# Patient Record
Sex: MALE | Race: WHITE | NOT HISPANIC OR LATINO | Employment: OTHER | URBAN - METROPOLITAN AREA
[De-identification: names, ages, dates, MRNs, and addresses within clinical notes are randomized per-mention and may not be internally consistent; named-entity substitution may affect disease eponyms.]

---

## 2018-08-20 PROBLEM — C18.4 MALIGNANT NEOPLASM OF TRANSVERSE COLON: Status: ACTIVE | Noted: 2018-08-20

## 2018-08-20 NOTE — H&P (VIEW-ONLY)
History & Physical    SUBJECTIVE:     History of Present Illness:  Patient is a 68 y.o. male presents with newly diagnosed colon cancer on recent colonoscopy.     Chief Complaint   Patient presents with    Colon Cancer       Review of patient's allergies indicates:  No Known Allergies    Current Outpatient Medications   Medication Sig Dispense Refill    atorvastatin (LIPITOR) 10 MG tablet TAKE 1 TABLET BY MOUTH EVERY BEDTIME  4    cholecalciferol, vitamin D3, (VITAMIN D3) 400 unit Cap Take 400 Units by mouth once daily.      docusate (COLACE) 60 mg/15 mL syrup Take 100 mg by mouth 2 (two) times daily.      escitalopram oxalate (LEXAPRO) 10 MG tablet Take 10 mg by mouth once daily.      ferrous sulfate 325 mg (65 mg iron) Tab tablet Take 1 tablet by mouth once daily.  0    hydroCHLOROthiazide (HYDRODIURIL) 25 MG tablet Take 25 mg by mouth every morning.  4    levetiracetam (KEPPRA) 500 MG Tab Take 500 mg by mouth 2 (two) times daily.      lisinopril (PRINIVIL,ZESTRIL) 5 MG tablet Take 5 mg by mouth once daily.      omeprazole (PRILOSEC) 20 MG capsule Take by mouth daily as needed.  4    potassium chloride (MICRO-K) 10 MEQ CpSR Take 10 mEq by mouth once.      SYMBICORT 160-4.5 mcg/actuation HFAA 2 puffs 2 (two) times daily.  2    VENTOLIN HFA 90 mcg/actuation inhaler Inhale 2 puffs into the lungs every 4 (four) hours as needed.  4     No current facility-administered medications for this visit.        Past Medical History:   Diagnosis Date    Aphagia     Cancer     prostate & colon    Cardiomegaly     Dysphagia     Hypertension     Seizures     Subdural hemorrhage      Past Surgical History:   Procedure Laterality Date    brain coiling      PROSTATE SURGERY      TRACHEOSTOMY TUBE PLACEMENT       Family History   Problem Relation Age of Onset    Aneurysm Sister      Social History     Tobacco Use    Smoking status: Current Every Day Smoker    Smokeless tobacco: Never Used   Substance Use  "Topics    Alcohol use: Yes    Drug use: No        Review of Systems:  Review of Systems   Constitutional: Negative.    HENT: Negative.    Eyes: Negative.    Respiratory: Negative.    Cardiovascular: Negative.    Gastrointestinal: Negative.    Endocrine: Negative.    Musculoskeletal: Negative.    Skin: Negative.    Allergic/Immunologic: Negative.    Neurological: Negative.    Hematological: Negative.    Psychiatric/Behavioral: Negative.    All other systems reviewed and are negative.      OBJECTIVE:     Vital Signs (Most Recent)  Pulse: 79 (08/20/18 1411)  BP: 131/69 (08/20/18 1411)  5' 6" (1.676 m)  105.7 kg (233 lb)     Physical Exam:  Physical Exam   Constitutional: He is oriented to person, place, and time. He appears well-developed and well-nourished.   HENT:   Head: Normocephalic and atraumatic.   Right Ear: External ear normal.   Left Ear: External ear normal.   Nose: Nose normal.   Mouth/Throat: Oropharynx is clear and moist.   Eyes: Conjunctivae and EOM are normal. Pupils are equal, round, and reactive to light.   Neck: Normal range of motion. Neck supple.   Cardiovascular: Normal rate, regular rhythm, normal heart sounds and intact distal pulses.   Pulmonary/Chest: Effort normal and breath sounds normal.   Abdominal: Soft. Bowel sounds are normal.       Musculoskeletal: Normal range of motion.   Neurological: He is alert and oriented to person, place, and time. He has normal reflexes.   Skin: Skin is warm and dry.   Psychiatric: He has a normal mood and affect. His behavior is normal. Thought content normal.   Vitals reviewed.      Laboratory  none    Diagnostic Results:  none    ASSESSMENT/PLAN:     Colon cancer    PLAN:Plan     To the OR for lap colectomy with risks and possible complications were explained and he agrees to proceed        "

## 2018-09-05 ENCOUNTER — HOSPITAL ENCOUNTER (OUTPATIENT)
Dept: PREADMISSION TESTING | Facility: HOSPITAL | Age: 68
Discharge: HOME | End: 2018-09-05
Attending: SURGERY
Payer: MEDICARE

## 2018-09-05 VITALS
HEART RATE: 68 BPM | DIASTOLIC BLOOD PRESSURE: 74 MMHG | OXYGEN SATURATION: 95 % | SYSTOLIC BLOOD PRESSURE: 119 MMHG | WEIGHT: 230.19 LBS | BODY MASS INDEX: 36.99 KG/M2 | TEMPERATURE: 97 F | RESPIRATION RATE: 18 BRPM | HEIGHT: 66 IN

## 2018-09-05 DIAGNOSIS — C18.4 MALIGNANT NEOPLASM OF TRANSVERSE COLON: ICD-10-CM

## 2018-09-05 DIAGNOSIS — Z01.818 PRE-OP TESTING: Primary | ICD-10-CM

## 2018-09-05 LAB
ALBUMIN SERPL BCP-MCNC: 3.8 G/DL
ALP SERPL-CCNC: 84 U/L
ALT SERPL W/O P-5'-P-CCNC: 14 U/L
ANION GAP SERPL CALC-SCNC: 11 MMOL/L
AST SERPL-CCNC: 14 U/L
BASOPHILS # BLD AUTO: 0.03 K/UL
BASOPHILS NFR BLD: 0.2 %
BILIRUB SERPL-MCNC: 0.5 MG/DL
BUN SERPL-MCNC: 18 MG/DL
CALCIUM SERPL-MCNC: 10 MG/DL
CHLORIDE SERPL-SCNC: 104 MMOL/L
CO2 SERPL-SCNC: 28 MMOL/L
CREAT SERPL-MCNC: 1.1 MG/DL
DIFFERENTIAL METHOD: ABNORMAL
EOSINOPHIL # BLD AUTO: 0.5 K/UL
EOSINOPHIL NFR BLD: 3.7 %
ERYTHROCYTE [DISTWIDTH] IN BLOOD BY AUTOMATED COUNT: 15.4 %
EST. GFR  (AFRICAN AMERICAN): >60 ML/MIN/1.73 M^2
EST. GFR  (NON AFRICAN AMERICAN): >60 ML/MIN/1.73 M^2
GLUCOSE SERPL-MCNC: 98 MG/DL
HCT VFR BLD AUTO: 42.7 %
HGB BLD-MCNC: 14.7 G/DL
LYMPHOCYTES # BLD AUTO: 3 K/UL
LYMPHOCYTES NFR BLD: 22.4 %
MCH RBC QN AUTO: 29.6 PG
MCHC RBC AUTO-ENTMCNC: 34.4 G/DL
MCV RBC AUTO: 86 FL
MONOCYTES # BLD AUTO: 1.1 K/UL
MONOCYTES NFR BLD: 7.9 %
NEUTROPHILS # BLD AUTO: 8.7 K/UL
NEUTROPHILS NFR BLD: 65.4 %
PLATELET # BLD AUTO: 373 K/UL
PMV BLD AUTO: 9.6 FL
POTASSIUM SERPL-SCNC: 4.7 MMOL/L
PROT SERPL-MCNC: 7.4 G/DL
RBC # BLD AUTO: 4.97 M/UL
SODIUM SERPL-SCNC: 143 MMOL/L
WBC # BLD AUTO: 13.36 K/UL

## 2018-09-05 PROCEDURE — 93005 ELECTROCARDIOGRAM TRACING: CPT

## 2018-09-05 PROCEDURE — 80053 COMPREHEN METABOLIC PANEL: CPT

## 2018-09-05 PROCEDURE — 36415 COLL VENOUS BLD VENIPUNCTURE: CPT

## 2018-09-05 PROCEDURE — 93010 ELECTROCARDIOGRAM REPORT: CPT | Mod: ,,, | Performed by: INTERNAL MEDICINE

## 2018-09-05 PROCEDURE — 85025 COMPLETE CBC W/AUTO DIFF WBC: CPT

## 2018-09-05 RX ORDER — ASPIRIN 81 MG/1
81 TABLET ORAL DAILY
COMMUNITY

## 2018-09-05 NOTE — DISCHARGE INSTRUCTIONS
"  Your surgery is scheduled for __Monday Sept 10, 2018__________________.    Call [phone] between 2 p.m. and 5 p.m. on   _Friday___ to find out your arrival time for the day of your surgery.      Please report to SAME DAY SURGERY UNIT on the 2nd FLOOR at _______ a.m.  Use front door entrance. The doors open at 0530 am.      If you need WHEELCHAIR assistance please call  [phone] from your cell phone or "0"  from the  hospital courtesy phone located to the right after you enter the hospital lobby.      INSTRUCTIONS IMPORTANT!!!  ¨ Do not eat or drink after 12 midnight-including water. OK to brush teeth, no   gum, candy or mints!    ¨ Take only these medicines with a small swallow of water-morning of surgery.  Take Levetiracetam (Keppra) and Omeprazole with swallow of water.    Take Flagyl and Neomycin 2 tablets ( each) at 7 PM and 11 PM  Drink entire gallon of prescribed PREP as directed evening before surgery.          _x___  Prep instructions:   SHOWER     _x___  Please shower using Hibiclens soap the night before AND  the morning of  your surgery/procedure. Do not use Hibiclens on your face or genitals       x        If your surgery is around your belly button (Navel) be sure to wash inside your belly button also. Rinse hibiclens off completely.  _x___  No shaving of procedural area at least 4-5 days before surgery due to  increased risk of skin irritation and/or possible infection.  ____  Do not wear makeup, including mascara. WEARING EYE MAKEUP MAY LEAD TO SERIOUS EYE INJURY during surgery.  _x___  No powder, lotions or creams to your body.  _x___  You may wear only deodorant on the day of surgery.  _x___  Please remove all jewelry, including piercings and leave at home.  ____  No money or valuables needed. Please leave at home.  You may bring your cell phone.  ____  Please bring any documents given by your doctor.  ____  If going home the same day, arrange for a ride home. You will not be able to drive if " Anesthesia was used.  ____  Children under 18 years require a parent / guardian present the entire time they are in surgery / recovery.  _x___  Wear loose fitting clothing. Allow for dressings, bandages.  _x___  Stop Aspirin, Ibuprofen, Motrin and Aleve at least 3-5 days before surgery, unless otherwise instructed by your doctor, or the nurse.              You MAY use Tylenol/acetaminophen until day of surgery.  ____  If you take diabetic medication, do not take am of surgery unless instructed by   Doctor.  _x___  Call MD for temperature above 101 degrees.        _x___ Stop taking any Fish Oil supplement or any Vitamins that contain Vitamin  E at least 5 days prior to surgery.          I have read or had read and explained to me, and understand the above information.  Additional comments or instructions:Please call   [phone] if you have any questions regarding the instructions above.

## 2018-09-09 ENCOUNTER — ANESTHESIA EVENT (OUTPATIENT)
Dept: SURGERY | Facility: HOSPITAL | Age: 68
DRG: 331 | End: 2018-09-09
Payer: MEDICARE

## 2018-09-10 ENCOUNTER — ANESTHESIA (OUTPATIENT)
Dept: SURGERY | Facility: HOSPITAL | Age: 68
DRG: 331 | End: 2018-09-10
Payer: MEDICARE

## 2018-09-10 ENCOUNTER — HOSPITAL ENCOUNTER (INPATIENT)
Facility: HOSPITAL | Age: 68
LOS: 3 days | Discharge: HOME | DRG: 331 | End: 2018-09-13
Attending: SURGERY | Admitting: SURGERY
Payer: MEDICARE

## 2018-09-10 DIAGNOSIS — C18.4 MALIGNANT NEOPLASM OF TRANSVERSE COLON: Primary | ICD-10-CM

## 2018-09-10 PROCEDURE — C9290 INJ, BUPIVACAINE LIPOSOME: HCPCS | Performed by: SURGERY

## 2018-09-10 PROCEDURE — 25000003 PHARM REV CODE 250: Performed by: NURSE ANESTHETIST, CERTIFIED REGISTERED

## 2018-09-10 PROCEDURE — 25000003 PHARM REV CODE 250: Performed by: SURGERY

## 2018-09-10 PROCEDURE — 63600175 PHARM REV CODE 636 W HCPCS: Performed by: SURGERY

## 2018-09-10 PROCEDURE — V2790 AMNIOTIC MEMBRANE: HCPCS | Performed by: SURGERY

## 2018-09-10 PROCEDURE — 27201423 OPTIME MED/SURG SUP & DEVICES STERILE SUPPLY: Performed by: SURGERY

## 2018-09-10 PROCEDURE — 25000003 PHARM REV CODE 250: Performed by: ANESTHESIOLOGY

## 2018-09-10 PROCEDURE — D9220A PRA ANESTHESIA: Mod: CRNA,,, | Performed by: NURSE ANESTHETIST, CERTIFIED REGISTERED

## 2018-09-10 PROCEDURE — 36000711: Performed by: SURGERY

## 2018-09-10 PROCEDURE — 37000008 HC ANESTHESIA 1ST 15 MINUTES: Performed by: SURGERY

## 2018-09-10 PROCEDURE — 36000710: Performed by: SURGERY

## 2018-09-10 PROCEDURE — 37000009 HC ANESTHESIA EA ADD 15 MINS: Performed by: SURGERY

## 2018-09-10 PROCEDURE — 63600175 PHARM REV CODE 636 W HCPCS: Performed by: NURSE ANESTHETIST, CERTIFIED REGISTERED

## 2018-09-10 PROCEDURE — D9220A PRA ANESTHESIA: Mod: ANES,,, | Performed by: ANESTHESIOLOGY

## 2018-09-10 PROCEDURE — 0DTF4ZZ RESECTION OF RIGHT LARGE INTESTINE, PERCUTANEOUS ENDOSCOPIC APPROACH: ICD-10-PCS | Performed by: SURGERY

## 2018-09-10 PROCEDURE — 88307 TISSUE EXAM BY PATHOLOGIST: CPT | Performed by: PATHOLOGY

## 2018-09-10 PROCEDURE — 63600175 PHARM REV CODE 636 W HCPCS: Performed by: ANESTHESIOLOGY

## 2018-09-10 PROCEDURE — 88307 TISSUE EXAM BY PATHOLOGIST: CPT | Mod: 26,,, | Performed by: PATHOLOGY

## 2018-09-10 PROCEDURE — 94640 AIRWAY INHALATION TREATMENT: CPT

## 2018-09-10 PROCEDURE — 25000242 PHARM REV CODE 250 ALT 637 W/ HCPCS: Performed by: NURSE ANESTHETIST, CERTIFIED REGISTERED

## 2018-09-10 PROCEDURE — 71000033 HC RECOVERY, INTIAL HOUR: Performed by: SURGERY

## 2018-09-10 PROCEDURE — 71000039 HC RECOVERY, EACH ADD'L HOUR: Performed by: SURGERY

## 2018-09-10 PROCEDURE — 11000001 HC ACUTE MED/SURG PRIVATE ROOM

## 2018-09-10 PROCEDURE — 25000242 PHARM REV CODE 250 ALT 637 W/ HCPCS: Performed by: ANESTHESIOLOGY

## 2018-09-10 DEVICE — MEMBRANE AMNIOFIX 2X12CM: Type: IMPLANTABLE DEVICE | Site: ABDOMEN | Status: FUNCTIONAL

## 2018-09-10 RX ORDER — ACETAMINOPHEN 10 MG/ML
1000 INJECTION, SOLUTION INTRAVENOUS ONCE
Status: COMPLETED | OUTPATIENT
Start: 2018-09-10 | End: 2018-09-10

## 2018-09-10 RX ORDER — LIDOCAINE HYDROCHLORIDE 10 MG/ML
1 INJECTION, SOLUTION EPIDURAL; INFILTRATION; INTRACAUDAL; PERINEURAL ONCE
Status: DISCONTINUED | OUTPATIENT
Start: 2018-09-10 | End: 2018-09-10 | Stop reason: HOSPADM

## 2018-09-10 RX ORDER — ALVIMOPAN 12 MG/1
12 CAPSULE ORAL 2 TIMES DAILY
Status: DISCONTINUED | OUTPATIENT
Start: 2018-09-10 | End: 2018-09-13 | Stop reason: HOSPADM

## 2018-09-10 RX ORDER — METOPROLOL TARTRATE 1 MG/ML
INJECTION, SOLUTION INTRAVENOUS
Status: DISCONTINUED | OUTPATIENT
Start: 2018-09-10 | End: 2018-09-10

## 2018-09-10 RX ORDER — ONDANSETRON 2 MG/ML
4 INJECTION INTRAMUSCULAR; INTRAVENOUS EVERY 6 HOURS PRN
Status: DISCONTINUED | OUTPATIENT
Start: 2018-09-10 | End: 2018-09-10 | Stop reason: HOSPADM

## 2018-09-10 RX ORDER — BUPIVACAINE HYDROCHLORIDE 2.5 MG/ML
INJECTION, SOLUTION EPIDURAL; INFILTRATION; INTRACAUDAL
Status: DISCONTINUED | OUTPATIENT
Start: 2018-09-10 | End: 2018-09-10 | Stop reason: HOSPADM

## 2018-09-10 RX ORDER — PROPOFOL 10 MG/ML
VIAL (ML) INTRAVENOUS
Status: DISCONTINUED | OUTPATIENT
Start: 2018-09-10 | End: 2018-09-10

## 2018-09-10 RX ORDER — SODIUM CHLORIDE 9 MG/ML
INJECTION, SOLUTION INTRAVENOUS CONTINUOUS
Status: DISCONTINUED | OUTPATIENT
Start: 2018-09-10 | End: 2018-09-10

## 2018-09-10 RX ORDER — LEVETIRACETAM 500 MG/1
500 TABLET ORAL 2 TIMES DAILY
Status: DISCONTINUED | OUTPATIENT
Start: 2018-09-10 | End: 2018-09-13 | Stop reason: HOSPADM

## 2018-09-10 RX ORDER — HYDRALAZINE HYDROCHLORIDE 20 MG/ML
INJECTION INTRAMUSCULAR; INTRAVENOUS
Status: DISCONTINUED | OUTPATIENT
Start: 2018-09-10 | End: 2018-09-10

## 2018-09-10 RX ORDER — LIDOCAINE HCL/PF 100 MG/5ML
SYRINGE (ML) INTRAVENOUS
Status: DISCONTINUED | OUTPATIENT
Start: 2018-09-10 | End: 2018-09-10

## 2018-09-10 RX ORDER — HYDROMORPHONE HYDROCHLORIDE 2 MG/ML
0.2 INJECTION, SOLUTION INTRAMUSCULAR; INTRAVENOUS; SUBCUTANEOUS EVERY 5 MIN PRN
Status: DISCONTINUED | OUTPATIENT
Start: 2018-09-10 | End: 2018-09-10 | Stop reason: HOSPADM

## 2018-09-10 RX ORDER — MIDAZOLAM HYDROCHLORIDE 1 MG/ML
INJECTION, SOLUTION INTRAMUSCULAR; INTRAVENOUS
Status: DISCONTINUED | OUTPATIENT
Start: 2018-09-10 | End: 2018-09-10

## 2018-09-10 RX ORDER — FENTANYL CITRATE 50 UG/ML
25 INJECTION, SOLUTION INTRAMUSCULAR; INTRAVENOUS EVERY 5 MIN PRN
Status: DISCONTINUED | OUTPATIENT
Start: 2018-09-10 | End: 2018-09-10 | Stop reason: HOSPADM

## 2018-09-10 RX ORDER — SODIUM CHLORIDE, SODIUM LACTATE, POTASSIUM CHLORIDE, CALCIUM CHLORIDE 600; 310; 30; 20 MG/100ML; MG/100ML; MG/100ML; MG/100ML
1000 INJECTION, SOLUTION INTRAVENOUS ONCE
Status: DISCONTINUED | OUTPATIENT
Start: 2018-09-10 | End: 2018-09-10 | Stop reason: HOSPADM

## 2018-09-10 RX ORDER — CEFAZOLIN SODIUM 2 G/50ML
2 SOLUTION INTRAVENOUS
Status: COMPLETED | OUTPATIENT
Start: 2018-09-10 | End: 2018-09-10

## 2018-09-10 RX ORDER — CEFOXITIN SODIUM 2 G/50ML
2 INJECTION, SOLUTION INTRAVENOUS
Status: COMPLETED | OUTPATIENT
Start: 2018-09-10 | End: 2018-09-11

## 2018-09-10 RX ORDER — PHENYLEPHRINE HYDROCHLORIDE 10 MG/ML
INJECTION INTRAVENOUS
Status: DISCONTINUED | OUTPATIENT
Start: 2018-09-10 | End: 2018-09-10

## 2018-09-10 RX ORDER — SODIUM CHLORIDE, SODIUM LACTATE, POTASSIUM CHLORIDE, CALCIUM CHLORIDE 600; 310; 30; 20 MG/100ML; MG/100ML; MG/100ML; MG/100ML
INJECTION, SOLUTION INTRAVENOUS CONTINUOUS
Status: DISCONTINUED | OUTPATIENT
Start: 2018-09-10 | End: 2018-09-10

## 2018-09-10 RX ORDER — ESCITALOPRAM OXALATE 10 MG/1
10 TABLET ORAL DAILY
Status: DISCONTINUED | OUTPATIENT
Start: 2018-09-10 | End: 2018-09-13 | Stop reason: HOSPADM

## 2018-09-10 RX ORDER — HYDROCHLOROTHIAZIDE 25 MG/1
25 TABLET ORAL EVERY MORNING
Status: DISCONTINUED | OUTPATIENT
Start: 2018-09-10 | End: 2018-09-13 | Stop reason: HOSPADM

## 2018-09-10 RX ORDER — SODIUM CHLORIDE 9 MG/ML
INJECTION, SOLUTION INTRAVENOUS CONTINUOUS
Status: DISCONTINUED | OUTPATIENT
Start: 2018-09-10 | End: 2018-09-12

## 2018-09-10 RX ORDER — ALBUTEROL SULFATE 90 UG/1
AEROSOL, METERED RESPIRATORY (INHALATION)
Status: DISCONTINUED | OUTPATIENT
Start: 2018-09-10 | End: 2018-09-10

## 2018-09-10 RX ORDER — GLYCOPYRROLATE 0.2 MG/ML
INJECTION INTRAMUSCULAR; INTRAVENOUS
Status: DISCONTINUED | OUTPATIENT
Start: 2018-09-10 | End: 2018-09-10

## 2018-09-10 RX ORDER — MORPHINE SULFATE 10 MG/ML
5 INJECTION, SOLUTION INTRAMUSCULAR; INTRAVENOUS EVERY 4 HOURS PRN
Status: DISCONTINUED | OUTPATIENT
Start: 2018-09-10 | End: 2018-09-10

## 2018-09-10 RX ORDER — ALBUTEROL SULFATE 2.5 MG/.5ML
2.5 SOLUTION RESPIRATORY (INHALATION) ONCE
Status: COMPLETED | OUTPATIENT
Start: 2018-09-10 | End: 2018-09-10

## 2018-09-10 RX ORDER — SUCCINYLCHOLINE CHLORIDE 20 MG/ML
INJECTION INTRAMUSCULAR; INTRAVENOUS
Status: DISCONTINUED | OUTPATIENT
Start: 2018-09-10 | End: 2018-09-10

## 2018-09-10 RX ORDER — ENOXAPARIN SODIUM 100 MG/ML
40 INJECTION SUBCUTANEOUS EVERY 24 HOURS
Status: DISCONTINUED | OUTPATIENT
Start: 2018-09-11 | End: 2018-09-13 | Stop reason: HOSPADM

## 2018-09-10 RX ORDER — MORPHINE SULFATE 4 MG/ML
5 INJECTION, SOLUTION INTRAMUSCULAR; INTRAVENOUS EVERY 4 HOURS PRN
Status: DISCONTINUED | OUTPATIENT
Start: 2018-09-10 | End: 2018-09-13

## 2018-09-10 RX ORDER — SODIUM CHLORIDE 0.9 % (FLUSH) 0.9 %
3 SYRINGE (ML) INJECTION
Status: DISCONTINUED | OUTPATIENT
Start: 2018-09-10 | End: 2018-09-10 | Stop reason: HOSPADM

## 2018-09-10 RX ORDER — ROCURONIUM BROMIDE 10 MG/ML
INJECTION, SOLUTION INTRAVENOUS
Status: DISCONTINUED | OUTPATIENT
Start: 2018-09-10 | End: 2018-09-10

## 2018-09-10 RX ORDER — LISINOPRIL 5 MG/1
5 TABLET ORAL DAILY
Status: DISCONTINUED | OUTPATIENT
Start: 2018-09-10 | End: 2018-09-13 | Stop reason: HOSPADM

## 2018-09-10 RX ORDER — NEOSTIGMINE METHYLSULFATE 1 MG/ML
INJECTION, SOLUTION INTRAVENOUS
Status: DISCONTINUED | OUTPATIENT
Start: 2018-09-10 | End: 2018-09-10

## 2018-09-10 RX ORDER — EPHEDRINE SULFATE 50 MG/ML
INJECTION, SOLUTION INTRAVENOUS
Status: DISCONTINUED | OUTPATIENT
Start: 2018-09-10 | End: 2018-09-10

## 2018-09-10 RX ORDER — FENTANYL CITRATE 50 UG/ML
INJECTION, SOLUTION INTRAMUSCULAR; INTRAVENOUS
Status: DISCONTINUED | OUTPATIENT
Start: 2018-09-10 | End: 2018-09-10

## 2018-09-10 RX ADMIN — ROCURONIUM BROMIDE 10 MG: 10 INJECTION, SOLUTION INTRAVENOUS at 08:09

## 2018-09-10 RX ADMIN — FENTANYL CITRATE 100 MCG: 50 INJECTION INTRAMUSCULAR; INTRAVENOUS at 07:09

## 2018-09-10 RX ADMIN — ACETAMINOPHEN 1000 MG: 10 INJECTION, SOLUTION INTRAVENOUS at 09:09

## 2018-09-10 RX ADMIN — PHENYLEPHRINE HYDROCHLORIDE 100 MCG: 10 INJECTION INTRAVENOUS at 07:09

## 2018-09-10 RX ADMIN — HYDROMORPHONE HYDROCHLORIDE 0.2 MG: 2 INJECTION, SOLUTION INTRAMUSCULAR; INTRAVENOUS; SUBCUTANEOUS at 09:09

## 2018-09-10 RX ADMIN — SODIUM CHLORIDE, SODIUM LACTATE, POTASSIUM CHLORIDE, AND CALCIUM CHLORIDE: .6; .31; .03; .02 INJECTION, SOLUTION INTRAVENOUS at 08:09

## 2018-09-10 RX ADMIN — LISINOPRIL 5 MG: 5 TABLET ORAL at 11:09

## 2018-09-10 RX ADMIN — ESCITALOPRAM OXALATE 10 MG: 10 TABLET ORAL at 11:09

## 2018-09-10 RX ADMIN — ONDANSETRON 4 MG: 2 INJECTION, SOLUTION INTRAMUSCULAR; INTRAVENOUS at 09:09

## 2018-09-10 RX ADMIN — SODIUM CHLORIDE: 0.9 INJECTION, SOLUTION INTRAVENOUS at 11:09

## 2018-09-10 RX ADMIN — GLYCOPYRROLATE 0.2 MG: 0.2 INJECTION, SOLUTION INTRAMUSCULAR; INTRAVENOUS at 08:09

## 2018-09-10 RX ADMIN — ALVIMOPAN 12 MG: 12 CAPSULE ORAL at 11:09

## 2018-09-10 RX ADMIN — ALBUTEROL SULFATE 4 PUFF: 90 AEROSOL, METERED RESPIRATORY (INHALATION) at 09:09

## 2018-09-10 RX ADMIN — HYDROMORPHONE HYDROCHLORIDE 0.2 MG: 2 INJECTION, SOLUTION INTRAMUSCULAR; INTRAVENOUS; SUBCUTANEOUS at 10:09

## 2018-09-10 RX ADMIN — LEVETIRACETAM 500 MG: 500 TABLET, FILM COATED ORAL at 11:09

## 2018-09-10 RX ADMIN — METOPROLOL TARTRATE 2 MG: 1 INJECTION, SOLUTION INTRAVENOUS at 09:09

## 2018-09-10 RX ADMIN — HYDRALAZINE HYDROCHLORIDE 5 MG: 20 INJECTION INTRAMUSCULAR; INTRAVENOUS at 09:09

## 2018-09-10 RX ADMIN — CEFOXITIN SODIUM 2 G: 2 INJECTION, SOLUTION INTRAVENOUS at 02:09

## 2018-09-10 RX ADMIN — SUCCINYLCHOLINE CHLORIDE 120 MG: 20 INJECTION, SOLUTION INTRAMUSCULAR; INTRAVENOUS at 07:09

## 2018-09-10 RX ADMIN — ALVIMOPAN 12 MG: 12 CAPSULE ORAL at 09:09

## 2018-09-10 RX ADMIN — ROCURONIUM BROMIDE 45 MG: 10 INJECTION, SOLUTION INTRAVENOUS at 07:09

## 2018-09-10 RX ADMIN — CEFAZOLIN SODIUM 2 G: 2 SOLUTION INTRAVENOUS at 07:09

## 2018-09-10 RX ADMIN — GLYCOPYRROLATE 0.6 MG: 0.2 INJECTION, SOLUTION INTRAMUSCULAR; INTRAVENOUS at 09:09

## 2018-09-10 RX ADMIN — EPHEDRINE SULFATE 10 MG: 50 INJECTION, SOLUTION INTRAMUSCULAR; INTRAVENOUS; SUBCUTANEOUS at 08:09

## 2018-09-10 RX ADMIN — LEVETIRACETAM 500 MG: 500 TABLET, FILM COATED ORAL at 09:09

## 2018-09-10 RX ADMIN — PROPOFOL 150 MG: 10 INJECTION, EMULSION INTRAVENOUS at 07:09

## 2018-09-10 RX ADMIN — ALBUTEROL SULFATE 2.5 MG: 2.5 SOLUTION RESPIRATORY (INHALATION) at 06:09

## 2018-09-10 RX ADMIN — ALBUTEROL SULFATE 4 PUFF: 90 AEROSOL, METERED RESPIRATORY (INHALATION) at 07:09

## 2018-09-10 RX ADMIN — METOPROLOL TARTRATE 1 MG: 1 INJECTION, SOLUTION INTRAVENOUS at 08:09

## 2018-09-10 RX ADMIN — ROCURONIUM BROMIDE 5 MG: 10 INJECTION, SOLUTION INTRAVENOUS at 07:09

## 2018-09-10 RX ADMIN — MORPHINE SULFATE 5 MG: 4 INJECTION, SOLUTION INTRAMUSCULAR; INTRAVENOUS at 02:09

## 2018-09-10 RX ADMIN — NEOSTIGMINE METHYLSULFATE 5 MG: 1 INJECTION INTRAVENOUS at 09:09

## 2018-09-10 RX ADMIN — LIDOCAINE HYDROCHLORIDE 100 MG: 20 INJECTION, SOLUTION INTRAVENOUS at 07:09

## 2018-09-10 RX ADMIN — CEFOXITIN SODIUM 2 G: 2 INJECTION, SOLUTION INTRAVENOUS at 11:09

## 2018-09-10 RX ADMIN — SODIUM CHLORIDE, SODIUM LACTATE, POTASSIUM CHLORIDE, AND CALCIUM CHLORIDE: .6; .31; .03; .02 INJECTION, SOLUTION INTRAVENOUS at 06:09

## 2018-09-10 RX ADMIN — HYDROCHLOROTHIAZIDE 25 MG: 25 TABLET ORAL at 11:09

## 2018-09-10 RX ADMIN — PROPOFOL 50 MG: 10 INJECTION, EMULSION INTRAVENOUS at 07:09

## 2018-09-10 RX ADMIN — MIDAZOLAM HYDROCHLORIDE 2 MG: 1 INJECTION, SOLUTION INTRAMUSCULAR; INTRAVENOUS at 07:09

## 2018-09-10 RX ADMIN — Medication 10 MG: at 08:09

## 2018-09-10 RX ADMIN — MORPHINE SULFATE 5 MG: 4 INJECTION, SOLUTION INTRAMUSCULAR; INTRAVENOUS at 07:09

## 2018-09-10 RX ADMIN — ROCURONIUM BROMIDE 15 MG: 10 INJECTION, SOLUTION INTRAVENOUS at 08:09

## 2018-09-10 NOTE — BRIEF OP NOTE
[facility]  Brief Operative Note    SUMMARY     Surgery Date: 9/10/2018     Surgeon(s) and Role:     * Mat Glass MD - Primary     * Hector Phan MD - Resident - Assisting        Pre-op Diagnosis:  Malignant neoplasm of transverse colon [C18.4]    Post-op Diagnosis:  Post-Op Diagnosis Codes:     * Malignant neoplasm of transverse colon [C18.4]    Procedure(s) (LRB):  COLECTOMY,LAPAROSCOPIC (N/A)  Right and transverse with SPY    Anesthesia: General    Description of Procedure: laparoscopic extended right colectomy with SPY application    Description of the findings of the procedure: mass in the proximal transverse colon    Estimated Blood Loss: 50 mL         Specimens:   Specimen (12h ago, onward)    Start     Ordered    09/10/18 0848  Specimen to Pathology - Surgery  Once     Comments:  Right colon/Transverse colon     Start Status   09/10/18 0848 Needs to be Collected       09/10/18 0848

## 2018-09-10 NOTE — TRANSFER OF CARE
"Anesthesia Transfer of Care Note    Patient: Justin Adams    Procedure(s) Performed: Procedure(s) (LRB):  COLECTOMY,LAPAROSCOPIC (N/A)    Patient location: PACU    Anesthesia Type: general    Transport from OR: Transported from OR on 6-10 L/min O2 by face mask with adequate spontaneous ventilation    Post pain: adequate analgesia    Post assessment: no apparent anesthetic complications    Post vital signs: stable    Level of consciousness: awake, alert and oriented    Nausea/Vomiting: no nausea/vomiting    Complications: none    Transfer of care protocol was followed      Last vitals:   Visit Vitals  BP (!) 140/70 (BP Location: Left arm, Patient Position: Lying)   Pulse 90   Temp 36.3 °C (97.3 °F) (Oral)   Resp (!) 22   Ht 5' 6" (1.676 m)   Wt 104.4 kg (230 lb 2 oz)   SpO2 97%   BMI 37.14 kg/m²     "

## 2018-09-10 NOTE — PLAN OF CARE
Problem: Patient Care Overview  Goal: Plan of Care Review   09/10/18 1115   Coping/Psychosocial   Plan Of Care Reviewed With patient     Goal: Individualization & Mutuality   09/10/18 0658   Mutuality/Individual Preferences   What Anxieties, Fears, Concerns, or Questions Do You Have About Your Care? All questions answered.    What Information Would Help Us Give You More Personalized Care? none   Individualization   Patient Specific Preferences none       Problem: Fall Risk (Adult)  Goal: Identify Related Risk Factors and Signs and Symptoms  Related risk factors and signs and symptoms are identified upon initiation of Human Response Clinical Practice Guideline (CPG)   09/10/18 1412   Fall Risk   Related Risk Factors (Fall Risk) gait/mobility problems;environment unfamiliar   Signs and Symptoms (Fall Risk) presence of risk factors       Problem: Infection, Risk/Actual (Adult)  Goal: Identify Related Risk Factors and Signs and Symptoms  Related risk factors and signs and symptoms are identified upon initiation of Human Response Clinical Practice Guideline (CPG)   09/10/18 1412   Infection, Risk/Actual   Related Risk Factors (Infection, Risk/Actual) other (see comments)  (surgery)

## 2018-09-10 NOTE — ANESTHESIA PREPROCEDURE EVALUATION
09/10/2018  Justin Adams is a 68 y.o., male.    Anesthesia Evaluation    I have reviewed the Patient Summary Reports.     I have reviewed the Medications.     Review of Systems  Anesthesia Hx:  No problems with previous Anesthesia Denies Hx of Anesthetic complications  History of prior surgery of interest to airway management or planning: Previous anesthesia: General Denies Family Hx of Anesthesia complications.   Denies Personal Hx of Anesthesia complications.   Social:  Smoker 1 PPD  Wheezing on exam.    EENT/Dental:   Dysphagia   Cardiovascular:   Exercise tolerance: good Hypertension, well controlled ECG has been reviewed. cardiomegaly   Pulmonary:   Hx/o tracheostomy   Musculoskeletal:  Musculoskeletal Normal    Neurological:   Seizures, well controlled S/p coiling   Endocrine:  Endocrine Normal    Dermatological:  Skin Normal    Psych:  Psychiatric Normal           Physical Exam  General:  Well nourished, Obesity    Airway/Jaw/Neck:  Airway Findings: Mallampati: III TM Distance: 4 - 6 cm  Jaw/Neck Findings:  Neck ROM: Normal ROM  Neck Findings:  Girth Increased     Eyes/Ears/Nose:  EYES/EARS/NOSE FINDINGS: Normal   Dental:  Dental Findings: In tact   Chest/Lungs:  Chest/Lungs Findings: Expiratory Wheezes, Mild, Expiratory Wheezes, Mod., Normal Respiratory Rate         Mental Status:  Mental Status Findings:  Cooperative, Alert and Oriented         Anesthesia Plan  Type of Anesthesia, risks & benefits discussed:  Anesthesia Type:  general  Patient's Preference:   Intra-op Monitoring Plan:   Intra-op Monitoring Plan Comments:   Post Op Pain Control Plan: multimodal analgesia, IV/PO Opioids PRN and per primary service following discharge from PACU  Post Op Pain Control Plan Comments:   Induction:   IV  Beta Blocker:  Patient is not currently on a Beta-Blocker (No further documentation required).        Informed Consent: Patient understands risks and agrees with Anesthesia plan.  Questions answered. Anesthesia consent signed with patient.  ASA Score: 2     Day of Surgery Review of History & Physical: I have interviewed and examined the patient. I have reviewed the patient's H&P dated:  There are no significant changes.          Ready For Surgery From Anesthesia Perspective.

## 2018-09-10 NOTE — OP NOTE
DATE OF PROCEDURE:  09/10/2018.    PREOPERATIVE DIAGNOSIS:  Transverse or hepatic flexure malignant neoplasm.    POSTOPERATIVE DIAGNOSIS:  Transverse or hepatic flexure malignant neoplasm.    PROCEDURE PERFORMED:  Laparoscopic extended right colectomy.    SURGEON:  Mat Glass M.D.    ASSISTANT:  Sylvester.    ANESTHESIA:  General.    ESTIMATED BLOOD LOSS:  50 mL    DESCRIPTION OF OPERATION:  The patient was brought to the Operating Room, placed   in lithotomy after anesthesia was induced.  He was then prepped and draped   around his abdomen in the usual sterile fashion.  Incision was made in his   umbilicus through which a 5-mm port was inserted under direct vision.  The   abdomen was insufflated with 3.5 liters of CO2.  Another port was placed in his   left upper quadrant and a hand port was placed in his right upper quadrant and   lateral right side of his abdomen.  His colon was mobilized from his appendix   all the way to the mid transverse colon.  The mesentery was then divided between   the terminal ileum and the midtransverse colon.  The tattoo kenrick could be seen   in the hepatic flexure.  Once this was done, the specimen was externalized and   the transverse colon was divided and the distal ileum was divided.  The   side-to-side functional end-to-end anastomosis was then created with staplers   and then the patient had amniotic membrane placed on both sides of the   anastomosis.  Following this, ICG was injected into the patient and using the   laparoscopic SPY camera, there was more than adequate perfusion to the   anastomosis and this was without tension.  This was dropped back into the   patient's abdominal cavity without any evidence of any ongoing bleeding or   problems.  The ports were removed.  The abdomen was desufflated and the port   sites were closed in layers with absorbable suture.  Steri-Strips applied as   well as bandage.  The patient was awakened and transported to Recovery Room in    satisfactory condition.      KOLTON  dd: 09/10/2018 09:06:58 (CDT)  td: 09/10/2018 11:57:47 (CDT)  Doc ID   #[id number]  Job ID #[id number]    CC:

## 2018-09-10 NOTE — OR NURSING
Dr flanagan at bedside - assessing pt breathing   IS given and instructed on how to use - pt verbalized understanding

## 2018-09-10 NOTE — NURSING
[facility] AA[facility], answers questions, follows commands, MAEx4 with general weakness,

## 2018-09-10 NOTE — ANESTHESIA POSTPROCEDURE EVALUATION
"Anesthesia Post Evaluation    Patient: Justin Adams    Procedure(s) Performed: Procedure(s) (LRB):  COLECTOMY,LAPAROSCOPIC (N/A)    Final Anesthesia Type: general  Patient location during evaluation: PACU  Patient participation: Yes- Able to Participate  Level of consciousness: awake and alert and oriented  Post-procedure vital signs: reviewed and stable  Pain management: adequate  Airway patency: patent  PONV status at discharge: No PONV  Anesthetic complications: no      Cardiovascular status: blood pressure returned to baseline and hemodynamically stable  Respiratory status: spontaneous ventilation, room air and nasal cannula  Hydration status: euvolemic  Follow-up not needed.        Visit Vitals  /60 (BP Location: Left arm, Patient Position: Lying)   Pulse 81   Temp 36.4 °C (97.5 °F) (Oral)   Resp 18   Ht 5' 6" (1.676 m)   Wt 104.4 kg (230 lb 2 oz)   SpO2 (!) 93%   BMI 37.14 kg/m²       Pain/Joy Score: Pain Assessment Performed: Yes (9/10/2018 11:00 AM)  Presence of Pain: complains of pain/discomfort (9/10/2018 11:00 AM)  Pain Rating Prior to Med Admin: 3 (9/10/2018 11:00 AM)  Pain Rating Post Med Admin: 3 (9/10/2018 11:00 AM)  Joy Score: 9 (9/10/2018 11:00 AM)        "

## 2018-09-11 LAB
ALBUMIN SERPL BCP-MCNC: 2.8 G/DL
ALP SERPL-CCNC: 66 U/L
ALT SERPL W/O P-5'-P-CCNC: 15 U/L
ANION GAP SERPL CALC-SCNC: 10 MMOL/L
ANION GAP SERPL CALC-SCNC: 10 MMOL/L
AST SERPL-CCNC: 18 U/L
BASOPHILS # BLD AUTO: 0.01 K/UL
BASOPHILS NFR BLD: 0.1 %
BILIRUB SERPL-MCNC: 0.5 MG/DL
BUN SERPL-MCNC: 10 MG/DL
BUN SERPL-MCNC: 10 MG/DL
CALCIUM SERPL-MCNC: 8.4 MG/DL
CALCIUM SERPL-MCNC: 8.4 MG/DL
CHLORIDE SERPL-SCNC: 104 MMOL/L
CHLORIDE SERPL-SCNC: 104 MMOL/L
CO2 SERPL-SCNC: 23 MMOL/L
CO2 SERPL-SCNC: 23 MMOL/L
CREAT SERPL-MCNC: 0.8 MG/DL
CREAT SERPL-MCNC: 0.8 MG/DL
DIFFERENTIAL METHOD: ABNORMAL
EOSINOPHIL # BLD AUTO: 0 K/UL
EOSINOPHIL NFR BLD: 0.2 %
ERYTHROCYTE [DISTWIDTH] IN BLOOD BY AUTOMATED COUNT: 15.3 %
EST. GFR  (AFRICAN AMERICAN): >60 ML/MIN/1.73 M^2
EST. GFR  (AFRICAN AMERICAN): >60 ML/MIN/1.73 M^2
EST. GFR  (NON AFRICAN AMERICAN): >60 ML/MIN/1.73 M^2
EST. GFR  (NON AFRICAN AMERICAN): >60 ML/MIN/1.73 M^2
GLUCOSE SERPL-MCNC: 102 MG/DL
GLUCOSE SERPL-MCNC: 102 MG/DL
HCT VFR BLD AUTO: 38.9 %
HGB BLD-MCNC: 13.1 G/DL
LYMPHOCYTES # BLD AUTO: 1.6 K/UL
LYMPHOCYTES NFR BLD: 9.8 %
MAGNESIUM SERPL-MCNC: 1.4 MG/DL
MCH RBC QN AUTO: 29.6 PG
MCHC RBC AUTO-ENTMCNC: 33.7 G/DL
MCV RBC AUTO: 88 FL
MONOCYTES # BLD AUTO: 1.7 K/UL
MONOCYTES NFR BLD: 10.3 %
NEUTROPHILS # BLD AUTO: 12.7 K/UL
NEUTROPHILS NFR BLD: 79.3 %
PLATELET # BLD AUTO: 336 K/UL
PMV BLD AUTO: 10.5 FL
POTASSIUM SERPL-SCNC: 3.7 MMOL/L
POTASSIUM SERPL-SCNC: 3.7 MMOL/L
PROT SERPL-MCNC: 6.3 G/DL
RBC # BLD AUTO: 4.42 M/UL
SODIUM SERPL-SCNC: 137 MMOL/L
SODIUM SERPL-SCNC: 137 MMOL/L
WBC # BLD AUTO: 15.99 K/UL

## 2018-09-11 PROCEDURE — 36415 COLL VENOUS BLD VENIPUNCTURE: CPT

## 2018-09-11 PROCEDURE — 94761 N-INVAS EAR/PLS OXIMETRY MLT: CPT

## 2018-09-11 PROCEDURE — 25000242 PHARM REV CODE 250 ALT 637 W/ HCPCS: Performed by: SURGERY

## 2018-09-11 PROCEDURE — 25000003 PHARM REV CODE 250: Performed by: SURGERY

## 2018-09-11 PROCEDURE — 83735 ASSAY OF MAGNESIUM: CPT

## 2018-09-11 PROCEDURE — 94640 AIRWAY INHALATION TREATMENT: CPT

## 2018-09-11 PROCEDURE — 85025 COMPLETE CBC W/AUTO DIFF WBC: CPT

## 2018-09-11 PROCEDURE — 94799 UNLISTED PULMONARY SVC/PX: CPT

## 2018-09-11 PROCEDURE — 27000221 HC OXYGEN, UP TO 24 HOURS

## 2018-09-11 PROCEDURE — 11000001 HC ACUTE MED/SURG PRIVATE ROOM

## 2018-09-11 PROCEDURE — 63600175 PHARM REV CODE 636 W HCPCS: Performed by: SURGERY

## 2018-09-11 PROCEDURE — 80053 COMPREHEN METABOLIC PANEL: CPT

## 2018-09-11 RX ORDER — IPRATROPIUM BROMIDE AND ALBUTEROL SULFATE 2.5; .5 MG/3ML; MG/3ML
3 SOLUTION RESPIRATORY (INHALATION) EVERY 4 HOURS PRN
Status: DISCONTINUED | OUTPATIENT
Start: 2018-09-11 | End: 2018-09-12

## 2018-09-11 RX ADMIN — LEVETIRACETAM 500 MG: 500 TABLET, FILM COATED ORAL at 08:09

## 2018-09-11 RX ADMIN — MORPHINE SULFATE 5 MG: 4 INJECTION, SOLUTION INTRAMUSCULAR; INTRAVENOUS at 04:09

## 2018-09-11 RX ADMIN — ESCITALOPRAM OXALATE 10 MG: 10 TABLET ORAL at 08:09

## 2018-09-11 RX ADMIN — ALVIMOPAN 12 MG: 12 CAPSULE ORAL at 08:09

## 2018-09-11 RX ADMIN — ENOXAPARIN SODIUM 40 MG: 40 INJECTION, SOLUTION INTRAVENOUS; SUBCUTANEOUS at 04:09

## 2018-09-11 RX ADMIN — LISINOPRIL 5 MG: 5 TABLET ORAL at 08:09

## 2018-09-11 RX ADMIN — SODIUM CHLORIDE: 0.9 INJECTION, SOLUTION INTRAVENOUS at 06:09

## 2018-09-11 RX ADMIN — IPRATROPIUM BROMIDE AND ALBUTEROL SULFATE 3 ML: .5; 2.5 SOLUTION RESPIRATORY (INHALATION) at 07:09

## 2018-09-11 RX ADMIN — HYDROCHLOROTHIAZIDE 25 MG: 25 TABLET ORAL at 06:09

## 2018-09-11 NOTE — NURSING
Problem: Patient Care Overview  Goal: Plan of Care Review  Outcome: Ongoing (interventions implemented as appropriate)    09/11/18 9053   Coping/Psychosocial   Plan Of Care Reviewed With patient   Pt AOx3. No s/s of distress or discomfort noted. Dressing on abd intact. O2 via nasal cannula. Iv intact. Fluids infusing and antibiotics. Tolerating well. Will cont to monitor.

## 2018-09-11 NOTE — PROGRESS NOTES
[facility]      General Surgery Progress Note    09/11/2018  8:43 AM      Patient: Justin Adams  MRN: [id number]  Admit Date: 9/10/2018  LOS: 1        Subjective                                                                                                      POD1 extended right hemicolectomy for colon adenocarcinoma.     Doing well. Tolerated small amt PO. No gas. Pain controlled.     Patient Active Problem List   Diagnosis    Malignant neoplasm of transverse colon       No current facility-administered medications on file prior to encounter.      Current Outpatient Medications on File Prior to Encounter   Medication Sig Dispense Refill    atorvastatin (LIPITOR) 10 MG tablet TAKE 1 TABLET BY MOUTH EVERY BEDTIME  4    docusate (COLACE) 60 mg/15 mL syrup Take 100 mg by mouth 2 (two) times daily.      escitalopram oxalate (LEXAPRO) 10 MG tablet Take 10 mg by mouth once daily.      ferrous sulfate 325 mg (65 mg iron) Tab tablet Take 1 tablet by mouth once daily.  0    hydroCHLOROthiazide (HYDRODIURIL) 25 MG tablet Take 25 mg by mouth every morning.  4    levetiracetam (KEPPRA) 500 MG Tab Take 500 mg by mouth 2 (two) times daily.      lisinopril (PRINIVIL,ZESTRIL) 5 MG tablet Take 5 mg by mouth once daily.      metroNIDAZOLE (FLAGYL) 500 MG tablet Take 2 tabs at 7pm & 11pm night before surgery 4 tablet 0    neomycin (MYCIFRADIN) 500 mg Tab Take 2 tabs at 7pm & 11pm night before surgery 4 tablet 0    potassium chloride (MICRO-K) 10 MEQ CpSR Take 10 mEq by mouth once.      SYMBICORT 160-4.5 mcg/actuation HFAA 2 puffs 2 (two) times daily.  2    omeprazole (PRILOSEC) 20 MG capsule Take by mouth daily as needed.  4    VENTOLIN HFA 90 mcg/actuation inhaler Inhale 2 puffs into the lungs every 4 (four) hours as needed.  4       Objective                                                                                                          Vitals:    09/10/18 1945 09/10/18 1957  09/10/18 2306 09/11/18 0732   BP:  132/65 132/70 130/63   BP Location:  Left arm Left arm Right arm   Patient Position:  Lying Lying Lying   Pulse:  88 88 90   Resp:  20 20 18   Temp:  98.7 °F (37.1 °C) 98.1 °F (36.7 °C) 98.2 °F (36.8 °C)   TempSrc:  Oral Oral Oral   SpO2: (!) 92% (!) 93% (!) 92% (!) 90%   Weight:       Height:           CBC   Recent Labs   Lab  09/05/18   1109  09/11/18   0509   WBC  13.36*  15.99*   HGB  14.7  13.1*   HCT  42.7  38.9*   PLT  373*  336       CHEM   Recent Labs   Lab  09/05/18   1108  09/11/18   0509   NA  143  137  137   K  4.7  3.7  3.7   CL  104  104  104   CO2  28  23  23   BUN  18  10  10   CREATININE  1.1  0.8  0.8   GLU  98  102  102       PHYSICAL EXAM:    GEN: NAD, sitting up awake  HEENT: Atraumatic  RESP: Nonlabored on NC  CV: RRR  ABD: Soft, appropriate tenderness, incisions clean and dry. Obese with mild distention  EXT:  No peripheral edema    Assessment / Plan:                                                                                           A/P:  68 y.o. male POD1 lap hand assist extended right hemicolectomy    - Regular diet as tolerated  - IVF until taking adequate PO@125  - Home medications: lisinopril, levetiracetam, escitalopram, HCTZ  - PRN morphine  - up OOB  - IS  - laurent Phan MD  PGY5 General Surgery

## 2018-09-11 NOTE — PLAN OF CARE
Problem: Patient Care Overview  Goal: Plan of Care Review  Outcome: Ongoing (interventions implemented as appropriate)   09/11/18 4118   Coping/Psychosocial   Plan Of Care Reviewed With patient   Pt AOx3. No s/s of distress or discomfort noted. Pain managed with prn meds. Dressing on abd intact. O2 tirated to 1L nassal cannula. Iv intact. Fluids infusing and antibiotics. Tolerating well. Will cont to monitor....

## 2018-09-11 NOTE — NURSING
Offered to assist patient in ambulation. He did not want to walk at this time but was ok with sitting in the chair at the bedside. Patient used his walker and required very minimal assistance to the chair. All needs met.

## 2018-09-11 NOTE — PLAN OF CARE
09/11/18 1020   Discharge Assessment   Assessment Type Discharge Planning Assessment   Confirmed/corrected address and phone number on facesheet? Yes   Assessment information obtained from? Patient   Prior to hospitilization cognitive status: Alert/Oriented   Prior to hospitalization functional status: Independent;Assistive Equipment   Current cognitive status: Alert/Oriented   Current Functional Status: Independent;Assistive Equipment   Lives With alone   Able to Return to Prior Arrangements yes   Is patient able to care for self after discharge? Yes   Who are your caregiver(s) and their phone number(s)? [facility] [phone]   Patient's perception of discharge disposition home or selfcare;home health   Readmission Within The Last 30 Days no previous admission in last 30 days   Patient currently being followed by outpatient case management? No   Patient currently receives any other outside agency services? No   Equipment Currently Used at Home walker, rolling   Do you have any problems affording any of your prescribed medications? No   Is the patient taking medications as prescribed? yes   Does the patient have transportation home? Yes   Transportation Available family or friend will provide   Does the patient receive services at the [facility]? No   Discharge Plan A Home with family;Home Health   Discharge Plan B Home with family;Home Health  (with follow up appointment)   Patient/Family In Agreement With Plan yes     [facility] [address] [place] [address] AT [facility] OF [address] [address]  Phone: [phone] Fax: [phone]    [id number] - [place] - [phone]  [place] [address]  Phone: [phone] Fax: [phone]

## 2018-09-11 NOTE — NURSING
Patient urinated in bed, pad and draw sheet were full of urine patient did not tell rn PCT's cleaned him up and placed diaper on him prior to that patient voided  200 ml in urinal

## 2018-09-12 PROCEDURE — 11000001 HC ACUTE MED/SURG PRIVATE ROOM

## 2018-09-12 PROCEDURE — 63600175 PHARM REV CODE 636 W HCPCS: Performed by: SURGERY

## 2018-09-12 PROCEDURE — 27000221 HC OXYGEN, UP TO 24 HOURS

## 2018-09-12 PROCEDURE — 94761 N-INVAS EAR/PLS OXIMETRY MLT: CPT

## 2018-09-12 PROCEDURE — 25000003 PHARM REV CODE 250: Performed by: SURGERY

## 2018-09-12 PROCEDURE — 25000242 PHARM REV CODE 250 ALT 637 W/ HCPCS: Performed by: PEDIATRICS

## 2018-09-12 PROCEDURE — 63600175 PHARM REV CODE 636 W HCPCS: Performed by: PEDIATRICS

## 2018-09-12 PROCEDURE — 25000242 PHARM REV CODE 250 ALT 637 W/ HCPCS: Performed by: SURGERY

## 2018-09-12 PROCEDURE — 94799 UNLISTED PULMONARY SVC/PX: CPT

## 2018-09-12 PROCEDURE — 94640 AIRWAY INHALATION TREATMENT: CPT

## 2018-09-12 RX ORDER — IPRATROPIUM BROMIDE AND ALBUTEROL SULFATE 2.5; .5 MG/3ML; MG/3ML
3 SOLUTION RESPIRATORY (INHALATION)
Status: DISCONTINUED | OUTPATIENT
Start: 2018-09-12 | End: 2018-09-13 | Stop reason: HOSPADM

## 2018-09-12 RX ORDER — FUROSEMIDE 10 MG/ML
40 INJECTION INTRAMUSCULAR; INTRAVENOUS ONCE
Status: COMPLETED | OUTPATIENT
Start: 2018-09-12 | End: 2018-09-12

## 2018-09-12 RX ADMIN — ALVIMOPAN 12 MG: 12 CAPSULE ORAL at 09:09

## 2018-09-12 RX ADMIN — LEVETIRACETAM 500 MG: 500 TABLET, FILM COATED ORAL at 09:09

## 2018-09-12 RX ADMIN — ESCITALOPRAM OXALATE 10 MG: 10 TABLET ORAL at 08:09

## 2018-09-12 RX ADMIN — IPRATROPIUM BROMIDE AND ALBUTEROL SULFATE 3 ML: .5; 2.5 SOLUTION RESPIRATORY (INHALATION) at 08:09

## 2018-09-12 RX ADMIN — IPRATROPIUM BROMIDE AND ALBUTEROL SULFATE 3 ML: .5; 2.5 SOLUTION RESPIRATORY (INHALATION) at 09:09

## 2018-09-12 RX ADMIN — IPRATROPIUM BROMIDE AND ALBUTEROL SULFATE 3 ML: .5; 2.5 SOLUTION RESPIRATORY (INHALATION) at 03:09

## 2018-09-12 RX ADMIN — ALVIMOPAN 12 MG: 12 CAPSULE ORAL at 08:09

## 2018-09-12 RX ADMIN — LISINOPRIL 5 MG: 5 TABLET ORAL at 08:09

## 2018-09-12 RX ADMIN — LEVETIRACETAM 500 MG: 500 TABLET, FILM COATED ORAL at 08:09

## 2018-09-12 RX ADMIN — HYDROCHLOROTHIAZIDE 25 MG: 25 TABLET ORAL at 06:09

## 2018-09-12 RX ADMIN — MORPHINE SULFATE 5 MG: 4 INJECTION, SOLUTION INTRAMUSCULAR; INTRAVENOUS at 03:09

## 2018-09-12 RX ADMIN — FUROSEMIDE 40 MG: 10 INJECTION, SOLUTION INTRAMUSCULAR; INTRAVENOUS at 11:09

## 2018-09-12 NOTE — PLAN OF CARE
Problem: Patient Care Overview  Goal: Plan of Care Review  Outcome: Ongoing (interventions implemented as appropriate)   09/12/18 3610   Coping/Psychosocial   Plan Of Care Reviewed With patient   Pt AOx4. Hard of hearing. Able to make needs known. c/o pain during shift. Managed with prn meds. IV intact. Fluids infusing. Bowel sounds audible . Abd tender to touch. No s/s of distress or discomfort noted.  Safety maintained. Will cont to monitor ...

## 2018-09-12 NOTE — PROGRESS NOTES
[facility]      General Surgery Progress Note    09/12/2018  8:43 AM      Patient: Justin Adams  MRN: [id number]  Admit Date: 9/10/2018  LOS: 2        Subjective                                                                                                      POD2 extended right hemicolectomy for colon adenocarcinoma.     Doing well. Tolerated small amt PO. No gas. Pain controlled.   Having some minor resp issue with congestion, on NC.     Patient Active Problem List   Diagnosis    Malignant neoplasm of transverse colon       No current facility-administered medications on file prior to encounter.      Current Outpatient Medications on File Prior to Encounter   Medication Sig Dispense Refill    atorvastatin (LIPITOR) 10 MG tablet TAKE 1 TABLET BY MOUTH EVERY BEDTIME  4    docusate (COLACE) 60 mg/15 mL syrup Take 100 mg by mouth 2 (two) times daily.      escitalopram oxalate (LEXAPRO) 10 MG tablet Take 10 mg by mouth once daily.      ferrous sulfate 325 mg (65 mg iron) Tab tablet Take 1 tablet by mouth once daily.  0    hydroCHLOROthiazide (HYDRODIURIL) 25 MG tablet Take 25 mg by mouth every morning.  4    levetiracetam (KEPPRA) 500 MG Tab Take 500 mg by mouth 2 (two) times daily.      lisinopril (PRINIVIL,ZESTRIL) 5 MG tablet Take 5 mg by mouth once daily.      metroNIDAZOLE (FLAGYL) 500 MG tablet Take 2 tabs at 7pm & 11pm night before surgery 4 tablet 0    neomycin (MYCIFRADIN) 500 mg Tab Take 2 tabs at 7pm & 11pm night before surgery 4 tablet 0    potassium chloride (MICRO-K) 10 MEQ CpSR Take 10 mEq by mouth once.      SYMBICORT 160-4.5 mcg/actuation HFAA 2 puffs 2 (two) times daily.  2    omeprazole (PRILOSEC) 20 MG capsule Take by mouth daily as needed.  4    VENTOLIN HFA 90 mcg/actuation inhaler Inhale 2 puffs into the lungs every 4 (four) hours as needed.  4       Objective                                                                                                           Vitals:    09/11/18 2013 09/11/18 2319 09/12/18 0716 09/12/18 0919   BP: (!) 142/71 (!) 145/69 126/68    BP Location: Left arm Left arm Right arm    Patient Position: Lying Lying Lying    Pulse: 99 104 87 93   Resp: 20 20 18 20   Temp: 98.4 °F (36.9 °C) 98.2 °F (36.8 °C) 98.3 °F (36.8 °C)    TempSrc: Oral Oral Oral    SpO2: (!) 94% (!) 94% (!) 91% (!) 93%   Weight:       Height:           CBC   Recent Labs   Lab  09/05/18   1109  09/11/18   0509   WBC  13.36*  15.99*   HGB  14.7  13.1*   HCT  42.7  38.9*   PLT  373*  336       CHEM   Recent Labs   Lab  09/05/18   1108  09/11/18   0509   NA  143  137  137   K  4.7  3.7  3.7   CL  104  104  104   CO2  28  23  23   BUN  18  10  10   CREATININE  1.1  0.8  0.8   GLU  98  102  102       PHYSICAL EXAM:    GEN: NAD, sitting up awake  HEENT: Atraumatic  RESP: Nonlabored on NC  CV: RRR  ABD: Soft, appropriate tenderness, incisions clean and dry. Obese with mild distention  EXT:  No peripheral edema    Assessment / Plan:                                                                                           A/P:  68 y.o. male POD2 lap hand assist extended right hemicolectomy    - Regular diet as tolerated  - dc IVF, seems to be mildly volume overloaded with oxygen requirement.   - 40mg 1x lasix today   - labs tomorrow   - scheduled resp treatments.   - Home medications: lisinopril, levetiracetam, escitalopram, HCTZ  - PRN morphine  - up OOB  - IS  - lovealpax      Hector Phan MD  PGY5 General Surgery

## 2018-09-12 NOTE — PLAN OF CARE
09/11/18 1936   Patient Assessment/Suction   Level of Consciousness (AVPU) alert   Respiratory Effort Unlabored;Short of breath   Expansion/Accessory Muscles/Retractions no use of accessory muscles   All Lung Fields Breath Sounds diminished   PRE-TX-O2-ETCO2   O2 Device (Oxygen Therapy) nasal cannula   Flow (L/min) 4   SpO2 96 %   Pulse 97   Resp 18   Aerosol Therapy   $ Aerosol Therapy Charges Aerosol Treatment   Respiratory Treatment Status given   SVN/Inhaler Treatment Route mouthpiece;with oxygen   Position During Treatment HOB at 30 degrees   Patient Tolerance good   Post-Treatment   Post-treatment Heart Rate (beats/min) 98   Post-treatment Resp Rate (breaths/min) 18   All Fields Breath Sounds unchanged   Incentive Spirometer   $ Incentive Spirometer Charges done with encouragement   Administration (Incentive Spirometer) done with encouragement   Number of Repetitions (Incentive Spirometer) 8   Level (mL) (Incentive Spirometer) 750   Patient Tolerance good   Pt tolerates NC well. PRN TX requested due to SOB.  Pt has good effort with IS.

## 2018-09-13 VITALS
HEIGHT: 66 IN | BODY MASS INDEX: 36.99 KG/M2 | OXYGEN SATURATION: 92 % | TEMPERATURE: 99 F | HEART RATE: 113 BPM | RESPIRATION RATE: 18 BRPM | DIASTOLIC BLOOD PRESSURE: 67 MMHG | WEIGHT: 230.13 LBS | SYSTOLIC BLOOD PRESSURE: 98 MMHG

## 2018-09-13 LAB
ANION GAP SERPL CALC-SCNC: 11 MMOL/L
BASOPHILS # BLD AUTO: 0.02 K/UL
BASOPHILS NFR BLD: 0.2 %
BUN SERPL-MCNC: 15 MG/DL
CALCIUM SERPL-MCNC: 9.6 MG/DL
CHLORIDE SERPL-SCNC: 99 MMOL/L
CO2 SERPL-SCNC: 31 MMOL/L
CREAT SERPL-MCNC: 0.8 MG/DL
DIFFERENTIAL METHOD: ABNORMAL
EOSINOPHIL # BLD AUTO: 0.6 K/UL
EOSINOPHIL NFR BLD: 5 %
ERYTHROCYTE [DISTWIDTH] IN BLOOD BY AUTOMATED COUNT: 15.1 %
EST. GFR  (AFRICAN AMERICAN): >60 ML/MIN/1.73 M^2
EST. GFR  (NON AFRICAN AMERICAN): >60 ML/MIN/1.73 M^2
GLUCOSE SERPL-MCNC: 95 MG/DL
HCT VFR BLD AUTO: 38.3 %
HGB BLD-MCNC: 13.4 G/DL
LYMPHOCYTES # BLD AUTO: 2.7 K/UL
LYMPHOCYTES NFR BLD: 21.4 %
MCH RBC QN AUTO: 30.5 PG
MCHC RBC AUTO-ENTMCNC: 35 G/DL
MCV RBC AUTO: 87 FL
MONOCYTES # BLD AUTO: 1.1 K/UL
MONOCYTES NFR BLD: 8.5 %
NEUTROPHILS # BLD AUTO: 8.3 K/UL
NEUTROPHILS NFR BLD: 64.9 %
PLATELET # BLD AUTO: 317 K/UL
PMV BLD AUTO: 10.7 FL
POTASSIUM SERPL-SCNC: 3.8 MMOL/L
RBC # BLD AUTO: 4.39 M/UL
SODIUM SERPL-SCNC: 141 MMOL/L
WBC # BLD AUTO: 12.8 K/UL

## 2018-09-13 PROCEDURE — 94761 N-INVAS EAR/PLS OXIMETRY MLT: CPT

## 2018-09-13 PROCEDURE — 25000003 PHARM REV CODE 250: Performed by: SURGERY

## 2018-09-13 PROCEDURE — 25000242 PHARM REV CODE 250 ALT 637 W/ HCPCS: Performed by: PEDIATRICS

## 2018-09-13 PROCEDURE — 36415 COLL VENOUS BLD VENIPUNCTURE: CPT

## 2018-09-13 PROCEDURE — 27000221 HC OXYGEN, UP TO 24 HOURS

## 2018-09-13 PROCEDURE — 85025 COMPLETE CBC W/AUTO DIFF WBC: CPT

## 2018-09-13 PROCEDURE — 94640 AIRWAY INHALATION TREATMENT: CPT

## 2018-09-13 PROCEDURE — 80048 BASIC METABOLIC PNL TOTAL CA: CPT

## 2018-09-13 PROCEDURE — 94799 UNLISTED PULMONARY SVC/PX: CPT

## 2018-09-13 RX ORDER — OXYCODONE AND ACETAMINOPHEN 5; 325 MG/1; MG/1
1 TABLET ORAL EVERY 4 HOURS PRN
Qty: 30 TABLET | Refills: 0 | Status: SHIPPED | OUTPATIENT
Start: 2018-09-13 | End: 2018-09-13

## 2018-09-13 RX ORDER — OXYCODONE AND ACETAMINOPHEN 5; 325 MG/1; MG/1
1 TABLET ORAL EVERY 4 HOURS PRN
Status: DISCONTINUED | OUTPATIENT
Start: 2018-09-13 | End: 2018-09-13 | Stop reason: HOSPADM

## 2018-09-13 RX ORDER — OXYCODONE AND ACETAMINOPHEN 5; 325 MG/1; MG/1
1 TABLET ORAL EVERY 4 HOURS PRN
Qty: 30 TABLET | Refills: 0 | Status: SHIPPED | OUTPATIENT
Start: 2018-09-13 | End: 2018-10-17

## 2018-09-13 RX ADMIN — ALVIMOPAN 12 MG: 12 CAPSULE ORAL at 08:09

## 2018-09-13 RX ADMIN — LEVETIRACETAM 500 MG: 500 TABLET, FILM COATED ORAL at 08:09

## 2018-09-13 RX ADMIN — IPRATROPIUM BROMIDE AND ALBUTEROL SULFATE 3 ML: .5; 2.5 SOLUTION RESPIRATORY (INHALATION) at 07:09

## 2018-09-13 RX ADMIN — HYDROCHLOROTHIAZIDE 25 MG: 25 TABLET ORAL at 08:09

## 2018-09-13 RX ADMIN — ESCITALOPRAM OXALATE 10 MG: 10 TABLET ORAL at 08:09

## 2018-09-13 RX ADMIN — LISINOPRIL 5 MG: 5 TABLET ORAL at 08:09

## 2018-09-13 NOTE — PLAN OF CARE
09/13/18 1020   Medicare Message   Important Message from Medicare regarding Discharge Appeal Rights Given to patient/caregiver;Explained to patient/caregiver;Signed/date by patient/caregiver   Date IMM was signed 09/13/18   Time IMM was signed 1020

## 2018-09-13 NOTE — DISCHARGE SUMMARY
[facility]  Discharge Summary      Admit Date: 9/10/2018    Discharge Date and Time:  09/13/2018 1:59 PM    Attending Physician: Mat Glass MD     Reason for Admission: Elective extended right hemicolectomy for colon adenocarcinoma of transverse colon.     Procedures Performed: Procedure(s) (LRB):  COLECTOMY,LAPAROSCOPIC (N/A)    Hospital Course (synopsis of major diagnoses, care, treatment, and services provided during the course of the hospital stay): Patient presented for elective lap hand assisted extended right hemicolectomy for colon adenocarcinoma of the transverse colon. He did very well and by POD3 was having multiple BMs and tolerating diet. Also walking halls with adequate pain control. He was then discharged to home in stable condition.      Consults: none    Significant Diagnostic Studies: Labs:   CBC   Recent Labs   Lab  09/13/18   0505   WBC  12.80*   HGB  13.4*   HCT  38.3*   PLT  317       Final Diagnoses:    Principal Problem: Malignant neoplasm of transverse colon   Secondary Diagnoses:   Active Hospital Problems    Diagnosis  POA    *Malignant neoplasm of transverse colon [C18.4]  Yes      Resolved Hospital Problems   No resolved problems to display.       Discharged Condition: good    Disposition: Home or Self Care    Follow Up/Patient Instructions:     Medications:  Reconciled Home Medications:      Medication List      START taking these medications    oxyCODONE-acetaminophen 5-325 mg per tablet  Commonly known as:  PERCOCET  Take 1 tablet by mouth every 4 (four) hours as needed.        CONTINUE taking these medications    aspirin 81 MG EC tablet  Commonly known as:  ECOTRIN  Take 81 mg by mouth once daily.     atorvastatin 10 MG tablet  Commonly known as:  LIPITOR  TAKE 1 TABLET BY MOUTH EVERY BEDTIME     CENTRUM SILVER ULTRA MEN'S ORAL  Take 1 tablet by mouth once daily.     docusate 60 mg/15 mL syrup  Commonly known as:  COLACE  Take 100 mg by mouth 2 (two) times  daily.     escitalopram oxalate 10 MG tablet  Commonly known as:  LEXAPRO  Take 10 mg by mouth once daily.     ferrous sulfate 325 mg (65 mg iron) Tab tablet  Commonly known as:  FEOSOL  Take 1 tablet by mouth once daily.     hydroCHLOROthiazide 25 MG tablet  Commonly known as:  HYDRODIURIL  Take 25 mg by mouth every morning.     levETIRAcetam 500 MG Tab  Commonly known as:  KEPPRA  Take 500 mg by mouth 2 (two) times daily.     lisinopril 5 MG tablet  Commonly known as:  PRINIVIL,ZESTRIL  Take 5 mg by mouth once daily.     metroNIDAZOLE 500 MG tablet  Commonly known as:  FLAGYL  Take 2 tabs at 7pm & 11pm night before surgery     neomycin 500 mg Tab  Commonly known as:  MYCIFRADIN  Take 2 tabs at 7pm & 11pm night before surgery     omeprazole 20 MG capsule  Commonly known as:  PRILOSEC  Take by mouth daily as needed.     potassium chloride 10 MEQ Cpsr  Commonly known as:  MICRO-K  Take 10 mEq by mouth once.     SYMBICORT 160-4.5 mcg/actuation Hfaa  Generic drug:  budesonide-formoterol 160-4.5 mcg  2 puffs 2 (two) times daily.     VENTOLIN HFA 90 mcg/actuation inhaler  Generic drug:  albuterol  Inhale 2 puffs into the lungs every 4 (four) hours as needed.          Discharge Procedure Orders   No dressing needed     Activity as tolerated     Follow-up Information     Dandre Massey MD.    Specialty:  Internal Medicine  Contact information:  [address]  [place] [address]  [phone]             Mat Glass MD In 2 weeks.    Specialties:  General Surgery, Oncology  Contact information:  [address] [place] [address]  [address]  [place] [address]  [phone]

## 2018-09-13 NOTE — PLAN OF CARE
"   09/13/18 1139   Final Note   Assessment Type Final Discharge Note   Discharge Disposition Home   Hospital Follow Up  Appt(s) scheduled? Yes   Discharge plans and expectations educations in teach back method with documentation complete? Yes   Right Care Referral Info   Post Acute Recommendation No Care   EDUCATION:   provided with educational information on post operative written instructions.  Information reviewed and placed in :My Healthcare Packet" to be brought home to use as resource after discharge.  Information included:  signs and symptoms to look for and call the doctor if experiencing, and symptoms that may indicate a medical emergency: CALL 911.      All questions answered.  Teach back method used.        Pts nurse Lennie notified that all CM needs have been addressed and that she may proceed with nursing d/c and teaching to complete d/c process.        "

## 2018-09-13 NOTE — PROGRESS NOTES
WRITTEN HEALTHCARE DISCHARGE INFORMATION      Things that YOU are responsible for to Manage Your Care At Home:  1. Getting your prescriptions filled.  2. Taking you medications as directed. DO NOT MISS ANY DOSES!  3. Going to your follow-up doctor appointments. This is important because it allows the doctor to monitor your progress and to determine if any changes need to be made to your treatment plan.     If you are unable to make your follow up appointments, please call the number listed and reschedule this appointment.      After discharge, if you need assistance, you can call [facility] for 24/7 assistance at [phone]     If you are experience any signs or symptoms, Call your doctor or Call 911 and come to your nearest Emergency Room.     Thank you for choosing [facility] and allowing us to care for you.        You should receive a call from [facility] within 48-72 hours to help manage your care after discharge. Please try to make sure that you answer your phone for this important phone call.     Follow-up Information     Dandre Massey MD.    Specialty:  Internal Medicine  Why:  call to schedule follow up appointment as needed  Contact information:  [address]  [facility] [address] [phone]             Mat Glass MD On 9/26/2018.    Specialties:  General Surgery, Oncology  Why:  Appointment scheduled for Wednesday September 26th at 11am  Contact information:  [address] [place] [address]  [address]  [facility] [address] [phone]

## 2018-09-13 NOTE — NURSING
Patient discharged to home at this time with family. He had all belongings and verbalized understanding of discharge instruction. Iv removed, catheter intact. Patient declined wheelchair and walked out with walker. This writer escorted patient to sisters car.

## 2018-09-13 NOTE — PLAN OF CARE
Problem: Patient Care Overview  Goal: Plan of Care Review  Outcome: Ongoing (interventions implemented as appropriate)  Plan of care discussed w/ pt.A/OX4 Pt ambulates to restroom w/ assist.No s/s of distress.No complaints of pain.Pt able to make needs known.Surgical dressing intact.Safety maintained.

## 2018-09-26 ENCOUNTER — OFFICE VISIT (OUTPATIENT)
Dept: SURGERY | Facility: CLINIC | Age: 68
End: 2018-09-26
Payer: MEDICARE

## 2018-09-26 DIAGNOSIS — C18.4 MALIGNANT NEOPLASM OF TRANSVERSE COLON: Primary | ICD-10-CM

## 2018-09-26 PROCEDURE — 99024 POSTOP FOLLOW-UP VISIT: CPT | Mod: S$GLB,,, | Performed by: SURGERY

## 2018-09-26 NOTE — PROGRESS NOTES
Subjective:       Patient ID: Justin Adams is a 68 y.o. male.    Chief Complaint: Post-op Evaluation    HPI 67 yo male with colon cancer s/p lap resection without complaints   Review of Systems   Constitutional: Negative.    HENT: Negative.    Eyes: Negative.    Respiratory: Negative.    Cardiovascular: Negative.    Gastrointestinal: Negative.    Endocrine: Negative.    Musculoskeletal: Negative.    Skin: Negative.    Allergic/Immunologic: Negative.    Neurological: Negative.    Hematological: Negative.    Psychiatric/Behavioral: Negative.    All other systems reviewed and are negative.      Objective:      Physical Exam   Constitutional: He is oriented to person, place, and time. He appears well-developed and well-nourished.   HENT:   Head: Normocephalic and atraumatic.   Right Ear: External ear normal.   Left Ear: External ear normal.   Nose: Nose normal.   Mouth/Throat: Oropharynx is clear and moist.   Eyes: Conjunctivae and EOM are normal. Pupils are equal, round, and reactive to light.   Neck: Normal range of motion. Neck supple.   Cardiovascular: Normal rate, regular rhythm, normal heart sounds and intact distal pulses.   Pulmonary/Chest: Effort normal and breath sounds normal.   Abdominal: Soft. Bowel sounds are normal.       Musculoskeletal: Normal range of motion.   Neurological: He is alert and oriented to person, place, and time. He has normal reflexes.   Skin: Skin is warm and dry.   Psychiatric: He has a normal mood and affect. His behavior is normal. Thought content normal.   Vitals reviewed.      Assessment:       1. Malignant neoplasm of transverse colon      discussed the pathology   Plan:       I will see him back in 3 weeks with labs

## 2018-09-27 ENCOUNTER — TELEPHONE (OUTPATIENT)
Dept: SURGERY | Facility: CLINIC | Age: 68
End: 2018-09-27

## 2018-10-17 ENCOUNTER — OFFICE VISIT (OUTPATIENT)
Dept: SURGERY | Facility: CLINIC | Age: 68
End: 2018-10-17
Payer: MEDICARE

## 2018-10-17 DIAGNOSIS — C18.4 MALIGNANT NEOPLASM OF TRANSVERSE COLON: Primary | ICD-10-CM

## 2018-10-17 PROCEDURE — 99024 POSTOP FOLLOW-UP VISIT: CPT | Mod: S$GLB,,, | Performed by: SURGERY

## 2018-10-17 RX ORDER — LIDOCAINE HYDROCHLORIDE 10 MG/ML
1 INJECTION, SOLUTION EPIDURAL; INFILTRATION; INTRACAUDAL; PERINEURAL ONCE
Status: DISCONTINUED | OUTPATIENT
Start: 2018-10-17 | End: 2018-10-30 | Stop reason: CLARIF

## 2018-10-17 RX ORDER — SODIUM CHLORIDE 9 MG/ML
INJECTION, SOLUTION INTRAVENOUS CONTINUOUS
Status: CANCELLED | OUTPATIENT
Start: 2018-10-17

## 2018-10-17 NOTE — PROGRESS NOTES
Subjective:       Patient ID: Justin Adams is a 68 y.o. male.    Chief Complaint: Post-op Evaluation    HPI 69 yo male with colon cancer and with some rectal bleeding and an elevated CEA  Review of Systems   Constitutional: Negative.    HENT: Negative.    Eyes: Negative.    Respiratory: Negative.    Cardiovascular: Negative.    Gastrointestinal: Positive for anal bleeding and blood in stool.   Endocrine: Negative.    Musculoskeletal: Negative.    Skin: Negative.    Allergic/Immunologic: Negative.    Neurological: Negative.    Hematological: Negative.    Psychiatric/Behavioral: Negative.    All other systems reviewed and are negative.      Objective:      Physical Exam   Constitutional: He is oriented to person, place, and time. He appears well-developed and well-nourished.   HENT:   Head: Normocephalic and atraumatic.   Right Ear: External ear normal.   Left Ear: External ear normal.   Nose: Nose normal.   Mouth/Throat: Oropharynx is clear and moist.   Eyes: Conjunctivae and EOM are normal. Pupils are equal, round, and reactive to light.   Neck: Normal range of motion. Neck supple.   Cardiovascular: Normal rate, regular rhythm, normal heart sounds and intact distal pulses.   Pulmonary/Chest: Effort normal and breath sounds normal.   Abdominal: Soft. Bowel sounds are normal.   Musculoskeletal: Normal range of motion.   Neurological: He is alert and oriented to person, place, and time. He has normal reflexes.   Skin: Skin is warm and dry.   Psychiatric: He has a normal mood and affect. His behavior is normal. Thought content normal.   Vitals reviewed.      Assessment:       1. Malignant neoplasm of transverse colon      rectal bleeding and h/o rectal polyp  Plan:       I will take him the OR for exam under anesthesia and procto sigmoid.  The risks were explained and he agrees to proceed

## 2018-10-18 RX ORDER — POLYETHYLENE GLYCOL 3350, SODIUM SULFATE ANHYDROUS, SODIUM BICARBONATE, SODIUM CHLORIDE, POTASSIUM CHLORIDE 236; 22.74; 6.74; 5.86; 2.97 G/4L; G/4L; G/4L; G/4L; G/4L
4 POWDER, FOR SOLUTION ORAL ONCE
Qty: 4000 ML | Refills: 0 | Status: SHIPPED | OUTPATIENT
Start: 2018-10-18 | End: 2018-10-18

## 2018-10-23 ENCOUNTER — HOSPITAL ENCOUNTER (OUTPATIENT)
Dept: PREADMISSION TESTING | Facility: HOSPITAL | Age: 68
Discharge: HOME | End: 2018-10-23
Attending: SURGERY
Payer: MEDICARE

## 2018-10-23 VITALS
HEART RATE: 77 BPM | RESPIRATION RATE: 18 BRPM | OXYGEN SATURATION: 95 % | TEMPERATURE: 98 F | WEIGHT: 220 LBS | BODY MASS INDEX: 35.36 KG/M2 | HEIGHT: 66 IN | DIASTOLIC BLOOD PRESSURE: 72 MMHG | SYSTOLIC BLOOD PRESSURE: 117 MMHG

## 2018-10-23 DIAGNOSIS — Z01.818 PREOP TESTING: Primary | ICD-10-CM

## 2018-10-23 NOTE — DISCHARGE INSTRUCTIONS
Your surgery is scheduled for____10/30/2018_____________.    Call [phone] between 2 pm and 5 pm ____10/29/2018________ to find out your arrival time for the day of surgery.    Report to SAME DAY SURGERY UNIT at _______am on the 2nd floor of the hospital.  Use the front entrance of the hospital.  The front doors of the hospital open promptly at 5:30 am.    If you need wheelchair assistance, call [phone] from your cell phone,  or call 0 from the courtesy phone in the hospital lobby.    Important instructions:   Do not eat or drink after 12 midnight, including water.  It is okay to brush your teeth.  Do not have gum, candy or mints.     Take only these medications with a small swallow of water on the morning of your surgery.___escitalopram, levetiracetam(keppra) , inhalers, omeprazole__________      Stop taking Aspirin, Ibuprofen, Motrin and Aleve , Fish oil, and Vitamin E for at least 7 days before your surgery. You may use Tylenol unless otherwise instructed by your doctor.           Please shower the night before and the morning of your surgery.     You may wear deodorant only.      Do not wear powder, body lotion or cologne.     Do not wear any jewelry or have any metal on your body.     Please bring any documents given to you by your doctor.     If you are going home on the same day of surgery, you must arrange for a family member or a friend to drive you home.  Public transportation is prohibited.    You will not be able to drive home if you were given anesthesia or sedation.     Wear loose fitting clothes allowing for bandages.     Please leave money and valuables home.       You may bring your cell phone.     Call the doctor if fever or illness should occur before your surgery.    Call [phone] to contact us here at [facility] if needed.

## 2018-10-30 ENCOUNTER — ANESTHESIA (OUTPATIENT)
Dept: SURGERY | Facility: HOSPITAL | Age: 68
End: 2018-10-30
Payer: MEDICARE

## 2018-10-30 ENCOUNTER — ANESTHESIA EVENT (OUTPATIENT)
Dept: SURGERY | Facility: HOSPITAL | Age: 68
End: 2018-10-30
Payer: MEDICARE

## 2018-10-30 ENCOUNTER — HOSPITAL ENCOUNTER (OUTPATIENT)
Facility: HOSPITAL | Age: 68
Discharge: HOME | End: 2018-10-30
Attending: SURGERY | Admitting: SURGERY
Payer: MEDICARE

## 2018-10-30 VITALS
OXYGEN SATURATION: 98 % | TEMPERATURE: 98 F | HEART RATE: 80 BPM | DIASTOLIC BLOOD PRESSURE: 66 MMHG | SYSTOLIC BLOOD PRESSURE: 105 MMHG | RESPIRATION RATE: 16 BRPM | HEIGHT: 66 IN | BODY MASS INDEX: 35.36 KG/M2 | WEIGHT: 220 LBS

## 2018-10-30 DIAGNOSIS — C18.4 MALIGNANT NEOPLASM OF TRANSVERSE COLON: Primary | ICD-10-CM

## 2018-10-30 LAB
ALLENS TEST: ABNORMAL
DELSYS: ABNORMAL
HCO3 UR-SCNC: 27.3 MMOL/L (ref 24–28)
HCT VFR BLD CALC: 33 %PCV (ref 36–54)
MODE: ABNORMAL
PCO2 BLDA: 39.8 MMHG (ref 35–45)
PH SMN: 7.44 [PH] (ref 7.35–7.45)
PO2 BLDA: 50 MMHG (ref 80–100)
POC BE: 3 MMOL/L
POC IONIZED CALCIUM: 1.18 MMOL/L (ref 1.06–1.42)
POC SATURATED O2: 86 % (ref 95–100)
POC TCO2: 29 MMOL/L (ref 23–27)
POCT GLUCOSE: 106 MG/DL (ref 70–110)
POTASSIUM BLD-SCNC: 3.7 MMOL/L (ref 3.5–5.1)
SAMPLE: ABNORMAL
SITE: ABNORMAL
SODIUM BLD-SCNC: 141 MMOL/L (ref 136–145)

## 2018-10-30 PROCEDURE — 71000016 HC POSTOP RECOV ADDL HR: Performed by: SURGERY

## 2018-10-30 PROCEDURE — D9220A PRA ANESTHESIA: Mod: ANES,,, | Performed by: ANESTHESIOLOGY

## 2018-10-30 PROCEDURE — 36000704 HC OR TIME LEV I 1ST 15 MIN: Performed by: SURGERY

## 2018-10-30 PROCEDURE — 25000003 PHARM REV CODE 250: Performed by: NURSE ANESTHETIST, CERTIFIED REGISTERED

## 2018-10-30 PROCEDURE — 37000009 HC ANESTHESIA EA ADD 15 MINS: Performed by: SURGERY

## 2018-10-30 PROCEDURE — 63600175 PHARM REV CODE 636 W HCPCS: Performed by: SURGERY

## 2018-10-30 PROCEDURE — C9290 INJ, BUPIVACAINE LIPOSOME: HCPCS | Performed by: SURGERY

## 2018-10-30 PROCEDURE — 82803 BLOOD GASES ANY COMBINATION: CPT

## 2018-10-30 PROCEDURE — 63600175 PHARM REV CODE 636 W HCPCS: Performed by: ANESTHESIOLOGY

## 2018-10-30 PROCEDURE — 45100 BIOPSY OF RECTUM: CPT | Mod: ,,, | Performed by: SURGERY

## 2018-10-30 PROCEDURE — 99900035 HC TECH TIME PER 15 MIN (STAT)

## 2018-10-30 PROCEDURE — 94640 AIRWAY INHALATION TREATMENT: CPT

## 2018-10-30 PROCEDURE — 36600 WITHDRAWAL OF ARTERIAL BLOOD: CPT

## 2018-10-30 PROCEDURE — 25000003 PHARM REV CODE 250: Performed by: SURGERY

## 2018-10-30 PROCEDURE — 63600175 PHARM REV CODE 636 W HCPCS: Performed by: NURSE ANESTHETIST, CERTIFIED REGISTERED

## 2018-10-30 PROCEDURE — 36000705 HC OR TIME LEV I EA ADD 15 MIN: Performed by: SURGERY

## 2018-10-30 PROCEDURE — S0020 INJECTION, BUPIVICAINE HYDRO: HCPCS | Performed by: SURGERY

## 2018-10-30 PROCEDURE — 94761 N-INVAS EAR/PLS OXIMETRY MLT: CPT

## 2018-10-30 PROCEDURE — D9220A PRA ANESTHESIA: Mod: CRNA,,, | Performed by: NURSE ANESTHETIST, CERTIFIED REGISTERED

## 2018-10-30 PROCEDURE — 37000008 HC ANESTHESIA 1ST 15 MINUTES: Performed by: SURGERY

## 2018-10-30 PROCEDURE — 71000033 HC RECOVERY, INTIAL HOUR: Performed by: SURGERY

## 2018-10-30 PROCEDURE — 71000015 HC POSTOP RECOV 1ST HR: Performed by: SURGERY

## 2018-10-30 PROCEDURE — 25000242 PHARM REV CODE 250 ALT 637 W/ HCPCS: Performed by: ANESTHESIOLOGY

## 2018-10-30 PROCEDURE — 88309 TISSUE EXAM BY PATHOLOGIST: CPT | Performed by: PATHOLOGY

## 2018-10-30 PROCEDURE — 88309 TISSUE EXAM BY PATHOLOGIST: CPT | Mod: 26,,, | Performed by: PATHOLOGY

## 2018-10-30 RX ORDER — SODIUM CHLORIDE, SODIUM LACTATE, POTASSIUM CHLORIDE, CALCIUM CHLORIDE 600; 310; 30; 20 MG/100ML; MG/100ML; MG/100ML; MG/100ML
INJECTION, SOLUTION INTRAVENOUS CONTINUOUS PRN
Status: DISCONTINUED | OUTPATIENT
Start: 2018-10-30 | End: 2018-10-30

## 2018-10-30 RX ORDER — SODIUM CHLORIDE 9 MG/ML
INJECTION, SOLUTION INTRAVENOUS CONTINUOUS
Status: DISCONTINUED | OUTPATIENT
Start: 2018-10-30 | End: 2018-10-30 | Stop reason: HOSPADM

## 2018-10-30 RX ORDER — DIBUCAINE 1 %
OINTMENT (GRAM) TOPICAL
Status: DISCONTINUED | OUTPATIENT
Start: 2018-10-30 | End: 2018-10-30 | Stop reason: HOSPADM

## 2018-10-30 RX ORDER — PHENYLEPHRINE HYDROCHLORIDE 10 MG/ML
INJECTION INTRAVENOUS
Status: DISCONTINUED | OUTPATIENT
Start: 2018-10-30 | End: 2018-10-30

## 2018-10-30 RX ORDER — METOCLOPRAMIDE HYDROCHLORIDE 5 MG/ML
INJECTION INTRAMUSCULAR; INTRAVENOUS
Status: DISCONTINUED | OUTPATIENT
Start: 2018-10-30 | End: 2018-10-30

## 2018-10-30 RX ORDER — SUCCINYLCHOLINE CHLORIDE 20 MG/ML
INJECTION INTRAMUSCULAR; INTRAVENOUS
Status: DISCONTINUED | OUTPATIENT
Start: 2018-10-30 | End: 2018-10-30

## 2018-10-30 RX ORDER — BUPIVACAINE HYDROCHLORIDE 2.5 MG/ML
INJECTION, SOLUTION INFILTRATION; PERINEURAL
Status: DISCONTINUED | OUTPATIENT
Start: 2018-10-30 | End: 2018-10-30 | Stop reason: HOSPADM

## 2018-10-30 RX ORDER — HYDROMORPHONE HYDROCHLORIDE 2 MG/ML
0.2 INJECTION, SOLUTION INTRAMUSCULAR; INTRAVENOUS; SUBCUTANEOUS EVERY 5 MIN PRN
Status: DISCONTINUED | OUTPATIENT
Start: 2018-10-30 | End: 2018-10-30 | Stop reason: HOSPADM

## 2018-10-30 RX ORDER — ACETAMINOPHEN 10 MG/ML
1000 INJECTION, SOLUTION INTRAVENOUS ONCE
Status: COMPLETED | OUTPATIENT
Start: 2018-10-30 | End: 2018-10-30

## 2018-10-30 RX ORDER — ONDANSETRON 2 MG/ML
INJECTION INTRAMUSCULAR; INTRAVENOUS
Status: DISCONTINUED | OUTPATIENT
Start: 2018-10-30 | End: 2018-10-30

## 2018-10-30 RX ORDER — PROPOFOL 10 MG/ML
VIAL (ML) INTRAVENOUS
Status: DISCONTINUED | OUTPATIENT
Start: 2018-10-30 | End: 2018-10-30

## 2018-10-30 RX ORDER — OXYCODONE AND ACETAMINOPHEN 5; 325 MG/1; MG/1
1 TABLET ORAL EVERY 4 HOURS PRN
Qty: 30 TABLET | Refills: 0 | Status: SHIPPED | OUTPATIENT
Start: 2018-10-30 | End: 2018-11-07

## 2018-10-30 RX ORDER — GLYCOPYRROLATE 0.2 MG/ML
INJECTION INTRAMUSCULAR; INTRAVENOUS
Status: DISCONTINUED | OUTPATIENT
Start: 2018-10-30 | End: 2018-10-30

## 2018-10-30 RX ORDER — MORPHINE SULFATE 4 MG/ML
3 INJECTION, SOLUTION INTRAMUSCULAR; INTRAVENOUS
Status: DISCONTINUED | OUTPATIENT
Start: 2018-10-30 | End: 2018-10-30 | Stop reason: HOSPADM

## 2018-10-30 RX ORDER — MIDAZOLAM HYDROCHLORIDE 1 MG/ML
INJECTION, SOLUTION INTRAMUSCULAR; INTRAVENOUS
Status: DISCONTINUED | OUTPATIENT
Start: 2018-10-30 | End: 2018-10-30

## 2018-10-30 RX ORDER — ROCURONIUM BROMIDE 10 MG/ML
INJECTION, SOLUTION INTRAVENOUS
Status: DISCONTINUED | OUTPATIENT
Start: 2018-10-30 | End: 2018-10-30

## 2018-10-30 RX ORDER — IPRATROPIUM BROMIDE AND ALBUTEROL SULFATE 2.5; .5 MG/3ML; MG/3ML
3 SOLUTION RESPIRATORY (INHALATION)
Status: COMPLETED | OUTPATIENT
Start: 2018-10-30 | End: 2018-10-30

## 2018-10-30 RX ORDER — SODIUM CHLORIDE 0.9 % (FLUSH) 0.9 %
3 SYRINGE (ML) INJECTION
Status: DISCONTINUED | OUTPATIENT
Start: 2018-10-30 | End: 2018-10-30 | Stop reason: HOSPADM

## 2018-10-30 RX ORDER — FENTANYL CITRATE 50 UG/ML
25 INJECTION, SOLUTION INTRAMUSCULAR; INTRAVENOUS EVERY 5 MIN PRN
Status: DISCONTINUED | OUTPATIENT
Start: 2018-10-30 | End: 2018-10-30 | Stop reason: HOSPADM

## 2018-10-30 RX ORDER — FENTANYL CITRATE 50 UG/ML
INJECTION, SOLUTION INTRAMUSCULAR; INTRAVENOUS
Status: DISCONTINUED | OUTPATIENT
Start: 2018-10-30 | End: 2018-10-30

## 2018-10-30 RX ADMIN — IPRATROPIUM BROMIDE AND ALBUTEROL SULFATE 3 ML: .5; 2.5 SOLUTION RESPIRATORY (INHALATION) at 07:10

## 2018-10-30 RX ADMIN — ACETAMINOPHEN 1000 MG: 10 INJECTION, SOLUTION INTRAVENOUS at 08:10

## 2018-10-30 RX ADMIN — METOCLOPRAMIDE 10 MG: 5 INJECTION, SOLUTION INTRAMUSCULAR; INTRAVENOUS at 07:10

## 2018-10-30 RX ADMIN — HYDROMORPHONE HYDROCHLORIDE 0.2 MG: 2 INJECTION, SOLUTION INTRAMUSCULAR; INTRAVENOUS; SUBCUTANEOUS at 08:10

## 2018-10-30 RX ADMIN — ROCURONIUM BROMIDE 5 MG: 10 INJECTION, SOLUTION INTRAVENOUS at 07:10

## 2018-10-30 RX ADMIN — PROPOFOL 150 MG: 10 INJECTION, EMULSION INTRAVENOUS at 07:10

## 2018-10-30 RX ADMIN — ONDANSETRON 4 MG: 2 INJECTION, SOLUTION INTRAMUSCULAR; INTRAVENOUS at 07:10

## 2018-10-30 RX ADMIN — SUCCINYLCHOLINE CHLORIDE 140 MG: 20 INJECTION, SOLUTION INTRAMUSCULAR; INTRAVENOUS at 07:10

## 2018-10-30 RX ADMIN — SODIUM CHLORIDE, SODIUM LACTATE, POTASSIUM CHLORIDE, AND CALCIUM CHLORIDE: .6; .31; .03; .02 INJECTION, SOLUTION INTRAVENOUS at 07:10

## 2018-10-30 RX ADMIN — PROPOFOL 50 MG: 10 INJECTION, EMULSION INTRAVENOUS at 07:10

## 2018-10-30 RX ADMIN — PHENYLEPHRINE HYDROCHLORIDE 100 MCG: 10 INJECTION INTRAVENOUS at 07:10

## 2018-10-30 RX ADMIN — GLYCOPYRROLATE 0.2 MG: 0.2 INJECTION, SOLUTION INTRAMUSCULAR; INTRAVENOUS at 07:10

## 2018-10-30 RX ADMIN — CEFAZOLIN SODIUM 2 G: 1 POWDER, FOR SOLUTION INTRAMUSCULAR; INTRAVENOUS at 07:10

## 2018-10-30 RX ADMIN — MIDAZOLAM HYDROCHLORIDE 2 MG: 1 INJECTION, SOLUTION INTRAMUSCULAR; INTRAVENOUS at 07:10

## 2018-10-30 RX ADMIN — FENTANYL CITRATE 100 MCG: 50 INJECTION INTRAMUSCULAR; INTRAVENOUS at 07:10

## 2018-10-30 NOTE — BRIEF OP NOTE
[facility]  Brief Operative Note     SUMMARY     Surgery Date: 10/30/2018     Surgeon(s) and Role:     * Mat Glass MD - Primary    Assisting Surgeon: Afia Schwartz MD    Pre-op Diagnosis:  Malignant neoplasm of transverse colon [C18.4]    Post-op Diagnosis:  Post-Op Diagnosis Codes:     * Malignant neoplasm of transverse colon [C18.4]    Procedure(s) (LRB):  Exam under anesthesia (N/A)    Anesthesia: General    Description of the findings of the procedure: mass extending from anal verge proximally into colon    Findings/Key Components: friable anorectal mass that was biopsied    Estimated Blood Loss: minimal         Specimens:   Specimen (12h ago, onward)    None          Discharge Note    SUMMARY     Admit Date: 10/30/2018    Discharge Date and Time:  10/30/2018 7:58 AM    Hospital Course (synopsis of major diagnoses, care, treatment, and services provided during the course of the hospital stay): uneventful post op course     Final Diagnosis: Post-Op Diagnosis Codes:     * Malignant neoplasm of transverse colon [C18.4]    Disposition: Home or Self Care    Follow Up/Patient Instructions:     Medications:  Reconciled Home Medications:      Medication List      START taking these medications    oxyCODONE-acetaminophen 5-325 mg per tablet  Commonly known as:  PERCOCET  Take 1 tablet by mouth every 4 (four) hours as needed for Pain.        CONTINUE taking these medications    aspirin 81 MG EC tablet  Commonly known as:  ECOTRIN  Take 81 mg by mouth once daily.     atorvastatin 10 MG tablet  Commonly known as:  LIPITOR  TAKE 1 TABLET BY MOUTH EVERY BEDTIME     CENTRUM SILVER ULTRA MEN'S ORAL  Take 1 tablet by mouth once daily.     docusate sodium 100 MG capsule  Commonly known as:  COLACE  Take 100 mg by mouth 2 (two) times daily.     escitalopram oxalate 10 MG tablet  Commonly known as:  LEXAPRO  Take 10 mg by mouth once daily.     ferrous sulfate 325 mg (65 mg iron) Tab tablet  Commonly known  as:  FEOSOL  Take 1 tablet by mouth once daily.     hydroCHLOROthiazide 25 MG tablet  Commonly known as:  HYDRODIURIL  Take 25 mg by mouth every morning.     levETIRAcetam 500 MG Tab  Commonly known as:  KEPPRA  Take 500 mg by mouth 2 (two) times daily.     lisinopril 5 MG tablet  Commonly known as:  PRINIVIL,ZESTRIL  Take 5 mg by mouth once daily.     omeprazole 20 MG capsule  Commonly known as:  PRILOSEC  Take by mouth daily as needed.     potassium chloride 10 MEQ Cpsr  Commonly known as:  MICRO-K  Take 10 mEq by mouth once.     SYMBICORT 160-4.5 mcg/actuation Hfaa  Generic drug:  budesonide-formoterol 160-4.5 mcg  2 puffs 2 (two) times daily.     VENTOLIN HFA 90 mcg/actuation inhaler  Generic drug:  albuterol  Inhale 2 puffs into the lungs every 4 (four) hours as needed.          Discharge Procedure Orders   Diet general     Call MD for:  temperature >100.4     Call MD for:  persistent nausea and vomiting     Call MD for:  severe uncontrolled pain     Call MD for:  difficulty breathing, headache or visual disturbances     Call MD for:  redness, tenderness, or signs of infection (pain, swelling, redness, odor or green/yellow discharge around incision site)     Call MD for:  hives     Remove dressing in 24 hours     Shower on day dressing removed (No bath)     Follow-up Information     Mat Glass MD In 1 week.    Specialties:  General Surgery, Oncology  Contact information:  [address]  [address] [place] [id number]  [phone]

## 2018-10-30 NOTE — DISCHARGE INSTRUCTIONS
Ginny Fernández and Maria Luisa   Office # [phone]     Discharge Instructions for Same Day Surgery     Call the office for and appointment if one has not already been made.     Diet: Drink plenty of fluids the first 48 hours and you may resume your   usual diet.     Activity: No heavy lifting (over 10 pounds), pushing or pulling until your   post op visit. Your doctor's office may have told you to limit your lifting to less weight, or even no weight.  Be sure to follow those instructions.    Note: You may ride in a car and you may drive when comfortable.     Do not drive, drink alcohol, or sign legal documents for 24 hours, or if taking narcotic pain medication.    Dressings: You have a rectal packing present that may fall out at any point or will come out with your next BM    Medical: Call the doctor for any of the following problems: fever above 101,   severe pain, bleeding, or abdominal distention (swelling).   If constipated you may take any stool softener you choose.     Occasionally small areas of skin numbness or an unpleasant skin sensation can result. Also, you may find that your incision is swollen and tender for a few days.  Some redness around sutures and staples is a normal reaction, but if the discomfort persists or worsens, call you doctor.                                                Exparel information      To help control your pain after surgery, your surgeon injected Exparel (bupicacaine liposome injectable suspension) into your surgical incision just before the end of the procedure.      Exparel is a local analgesic that contains the local anesthetic bupivacaine..  Local anesthetics provide pain relief by numbing the tissue around the surgical site.    Exparel is specifically designed to release pain medication over time an can control pain for up to 72 hours.    In addition to Exparel, your surgeon may provide other pain medications to control your pain.    Each patient is different and responds  differently to pain medication.  Depending on how you respond to Exparel, you may require less additional pain medication during your recovery.    Side effects can occur with any medication and it is important not to ignore anything you may be experiencing.  Some patients who received Exparel experienced nausea, vomiting, or constipation.  Rarely, patients who receive bupivacaine (the active ingredient in Exparel) have experienced numbness and tingling in their mouth or lips, lightheadedness, or anxiety.  Speak with your doctor right away if you think you may be experiencing any of these sensations, or if you have other questions regarding possible side effects.    Products that contain bupivacaine, like Exparel, may cause a temporary loss of sensation or the ability to move in the area where bupivacaine was injected.    Other formulations of bupivacaine should not be administered within 96 hours following administrations of Exparel.  Do not remove the teal colored bracelet that you have on for 96 hours.  This bracelet will let other health care workers know that you have received Exparel, and not to give you bupivacaine during this 96 hours.      Fall Prevention  Millions of people fall every year and injure themselves. You may have had anesthesia or sedation which may increase your risk of falling. You may have health issues that put you at an increased risk of falling.     Here are ways to reduce your risk of falling.  ·   · Make your home safe by keeping walkways clear of objects you may trip over.  · Use non-slip pads under rugs. Do not use area rugs or small throw rugs.  · Use non-slip mats in bathtubs and showers.  · Install handrails and lights on staircases.  · Do not walk in poorly lit areas.  · Do not stand on chairs or wobbly ladders.  · Use caution when reaching overhead or looking upward. This position can cause a loss of balance.  · Be sure your shoes fit properly, have non-slip bottoms and are in  good condition.   · Wear shoes both inside and out. Avoid going barefoot or wearing slippers.  · Be cautious when going up and down stairs, curbs, and when walking on uneven sidewalks.  · If your balance is poor, consider using a cane or walker.  · If your fall was related to alcohol use, stop or limit alcohol intake.   · If your fall was related to use of sleeping medicines, talk to your doctor about this. You may need to reduce your dosage at bedtime if you awaken during the night to go to the bathroom.    · To reduce the need for nighttime bathroom trips:  ¨ Avoid drinking fluids for several hours before going to bed  ¨ Empty your bladder before going to bed  ¨ Men can keep a urinal at the bedside  · Stay as active as you can. Balance, flexibility, strength, and endurance all come from exercise. They all play a role in preventing falls. Ask your healthcare provider which types of activity are right for you.  · Get your vision checked on a regular basis.  · If you have pets, know where they are before you stand up or walk so you don't trip over them.  Use night lights.

## 2018-10-30 NOTE — OP NOTE
DATE OF PROCEDURE:  10/30/2018.    PREOPERATIVE DIAGNOSIS:  Rectal mass, rectal bleeding, history of colon cancer.    POSTOPERATIVE DIAGNOSIS:  Rectal mass, rectal bleeding, history of colon cancer.    PROCEDURE PERFORMED:  Exam under anesthesia, biopsy of anorectal mass/polyp.    SURGEON:  Mat Glass M.D.    ASSISTANT:  Afia Schwartz.    ANESTHESIA:  General.    ESTIMATED BLOOD LOSS:  Minimal.    DESCRIPTION OF OPERATION:  The patient was brought to the Operating Room.  Under   adequate anesthesia, placed in prone jackknife position.  At the 12 o'clock   position, there was a mass that extended from the anal verge, approximately 10   to 12 cm into the rectum was quite friable, but had areas that were very hard as   well.  This was biopsied at the anorectal area, was removed and sent to   Pathology for analysis.  A small pack was placed in his bottom as well as   bandage or pad.  The patient was awakened and transported to the Recovery Room   in satisfactory condition.      PHILLIP/ANAT  dd: 10/30/2018 08:22:54 (CDT)  td: 10/30/2018 09:08:04 (CDT)  Doc ID   #[id number]  Job ID #[id number]    CC:

## 2018-10-30 NOTE — TRANSFER OF CARE
"Anesthesia Transfer of Care Note    Patient: Justin Adams    Procedure(s) Performed: Procedure(s) (LRB):  Exam under anesthesia (N/A)    Patient location: PACU    Anesthesia Type: general    Transport from OR: Transported from OR on room air with adequate spontaneous ventilation    Post pain: adequate analgesia    Post assessment: no apparent anesthetic complications and tolerated procedure well    Post vital signs: stable    Level of consciousness: awake, alert and oriented    Nausea/Vomiting: no nausea/vomiting    Complications: none    Transfer of care protocol was followed      Last vitals:   Visit Vitals  /62 (BP Location: Right arm, Patient Position: Lying)   Pulse 104   Temp 36.6 °C (97.9 °F) (Oral)   Resp 16   Ht 5' 6" (1.676 m)   Wt 99.8 kg (220 lb)   SpO2 99%   BMI 35.51 kg/m²     "

## 2018-11-02 NOTE — ANESTHESIA POSTPROCEDURE EVALUATION
"Anesthesia Post Evaluation    Patient: Justin Adams    Procedure(s) Performed: Procedure(s) (LRB):  Exam under anesthesia (N/A)    Final Anesthesia Type: general  Patient location during evaluation: PACU  Patient participation: Yes- Able to Participate  Level of consciousness: awake and alert  Post-procedure vital signs: reviewed and stable  Pain management: adequate  Airway patency: patent  PONV status at discharge: No PONV  Anesthetic complications: no      Cardiovascular status: blood pressure returned to baseline and hemodynamically stable  Respiratory status: unassisted and spontaneous ventilation  Hydration status: euvolemic  Follow-up not needed.        Visit Vitals  /66   Pulse 80   Temp 36.4 °C (97.5 °F) (Oral)   Resp 16   Ht 5' 6" (1.676 m)   Wt 99.8 kg (220 lb)   SpO2 98%   BMI 35.51 kg/m²       Pain/Joy Score: No Data Recorded      "

## 2018-11-02 NOTE — ANESTHESIA PREPROCEDURE EVALUATION
11/02/2018  Justin Adams is a 68 y.o., male.    Anesthesia Evaluation     I have reviewed the Nursing Notes.      Review of Systems  Anesthesia Hx:  No problems with previous Anesthesia   Social:  Smoker    Hematology/Oncology:        Current/Recent Cancer.   Cardiovascular:   Denies Pacemaker. Hypertension  Denies Valvular problems/Murmurs.  Denies MI.  Denies CAD.    Denies CABG/stent.  Denies Dysrhythmias.   Denies Angina.             denies PVD hyperlipidemia    Pulmonary:   COPD Denies Shortness of breath.  Denies Recent URI. Trach hx   Renal/:  Renal/ Normal     Hepatic/GI:   Denies PUD. Denies Hiatal Hernia.  Denies GERD. Denies Liver Disease.  Denies Hepatitis. Colon ca   Neurological:   Denies CVA. Seizures    Endocrine:  Endocrine Normal        Physical Exam  General:  Obesity    Airway/Jaw/Neck:  AIRWAY FINDINGS: Normal      Chest/Lungs:  Chest/Lungs Clear    Heart/Vascular:  Heart Findings: Normal       Mental Status:  Mental Status Findings: Normal        Anesthesia Plan  Type of Anesthesia, risks & benefits discussed:  Anesthesia Type:  general  Patient's Preference:   Intra-op Monitoring Plan: standard ASA monitors  Intra-op Monitoring Plan Comments:   Post Op Pain Control Plan:   Post Op Pain Control Plan Comments:   Induction:   IV  Beta Blocker:  Patient is not currently on a Beta-Blocker (No further documentation required).       Informed Consent: Patient understands risks and agrees with Anesthesia plan.  Questions answered. Anesthesia consent signed with patient.  ASA Score: 3     Day of Surgery Review of History & Physical:  There are no significant changes.  H&P update referred to the provider.         Ready For Surgery From Anesthesia Perspective.

## 2018-11-07 ENCOUNTER — DOCUMENTATION ONLY (OUTPATIENT)
Dept: SURGERY | Facility: CLINIC | Age: 68
End: 2018-11-07

## 2018-11-07 ENCOUNTER — OFFICE VISIT (OUTPATIENT)
Dept: SURGERY | Facility: CLINIC | Age: 68
End: 2018-11-07
Payer: MEDICARE

## 2018-11-07 VITALS
HEIGHT: 66 IN | WEIGHT: 220 LBS | BODY MASS INDEX: 35.36 KG/M2 | DIASTOLIC BLOOD PRESSURE: 69 MMHG | SYSTOLIC BLOOD PRESSURE: 116 MMHG | HEART RATE: 80 BPM

## 2018-11-07 DIAGNOSIS — C18.4 MALIGNANT NEOPLASM OF TRANSVERSE COLON: Primary | ICD-10-CM

## 2018-11-07 DIAGNOSIS — C20 MALIGNANT NEOPLASM OF RECTUM: ICD-10-CM

## 2018-11-07 DIAGNOSIS — C20 MALIGNANT NEOPLASM OF RECTUM: Primary | ICD-10-CM

## 2018-11-07 PROCEDURE — 99024 POSTOP FOLLOW-UP VISIT: CPT | Mod: S$GLB,,, | Performed by: SURGERY

## 2018-11-07 NOTE — H&P (VIEW-ONLY)
Subjective:       Patient ID: Justin Adams is a 68 y.o. male.    Chief Complaint: Post-op Evaluation    HPI 67 yo male with known transverse colon cancer and now a rectal cancer in a villous adenoma needing neoadjuvant treatment without symptoms  Review of Systems   Constitutional: Negative.    HENT: Negative.    Eyes: Negative.    Respiratory: Negative.    Cardiovascular: Negative.    Gastrointestinal: Negative.    Endocrine: Negative.    Musculoskeletal: Negative.    Skin: Negative.    Allergic/Immunologic: Negative.    Neurological: Negative.    Hematological: Negative.    Psychiatric/Behavioral: Negative.    All other systems reviewed and are negative.      Objective:      Physical Exam   Constitutional: He is oriented to person, place, and time. He appears well-developed and well-nourished.   HENT:   Head: Normocephalic and atraumatic.   Right Ear: External ear normal.   Left Ear: External ear normal.   Nose: Nose normal.   Mouth/Throat: Oropharynx is clear and moist.   Eyes: Conjunctivae and EOM are normal. Pupils are equal, round, and reactive to light.   Neck: Normal range of motion. Neck supple.   Cardiovascular: Normal rate, regular rhythm, normal heart sounds and intact distal pulses.   Pulmonary/Chest: Effort normal and breath sounds normal.   Abdominal: Soft. Bowel sounds are normal.   Musculoskeletal: Normal range of motion.   Neurological: He is alert and oriented to person, place, and time. He has normal reflexes.   Skin: Skin is warm and dry.   Psychiatric: He has a normal mood and affect. His behavior is normal. Thought content normal.   Vitals reviewed.      Assessment:       1. Malignant neoplasm of transverse colon    2. Malignant neoplasm of rectum        Plan:       I will send him to see Radiation oncology and medical oncology then back to see me

## 2018-11-07 NOTE — PROGRESS NOTES
Pt notified of appt with  11/12/18 @ 2:40pm-[address] & appt with Dr. Salcido 11/13/18 @ 2:45pm-wj radiation therapy

## 2018-11-07 NOTE — PROGRESS NOTES
Subjective:       Patient ID: Justin Adams is a 68 y.o. male.    Chief Complaint: Post-op Evaluation    HPI 69 yo male with known transverse colon cancer and now a rectal cancer in a villous adenoma needing neoadjuvant treatment without symptoms  Review of Systems   Constitutional: Negative.    HENT: Negative.    Eyes: Negative.    Respiratory: Negative.    Cardiovascular: Negative.    Gastrointestinal: Negative.    Endocrine: Negative.    Musculoskeletal: Negative.    Skin: Negative.    Allergic/Immunologic: Negative.    Neurological: Negative.    Hematological: Negative.    Psychiatric/Behavioral: Negative.    All other systems reviewed and are negative.      Objective:      Physical Exam   Constitutional: He is oriented to person, place, and time. He appears well-developed and well-nourished.   HENT:   Head: Normocephalic and atraumatic.   Right Ear: External ear normal.   Left Ear: External ear normal.   Nose: Nose normal.   Mouth/Throat: Oropharynx is clear and moist.   Eyes: Conjunctivae and EOM are normal. Pupils are equal, round, and reactive to light.   Neck: Normal range of motion. Neck supple.   Cardiovascular: Normal rate, regular rhythm, normal heart sounds and intact distal pulses.   Pulmonary/Chest: Effort normal and breath sounds normal.   Abdominal: Soft. Bowel sounds are normal.   Musculoskeletal: Normal range of motion.   Neurological: He is alert and oriented to person, place, and time. He has normal reflexes.   Skin: Skin is warm and dry.   Psychiatric: He has a normal mood and affect. His behavior is normal. Thought content normal.   Vitals reviewed.      Assessment:       1. Malignant neoplasm of transverse colon    2. Malignant neoplasm of rectum        Plan:       I will send him to see Radiation oncology and medical oncology then back to see me

## 2018-11-12 ENCOUNTER — OFFICE VISIT (OUTPATIENT)
Dept: HEMATOLOGY/ONCOLOGY | Facility: CLINIC | Age: 68
End: 2018-11-12
Payer: MEDICARE

## 2018-11-12 ENCOUNTER — TELEPHONE (OUTPATIENT)
Dept: SURGERY | Facility: CLINIC | Age: 68
End: 2018-11-12

## 2018-11-12 VITALS
WEIGHT: 216.69 LBS | HEIGHT: 65 IN | TEMPERATURE: 98 F | BODY MASS INDEX: 36.1 KG/M2 | SYSTOLIC BLOOD PRESSURE: 126 MMHG | HEART RATE: 76 BPM | OXYGEN SATURATION: 95 % | DIASTOLIC BLOOD PRESSURE: 66 MMHG

## 2018-11-12 DIAGNOSIS — C20 CARCINOMA OF RECTUM: Primary | ICD-10-CM

## 2018-11-12 DIAGNOSIS — C18.4 CARCINOMA OF TRANSVERSE COLON: ICD-10-CM

## 2018-11-12 DIAGNOSIS — Z85.46 PERSONAL HISTORY OF PROSTATE CANCER: ICD-10-CM

## 2018-11-12 PROCEDURE — 1101F PT FALLS ASSESS-DOCD LE1/YR: CPT | Mod: CPTII,S$GLB,, | Performed by: INTERNAL MEDICINE

## 2018-11-12 PROCEDURE — 99999 PR PBB SHADOW E&M-EST. PATIENT-LVL III: CPT | Mod: PBBFAC,,, | Performed by: INTERNAL MEDICINE

## 2018-11-12 PROCEDURE — 99205 OFFICE O/P NEW HI 60 MIN: CPT | Mod: S$GLB,,, | Performed by: INTERNAL MEDICINE

## 2018-11-12 NOTE — LETTER
November 13, 2018      Mat Glass MD  [address] [facility] [address]  [address]  [facility] [address]           [facility]  [address] [facility] [address]  [place] [address]  Phone: [phone]          Patient: Justin Adams   MR Number: [id number]   YOB: 1950   Date of Visit: 11/12/2018       Dear Dr. Mat Glass:    Thank you for referring Justin Adams to me for evaluation. Attached you will find relevant portions of my assessment and plan of care.    If you have questions, please do not hesitate to call me. I look forward to following Justin Adams along with you.    Sincerely,    Vu Adames MD    Enclosure  CC:  No Recipients    If you would like to receive this communication electronically, please contact [email or web address] or [phone] to request more information on [facility] access.    For providers and/or their staff who would like to refer a patient to [facility], please contact us through our one-stop-shop provider referral line, [facility], at [phone].    If you feel you have received this communication in error or would no longer like to receive these types of communications, please e-mail [email or web address]

## 2018-11-13 ENCOUNTER — TELEPHONE (OUTPATIENT)
Dept: SURGERY | Facility: CLINIC | Age: 68
End: 2018-11-13

## 2018-11-13 DIAGNOSIS — C20 MALIGNANT NEOPLASM OF RECTUM: Primary | ICD-10-CM

## 2018-11-13 DIAGNOSIS — C20 RECTAL CANCER: ICD-10-CM

## 2018-11-13 RX ORDER — SODIUM CHLORIDE 9 MG/ML
INJECTION, SOLUTION INTRAVENOUS CONTINUOUS
Status: CANCELLED | OUTPATIENT
Start: 2018-11-13

## 2018-11-13 RX ORDER — LIDOCAINE HYDROCHLORIDE 10 MG/ML
1 INJECTION, SOLUTION EPIDURAL; INFILTRATION; INTRACAUDAL; PERINEURAL ONCE
Status: CANCELLED | OUTPATIENT
Start: 2018-11-13 | End: 2018-11-13

## 2018-11-13 NOTE — TELEPHONE ENCOUNTER
----- Message from Darshan Mckay sent at 11/13/2018 11:16 AM CST -----  Contact: Self/[phone]  The patient returned the staff call.        Thank you

## 2018-11-13 NOTE — TELEPHONE ENCOUNTER
Pt notified surgery scheduled for 11/26/18 for port insertion.  Will call pt back with preop appt.  Pt instructed to d/c asa 5 days before surgery

## 2018-11-20 ENCOUNTER — HOSPITAL ENCOUNTER (OUTPATIENT)
Dept: PREADMISSION TESTING | Facility: HOSPITAL | Age: 68
Discharge: HOME | End: 2018-11-20
Attending: SURGERY
Payer: MEDICARE

## 2018-11-20 ENCOUNTER — HOSPITAL ENCOUNTER (OUTPATIENT)
Dept: RADIOLOGY | Facility: HOSPITAL | Age: 68
Discharge: HOME | End: 2018-11-20
Attending: SURGERY
Payer: MEDICARE

## 2018-11-20 ENCOUNTER — CLINICAL SUPPORT (OUTPATIENT)
Dept: HEMATOLOGY/ONCOLOGY | Facility: CLINIC | Age: 68
End: 2018-11-20
Payer: MEDICARE

## 2018-11-20 VITALS
SYSTOLIC BLOOD PRESSURE: 123 MMHG | TEMPERATURE: 98 F | WEIGHT: 216 LBS | DIASTOLIC BLOOD PRESSURE: 65 MMHG | HEIGHT: 64 IN | BODY MASS INDEX: 36.88 KG/M2 | RESPIRATION RATE: 18 BRPM | OXYGEN SATURATION: 95 %

## 2018-11-20 DIAGNOSIS — C20 MALIGNANT NEOPLASM OF RECTUM: ICD-10-CM

## 2018-11-20 DIAGNOSIS — Z01.818 PREOP TESTING: Primary | ICD-10-CM

## 2018-11-20 DIAGNOSIS — C20 CARCINOMA OF RECTUM: ICD-10-CM

## 2018-11-20 DIAGNOSIS — R11.0 CHEMOTHERAPY-INDUCED NAUSEA: Primary | ICD-10-CM

## 2018-11-20 DIAGNOSIS — C18.4 CARCINOMA OF TRANSVERSE COLON: ICD-10-CM

## 2018-11-20 DIAGNOSIS — Z85.46 PERSONAL HISTORY OF PROSTATE CANCER: ICD-10-CM

## 2018-11-20 DIAGNOSIS — T45.1X5A CHEMOTHERAPY-INDUCED NAUSEA: Primary | ICD-10-CM

## 2018-11-20 LAB
ALBUMIN SERPL BCP-MCNC: 3.7 G/DL
ALP SERPL-CCNC: 71 U/L
ALT SERPL W/O P-5'-P-CCNC: 11 U/L
ANION GAP SERPL CALC-SCNC: 9 MMOL/L
AST SERPL-CCNC: 12 U/L
BASOPHILS # BLD AUTO: 0.02 K/UL
BASOPHILS NFR BLD: 0.1 %
BILIRUB SERPL-MCNC: 0.4 MG/DL
BUN SERPL-MCNC: 20 MG/DL
CALCIUM SERPL-MCNC: 9.6 MG/DL
CHLORIDE SERPL-SCNC: 104 MMOL/L
CO2 SERPL-SCNC: 30 MMOL/L
CREAT SERPL-MCNC: 0.9 MG/DL
DIFFERENTIAL METHOD: ABNORMAL
EOSINOPHIL # BLD AUTO: 0.4 K/UL
EOSINOPHIL NFR BLD: 2.3 %
ERYTHROCYTE [DISTWIDTH] IN BLOOD BY AUTOMATED COUNT: 15 %
EST. GFR  (AFRICAN AMERICAN): >60 ML/MIN/1.73 M^2
EST. GFR  (NON AFRICAN AMERICAN): >60 ML/MIN/1.73 M^2
GLUCOSE SERPL-MCNC: 96 MG/DL
HCT VFR BLD AUTO: 37 %
HGB BLD-MCNC: 12.6 G/DL
LYMPHOCYTES # BLD AUTO: 4 K/UL
LYMPHOCYTES NFR BLD: 25.3 %
MCH RBC QN AUTO: 30.4 PG
MCHC RBC AUTO-ENTMCNC: 34.1 G/DL
MCV RBC AUTO: 89 FL
MONOCYTES # BLD AUTO: 1.4 K/UL
MONOCYTES NFR BLD: 8.6 %
NEUTROPHILS # BLD AUTO: 10 K/UL
NEUTROPHILS NFR BLD: 63.7 %
PLATELET # BLD AUTO: 376 K/UL
PMV BLD AUTO: 9.5 FL
POTASSIUM SERPL-SCNC: 3.8 MMOL/L
PROT SERPL-MCNC: 7.1 G/DL
RBC # BLD AUTO: 4.14 M/UL
SODIUM SERPL-SCNC: 143 MMOL/L
WBC # BLD AUTO: 15.78 K/UL

## 2018-11-20 PROCEDURE — 82378 CARCINOEMBRYONIC ANTIGEN: CPT

## 2018-11-20 PROCEDURE — 71046 X-RAY EXAM CHEST 2 VIEWS: CPT | Mod: TC,FY

## 2018-11-20 PROCEDURE — 85025 COMPLETE CBC W/AUTO DIFF WBC: CPT

## 2018-11-20 PROCEDURE — 80053 COMPREHEN METABOLIC PANEL: CPT

## 2018-11-20 PROCEDURE — 99999 PR PBB SHADOW E&M-EST. PATIENT-LVL II: CPT | Mod: PBBFAC,,,

## 2018-11-20 PROCEDURE — 71046 X-RAY EXAM CHEST 2 VIEWS: CPT | Mod: 26,,, | Performed by: RADIOLOGY

## 2018-11-20 PROCEDURE — 36415 COLL VENOUS BLD VENIPUNCTURE: CPT

## 2018-11-20 RX ORDER — ONDANSETRON HYDROCHLORIDE 8 MG/1
8 TABLET, FILM COATED ORAL EVERY 8 HOURS PRN
Qty: 30 TABLET | Refills: 1 | Status: ON HOLD | OUTPATIENT
Start: 2018-11-20 | End: 2019-03-21 | Stop reason: HOSPADM

## 2018-11-20 NOTE — PROGRESS NOTES
Chemotherapy Education    Justin Adams attended chemotherapy class by himself.  [facility] cancer resource binder provided to patient as well as handouts on Fluorouracil.  Most common side effects and regimen reviewed.  Information provided for all support groups and additional relevant resources.  Also provided my contact information for any future questions or concerns.  Maximum support offered.  Schedule provided and reviewed.

## 2018-11-20 NOTE — DISCHARGE INSTRUCTIONS
"Your procedure  is scheduled for __11/26/2018________.    Call [phone] between 2pm and 5pm on _11/23/2018______to find out your arrival time for the day of surgery.    Report to Same Day Surgery Unit at ____ AM on the 2nd floor of the hospital.  Use the front entrance of the hospital.  The front doors of the hospital open promptly at 5:30am.  If you need wheelchair assistance, call [phone] from your cell phone, or call "0" from the courtesy phone in the lobby.    Important instructions:   Do not eat or drink after 12 midnight, including water.  It is okay to brush your teeth.  Do not have gum, candy or mints.     Take only these medications with a small swallow of water on the morning of your surgery __inhalers, lexapro, keppra____________      Stop taking Aspirin, Ibuprofen, Motrin and Aleve , Fish oil, and Vitamin E for at least 7 days before your surgery. You may use Tylenol unless otherwise instructed by your doctor.         Please shower the night before and the morning of your surgery.        Use Hibiclens soap to your surgery site if instructed by your pre op nurse.   If your surgery is on your abdomen, be sure to wash your naval.  Be sure to rinse off Hibiclens after it is on your skin for several minutes.  Do not use Hibiclens on your face or genitals. Please place clean linens on your bed the night before surgery. Please wear fresh clean clothing after each shower.     No shaving of procedural area at least 4-5 days before surgery due to increased risk of skin irritation and/or possible infection.      You may wear deodorant only.      Do not wear powder, body lotion or perfume/cologne.     Do not wear any jewelry or have any metal on your body.     You will be asked to remove any dentures or partials for the procedure.     Please bring any documents given to you by your doctor.     If you are going home on the same day of surgery, you must arrange for a family member or a friend to drive you " home.  Public transportation is prohibited.  You will not be able to drive home if you were given anesthesia or sedation.     Wear loose fitting clothes allowing for bandages.     Please leave money and valuables home.       You may bring your cell phone.     Call the doctor if fever or illness should occur before your surgery.    Call [phone] to contact us here if needed.

## 2018-11-20 NOTE — PLAN OF CARE
Pre-operative instructions, medication directives and pain scales reviewed with patient. All questions the patient had were answered. Re-assurance about surgical procedure and day of surgery routine given as needed, patient verbalized understanding of the pre-op instructions.

## 2018-11-21 LAB — CEA SERPL-MCNC: 40.8 NG/ML

## 2018-11-26 ENCOUNTER — ANESTHESIA (OUTPATIENT)
Dept: SURGERY | Facility: HOSPITAL | Age: 68
End: 2018-11-26
Payer: MEDICARE

## 2018-11-26 ENCOUNTER — ANESTHESIA EVENT (OUTPATIENT)
Dept: SURGERY | Facility: HOSPITAL | Age: 68
End: 2018-11-26
Payer: MEDICARE

## 2018-11-26 ENCOUNTER — HOSPITAL ENCOUNTER (OUTPATIENT)
Facility: HOSPITAL | Age: 68
Discharge: HOME | End: 2018-11-26
Attending: SURGERY | Admitting: SURGERY
Payer: MEDICARE

## 2018-11-26 VITALS
WEIGHT: 216 LBS | RESPIRATION RATE: 19 BRPM | HEART RATE: 71 BPM | DIASTOLIC BLOOD PRESSURE: 71 MMHG | TEMPERATURE: 98 F | SYSTOLIC BLOOD PRESSURE: 121 MMHG | BODY MASS INDEX: 36.88 KG/M2 | OXYGEN SATURATION: 95 % | HEIGHT: 64 IN

## 2018-11-26 DIAGNOSIS — C20 MALIGNANT NEOPLASM OF RECTUM: Primary | ICD-10-CM

## 2018-11-26 DIAGNOSIS — C20 RECTAL CANCER: ICD-10-CM

## 2018-11-26 LAB — POCT GLUCOSE: 103 MG/DL (ref 70–110)

## 2018-11-26 PROCEDURE — 25000003 PHARM REV CODE 250: Performed by: ANESTHESIOLOGY

## 2018-11-26 PROCEDURE — 37000008 HC ANESTHESIA 1ST 15 MINUTES: Performed by: SURGERY

## 2018-11-26 PROCEDURE — 36000707: Performed by: SURGERY

## 2018-11-26 PROCEDURE — 25000003 PHARM REV CODE 250: Performed by: SURGERY

## 2018-11-26 PROCEDURE — 71000015 HC POSTOP RECOV 1ST HR: Performed by: SURGERY

## 2018-11-26 PROCEDURE — 63600175 PHARM REV CODE 636 W HCPCS: Performed by: NURSE ANESTHETIST, CERTIFIED REGISTERED

## 2018-11-26 PROCEDURE — 82962 GLUCOSE BLOOD TEST: CPT | Performed by: SURGERY

## 2018-11-26 PROCEDURE — 36561 INSERT TUNNELED CV CATH: CPT | Mod: 58,LT,, | Performed by: SURGERY

## 2018-11-26 PROCEDURE — C1788 PORT, INDWELLING, IMP: HCPCS | Performed by: SURGERY

## 2018-11-26 PROCEDURE — 63600175 PHARM REV CODE 636 W HCPCS: Performed by: SURGERY

## 2018-11-26 PROCEDURE — 36000706: Performed by: SURGERY

## 2018-11-26 PROCEDURE — 77001 FLUOROGUIDE FOR VEIN DEVICE: CPT | Mod: 26,,, | Performed by: SURGERY

## 2018-11-26 PROCEDURE — 37000009 HC ANESTHESIA EA ADD 15 MINS: Performed by: SURGERY

## 2018-11-26 DEVICE — PORT POWER 8FR NO SUT PLUG: Type: IMPLANTABLE DEVICE | Site: CHEST  WALL | Status: FUNCTIONAL

## 2018-11-26 RX ORDER — ACETAMINOPHEN 10 MG/ML
1000 INJECTION, SOLUTION INTRAVENOUS ONCE
Status: DISCONTINUED | OUTPATIENT
Start: 2018-11-26 | End: 2018-11-26

## 2018-11-26 RX ORDER — MIDAZOLAM HYDROCHLORIDE 1 MG/ML
INJECTION, SOLUTION INTRAMUSCULAR; INTRAVENOUS
Status: DISCONTINUED | OUTPATIENT
Start: 2018-11-26 | End: 2018-11-26

## 2018-11-26 RX ORDER — SODIUM CHLORIDE 9 MG/ML
INJECTION, SOLUTION INTRAVENOUS CONTINUOUS
Status: DISCONTINUED | OUTPATIENT
Start: 2018-11-26 | End: 2018-11-26 | Stop reason: HOSPADM

## 2018-11-26 RX ORDER — CEFAZOLIN SODIUM 2 G/50ML
2 SOLUTION INTRAVENOUS
Status: COMPLETED | OUTPATIENT
Start: 2018-11-26 | End: 2018-11-26

## 2018-11-26 RX ORDER — LIDOCAINE HYDROCHLORIDE 10 MG/ML
1 INJECTION, SOLUTION EPIDURAL; INFILTRATION; INTRACAUDAL; PERINEURAL ONCE
Status: DISCONTINUED | OUTPATIENT
Start: 2018-11-26 | End: 2018-11-26 | Stop reason: HOSPADM

## 2018-11-26 RX ORDER — FENTANYL CITRATE 50 UG/ML
INJECTION, SOLUTION INTRAMUSCULAR; INTRAVENOUS
Status: DISCONTINUED | OUTPATIENT
Start: 2018-11-26 | End: 2018-11-26

## 2018-11-26 RX ORDER — BUPIVACAINE HYDROCHLORIDE 2.5 MG/ML
INJECTION, SOLUTION EPIDURAL; INFILTRATION; INTRACAUDAL
Status: DISCONTINUED | OUTPATIENT
Start: 2018-11-26 | End: 2018-11-26 | Stop reason: HOSPADM

## 2018-11-26 RX ORDER — OXYCODONE AND ACETAMINOPHEN 5; 325 MG/1; MG/1
1 TABLET ORAL EVERY 4 HOURS PRN
Qty: 30 TABLET | Refills: 0 | Status: SHIPPED | OUTPATIENT
Start: 2018-11-26 | End: 2019-02-14 | Stop reason: CLARIF

## 2018-11-26 RX ORDER — HYDROMORPHONE HYDROCHLORIDE 2 MG/ML
0.5 INJECTION, SOLUTION INTRAMUSCULAR; INTRAVENOUS; SUBCUTANEOUS EVERY 5 MIN PRN
Status: DISCONTINUED | OUTPATIENT
Start: 2018-11-26 | End: 2018-11-26 | Stop reason: HOSPADM

## 2018-11-26 RX ORDER — LIDOCAINE HYDROCHLORIDE 10 MG/ML
INJECTION, SOLUTION EPIDURAL; INFILTRATION; INTRACAUDAL; PERINEURAL
Status: DISCONTINUED | OUTPATIENT
Start: 2018-11-26 | End: 2018-11-26 | Stop reason: HOSPADM

## 2018-11-26 RX ORDER — ACETAMINOPHEN 10 MG/ML
INJECTION, SOLUTION INTRAVENOUS
Status: DISCONTINUED | OUTPATIENT
Start: 2018-11-26 | End: 2018-11-26

## 2018-11-26 RX ORDER — SODIUM CHLORIDE 0.9 % (FLUSH) 0.9 %
3 SYRINGE (ML) INJECTION
Status: DISCONTINUED | OUTPATIENT
Start: 2018-11-26 | End: 2018-11-26 | Stop reason: HOSPADM

## 2018-11-26 RX ORDER — HEPARIN SODIUM 1000 [USP'U]/ML
INJECTION, SOLUTION INTRAVENOUS; SUBCUTANEOUS
Status: DISCONTINUED | OUTPATIENT
Start: 2018-11-26 | End: 2018-11-26 | Stop reason: HOSPADM

## 2018-11-26 RX ORDER — HYDROMORPHONE HYDROCHLORIDE 2 MG/ML
0.2 INJECTION, SOLUTION INTRAMUSCULAR; INTRAVENOUS; SUBCUTANEOUS EVERY 5 MIN PRN
Status: DISCONTINUED | OUTPATIENT
Start: 2018-11-26 | End: 2018-11-26 | Stop reason: HOSPADM

## 2018-11-26 RX ORDER — MORPHINE SULFATE 4 MG/ML
3 INJECTION, SOLUTION INTRAMUSCULAR; INTRAVENOUS
Status: DISCONTINUED | OUTPATIENT
Start: 2018-11-26 | End: 2018-11-26 | Stop reason: HOSPADM

## 2018-11-26 RX ORDER — SODIUM CHLORIDE, SODIUM LACTATE, POTASSIUM CHLORIDE, CALCIUM CHLORIDE 600; 310; 30; 20 MG/100ML; MG/100ML; MG/100ML; MG/100ML
INJECTION, SOLUTION INTRAVENOUS CONTINUOUS
Status: DISCONTINUED | OUTPATIENT
Start: 2018-11-26 | End: 2018-11-26 | Stop reason: HOSPADM

## 2018-11-26 RX ADMIN — FENTANYL CITRATE 25 MCG: 50 INJECTION INTRAMUSCULAR; INTRAVENOUS at 12:11

## 2018-11-26 RX ADMIN — ACETAMINOPHEN 1000 MG: 10 INJECTION, SOLUTION INTRAVENOUS at 12:11

## 2018-11-26 RX ADMIN — MIDAZOLAM HYDROCHLORIDE 1 MG: 1 INJECTION, SOLUTION INTRAMUSCULAR; INTRAVENOUS at 12:11

## 2018-11-26 RX ADMIN — CEFAZOLIN SODIUM 2 G: 2 SOLUTION INTRAVENOUS at 12:11

## 2018-11-26 RX ADMIN — FENTANYL CITRATE 50 MCG: 50 INJECTION INTRAMUSCULAR; INTRAVENOUS at 12:11

## 2018-11-26 RX ADMIN — FENTANYL CITRATE 50 MCG: 50 INJECTION INTRAMUSCULAR; INTRAVENOUS at 01:11

## 2018-11-26 RX ADMIN — SODIUM CHLORIDE, SODIUM LACTATE, POTASSIUM CHLORIDE, AND CALCIUM CHLORIDE: .6; .31; .03; .02 INJECTION, SOLUTION INTRAVENOUS at 08:11

## 2018-11-26 RX ADMIN — MIDAZOLAM HYDROCHLORIDE 2 MG: 1 INJECTION, SOLUTION INTRAMUSCULAR; INTRAVENOUS at 12:11

## 2018-11-26 NOTE — DISCHARGE INSTRUCTIONS
Ginny Fernández and Maria Luisa   Office # [phone]     Discharge Instructions for Same Day Surgery     Call the office for and appointment if one has not already been made.     Diet: Drink plenty of fluids the first 48 hours and you may resume your   usual diet.     Activity: No heavy lifting (over 10 pounds), pushing or pulling until your   post op visit. Your doctor's office may have told you to limit your lifting to less weight, or even no weight.  Be sure to follow those instructions.    Note: You may ride in a car and you may drive when comfortable.     Do not drive, drink alcohol, or sign legal documents for 24 hours, or if taking narcotic pain medication.      Medical: Call the doctor for any of the following problems: fever above 101,   severe pain, bleeding, or abdominal distention (swelling).   If constipated you may take any stool softener you choose.     Occasionally small areas of skin numbness or an unpleasant skin sensation can result. Also, you may find that your incision is swollen and tender for a few days.  Some redness around sutures and staples is a normal reaction, but if the discomfort persists or worsens, call you doctor.       Dermabond or a material like it was used on your incision.   It is like a liquid glue.   Do not peel or try to remove it it will start to fall off in 7-10 days on its' own.   It is OK to shower, pat dry, do not apply any creams or lotions.    No tub baths, swimming pools, hot tubs or submersion of the incision until your surgeon says it's ok.         Comfort Measures  1.  For the first few days, it is common for the area around the incision to be swollen, discolored (black & blue), and sore.  To help reduce swelling, apply an ice pack or a  bag of frozen peas may be applied to the swollen area for 15 to 20 minutes every hour for 3 days or more as needed.  Wear loose, comfortable clothing.        Fall Prevention  Millions of people fall every year and injure themselves.  You may have had anesthesia or sedation which may increase your risk of falling. You may have health issues that put you at an increased risk of falling.     Here are ways to reduce your risk of falling.  ·   · Make your home safe by keeping walkways clear of objects you may trip over.  · Use non-slip pads under rugs. Do not use area rugs or small throw rugs.  · Use non-slip mats in bathtubs and showers.  · Install handrails and lights on staircases.  · Do not walk in poorly lit areas.  · Do not stand on chairs or wobbly ladders.  · Use caution when reaching overhead or looking upward. This position can cause a loss of balance.  · Be sure your shoes fit properly, have non-slip bottoms and are in good condition.   · Wear shoes both inside and out. Avoid going barefoot or wearing slippers.  · Be cautious when going up and down stairs, curbs, and when walking on uneven sidewalks.  · If your balance is poor, consider using a cane or walker.  · If your fall was related to alcohol use, stop or limit alcohol intake.   · If your fall was related to use of sleeping medicines, talk to your doctor about this. You may need to reduce your dosage at bedtime if you awaken during the night to go to the bathroom.    · To reduce the need for nighttime bathroom trips:  ¨ Avoid drinking fluids for several hours before going to bed  ¨ Empty your bladder before going to bed  ¨ Men can keep a urinal at the bedside  · Stay as active as you can. Balance, flexibility, strength, and endurance all come from exercise. They all play a role in preventing falls. Ask your healthcare provider which types of activity are right for you.  · Get your vision checked on a regular basis.  · If you have pets, know where they are before you stand up or walk so you don't trip over them.  Use night lights.

## 2018-11-26 NOTE — ANESTHESIA POSTPROCEDURE EVALUATION
"Anesthesia Post Evaluation    Patient: Justin Adams    Procedure(s) Performed: Procedure(s) (LRB):  [id number]-T-CATH (N/A)    Final Anesthesia Type: general  Patient location during evaluation: PACU  Patient participation: Yes- Able to Participate  Level of consciousness: awake  Post-procedure vital signs: reviewed and stable  Pain management: adequate  Airway patency: patent  PONV status at discharge: No PONV  Anesthetic complications: no      Cardiovascular status: blood pressure returned to baseline  Respiratory status: unassisted  Hydration status: euvolemic  Follow-up not needed.        Visit Vitals  /69 (BP Location: Left arm, Patient Position: Lying)   Pulse 62   Temp 36.4 °C (97.5 °F) (Oral)   Resp 17   Ht 5' 4" (1.626 m)   Wt 98 kg (216 lb)   SpO2 (!) 94%   BMI 37.08 kg/m²       Pain/Joy Score: Pain Assessment Performed: Yes (11/26/2018  8:46 AM)  Presence of Pain: complains of pain/discomfort (11/26/2018  8:46 AM)        "

## 2018-11-26 NOTE — OP NOTE
Port-A-Cath Insertion        DATE OF PROCEDURE: 11/26/2018     PREOPERATIVE DIAGNOSIS: Rectal cancer     POSTOPERATIVE DIAGNOSIS: same     PROCEDURE PERFORMED: Port-A-Cath insertion via cephalic vein cut down, left.     SURGEON: Mat Glass M.D.    ASST: Sparkle Barfield MD     ANESTHESIA: General.     DESCRIPTION OF OPERATION: The patient was brought to the Operating Room, placed  on operating room table in supine position. Under adequate general anesthesia,  prepped and draped around her left shoulder in the usual sterile fashion.   Incision was made in deltopectoral groove, deepened to expose her cephalic vein.  Proximal and distal control was obtained. Small venotomy was performed.   Catheter was inserted to the cavoatrial junction and verified with fluoroscopy.   This was attached to a port, which was fixed to the patient's chest wall and   flushed. The wound was then closed in layers with absorbable suture.   Steri-Strips were applied as well as bandage.     ESTIMATED BLOOD LOSS: Minimal.

## 2018-11-26 NOTE — TRANSFER OF CARE
"Anesthesia Transfer of Care Note    Patient: Justin Adams    Procedure(s) Performed: Procedure(s) (LRB):  [id number]-CATH (N/A)    Patient location: [facility]    Anesthesia Type: MAC    Transport from OR: Transported from OR on room air with adequate spontaneous ventilation    Post pain: adequate analgesia    Post assessment: no apparent anesthetic complications and tolerated procedure well    Post vital signs: stable    Level of consciousness: awake and alert    Nausea/Vomiting: no nausea/vomiting    Complications: none    Transfer of care protocol was followed      Last vitals:   Visit Vitals  /69 (BP Location: Left arm, Patient Position: Lying)   Pulse 62   Temp 36.4 °C (97.5 °F) (Oral)   Resp 17   Ht 5' 4" (1.626 m)   Wt 98 kg (216 lb)   SpO2 (!) 94%   BMI 37.08 kg/m²     "

## 2018-11-26 NOTE — INTERVAL H&P NOTE
The patient has been examined and the H&P has been reviewed:    I concur with the findings and no changes have occurred since H&P was written.    Anesthesia/Surgery risks, benefits and alternative options discussed and understood by patient/family.          Active Hospital Problems    Diagnosis  POA    Rectal cancer [C20]  Yes      Resolved Hospital Problems   No resolved problems to display.      Size Of Lesion In Cm: 0.5

## 2018-11-26 NOTE — BRIEF OP NOTE
[facility]  Brief Operative Note     SUMMARY     Surgery Date: 11/26/2018     Surgeon(s) and Role:     * Mat Glass MD - Primary    Assisting Surgeon: Sparkle Barfield MD    Pre-op Diagnosis:  Malignant neoplasm of rectum [C20]    Post-op Diagnosis:  Post-Op Diagnosis Codes:     * Malignant neoplasm of rectum [C20]    Procedure(s) (LRB):  [id number]-CATH (N/A)    Anesthesia: Local MAC    Description of the findings of the procedure: normal anatomy    Findings/Key Components: same    Estimated Blood Loss: minimal         Specimens:   Specimen (12h ago, onward)    None          Discharge Note    SUMMARY     Admit Date: 11/26/2018    Discharge Date and Time:  11/26/2018 1:15 PM    Hospital Course (synopsis of major diagnoses, care, treatment, and services provided during the course of the hospital stay): uneventful post op course     Final Diagnosis: Post-Op Diagnosis Codes:     * Malignant neoplasm of rectum [C20]    Disposition: Home or Self Care    Follow Up/Patient Instructions:     Medications:  Reconciled Home Medications:      Medication List      START taking these medications    oxyCODONE-acetaminophen 5-325 mg per tablet  Commonly known as:  PERCOCET  Take 1 tablet by mouth every 4 (four) hours as needed for Pain.        CONTINUE taking these medications    aspirin 81 MG EC tablet  Commonly known as:  ECOTRIN  Take 81 mg by mouth once daily.     atorvastatin 10 MG tablet  Commonly known as:  LIPITOR  TAKE 1 TABLET BY MOUTH EVERY BEDTIME     CENTRUM SILVER ULTRA MEN'S ORAL  Take 1 tablet by mouth once daily.     docusate sodium 100 MG capsule  Commonly known as:  COLACE  Take 100 mg by mouth 2 (two) times daily.     escitalopram oxalate 10 MG tablet  Commonly known as:  LEXAPRO  Take 10 mg by mouth once daily.     ferrous sulfate 325 mg (65 mg iron) Tab tablet  Commonly known as:  FEOSOL  Take 1 tablet by mouth once daily.     hydroCHLOROthiazide 25 MG tablet  Commonly known as:   HYDRODIURIL  Take 25 mg by mouth every morning.     levETIRAcetam 500 MG Tab  Commonly known as:  KEPPRA  Take 500 mg by mouth 2 (two) times daily.     lisinopril 5 MG tablet  Commonly known as:  PRINIVIL,ZESTRIL  Take 5 mg by mouth once daily.     omeprazole 20 MG capsule  Commonly known as:  PRILOSEC  Take by mouth daily as needed.     ondansetron 8 MG tablet  Commonly known as:  ZOFRAN  Take 1 tablet (8 mg total) by mouth every 8 (eight) hours as needed for Nausea.     potassium chloride 10 MEQ Cpsr  Commonly known as:  MICRO-K  Take 10 mEq by mouth once.     SYMBICORT 160-4.5 mcg/actuation Hfaa  Generic drug:  budesonide-formoterol 160-4.5 mcg  2 puffs 2 (two) times daily.     VENTOLIN HFA 90 mcg/actuation inhaler  Generic drug:  albuterol  Inhale 2 puffs into the lungs every 4 (four) hours as needed.          Discharge Procedure Orders   Diet general     Call MD for:  temperature >100.4     Call MD for:  persistent nausea and vomiting     Call MD for:  severe uncontrolled pain     Call MD for:  difficulty breathing, headache or visual disturbances     Call MD for:  redness, tenderness, or signs of infection (pain, swelling, redness, odor or green/yellow discharge around incision site)     Call MD for:  hives     Remove dressing in 24 hours     Shower on day dressing removed (No bath)     Follow-up Information     Mat Glass MD In 1 week.    Specialties:  General Surgery, Oncology  Contact information:  [address]  [address] [place] [id number] [phone]

## 2018-11-26 NOTE — ANESTHESIA PREPROCEDURE EVALUATION
11/26/2018  Justin Adams is a 68 y.o., male.    Anesthesia Evaluation    I have reviewed the Patient Summary Reports.      I have reviewed the Medications.     Review of Systems  Anesthesia Hx:  No problems with previous Anesthesia    Cardiovascular:   Hypertension  Hypertension    Neurological:   Seizures        Physical Exam      Chest/Lungs:  Chest/Lungs Findings: Clear to auscultation, Normal Respiratory Rate     Heart/Vascular:  Heart Findings: Rate: Normal  Rhythm: Regular Rhythm             Labs and EKG reviewed.     Anesthesia Plan  Type of Anesthesia, risks & benefits discussed:  Anesthesia Type:  MAC  Patient's Preference:   Intra-op Monitoring Plan: standard ASA monitors  Intra-op Monitoring Plan Comments:   Post Op Pain Control Plan:   Post Op Pain Control Plan Comments:   Induction:   IV  Beta Blocker:         Informed Consent: Patient understands risks and agrees with Anesthesia plan.  Questions answered.   ASA Score: 3     Day of Surgery Review of History & Physical:            Ready For Surgery From Anesthesia Perspective.

## 2018-11-27 ENCOUNTER — INFUSION (OUTPATIENT)
Dept: INFUSION THERAPY | Facility: HOSPITAL | Age: 68
End: 2018-11-27
Attending: INTERNAL MEDICINE
Payer: MEDICARE

## 2018-11-27 ENCOUNTER — TELEPHONE (OUTPATIENT)
Dept: SURGERY | Facility: CLINIC | Age: 68
End: 2018-11-27

## 2018-11-27 VITALS
SYSTOLIC BLOOD PRESSURE: 120 MMHG | RESPIRATION RATE: 18 BRPM | OXYGEN SATURATION: 96 % | HEART RATE: 99 BPM | TEMPERATURE: 98 F | DIASTOLIC BLOOD PRESSURE: 61 MMHG

## 2018-11-27 DIAGNOSIS — C20 CARCINOMA OF RECTUM: Primary | ICD-10-CM

## 2018-11-27 PROCEDURE — 63600175 PHARM REV CODE 636 W HCPCS: Performed by: INTERNAL MEDICINE

## 2018-11-27 PROCEDURE — 96416 CHEMO PROLONG INFUSE W/PUMP: CPT

## 2018-11-27 PROCEDURE — 25000003 PHARM REV CODE 250: Performed by: INTERNAL MEDICINE

## 2018-11-27 RX ADMIN — FLUOROURACIL 900 MG: 50 INJECTION, SOLUTION INTRAVENOUS at 12:11

## 2018-11-27 NOTE — PLAN OF CARE
Problem: Patient Care Overview  Goal: Plan of Care Review  Outcome: Ongoing (interventions implemented as appropriate)  Patient oriented to unit and Infusion Center procedures. Reviewed possible side effects of 5FU, symptom management and when to contact MD or head to ER. Verbalized understanding. Patient viewed Infusystem video and signed chemo pump consent. Questions and concerns addressed. Patient placed on 5FU chemo pump. Patient aware of spill kit placed in pump bag and 1-800 number. Received discharge instructions and verbalized understanding. Will return Friday for pump dc and pre-chemo labs.

## 2018-11-27 NOTE — TELEPHONE ENCOUNTER
Pt is getting first chemo treatment today. He is feeling fine from port insertion.  Instructed pt to call if he has any problems with port and we will see him in 3 mths with labs. notified

## 2018-11-30 ENCOUNTER — INFUSION (OUTPATIENT)
Dept: INFUSION THERAPY | Facility: HOSPITAL | Age: 68
End: 2018-11-30
Attending: INTERNAL MEDICINE
Payer: MEDICARE

## 2018-11-30 DIAGNOSIS — C20 RECTAL CANCER: Primary | ICD-10-CM

## 2018-11-30 LAB
ALBUMIN SERPL BCP-MCNC: 3.4 G/DL
ALP SERPL-CCNC: 70 U/L
ALT SERPL W/O P-5'-P-CCNC: 10 U/L
ANION GAP SERPL CALC-SCNC: 8 MMOL/L
AST SERPL-CCNC: 13 U/L
BILIRUB SERPL-MCNC: 0.5 MG/DL
BUN SERPL-MCNC: 13 MG/DL
CALCIUM SERPL-MCNC: 9.4 MG/DL
CHLORIDE SERPL-SCNC: 103 MMOL/L
CO2 SERPL-SCNC: 29 MMOL/L
CREAT SERPL-MCNC: 0.9 MG/DL
ERYTHROCYTE [DISTWIDTH] IN BLOOD BY AUTOMATED COUNT: 14.3 %
EST. GFR  (AFRICAN AMERICAN): >60 ML/MIN/1.73 M^2
EST. GFR  (NON AFRICAN AMERICAN): >60 ML/MIN/1.73 M^2
GLUCOSE SERPL-MCNC: 106 MG/DL
HCT VFR BLD AUTO: 34.9 %
HGB BLD-MCNC: 11.7 G/DL
MCH RBC QN AUTO: 29.7 PG
MCHC RBC AUTO-ENTMCNC: 33.5 G/DL
MCV RBC AUTO: 89 FL
NEUTROPHILS # BLD AUTO: 5.7 K/UL
PLATELET # BLD AUTO: 350 K/UL
PMV BLD AUTO: 9.7 FL
POTASSIUM SERPL-SCNC: 3.9 MMOL/L
PROT SERPL-MCNC: 6.6 G/DL
RBC # BLD AUTO: 3.94 M/UL
SODIUM SERPL-SCNC: 140 MMOL/L
WBC # BLD AUTO: 8.72 K/UL

## 2018-11-30 PROCEDURE — 63600175 PHARM REV CODE 636 W HCPCS: Performed by: INTERNAL MEDICINE

## 2018-11-30 PROCEDURE — 25000003 PHARM REV CODE 250: Performed by: INTERNAL MEDICINE

## 2018-11-30 PROCEDURE — 36591 DRAW BLOOD OFF VENOUS DEVICE: CPT

## 2018-11-30 PROCEDURE — 80053 COMPREHEN METABOLIC PANEL: CPT

## 2018-11-30 PROCEDURE — A4216 STERILE WATER/SALINE, 10 ML: HCPCS | Performed by: INTERNAL MEDICINE

## 2018-11-30 PROCEDURE — 85027 COMPLETE CBC AUTOMATED: CPT

## 2018-11-30 RX ORDER — SODIUM CHLORIDE 0.9 % (FLUSH) 0.9 %
10 SYRINGE (ML) INJECTION
Status: DISCONTINUED | OUTPATIENT
Start: 2018-11-30 | End: 2018-11-30 | Stop reason: HOSPADM

## 2018-11-30 RX ORDER — HEPARIN 100 UNIT/ML
500 SYRINGE INTRAVENOUS
Status: COMPLETED | OUTPATIENT
Start: 2018-11-30 | End: 2018-11-30

## 2018-11-30 RX ADMIN — HEPARIN 500 UNITS: 100 SYRINGE at 08:11

## 2018-11-30 RX ADMIN — Medication 10 ML: at 08:11

## 2018-11-30 NOTE — PLAN OF CARE
Problem: Patient Care Overview  Goal: Plan of Care Review  Outcome: Ongoing (interventions implemented as appropriate)  Arrived to unit. 5FU per pump completed. Labs drawn from port. Pt states he did well this week, feels like the pain has decreased. AVS given to pt. Pt discharged in NAD.

## 2018-12-03 ENCOUNTER — INFUSION (OUTPATIENT)
Dept: INFUSION THERAPY | Facility: HOSPITAL | Age: 68
End: 2018-12-03
Attending: INTERNAL MEDICINE
Payer: MEDICARE

## 2018-12-03 VITALS
DIASTOLIC BLOOD PRESSURE: 60 MMHG | SYSTOLIC BLOOD PRESSURE: 127 MMHG | TEMPERATURE: 98 F | OXYGEN SATURATION: 96 % | HEART RATE: 78 BPM | RESPIRATION RATE: 18 BRPM

## 2018-12-03 DIAGNOSIS — C20 CARCINOMA OF RECTUM: Primary | ICD-10-CM

## 2018-12-03 DIAGNOSIS — C18.4 CARCINOMA OF TRANSVERSE COLON: ICD-10-CM

## 2018-12-03 PROCEDURE — 96416 CHEMO PROLONG INFUSE W/PUMP: CPT

## 2018-12-03 PROCEDURE — 63600175 PHARM REV CODE 636 W HCPCS: Performed by: INTERNAL MEDICINE

## 2018-12-03 PROCEDURE — 25000003 PHARM REV CODE 250: Performed by: INTERNAL MEDICINE

## 2018-12-03 RX ADMIN — FLUOROURACIL 900 MG: 50 INJECTION, SOLUTION INTRAVENOUS at 12:12

## 2018-12-03 NOTE — PLAN OF CARE
Problem: Patient Care Overview  Goal: Plan of Care Review  Outcome: Ongoing (interventions implemented as appropriate)  Arrived to unit. Pt denies diarrhea, mouth sores, nausea or decreased appetite. Port accessed. 5FU per pump initiated. Pt using walker, discharged in NAD. Wife accompanied pt.

## 2018-12-07 ENCOUNTER — INFUSION (OUTPATIENT)
Dept: INFUSION THERAPY | Facility: HOSPITAL | Age: 68
End: 2018-12-07
Attending: INTERNAL MEDICINE
Payer: MEDICARE

## 2018-12-07 DIAGNOSIS — C20 RECTAL CANCER: Primary | ICD-10-CM

## 2018-12-07 LAB
ALBUMIN SERPL BCP-MCNC: 3.5 G/DL
ALP SERPL-CCNC: 63 U/L
ALT SERPL W/O P-5'-P-CCNC: 12 U/L
ANION GAP SERPL CALC-SCNC: 9 MMOL/L
AST SERPL-CCNC: 12 U/L
BILIRUB SERPL-MCNC: 0.3 MG/DL
BUN SERPL-MCNC: 14 MG/DL
CALCIUM SERPL-MCNC: 9.5 MG/DL
CHLORIDE SERPL-SCNC: 105 MMOL/L
CO2 SERPL-SCNC: 28 MMOL/L
CREAT SERPL-MCNC: 0.9 MG/DL
ERYTHROCYTE [DISTWIDTH] IN BLOOD BY AUTOMATED COUNT: 14.7 %
EST. GFR  (AFRICAN AMERICAN): >60 ML/MIN/1.73 M^2
EST. GFR  (NON AFRICAN AMERICAN): >60 ML/MIN/1.73 M^2
GLUCOSE SERPL-MCNC: 109 MG/DL
HCT VFR BLD AUTO: 35.2 %
HGB BLD-MCNC: 12.1 G/DL
MCH RBC QN AUTO: 30.1 PG
MCHC RBC AUTO-ENTMCNC: 34.4 G/DL
MCV RBC AUTO: 88 FL
NEUTROPHILS # BLD AUTO: 5.4 K/UL
PLATELET # BLD AUTO: 321 K/UL
PMV BLD AUTO: 9.4 FL
POTASSIUM SERPL-SCNC: 3.8 MMOL/L
PROT SERPL-MCNC: 6.7 G/DL
RBC # BLD AUTO: 4.02 M/UL
SODIUM SERPL-SCNC: 142 MMOL/L
WBC # BLD AUTO: 7.59 K/UL

## 2018-12-07 PROCEDURE — A4216 STERILE WATER/SALINE, 10 ML: HCPCS | Performed by: INTERNAL MEDICINE

## 2018-12-07 PROCEDURE — 80053 COMPREHEN METABOLIC PANEL: CPT

## 2018-12-07 PROCEDURE — 63600175 PHARM REV CODE 636 W HCPCS: Performed by: INTERNAL MEDICINE

## 2018-12-07 PROCEDURE — 85027 COMPLETE CBC AUTOMATED: CPT

## 2018-12-07 PROCEDURE — 36591 DRAW BLOOD OFF VENOUS DEVICE: CPT

## 2018-12-07 PROCEDURE — 25000003 PHARM REV CODE 250: Performed by: INTERNAL MEDICINE

## 2018-12-07 RX ORDER — HEPARIN 100 UNIT/ML
500 SYRINGE INTRAVENOUS
Status: COMPLETED | OUTPATIENT
Start: 2018-12-07 | End: 2018-12-07

## 2018-12-07 RX ORDER — SODIUM CHLORIDE 0.9 % (FLUSH) 0.9 %
10 SYRINGE (ML) INJECTION
Status: DISCONTINUED | OUTPATIENT
Start: 2018-12-07 | End: 2018-12-07 | Stop reason: HOSPADM

## 2018-12-07 RX ADMIN — HEPARIN 500 UNITS: 100 SYRINGE at 11:12

## 2018-12-07 RX ADMIN — Medication 10 ML: at 11:12

## 2018-12-07 NOTE — PLAN OF CARE
Problem: Patient Care Overview  Goal: Plan of Care Review  Outcome: Ongoing (interventions implemented as appropriate)  Tolerated lab draw from portacath and pump dc. Denies mouth sores, N/V/D, or any other possible chemo side effects. Received discharge instructions and follow up appointments. Verbalized understanding and ambulated off unit accompanied by wife.

## 2018-12-10 ENCOUNTER — INFUSION (OUTPATIENT)
Dept: INFUSION THERAPY | Facility: HOSPITAL | Age: 68
End: 2018-12-10
Attending: INTERNAL MEDICINE
Payer: MEDICARE

## 2018-12-10 VITALS
HEART RATE: 66 BPM | DIASTOLIC BLOOD PRESSURE: 58 MMHG | SYSTOLIC BLOOD PRESSURE: 127 MMHG | TEMPERATURE: 98 F | OXYGEN SATURATION: 96 % | RESPIRATION RATE: 19 BRPM

## 2018-12-10 DIAGNOSIS — C20 CARCINOMA OF RECTUM: Primary | ICD-10-CM

## 2018-12-10 DIAGNOSIS — C18.4 CARCINOMA OF TRANSVERSE COLON: ICD-10-CM

## 2018-12-10 PROCEDURE — 96416 CHEMO PROLONG INFUSE W/PUMP: CPT

## 2018-12-10 PROCEDURE — 63600175 PHARM REV CODE 636 W HCPCS: Performed by: INTERNAL MEDICINE

## 2018-12-10 PROCEDURE — 25000003 PHARM REV CODE 250: Performed by: INTERNAL MEDICINE

## 2018-12-10 RX ADMIN — FLUOROURACIL 900 MG: 50 INJECTION, SOLUTION INTRAVENOUS at 12:12

## 2018-12-10 NOTE — PLAN OF CARE
Problem: Patient Care Overview  Goal: Plan of Care Review  Outcome: Ongoing (interventions implemented as appropriate)  Patient arrived to unit. Denies N/V/D, mouth sores. Port accessed. 5FU chemo pump initiated. Received discharge instructions and verbalized understanding. Ambulated off unit using walker accompanied by wife.

## 2018-12-14 ENCOUNTER — INFUSION (OUTPATIENT)
Dept: INFUSION THERAPY | Facility: HOSPITAL | Age: 68
End: 2018-12-14
Attending: INTERNAL MEDICINE
Payer: MEDICARE

## 2018-12-14 DIAGNOSIS — C20 RECTAL CANCER: Primary | ICD-10-CM

## 2018-12-14 LAB
ALBUMIN SERPL BCP-MCNC: 3.5 G/DL
ALP SERPL-CCNC: 86 U/L
ALT SERPL W/O P-5'-P-CCNC: 47 U/L
ANION GAP SERPL CALC-SCNC: 11 MMOL/L
AST SERPL-CCNC: 52 U/L
BILIRUB SERPL-MCNC: 0.3 MG/DL
BUN SERPL-MCNC: 18 MG/DL
CALCIUM SERPL-MCNC: 9.3 MG/DL
CHLORIDE SERPL-SCNC: 101 MMOL/L
CO2 SERPL-SCNC: 29 MMOL/L
CREAT SERPL-MCNC: 1.1 MG/DL
ERYTHROCYTE [DISTWIDTH] IN BLOOD BY AUTOMATED COUNT: 15.6 %
EST. GFR  (AFRICAN AMERICAN): >60 ML/MIN/1.73 M^2
EST. GFR  (NON AFRICAN AMERICAN): >60 ML/MIN/1.73 M^2
GLUCOSE SERPL-MCNC: 89 MG/DL
HCT VFR BLD AUTO: 39.4 %
HGB BLD-MCNC: 13.3 G/DL
MCH RBC QN AUTO: 29.3 PG
MCHC RBC AUTO-ENTMCNC: 33.8 G/DL
MCV RBC AUTO: 87 FL
NEUTROPHILS # BLD AUTO: 3 K/UL
PLATELET # BLD AUTO: 218 K/UL
PMV BLD AUTO: 10.2 FL
POTASSIUM SERPL-SCNC: 3.8 MMOL/L
PROT SERPL-MCNC: 7 G/DL
RBC # BLD AUTO: 4.54 M/UL
SODIUM SERPL-SCNC: 141 MMOL/L
WBC # BLD AUTO: 3.82 K/UL

## 2018-12-14 PROCEDURE — 25000003 PHARM REV CODE 250: Performed by: INTERNAL MEDICINE

## 2018-12-14 PROCEDURE — A4216 STERILE WATER/SALINE, 10 ML: HCPCS | Performed by: INTERNAL MEDICINE

## 2018-12-14 PROCEDURE — 85027 COMPLETE CBC AUTOMATED: CPT

## 2018-12-14 PROCEDURE — 80053 COMPREHEN METABOLIC PANEL: CPT

## 2018-12-14 PROCEDURE — 36591 DRAW BLOOD OFF VENOUS DEVICE: CPT

## 2018-12-14 PROCEDURE — 63600175 PHARM REV CODE 636 W HCPCS: Performed by: INTERNAL MEDICINE

## 2018-12-14 RX ORDER — SODIUM CHLORIDE 0.9 % (FLUSH) 0.9 %
10 SYRINGE (ML) INJECTION
Status: DISCONTINUED | OUTPATIENT
Start: 2018-12-14 | End: 2018-12-14 | Stop reason: HOSPADM

## 2018-12-14 RX ORDER — HEPARIN 100 UNIT/ML
500 SYRINGE INTRAVENOUS
Status: COMPLETED | OUTPATIENT
Start: 2018-12-14 | End: 2018-12-14

## 2018-12-14 RX ADMIN — Medication 10 ML: at 11:12

## 2018-12-14 RX ADMIN — HEPARIN 500 UNITS: 100 SYRINGE at 11:12

## 2018-12-14 NOTE — NURSING
"Labs drawn. Patient tolerated portacath flush and pump dc. Pt c/o "sharp, stabbing" back pain. Will notify MD.   "

## 2018-12-17 ENCOUNTER — INFUSION (OUTPATIENT)
Dept: INFUSION THERAPY | Facility: HOSPITAL | Age: 68
End: 2018-12-17
Attending: INTERNAL MEDICINE
Payer: MEDICARE

## 2018-12-17 VITALS
TEMPERATURE: 98 F | OXYGEN SATURATION: 95 % | DIASTOLIC BLOOD PRESSURE: 59 MMHG | RESPIRATION RATE: 18 BRPM | HEART RATE: 79 BPM | SYSTOLIC BLOOD PRESSURE: 114 MMHG

## 2018-12-17 DIAGNOSIS — C18.4 CARCINOMA OF TRANSVERSE COLON: ICD-10-CM

## 2018-12-17 DIAGNOSIS — C20 CARCINOMA OF RECTUM: Primary | ICD-10-CM

## 2018-12-17 PROCEDURE — 96416 CHEMO PROLONG INFUSE W/PUMP: CPT

## 2018-12-17 PROCEDURE — 25000003 PHARM REV CODE 250: Performed by: INTERNAL MEDICINE

## 2018-12-17 PROCEDURE — 63600175 PHARM REV CODE 636 W HCPCS: Performed by: INTERNAL MEDICINE

## 2018-12-17 RX ADMIN — FLUOROURACIL 900 MG: 50 INJECTION, SOLUTION INTRAVENOUS at 12:12

## 2018-12-17 NOTE — NURSING
"Lower back pain described Friday as "stabbing" has improved over the weekend. Denies N/V/D, mouth sores or any other chemo-related symptoms. VSS. NAD. Portacath accessed. Discharged home on portable 5FU pump. Pt will return Friday for pump dc and port flush. Ambulated with walker off unit accompanied by wife.   "

## 2018-12-21 ENCOUNTER — INFUSION (OUTPATIENT)
Dept: INFUSION THERAPY | Facility: HOSPITAL | Age: 68
End: 2018-12-21
Attending: INTERNAL MEDICINE
Payer: MEDICARE

## 2018-12-21 DIAGNOSIS — C20 RECTAL CANCER: Primary | ICD-10-CM

## 2018-12-21 LAB
ALBUMIN SERPL BCP-MCNC: 3.5 G/DL
ALP SERPL-CCNC: 126 U/L
ALT SERPL W/O P-5'-P-CCNC: 77 U/L
ANION GAP SERPL CALC-SCNC: 9 MMOL/L
AST SERPL-CCNC: 33 U/L
BILIRUB SERPL-MCNC: 0.3 MG/DL
BUN SERPL-MCNC: 17 MG/DL
CALCIUM SERPL-MCNC: 9.4 MG/DL
CHLORIDE SERPL-SCNC: 104 MMOL/L
CO2 SERPL-SCNC: 28 MMOL/L
CREAT SERPL-MCNC: 0.9 MG/DL
ERYTHROCYTE [DISTWIDTH] IN BLOOD BY AUTOMATED COUNT: 15.1 %
EST. GFR  (AFRICAN AMERICAN): >60 ML/MIN/1.73 M^2
EST. GFR  (NON AFRICAN AMERICAN): >60 ML/MIN/1.73 M^2
GLUCOSE SERPL-MCNC: 102 MG/DL
HCT VFR BLD AUTO: 36.5 %
HGB BLD-MCNC: 12.6 G/DL
MCH RBC QN AUTO: 29.8 PG
MCHC RBC AUTO-ENTMCNC: 34.5 G/DL
MCV RBC AUTO: 86 FL
NEUTROPHILS # BLD AUTO: 6.3 K/UL
PLATELET # BLD AUTO: 332 K/UL
PMV BLD AUTO: 9.1 FL
POTASSIUM SERPL-SCNC: 4 MMOL/L
PROT SERPL-MCNC: 6.9 G/DL
RBC # BLD AUTO: 4.23 M/UL
SODIUM SERPL-SCNC: 141 MMOL/L
WBC # BLD AUTO: 8.97 K/UL

## 2018-12-21 PROCEDURE — 25000003 PHARM REV CODE 250: Performed by: INTERNAL MEDICINE

## 2018-12-21 PROCEDURE — 63600175 PHARM REV CODE 636 W HCPCS: Performed by: INTERNAL MEDICINE

## 2018-12-21 PROCEDURE — A4216 STERILE WATER/SALINE, 10 ML: HCPCS | Performed by: INTERNAL MEDICINE

## 2018-12-21 PROCEDURE — 80053 COMPREHEN METABOLIC PANEL: CPT

## 2018-12-21 PROCEDURE — 36591 DRAW BLOOD OFF VENOUS DEVICE: CPT

## 2018-12-21 PROCEDURE — 85027 COMPLETE CBC AUTOMATED: CPT

## 2018-12-21 RX ORDER — SODIUM CHLORIDE 0.9 % (FLUSH) 0.9 %
10 SYRINGE (ML) INJECTION
Status: DISCONTINUED | OUTPATIENT
Start: 2018-12-21 | End: 2018-12-21 | Stop reason: HOSPADM

## 2018-12-21 RX ORDER — HEPARIN 100 UNIT/ML
500 SYRINGE INTRAVENOUS
Status: COMPLETED | OUTPATIENT
Start: 2018-12-21 | End: 2018-12-21

## 2018-12-21 RX ADMIN — Medication 10 ML: at 09:12

## 2018-12-21 RX ADMIN — HEPARIN 500 UNITS: 100 SYRINGE at 09:12

## 2018-12-21 NOTE — PLAN OF CARE
Problem: Adult Inpatient Plan of Care  Goal: Plan of Care Review  Outcome: Ongoing (interventions implemented as appropriate)  5Fu per pump completed. Blood collected for cbc, cmp. No complaints voiced. No mouth sores. Pt has next appt.

## 2018-12-24 ENCOUNTER — INFUSION (OUTPATIENT)
Dept: INFUSION THERAPY | Facility: HOSPITAL | Age: 68
End: 2018-12-24
Attending: INTERNAL MEDICINE
Payer: MEDICARE

## 2018-12-24 DIAGNOSIS — C20 CARCINOMA OF RECTUM: Primary | ICD-10-CM

## 2018-12-24 DIAGNOSIS — C18.4 CARCINOMA OF TRANSVERSE COLON: ICD-10-CM

## 2018-12-24 PROCEDURE — 96416 CHEMO PROLONG INFUSE W/PUMP: CPT

## 2018-12-24 PROCEDURE — 25000003 PHARM REV CODE 250: Performed by: INTERNAL MEDICINE

## 2018-12-24 PROCEDURE — 63600175 PHARM REV CODE 636 W HCPCS: Performed by: INTERNAL MEDICINE

## 2018-12-24 RX ADMIN — FLUOROURACIL 900 MG: 50 INJECTION, SOLUTION INTRAVENOUS at 09:12

## 2018-12-24 NOTE — PLAN OF CARE
Problem: Adult Inpatient Plan of Care  Goal: Plan of Care Review  Outcome: Ongoing (interventions implemented as appropriate)  Arrived to unit. Reports rectal pain and Percocet is not helping. Also reports constipation. Taking colace with no results. Dr. Urbina notified. Pt instructed to try dulcolax or miralax for constipation and may take Percocet every 3 hours. Pt verbalized understanding. Port accessed.  5FU per pump initiated. Pt discharged, with wife, NAD.

## 2018-12-28 ENCOUNTER — INFUSION (OUTPATIENT)
Dept: INFUSION THERAPY | Facility: HOSPITAL | Age: 68
End: 2018-12-28
Attending: INTERNAL MEDICINE
Payer: MEDICARE

## 2018-12-28 DIAGNOSIS — C20 RECTAL CANCER: Primary | ICD-10-CM

## 2018-12-28 LAB
ALBUMIN SERPL BCP-MCNC: 3.5 G/DL
ALP SERPL-CCNC: 92 U/L
ALT SERPL W/O P-5'-P-CCNC: 16 U/L
ANION GAP SERPL CALC-SCNC: 10 MMOL/L
AST SERPL-CCNC: 12 U/L
BILIRUB SERPL-MCNC: 0.4 MG/DL
BUN SERPL-MCNC: 15 MG/DL
CALCIUM SERPL-MCNC: 9.5 MG/DL
CHLORIDE SERPL-SCNC: 104 MMOL/L
CO2 SERPL-SCNC: 26 MMOL/L
CREAT SERPL-MCNC: 1 MG/DL
ERYTHROCYTE [DISTWIDTH] IN BLOOD BY AUTOMATED COUNT: 15.5 %
EST. GFR  (AFRICAN AMERICAN): >60 ML/MIN/1.73 M^2
EST. GFR  (NON AFRICAN AMERICAN): >60 ML/MIN/1.73 M^2
GLUCOSE SERPL-MCNC: 77 MG/DL
HCT VFR BLD AUTO: 37 %
HGB BLD-MCNC: 12.7 G/DL
MCH RBC QN AUTO: 29.5 PG
MCHC RBC AUTO-ENTMCNC: 34.3 G/DL
MCV RBC AUTO: 86 FL
NEUTROPHILS # BLD AUTO: 5.6 K/UL
PLATELET # BLD AUTO: 501 K/UL
PMV BLD AUTO: 9.5 FL
POTASSIUM SERPL-SCNC: 4 MMOL/L
PROT SERPL-MCNC: 6.7 G/DL
RBC # BLD AUTO: 4.3 M/UL
SODIUM SERPL-SCNC: 140 MMOL/L
WBC # BLD AUTO: 8.54 K/UL

## 2018-12-28 PROCEDURE — 63600175 PHARM REV CODE 636 W HCPCS: Performed by: INTERNAL MEDICINE

## 2018-12-28 PROCEDURE — 36591 DRAW BLOOD OFF VENOUS DEVICE: CPT

## 2018-12-28 PROCEDURE — A4216 STERILE WATER/SALINE, 10 ML: HCPCS | Performed by: INTERNAL MEDICINE

## 2018-12-28 PROCEDURE — 25000003 PHARM REV CODE 250: Performed by: INTERNAL MEDICINE

## 2018-12-28 PROCEDURE — 80053 COMPREHEN METABOLIC PANEL: CPT

## 2018-12-28 PROCEDURE — 85027 COMPLETE CBC AUTOMATED: CPT

## 2018-12-28 RX ORDER — HEPARIN 100 UNIT/ML
500 SYRINGE INTRAVENOUS
Status: COMPLETED | OUTPATIENT
Start: 2018-12-28 | End: 2018-12-28

## 2018-12-28 RX ORDER — SODIUM CHLORIDE 0.9 % (FLUSH) 0.9 %
10 SYRINGE (ML) INJECTION
Status: DISCONTINUED | OUTPATIENT
Start: 2018-12-28 | End: 2018-12-28 | Stop reason: HOSPADM

## 2018-12-28 RX ADMIN — Medication 10 ML: at 11:12

## 2018-12-28 RX ADMIN — HEPARIN 500 UNITS: 100 SYRINGE at 11:12

## 2018-12-28 NOTE — PLAN OF CARE
Problem: Adult Inpatient Plan of Care  Goal: Plan of Care Review  Outcome: Ongoing (interventions implemented as appropriate)  5FU per portable pump completed. Labs drawn. Patient tolerated well. VSS. No current concerns/complaints. Denies mouth sores or any other chemo related symptoms. Received discharge instructions and follow up appointments. Verbalized understanding and ambulated off unit with walker assistance accompanied by wife.

## 2018-12-31 ENCOUNTER — INFUSION (OUTPATIENT)
Dept: INFUSION THERAPY | Facility: HOSPITAL | Age: 68
End: 2018-12-31
Attending: INTERNAL MEDICINE
Payer: MEDICARE

## 2018-12-31 VITALS
SYSTOLIC BLOOD PRESSURE: 98 MMHG | OXYGEN SATURATION: 95 % | TEMPERATURE: 98 F | DIASTOLIC BLOOD PRESSURE: 57 MMHG | HEART RATE: 71 BPM | RESPIRATION RATE: 17 BRPM

## 2018-12-31 DIAGNOSIS — C18.4 CARCINOMA OF TRANSVERSE COLON: ICD-10-CM

## 2018-12-31 DIAGNOSIS — C20 CARCINOMA OF RECTUM: Primary | ICD-10-CM

## 2018-12-31 PROCEDURE — 96416 CHEMO PROLONG INFUSE W/PUMP: CPT

## 2018-12-31 PROCEDURE — 63600175 PHARM REV CODE 636 W HCPCS: Performed by: INTERNAL MEDICINE

## 2018-12-31 PROCEDURE — 25000003 PHARM REV CODE 250: Performed by: INTERNAL MEDICINE

## 2018-12-31 RX ADMIN — FLUOROURACIL 900 MG: 50 INJECTION, SOLUTION INTRAVENOUS at 09:12

## 2018-12-31 NOTE — PLAN OF CARE
Problem: Adult Inpatient Plan of Care  Goal: Plan of Care Review  Outcome: Ongoing (interventions implemented as appropriate)  Arrived to unit after radiation. Reports some rectal pain. Reinforced to pt to take meds as directed ATC. Port accessed. 5Fu infusing per pump. Pt discharged, accompanied by wife, NAD.

## 2019-01-04 ENCOUNTER — INFUSION (OUTPATIENT)
Dept: INFUSION THERAPY | Facility: HOSPITAL | Age: 69
End: 2019-01-04
Attending: INTERNAL MEDICINE
Payer: MEDICARE

## 2019-01-04 DIAGNOSIS — C20 RECTAL CANCER: Primary | ICD-10-CM

## 2019-01-04 PROCEDURE — 25000003 PHARM REV CODE 250: Performed by: INTERNAL MEDICINE

## 2019-01-04 PROCEDURE — 96523 IRRIG DRUG DELIVERY DEVICE: CPT

## 2019-01-04 PROCEDURE — A4216 STERILE WATER/SALINE, 10 ML: HCPCS | Performed by: INTERNAL MEDICINE

## 2019-01-04 PROCEDURE — 63600175 PHARM REV CODE 636 W HCPCS: Performed by: INTERNAL MEDICINE

## 2019-01-04 RX ORDER — SODIUM CHLORIDE 0.9 % (FLUSH) 0.9 %
10 SYRINGE (ML) INJECTION
Status: DISCONTINUED | OUTPATIENT
Start: 2019-01-04 | End: 2019-01-04 | Stop reason: HOSPADM

## 2019-01-04 RX ORDER — HEPARIN 100 UNIT/ML
500 SYRINGE INTRAVENOUS
Status: COMPLETED | OUTPATIENT
Start: 2019-01-04 | End: 2019-01-04

## 2019-01-04 RX ADMIN — Medication 10 ML: at 12:01

## 2019-01-04 RX ADMIN — HEPARIN 500 UNITS: 100 SYRINGE at 12:01

## 2019-01-04 NOTE — PLAN OF CARE
Problem: Adult Inpatient Plan of Care  Goal: Plan of Care Review  Outcome: Ongoing (interventions implemented as appropriate)  5FU per portable pump completed. Tolerated pump dc and portacath flush. Received discharge instructions and verbalized understanding. Ambulated with walker off unit accompanied by significant other.

## 2019-01-24 ENCOUNTER — TELEPHONE (OUTPATIENT)
Dept: HEMATOLOGY/ONCOLOGY | Facility: CLINIC | Age: 69
End: 2019-01-24

## 2019-01-24 ENCOUNTER — TELEPHONE (OUTPATIENT)
Dept: SURGERY | Facility: CLINIC | Age: 69
End: 2019-01-24

## 2019-01-24 DIAGNOSIS — C20 RECTAL CANCER: Primary | ICD-10-CM

## 2019-01-24 NOTE — TELEPHONE ENCOUNTER
Pt notified of ct scan appt 1/31/19 @ 9:30am-och-prep instructions given & appt with  2/6/19 @ 11:00am          ----- Message from Марина Grady sent at 1/24/2019 10:07 AM CST -----  Contact: [phone]  Pt is finished with all chemo and radiation , wants to know when should he schedule a ppt to see you . Pls call pt [phone]. Thanks........Tena

## 2019-01-30 ENCOUNTER — LAB VISIT (OUTPATIENT)
Dept: LAB | Facility: HOSPITAL | Age: 69
End: 2019-01-30
Attending: INTERNAL MEDICINE
Payer: MEDICARE

## 2019-01-30 DIAGNOSIS — C20 RECTAL CANCER: ICD-10-CM

## 2019-01-30 LAB
ALBUMIN SERPL BCP-MCNC: 3.3 G/DL
ALP SERPL-CCNC: 72 U/L
ALT SERPL W/O P-5'-P-CCNC: 13 U/L
ANION GAP SERPL CALC-SCNC: 11 MMOL/L
AST SERPL-CCNC: 14 U/L
BASOPHILS # BLD AUTO: 0.03 K/UL
BASOPHILS NFR BLD: 0.3 %
BILIRUB SERPL-MCNC: 0.3 MG/DL
BUN SERPL-MCNC: 17 MG/DL
CALCIUM SERPL-MCNC: 9.7 MG/DL
CEA SERPL-MCNC: 2.9 NG/ML
CHLORIDE SERPL-SCNC: 104 MMOL/L
CO2 SERPL-SCNC: 27 MMOL/L
CREAT SERPL-MCNC: 1 MG/DL
DIFFERENTIAL METHOD: ABNORMAL
EOSINOPHIL # BLD AUTO: 0.4 K/UL
EOSINOPHIL NFR BLD: 3.7 %
ERYTHROCYTE [DISTWIDTH] IN BLOOD BY AUTOMATED COUNT: 16.6 %
EST. GFR  (AFRICAN AMERICAN): >60 ML/MIN/1.73 M^2
EST. GFR  (NON AFRICAN AMERICAN): >60 ML/MIN/1.73 M^2
GLUCOSE SERPL-MCNC: 108 MG/DL
HCT VFR BLD AUTO: 37.3 %
HGB BLD-MCNC: 13 G/DL
LYMPHOCYTES # BLD AUTO: 1.3 K/UL
LYMPHOCYTES NFR BLD: 11.2 %
MCH RBC QN AUTO: 29.9 PG
MCHC RBC AUTO-ENTMCNC: 34.9 G/DL
MCV RBC AUTO: 86 FL
MONOCYTES # BLD AUTO: 1.1 K/UL
MONOCYTES NFR BLD: 9.6 %
NEUTROPHILS # BLD AUTO: 8.4 K/UL
NEUTROPHILS NFR BLD: 75.2 %
PLATELET # BLD AUTO: 358 K/UL
PMV BLD AUTO: 9.3 FL
POTASSIUM SERPL-SCNC: 4.3 MMOL/L
PROT SERPL-MCNC: 7 G/DL
RBC # BLD AUTO: 4.35 M/UL
SODIUM SERPL-SCNC: 142 MMOL/L
WBC # BLD AUTO: 11.2 K/UL

## 2019-01-30 PROCEDURE — 80053 COMPREHEN METABOLIC PANEL: CPT

## 2019-01-30 PROCEDURE — 85025 COMPLETE CBC W/AUTO DIFF WBC: CPT | Mod: PO

## 2019-01-30 PROCEDURE — 36415 COLL VENOUS BLD VENIPUNCTURE: CPT | Mod: PO

## 2019-01-30 PROCEDURE — 82378 CARCINOEMBRYONIC ANTIGEN: CPT

## 2019-01-31 ENCOUNTER — HOSPITAL ENCOUNTER (OUTPATIENT)
Dept: RADIOLOGY | Facility: HOSPITAL | Age: 69
Discharge: HOME | End: 2019-01-31
Attending: SURGERY
Payer: MEDICARE

## 2019-01-31 DIAGNOSIS — C20 RECTAL CANCER: ICD-10-CM

## 2019-01-31 PROCEDURE — 74177 CT ABD & PELVIS W/CONTRAST: CPT | Mod: TC

## 2019-01-31 PROCEDURE — 74177 CT ABD & PELVIS W/CONTRAST: CPT | Mod: 26,,, | Performed by: RADIOLOGY

## 2019-01-31 PROCEDURE — 74177 CT ABDOMEN PELVIS WITH CONTRAST: ICD-10-PCS | Mod: 26,,, | Performed by: RADIOLOGY

## 2019-01-31 PROCEDURE — 25500020 PHARM REV CODE 255: Performed by: SURGERY

## 2019-01-31 RX ADMIN — IOHEXOL 100 ML: 350 INJECTION, SOLUTION INTRAVENOUS at 10:01

## 2019-01-31 RX ADMIN — IOHEXOL 15 ML: 300 INJECTION, SOLUTION INTRAVENOUS at 10:01

## 2019-02-06 ENCOUNTER — OFFICE VISIT (OUTPATIENT)
Dept: SURGERY | Facility: CLINIC | Age: 69
End: 2019-02-06
Payer: MEDICARE

## 2019-02-06 VITALS
SYSTOLIC BLOOD PRESSURE: 107 MMHG | HEART RATE: 80 BPM | HEIGHT: 64 IN | WEIGHT: 216 LBS | DIASTOLIC BLOOD PRESSURE: 66 MMHG | BODY MASS INDEX: 36.88 KG/M2

## 2019-02-06 DIAGNOSIS — C20 RECTAL CANCER: Primary | ICD-10-CM

## 2019-02-06 PROCEDURE — 99214 OFFICE O/P EST MOD 30 MIN: CPT | Mod: S$GLB,,, | Performed by: SURGERY

## 2019-02-06 PROCEDURE — 99214 PR OFFICE/OUTPT VISIT, EST, LEVL IV, 30-39 MIN: ICD-10-PCS | Mod: S$GLB,,, | Performed by: SURGERY

## 2019-02-06 PROCEDURE — 1101F PT FALLS ASSESS-DOCD LE1/YR: CPT | Mod: CPTII,S$GLB,, | Performed by: SURGERY

## 2019-02-06 PROCEDURE — 1101F PR PT FALLS ASSESS DOC 0-1 FALLS W/OUT INJ PAST YR: ICD-10-PCS | Mod: CPTII,S$GLB,, | Performed by: SURGERY

## 2019-02-06 RX ORDER — SODIUM CHLORIDE 9 MG/ML
INJECTION, SOLUTION INTRAVENOUS CONTINUOUS
Status: CANCELLED | OUTPATIENT
Start: 2019-02-06

## 2019-02-06 RX ORDER — LIDOCAINE HYDROCHLORIDE 10 MG/ML
1 INJECTION, SOLUTION EPIDURAL; INFILTRATION; INTRACAUDAL; PERINEURAL ONCE
Status: DISCONTINUED | OUTPATIENT
Start: 2019-02-06 | End: 2019-02-22 | Stop reason: CLARIF

## 2019-02-06 NOTE — H&P (VIEW-ONLY)
History & Physical    SUBJECTIVE:     History of Present Illness:  Patient is a 68 y.o. male presents with rectal cancer s/p neoadjuvant chemo and XRT and here now for lap LAR.     Chief Complaint   Patient presents with    Follow-up       Review of patient's allergies indicates:  No Known Allergies    Current Outpatient Medications   Medication Sig Dispense Refill    aspirin (ECOTRIN) 81 MG EC tablet Take 81 mg by mouth once daily.      atorvastatin (LIPITOR) 10 MG tablet TAKE 1 TABLET BY MOUTH EVERY BEDTIME  4    docusate sodium (COLACE) 100 MG capsule Take 100 mg by mouth 2 (two) times daily.      escitalopram oxalate (LEXAPRO) 10 MG tablet Take 10 mg by mouth once daily.      ferrous sulfate 325 mg (65 mg iron) Tab tablet Take 1 tablet by mouth once daily.  0    hydroCHLOROthiazide (HYDRODIURIL) 25 MG tablet Take 25 mg by mouth every morning.  4    levetiracetam (KEPPRA) 500 MG Tab Take 500 mg by mouth 2 (two) times daily.      lisinopril (PRINIVIL,ZESTRIL) 5 MG tablet Take 5 mg by mouth once daily.      multivit-min/FA/lycopen/lutein (CENTRUM SILVER ULTRA MEN'S ORAL) Take 1 tablet by mouth once daily.      omeprazole (PRILOSEC) 20 MG capsule Take by mouth daily as needed.  4    oxyCODONE-acetaminophen (PERCOCET) 5-325 mg per tablet Take 1 tablet by mouth every 4 (four) hours as needed for Pain. 30 tablet 0    potassium chloride (MICRO-K) 10 MEQ CpSR Take 10 mEq by mouth once.      SYMBICORT 160-4.5 mcg/actuation HFAA 2 puffs 2 (two) times daily.  2    VENTOLIN HFA 90 mcg/actuation inhaler Inhale 2 puffs into the lungs every 4 (four) hours as needed.  4    ondansetron (ZOFRAN) 8 MG tablet Take 1 tablet (8 mg total) by mouth every 8 (eight) hours as needed for Nausea. 30 tablet 1     No current facility-administered medications for this visit.        Past Medical History:   Diagnosis Date    Aphagia     Cancer     prostate & colon    Cardiomegaly     Colon cancer     rectal cancer     "Dysphagia     Hypertension     Prostate cancer     Seizures     Subdural hemorrhage      Past Surgical History:   Procedure Laterality Date    brain coiling      COLECTOMY,LAPAROSCOPIC N/A 9/10/2018    [facility]    COLON SURGERY      Exam under anesthesia N/A 10/30/2018    [facility]    EKBUYRZOT-OYRY-O-CATH N/A 11/26/2018    [facility] OR    pilonadal cyst      PROSTATE SURGERY      TONSILLECTOMY      TRACHEOSTOMY TUBE PLACEMENT       Family History   Problem Relation Age of Onset    Aneurysm Sister      Social History     Tobacco Use    Smoking status: Current Every Day Smoker     Types: Cigars    Smokeless tobacco: Never Used    Tobacco comment: one daily   Substance Use Topics    Alcohol use: Yes     Comment: occassional    Drug use: No        Review of Systems:  Review of Systems   Constitutional: Negative.    HENT: Negative.    Eyes: Negative.    Respiratory: Negative.    Cardiovascular: Negative.    Gastrointestinal: Negative.    Endocrine: Negative.    Musculoskeletal: Negative.    Skin: Negative.    Allergic/Immunologic: Negative.    Neurological: Negative.    Hematological: Negative.    Psychiatric/Behavioral: Negative.    All other systems reviewed and are negative.      OBJECTIVE:     Vital Signs (Most Recent)  Pulse: 80 (02/06/19 1048)  BP: 107/66 (02/06/19 1048)  5' 4" (1.626 m)  98 kg (216 lb)     Physical Exam:  Physical Exam   Constitutional: He is oriented to person, place, and time. He appears well-developed and well-nourished.   HENT:   Head: Normocephalic and atraumatic.   Right Ear: External ear normal.   Left Ear: External ear normal.   Nose: Nose normal.   Mouth/Throat: Oropharynx is clear and moist.   Eyes: Conjunctivae and EOM are normal. Pupils are equal, round, and reactive to light.   Neck: Normal range of motion. Neck supple.   Cardiovascular: Normal rate, regular " rhythm, normal heart sounds and intact distal pulses.   Pulmonary/Chest: Effort normal and breath sounds normal.   Abdominal: Soft. Bowel sounds are normal.   Musculoskeletal: Normal range of motion.   Neurological: He is alert and oriented to person, place, and time. He has normal reflexes.   Skin: Skin is warm and dry.   Psychiatric: He has a normal mood and affect. His behavior is normal. Thought content normal.   Vitals reviewed.      Laboratory  none    Diagnostic Results:  CT: Reviewed  no mets and a low rectal cancer    ASSESSMENT/PLAN:     Rectal cancer    PLAN:Plan     To the OR for lap LAR possible APR with the risks and possible complications explained and he agrees to proceed as planned

## 2019-02-07 DIAGNOSIS — C20 MALIGNANT NEOPLASM OF RECTUM: Primary | ICD-10-CM

## 2019-02-07 RX ORDER — POLYETHYLENE GLYCOL 3350, SODIUM SULFATE ANHYDROUS, SODIUM BICARBONATE, SODIUM CHLORIDE, POTASSIUM CHLORIDE 236; 22.74; 6.74; 5.86; 2.97 G/4L; G/4L; G/4L; G/4L; G/4L
4 POWDER, FOR SOLUTION ORAL ONCE
Qty: 4000 ML | Refills: 0 | Status: SHIPPED | OUTPATIENT
Start: 2019-02-07 | End: 2019-02-07

## 2019-02-07 RX ORDER — METRONIDAZOLE 500 MG/1
TABLET ORAL
Qty: 4 TABLET | Refills: 0 | Status: ON HOLD | OUTPATIENT
Start: 2019-02-07 | End: 2019-03-21 | Stop reason: HOSPADM

## 2019-02-07 RX ORDER — NEOMYCIN SULFATE 500 MG/1
TABLET ORAL
Qty: 4 TABLET | Refills: 0 | Status: ON HOLD | OUTPATIENT
Start: 2019-02-07 | End: 2019-03-21 | Stop reason: HOSPADM

## 2019-02-14 ENCOUNTER — OFFICE VISIT (OUTPATIENT)
Dept: HEMATOLOGY/ONCOLOGY | Facility: CLINIC | Age: 69
End: 2019-02-14
Payer: MEDICARE

## 2019-02-14 ENCOUNTER — HOSPITAL ENCOUNTER (OUTPATIENT)
Dept: PREADMISSION TESTING | Facility: HOSPITAL | Age: 69
Discharge: HOME | End: 2019-02-14
Attending: SURGERY
Payer: MEDICARE

## 2019-02-14 VITALS
OXYGEN SATURATION: 95 % | TEMPERATURE: 98 F | SYSTOLIC BLOOD PRESSURE: 105 MMHG | HEIGHT: 65 IN | WEIGHT: 189.81 LBS | DIASTOLIC BLOOD PRESSURE: 62 MMHG | HEART RATE: 69 BPM | BODY MASS INDEX: 31.63 KG/M2

## 2019-02-14 DIAGNOSIS — Z85.46 PERSONAL HISTORY OF PROSTATE CANCER: ICD-10-CM

## 2019-02-14 DIAGNOSIS — C18.4 CARCINOMA OF TRANSVERSE COLON: ICD-10-CM

## 2019-02-14 DIAGNOSIS — C20 RECTAL CANCER: Primary | ICD-10-CM

## 2019-02-14 DIAGNOSIS — C20 RECTAL CANCER: ICD-10-CM

## 2019-02-14 PROCEDURE — 99999 PR PBB SHADOW E&M-EST. PATIENT-LVL III: CPT | Mod: PBBFAC,,, | Performed by: INTERNAL MEDICINE

## 2019-02-14 PROCEDURE — 99999 PR PBB SHADOW E&M-EST. PATIENT-LVL III: ICD-10-PCS | Mod: PBBFAC,,, | Performed by: INTERNAL MEDICINE

## 2019-02-14 PROCEDURE — 99213 OFFICE O/P EST LOW 20 MIN: CPT | Mod: S$GLB,,, | Performed by: INTERNAL MEDICINE

## 2019-02-14 PROCEDURE — 99213 PR OFFICE/OUTPT VISIT, EST, LEVL III, 20-29 MIN: ICD-10-PCS | Mod: S$GLB,,, | Performed by: INTERNAL MEDICINE

## 2019-02-14 PROCEDURE — 1101F PR PT FALLS ASSESS DOC 0-1 FALLS W/OUT INJ PAST YR: ICD-10-PCS | Mod: CPTII,S$GLB,, | Performed by: INTERNAL MEDICINE

## 2019-02-14 PROCEDURE — 1101F PT FALLS ASSESS-DOCD LE1/YR: CPT | Mod: CPTII,S$GLB,, | Performed by: INTERNAL MEDICINE

## 2019-02-14 RX ORDER — HYDROCORTISONE 25 MG/G
CREAM TOPICAL
Status: ON HOLD | COMMUNITY
Start: 2019-01-23 | End: 2019-03-21 | Stop reason: HOSPADM

## 2019-02-14 RX ORDER — POLYETHYLENE GLYCOL-3350 AND ELECTROLYTES 236; 6.74; 5.86; 2.97; 22.74 G/274.31G; G/274.31G; G/274.31G; G/274.31G; G/274.31G
POWDER, FOR SOLUTION ORAL
Refills: 0 | Status: ON HOLD | COMMUNITY
Start: 2019-02-07 | End: 2019-03-21 | Stop reason: HOSPADM

## 2019-02-14 RX ORDER — DOXYCYCLINE HYCLATE 100 MG
TABLET ORAL
Refills: 0 | Status: ON HOLD | COMMUNITY
Start: 2018-11-19 | End: 2019-02-22 | Stop reason: CLARIF

## 2019-02-14 RX ORDER — OXYCODONE AND ACETAMINOPHEN 10; 325 MG/1; MG/1
TABLET ORAL
Refills: 0 | Status: ON HOLD | COMMUNITY
Start: 2019-01-25 | End: 2019-03-21 | Stop reason: HOSPADM

## 2019-02-14 NOTE — PROGRESS NOTES
Chief Complaint :   Carcinoma Rectum    Hx of Present illness :  Patient is a 68 y.o. year old male who presents to the clinic today for  Oncology evaluation. Had routine Colonoscopy about    Three  months ago. 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 In October had  Trans anal  Excision of rectal Mass.  .  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 PT1,pNx. Completed Neoadjuvant Chemoradiation.      Allergies :    Review of patient's allergies indicates:  No Known Allergies    Occupation :  .    Transfusion :  none    Menstrual & obstetric Hx :  N/A      Present Meds :   Medication List with Changes/Refills   Current Medications    ASPIRIN (ECOTRIN) 81 MG EC TABLET    Take 81 mg by mouth once daily.    ATORVASTATIN (LIPITOR) 10 MG TABLET    TAKE 1 TABLET BY MOUTH EVERY BEDTIME    DOCUSATE SODIUM (COLACE) 100 MG CAPSULE    Take 100 mg by mouth 2 (two) times daily.    ESCITALOPRAM OXALATE (LEXAPRO) 10 MG TABLET    Take 10 mg by mouth once daily.    FERROUS SULFATE 325 MG (65 MG IRON) TAB TABLET    Take 1 tablet by mouth once daily.    HYDROCHLOROTHIAZIDE (HYDRODIURIL) 25 MG TABLET    Take 25 mg by mouth every morning.    LEVETIRACETAM (KEPPRA) 500 MG TAB    Take 500 mg by mouth 2 (two) times daily.    LISINOPRIL (PRINIVIL,ZESTRIL) 5 MG TABLET    Take 5 mg by mouth once daily.    METRONIDAZOLE (FLAGYL) 500 MG TABLET    Take 2 tabs at 7pm & 11pm night before surgery    MULTIVIT-MIN/FA/LYCOPEN/LUTEIN (CENTRUM SILVER ULTRA MEN'S ORAL)    Take 1 tablet by mouth once daily.    NEOMYCIN (MYCIFRADIN) 500 MG TAB    Take 2 tabs at 7pm & 11pm night before surgery    OMEPRAZOLE (PRILOSEC) 20 MG CAPSULE    Take by mouth daily as needed.    ONDANSETRON (ZOFRAN) 8 MG TABLET    Take 1 tablet (8 mg total) by mouth every 8 (eight) hours as needed for Nausea.    OXYCODONE-ACETAMINOPHEN (PERCOCET) 5-325 MG PER TABLET    Take 1 tablet by mouth every 4 (four) hours as needed for Pain.    POTASSIUM CHLORIDE  (MICRO-K) 10 MEQ CPSR    Take 10 mEq by mouth once.    SYMBICORT 160-4.5 MCG/ACTUATION HFAA    2 puffs 2 (two) times daily.    VENTOLIN HFA 90 MCG/ACTUATION INHALER    Inhale 2 puffs into the lungs every 4 (four) hours as needed.       Past Medical Hx :  Hx of Prostate Cancer many years ago.  Hypetension; Hx of ruputured intracranial aneurysm.  Abdominal Atherosclerosis of aorta without anurysmal dilatation.    Past Medical Hx :  Past Medical History:   Diagnosis Date    Aphagia     Cancer     prostate & colon    Cardiomegaly     Colon cancer     rectal cancer    Dysphagia     Hypertension     Prostate cancer     Seizures     Subdural hemorrhage        Travel Hx :  N/A    Immunization :    There is no immunization history on file for this patient.    Family Hx :  Family History   Problem Relation Age of Onset    Aneurysm Sister        Social Hx :  Social History     Socioeconomic History    Marital status:      Spouse name: Not on file    Number of children: Not on file    Years of education: Not on file    Highest education level: Not on file   Social Needs    Financial resource strain: Not on file    Food insecurity - worry: Not on file    Food insecurity - inability: Not on file    Transportation needs - medical: Not on file    Transportation needs - non-medical: Not on file   Occupational History    Not on file   Tobacco Use    Smoking status: Current Every Day Smoker     Types: Cigars    Smokeless tobacco: Never Used    Tobacco comment: one daily   Substance and Sexual Activity    Alcohol use: Yes     Comment: occassional    Drug use: No    Sexual activity: Not Currently     Partners: Female   Other Topics Concern    Not on file   Social History Narrative    Not on file       Surgery :   Sep 2018, had laparoscopic transverse Colectomy.  Had excision of rectal mass in Oct 2018.   Colonoscopy; tonsillectomy. Pilonidal Cyst    Symptoms :    Review of Systems   Constitutional:  Negative for chills, diaphoresis, fever, malaise/fatigue and weight loss.   HENT: Positive for congestion and hearing loss. Negative for ear discharge, nosebleeds, sore throat and tinnitus.    Eyes: Negative for blurred vision, double vision, photophobia, pain, discharge and redness.        Poor vision left eye. Cataract surgery both eyes   Respiratory: Negative for hemoptysis, sputum production and wheezing.    Cardiovascular: Positive for leg swelling. Negative for chest pain, palpitations, claudication and PND.   Gastrointestinal: Positive for constipation. Negative for abdominal pain, blood in stool, diarrhea, heartburn, melena, nausea and vomiting.        Has rectal pain   Genitourinary: Negative.  Negative for dysuria, flank pain, frequency, hematuria and urgency.        Incontinent   Musculoskeletal: Positive for back pain. Negative for falls, joint pain, myalgias and neck pain.   Skin: Negative for itching and rash.   Neurological: Positive for tingling. Negative for dizziness, tremors, sensory change and headaches.   Endo/Heme/Allergies: Negative for environmental allergies and polydipsia. Does not bruise/bleed easily.   Psychiatric/Behavioral: Negative for depression and memory loss. The patient is nervous/anxious. The patient does not have insomnia.        Physical Exam :   Physical Exam   Constitutional: He is oriented to person, place, and time and well-developed, well-nourished, and in no distress. No distress.   HENT:   Head: Normocephalic and atraumatic.   Right Ear: External ear normal.   Left Ear: External ear normal.   Nose: Nose normal.   Mouth/Throat: Oropharynx is clear and moist. No oropharyngeal exudate.   Eyes: Conjunctivae and EOM are normal. Pupils are equal, round, and reactive to light. Right eye exhibits no discharge. Left eye exhibits no discharge. No scleral icterus.       Neck: Normal range of motion. Neck supple. No JVD present. No tracheal deviation present. No thyromegaly present.    Cardiovascular: Normal rate, regular rhythm, normal heart sounds, intact distal pulses and normal pulses. PMI is not displaced. Exam reveals no gallop and no friction rub.   No murmur heard.  Pulmonary/Chest: Effort normal and breath sounds normal. No stridor. No apnea. No respiratory distress. He has no wheezes. He has no rales. He exhibits no tenderness.   Abdominal: Soft. Bowel sounds are normal. He exhibits no distension and no mass. There is no tenderness. There is no rebound and no guarding.   Musculoskeletal: Normal range of motion. He exhibits edema (Trace Edema both legs). He exhibits no tenderness or deformity.   Lymphadenopathy:        Head (right side): No submental, no submandibular, no tonsillar, no preauricular, no posterior auricular and no occipital adenopathy present.        Head (left side): No submental, no submandibular, no tonsillar, no preauricular, no posterior auricular and no occipital adenopathy present.     He has no cervical adenopathy.     He has no axillary adenopathy.        Right: No inguinal, no supraclavicular and no epitrochlear adenopathy present.        Left: No inguinal, no supraclavicular and no epitrochlear adenopathy present.   Neurological: He is alert and oriented to person, place, and time. He has normal reflexes. No cranial nerve deficit. He exhibits normal muscle tone. Gait normal. Coordination normal. GCS score is 15.   Skin: Skin is warm, dry and intact. No rash noted. He is not diaphoretic. No cyanosis or erythema. No pallor. Nails show no clubbing.   Psychiatric: Mood, memory, affect and judgment normal.         Labs & Imaging :   01/30/19 :  CEA 2.9; down from 40.8 at Dx. NFBS 108; Cr. 1.0; ca 9.7. Bili 0.3. ALP 72.  Hgb 13. Hct 37.3; MCV 86. Plts 358,000 ANC 8,400        Dx : Synchronous Cancers Involving transverse Colon and Rectum.      Assessment & Plan:  Discussed with patient . .  Patient scheduled to have Low anterior resection of rectum with sigmoid  colectomy on 02/22/19.  Await surgical findings to see if further chemotherapy is indicated. Patient understands and verbalised

## 2019-02-14 NOTE — DISCHARGE INSTRUCTIONS
"Your procedure  is scheduled for _2/22/2019_________.    Call [phone] between 2pm and 5pm on _2/21/2019______to find out your arrival time for the day of surgery.    Report to Same Day Surgery Unit at ____ AM on the 2nd floor of the hospital.  Use the front entrance of the hospital.  The front doors of the hospital open promptly at 5:30am.  If you need wheelchair assistance, call [phone] from your cell phone, or call "0" from the courtesy phone in the lobby.    Important instructions:   Do not eat or drink after 12 midnight, including water.  It is okay to brush your teeth.  Do not have gum, candy or mints.     Take only these medications with a small swallow of water on the morning of your surgery _inhalers, keppra, escitalopram_____________      Stop taking Aspirin, Ibuprofen, Motrin and Aleve , Fish oil, and Vitamin E for at least 7 days before your surgery. You may use Tylenol unless otherwise instructed by your doctor.         Please shower the night before and the morning of your surgery.        Use Hibiclens soap to your surgery site if instructed by your pre op nurse.   If your surgery is on your abdomen, be sure to wash your naval.  Be sure to rinse off Hibiclens after it is on your skin for several minutes.  Do not use Hibiclens on your face or genitals. Please place clean linens on your bed the night before surgery. Please wear fresh clean clothing after each shower.     No shaving of procedural area at least 4-5 days before surgery due to increased risk of skin irritation and/or possible infection.      You may be asked to take a third shower on arrival to Same Day Surgery depending on the type of surgery you are having.     You may wear deodorant only.      Do not wear powder, body lotion or perfume/cologne.     Do not wear any jewelry or have any metal on your body.     You will be asked to remove any dentures or partials for the procedure.     Please bring any documents given to you by " your doctor.     If you are going home on the same day of surgery, you must arrange for a family member or a friend to drive you home.  Public transportation is prohibited.  You will not be able to drive home if you were given anesthesia or sedation.     Wear loose fitting clothes allowing for bandages.     Please leave money and valuables home.       You may bring your cell phone.     Call the doctor if fever or illness should occur before your surgery.    Call [phone] to contact us here if needed.

## 2019-02-16 ENCOUNTER — NURSE TRIAGE (OUTPATIENT)
Dept: ADMINISTRATIVE | Facility: CLINIC | Age: 69
End: 2019-02-16

## 2019-02-16 NOTE — TELEPHONE ENCOUNTER
Have some medications to take morning of surgery but not sure if it is a medication that he has. Escitalopram is the medication. Advised that it is his Lexapro. Pt verbalized understanding.    Reason for Disposition   Caller has medication question only, adult not sick, and triager answers question    Protocols used: ST MEDICATION QUESTION CALL-A-AH

## 2019-02-22 ENCOUNTER — ANESTHESIA (OUTPATIENT)
Dept: SURGERY | Facility: HOSPITAL | Age: 69
DRG: 331 | End: 2019-02-22
Payer: MEDICARE

## 2019-02-22 ENCOUNTER — HOSPITAL ENCOUNTER (INPATIENT)
Facility: HOSPITAL | Age: 69
LOS: 3 days | Discharge: HOME | DRG: 331 | End: 2019-02-25
Attending: SURGERY | Admitting: SURGERY
Payer: MEDICARE

## 2019-02-22 ENCOUNTER — ANESTHESIA EVENT (OUTPATIENT)
Dept: SURGERY | Facility: HOSPITAL | Age: 69
DRG: 331 | End: 2019-02-22
Payer: MEDICARE

## 2019-02-22 DIAGNOSIS — C20 RECTAL CANCER: Primary | ICD-10-CM

## 2019-02-22 LAB — POCT GLUCOSE: 104 MG/DL (ref 70–110)

## 2019-02-22 PROCEDURE — 63600175 PHARM REV CODE 636 W HCPCS: Performed by: SURGERY

## 2019-02-22 PROCEDURE — 44213 LAP MOBIL SPLENIC FL ADD-ON: CPT | Mod: ,,, | Performed by: SURGERY

## 2019-02-22 PROCEDURE — 37000008 HC ANESTHESIA 1ST 15 MINUTES: Performed by: SURGERY

## 2019-02-22 PROCEDURE — D9220A PRA ANESTHESIA: Mod: CRNA,,, | Performed by: NURSE ANESTHETIST, CERTIFIED REGISTERED

## 2019-02-22 PROCEDURE — 88309 TISSUE EXAM BY PATHOLOGIST: CPT | Performed by: PATHOLOGY

## 2019-02-22 PROCEDURE — 27201423 OPTIME MED/SURG SUP & DEVICES STERILE SUPPLY: Performed by: SURGERY

## 2019-02-22 PROCEDURE — 88309 TISSUE EXAM BY PATHOLOGIST: CPT | Mod: 26,,, | Performed by: PATHOLOGY

## 2019-02-22 PROCEDURE — 25000003 PHARM REV CODE 250: Performed by: SURGERY

## 2019-02-22 PROCEDURE — 25000003 PHARM REV CODE 250: Performed by: ANESTHESIOLOGY

## 2019-02-22 PROCEDURE — V2790 AMNIOTIC MEMBRANE: HCPCS | Performed by: SURGERY

## 2019-02-22 PROCEDURE — 71000039 HC RECOVERY, EACH ADD'L HOUR: Performed by: SURGERY

## 2019-02-22 PROCEDURE — 36000710: Performed by: SURGERY

## 2019-02-22 PROCEDURE — 44213 PR LAP, SURG MOBIL SPLENIC FL DUR PTL COLECTOMY: ICD-10-PCS | Mod: ,,, | Performed by: SURGERY

## 2019-02-22 PROCEDURE — C9290 INJ, BUPIVACAINE LIPOSOME: HCPCS | Performed by: SURGERY

## 2019-02-22 PROCEDURE — D9220A PRA ANESTHESIA: Mod: ANES,,, | Performed by: ANESTHESIOLOGY

## 2019-02-22 PROCEDURE — 25000003 PHARM REV CODE 250: Performed by: NURSE ANESTHETIST, CERTIFIED REGISTERED

## 2019-02-22 PROCEDURE — D9220A PRA ANESTHESIA: ICD-10-PCS | Mod: ANES,,, | Performed by: ANESTHESIOLOGY

## 2019-02-22 PROCEDURE — 88309 TISSUE SPECIMEN TO PATHOLOGY - SURGERY: ICD-10-PCS | Mod: 26,,, | Performed by: PATHOLOGY

## 2019-02-22 PROCEDURE — 63600175 PHARM REV CODE 636 W HCPCS: Performed by: ANESTHESIOLOGY

## 2019-02-22 PROCEDURE — 37000009 HC ANESTHESIA EA ADD 15 MINS: Performed by: SURGERY

## 2019-02-22 PROCEDURE — 63600175 PHARM REV CODE 636 W HCPCS: Performed by: NURSE ANESTHETIST, CERTIFIED REGISTERED

## 2019-02-22 PROCEDURE — 11000001 HC ACUTE MED/SURG PRIVATE ROOM

## 2019-02-22 PROCEDURE — 36000711: Performed by: SURGERY

## 2019-02-22 PROCEDURE — 71000033 HC RECOVERY, INTIAL HOUR: Performed by: SURGERY

## 2019-02-22 PROCEDURE — D9220A PRA ANESTHESIA: ICD-10-PCS | Mod: CRNA,,, | Performed by: NURSE ANESTHETIST, CERTIFIED REGISTERED

## 2019-02-22 PROCEDURE — 82962 GLUCOSE BLOOD TEST: CPT | Performed by: SURGERY

## 2019-02-22 PROCEDURE — 44207 PR LAP,SURG,COLECTOMY,W/ANAST: ICD-10-PCS | Mod: ,,, | Performed by: SURGERY

## 2019-02-22 PROCEDURE — 44207 L COLECTOMY/COLOPROCTOSTOMY: CPT | Mod: ,,, | Performed by: SURGERY

## 2019-02-22 DEVICE — MEMBRANE AMNIOFIX 2X12CM: Type: IMPLANTABLE DEVICE | Site: RECTUM | Status: FUNCTIONAL

## 2019-02-22 RX ORDER — LISINOPRIL 5 MG/1
5 TABLET ORAL DAILY
Status: DISCONTINUED | OUTPATIENT
Start: 2019-02-22 | End: 2019-02-25 | Stop reason: HOSPADM

## 2019-02-22 RX ORDER — BUDESONIDE AND FORMOTEROL FUMARATE DIHYDRATE 160; 4.5 UG/1; UG/1
2 AEROSOL RESPIRATORY (INHALATION) 2 TIMES DAILY
Status: DISCONTINUED | OUTPATIENT
Start: 2019-02-22 | End: 2019-02-23 | Stop reason: CLARIF

## 2019-02-22 RX ORDER — SODIUM CHLORIDE 0.9 % (FLUSH) 0.9 %
3 SYRINGE (ML) INJECTION
Status: DISCONTINUED | OUTPATIENT
Start: 2019-02-22 | End: 2019-02-22 | Stop reason: HOSPADM

## 2019-02-22 RX ORDER — ESCITALOPRAM OXALATE 10 MG/1
10 TABLET ORAL DAILY
Status: DISCONTINUED | OUTPATIENT
Start: 2019-02-22 | End: 2019-02-25 | Stop reason: HOSPADM

## 2019-02-22 RX ORDER — LEVETIRACETAM 500 MG/1
500 TABLET ORAL 2 TIMES DAILY
Status: DISCONTINUED | OUTPATIENT
Start: 2019-02-22 | End: 2019-02-25 | Stop reason: HOSPADM

## 2019-02-22 RX ORDER — BUPIVACAINE HYDROCHLORIDE 2.5 MG/ML
INJECTION, SOLUTION EPIDURAL; INFILTRATION; INTRACAUDAL
Status: DISCONTINUED | OUTPATIENT
Start: 2019-02-22 | End: 2019-02-22 | Stop reason: HOSPADM

## 2019-02-22 RX ORDER — HYDROMORPHONE HYDROCHLORIDE 2 MG/ML
0.2 INJECTION, SOLUTION INTRAMUSCULAR; INTRAVENOUS; SUBCUTANEOUS EVERY 5 MIN PRN
Status: DISCONTINUED | OUTPATIENT
Start: 2019-02-22 | End: 2019-02-22 | Stop reason: HOSPADM

## 2019-02-22 RX ORDER — ONDANSETRON 2 MG/ML
INJECTION INTRAMUSCULAR; INTRAVENOUS
Status: DISCONTINUED | OUTPATIENT
Start: 2019-02-22 | End: 2019-02-22

## 2019-02-22 RX ORDER — MIDAZOLAM HYDROCHLORIDE 1 MG/ML
INJECTION, SOLUTION INTRAMUSCULAR; INTRAVENOUS
Status: DISCONTINUED | OUTPATIENT
Start: 2019-02-22 | End: 2019-02-22

## 2019-02-22 RX ORDER — SODIUM CHLORIDE, SODIUM LACTATE, POTASSIUM CHLORIDE, CALCIUM CHLORIDE 600; 310; 30; 20 MG/100ML; MG/100ML; MG/100ML; MG/100ML
INJECTION, SOLUTION INTRAVENOUS CONTINUOUS
Status: DISCONTINUED | OUTPATIENT
Start: 2019-02-22 | End: 2019-02-22

## 2019-02-22 RX ORDER — ACETAMINOPHEN 10 MG/ML
1000 INJECTION, SOLUTION INTRAVENOUS ONCE
Status: COMPLETED | OUTPATIENT
Start: 2019-02-22 | End: 2019-02-22

## 2019-02-22 RX ORDER — FLUTICASONE FUROATE AND VILANTEROL 100; 25 UG/1; UG/1
1 POWDER RESPIRATORY (INHALATION) DAILY
Status: DISCONTINUED | OUTPATIENT
Start: 2019-02-23 | End: 2019-02-25 | Stop reason: HOSPADM

## 2019-02-22 RX ORDER — ENOXAPARIN SODIUM 100 MG/ML
40 INJECTION SUBCUTANEOUS EVERY 24 HOURS
Status: DISCONTINUED | OUTPATIENT
Start: 2019-02-23 | End: 2019-02-25 | Stop reason: HOSPADM

## 2019-02-22 RX ORDER — SODIUM CHLORIDE 9 MG/ML
INJECTION, SOLUTION INTRAVENOUS CONTINUOUS
Status: DISCONTINUED | OUTPATIENT
Start: 2019-02-22 | End: 2019-02-23

## 2019-02-22 RX ORDER — CEFAZOLIN SODIUM 2 G/50ML
2 SOLUTION INTRAVENOUS
Status: COMPLETED | OUTPATIENT
Start: 2019-02-22 | End: 2019-02-22

## 2019-02-22 RX ORDER — PROPOFOL 10 MG/ML
VIAL (ML) INTRAVENOUS
Status: DISCONTINUED | OUTPATIENT
Start: 2019-02-22 | End: 2019-02-22

## 2019-02-22 RX ORDER — ALVIMOPAN 12 MG/1
12 CAPSULE ORAL 2 TIMES DAILY
Status: DISCONTINUED | OUTPATIENT
Start: 2019-02-22 | End: 2019-02-25 | Stop reason: HOSPADM

## 2019-02-22 RX ORDER — ONDANSETRON 2 MG/ML
4 INJECTION INTRAMUSCULAR; INTRAVENOUS DAILY PRN
Status: DISCONTINUED | OUTPATIENT
Start: 2019-02-22 | End: 2019-02-22 | Stop reason: HOSPADM

## 2019-02-22 RX ORDER — GLUCAGON 1 MG
KIT INJECTION
Status: DISCONTINUED | OUTPATIENT
Start: 2019-02-22 | End: 2019-02-22

## 2019-02-22 RX ORDER — ACETAMINOPHEN 10 MG/ML
1000 INJECTION, SOLUTION INTRAVENOUS ONCE
Status: DISCONTINUED | OUTPATIENT
Start: 2019-02-22 | End: 2019-02-22

## 2019-02-22 RX ORDER — GLYCOPYRROLATE 0.2 MG/ML
INJECTION INTRAMUSCULAR; INTRAVENOUS
Status: DISCONTINUED | OUTPATIENT
Start: 2019-02-22 | End: 2019-02-22

## 2019-02-22 RX ORDER — ACETAMINOPHEN 10 MG/ML
INJECTION, SOLUTION INTRAVENOUS
Status: DISCONTINUED | OUTPATIENT
Start: 2019-02-22 | End: 2019-02-22

## 2019-02-22 RX ORDER — HYDROCHLOROTHIAZIDE 25 MG/1
25 TABLET ORAL EVERY MORNING
Status: DISCONTINUED | OUTPATIENT
Start: 2019-02-23 | End: 2019-02-25 | Stop reason: HOSPADM

## 2019-02-22 RX ORDER — ALBUTEROL SULFATE 90 UG/1
2 AEROSOL, METERED RESPIRATORY (INHALATION) EVERY 4 HOURS PRN
Status: DISCONTINUED | OUTPATIENT
Start: 2019-02-22 | End: 2019-02-25 | Stop reason: HOSPADM

## 2019-02-22 RX ORDER — NEOSTIGMINE METHYLSULFATE 1 MG/ML
INJECTION, SOLUTION INTRAVENOUS
Status: DISCONTINUED | OUTPATIENT
Start: 2019-02-22 | End: 2019-02-22

## 2019-02-22 RX ORDER — SUCCINYLCHOLINE CHLORIDE 20 MG/ML
INJECTION INTRAMUSCULAR; INTRAVENOUS
Status: DISCONTINUED | OUTPATIENT
Start: 2019-02-22 | End: 2019-02-22

## 2019-02-22 RX ORDER — FENTANYL CITRATE 50 UG/ML
INJECTION, SOLUTION INTRAMUSCULAR; INTRAVENOUS
Status: DISCONTINUED | OUTPATIENT
Start: 2019-02-22 | End: 2019-02-22

## 2019-02-22 RX ORDER — MORPHINE SULFATE 10 MG/ML
5 INJECTION INTRAMUSCULAR; INTRAVENOUS; SUBCUTANEOUS EVERY 4 HOURS PRN
Status: DISCONTINUED | OUTPATIENT
Start: 2019-02-22 | End: 2019-02-23

## 2019-02-22 RX ORDER — SODIUM CHLORIDE 9 MG/ML
INJECTION, SOLUTION INTRAVENOUS CONTINUOUS
Status: DISCONTINUED | OUTPATIENT
Start: 2019-02-22 | End: 2019-02-22

## 2019-02-22 RX ORDER — LIDOCAINE HCL/PF 100 MG/5ML
SYRINGE (ML) INTRAVENOUS
Status: DISCONTINUED | OUTPATIENT
Start: 2019-02-22 | End: 2019-02-22

## 2019-02-22 RX ORDER — ROCURONIUM BROMIDE 10 MG/ML
INJECTION, SOLUTION INTRAVENOUS
Status: DISCONTINUED | OUTPATIENT
Start: 2019-02-22 | End: 2019-02-22

## 2019-02-22 RX ADMIN — NEOSTIGMINE METHYLSULFATE 4 MG: 1 INJECTION INTRAVENOUS at 01:02

## 2019-02-22 RX ADMIN — ROCURONIUM BROMIDE 15 MG: 10 INJECTION, SOLUTION INTRAVENOUS at 11:02

## 2019-02-22 RX ADMIN — LIDOCAINE HYDROCHLORIDE 75 MG: 20 INJECTION, SOLUTION INTRAVENOUS at 10:02

## 2019-02-22 RX ADMIN — LEVETIRACETAM 500 MG: 500 TABLET ORAL at 09:02

## 2019-02-22 RX ADMIN — ROCURONIUM BROMIDE 5 MG: 10 INJECTION, SOLUTION INTRAVENOUS at 10:02

## 2019-02-22 RX ADMIN — HYDROMORPHONE HYDROCHLORIDE 0.2 MG: 2 INJECTION, SOLUTION INTRAMUSCULAR; INTRAVENOUS; SUBCUTANEOUS at 02:02

## 2019-02-22 RX ADMIN — SODIUM CHLORIDE, SODIUM LACTATE, POTASSIUM CHLORIDE, AND CALCIUM CHLORIDE: .6; .31; .03; .02 INJECTION, SOLUTION INTRAVENOUS at 10:02

## 2019-02-22 RX ADMIN — GLYCOPYRROLATE 0.2 MG: 0.2 INJECTION, SOLUTION INTRAMUSCULAR; INTRAVENOUS at 10:02

## 2019-02-22 RX ADMIN — FENTANYL CITRATE 50 MCG: 50 INJECTION INTRAMUSCULAR; INTRAVENOUS at 10:02

## 2019-02-22 RX ADMIN — HYDROMORPHONE HYDROCHLORIDE 0.2 MG: 2 INJECTION, SOLUTION INTRAMUSCULAR; INTRAVENOUS; SUBCUTANEOUS at 01:02

## 2019-02-22 RX ADMIN — MORPHINE SULFATE 5 MG: 10 INJECTION INTRAVENOUS at 09:02

## 2019-02-22 RX ADMIN — ACETAMINOPHEN 1000 MG: 10 INJECTION, SOLUTION INTRAVENOUS at 12:02

## 2019-02-22 RX ADMIN — SODIUM CHLORIDE, SODIUM LACTATE, POTASSIUM CHLORIDE, AND CALCIUM CHLORIDE: .6; .31; .03; .02 INJECTION, SOLUTION INTRAVENOUS at 11:02

## 2019-02-22 RX ADMIN — GLUCAGON HYDROCHLORIDE 1 MG: KIT at 12:02

## 2019-02-22 RX ADMIN — PROPOFOL 120 MG: 10 INJECTION, EMULSION INTRAVENOUS at 10:02

## 2019-02-22 RX ADMIN — ROCURONIUM BROMIDE 25 MG: 10 INJECTION, SOLUTION INTRAVENOUS at 10:02

## 2019-02-22 RX ADMIN — GLYCOPYRROLATE 0.4 MG: 0.2 INJECTION, SOLUTION INTRAMUSCULAR; INTRAVENOUS at 01:02

## 2019-02-22 RX ADMIN — ONDANSETRON 4 MG: 2 INJECTION, SOLUTION INTRAMUSCULAR; INTRAVENOUS at 10:02

## 2019-02-22 RX ADMIN — FENTANYL CITRATE 50 MCG: 50 INJECTION INTRAMUSCULAR; INTRAVENOUS at 11:02

## 2019-02-22 RX ADMIN — SODIUM CHLORIDE, SODIUM LACTATE, POTASSIUM CHLORIDE, AND CALCIUM CHLORIDE: .6; .31; .03; .02 INJECTION, SOLUTION INTRAVENOUS at 08:02

## 2019-02-22 RX ADMIN — CEFAZOLIN SODIUM 2 G: 2 SOLUTION INTRAVENOUS at 10:02

## 2019-02-22 RX ADMIN — SUCCINYLCHOLINE CHLORIDE 120 MG: 20 INJECTION, SOLUTION INTRAMUSCULAR; INTRAVENOUS at 10:02

## 2019-02-22 RX ADMIN — SODIUM CHLORIDE: 0.9 INJECTION, SOLUTION INTRAVENOUS at 04:02

## 2019-02-22 RX ADMIN — ACETAMINOPHEN 1000 MG: 10 INJECTION, SOLUTION INTRAVENOUS at 08:02

## 2019-02-22 RX ADMIN — ALVIMOPAN 12 MG: 12 CAPSULE ORAL at 09:02

## 2019-02-22 RX ADMIN — ROCURONIUM BROMIDE 15 MG: 10 INJECTION, SOLUTION INTRAVENOUS at 10:02

## 2019-02-22 RX ADMIN — MIDAZOLAM HYDROCHLORIDE 2 MG: 1 INJECTION, SOLUTION INTRAMUSCULAR; INTRAVENOUS at 10:02

## 2019-02-22 NOTE — OP NOTE
DATE OF PROCEDURE:  02/22/2019.    PREOPERATIVE DIAGNOSIS:  Rectal CA.    POSTOPERATIVE DIAGNOSIS:  Rectal CA.    PROCEDURE PERFORMED:  Laparoscopic low anterior resection with total mesorectal   excision.    SURGEON:  Mat Glass M.D.    ASSISTANT:  Cboy Harp M.D. PGY5.    ANESTHESIA:  General.    ESTIMATED BLOOD LOSS:  50 mL.    DESCRIPTION OF OPERATION:  The patient was taken to the Operating Room, placed   on the operating room table in supine position.  Under adequate anesthesia, he   was placed in lithotomy, prepped and draped around his abdomen and perineum in   usual sterile fashion.  The patient did have a low-lying rectal cancer   approximately 5 to 8 cm above the anal verge.  An incision was made just above   his umbilicus through which a 5 mm port was inserted under direct vision.    Abdomen was insufflated with 3.5 L of CO2.  Two other ports were placed, a 12 mm   right lower quadrant port as well as a hand port placed in his left lower   quadrant.  With pneumoperitoneum restored, the left colon all the way to the   splenic flexure and down to the pelvis was mobilized in a medial fashion by   incising its lateral attachment.  Once this was done, all the way into the   pelvis, he had a very floppy sigmoid colon going down.  With Trendelenburg   positioned for the table, the sacral promontory was identified and dissection   was then started there in the presacral space all the way down into the pelvis   and around the peritoneal reflection anteriorly as well as laterally.  Further   dissection was done posteriorly along the entire mesorectum all the way to the   pelvic diaphragm, which could be visualized with those fibers of muscle being   seen.  The mesorectum was gone and then a stapler was used to go anteriorly just   below the seminal vesicles and divide the rectum.  Once it was divided, it was   brought out through the hand port site and proximal division was then performed,   1 mg of  glucagon was given and then after sizing the proximal colon, an anvil   was placed proximally and stapled in place.  A staple was placed draining anally   with only the head being able to be into this area and an end-to-end   anastomosis was then performed with the staplers.  This was tested to be   airtight.  Once this was obtained, the rest of the pelvis was irrigated.  A cut   piece of amniotic membrane was placed anteriorly and posteriorly on the   anastomosis and some Evicel was used for hemostasis.  The patient then had all   the ports removed and all the port sites closed in layers with absorbable   suture.  Prineo tape was used.  The patient was awakened and transported to   Recovery Room in satisfactory condition.      PHILLIP/IN  dd: 02/22/2019 12:34:13 (CST)  td: 02/22/2019 13:16:25 (CST)  Doc ID   #[id number]  Job ID #[id number]    CC:

## 2019-02-22 NOTE — ANESTHESIA PREPROCEDURE EVALUATION
02/22/2019    Pre-operative evaluation for Procedure(s) (LRB):  RESECTION, RECTUM, LOW ANTERIOR, LAPAROSCOPIC, WITH SIGMOID COLECTOMY (N/A)    Justin Adams is a 68 y.o. male     Patient Active Problem List   Diagnosis    Malignant neoplasm of transverse colon    Carcinoma of rectum    Carcinoma of transverse colon    Personal history of prostate cancer    Rectal cancer       Review of patient's allergies indicates:  No Known Allergies    No current facility-administered medications on file prior to encounter.      Current Outpatient Medications on File Prior to Encounter   Medication Sig Dispense Refill    atorvastatin (LIPITOR) 10 MG tablet TAKE 1 TABLET BY MOUTH EVERY BEDTIME  4    docusate sodium (COLACE) 100 MG capsule Take 100 mg by mouth 2 (two) times daily.      hydroCHLOROthiazide (HYDRODIURIL) 25 MG tablet Take 25 mg by mouth every morning.  4    levetiracetam (KEPPRA) 500 MG Tab Take 500 mg by mouth 2 (two) times daily.      lisinopril (PRINIVIL,ZESTRIL) 5 MG tablet Take 5 mg by mouth once daily.      ondansetron (ZOFRAN) 8 MG tablet Take 1 tablet (8 mg total) by mouth every 8 (eight) hours as needed for Nausea. 30 tablet 1    SYMBICORT 160-4.5 mcg/actuation HFAA 2 puffs 2 (two) times daily.  2    VENTOLIN HFA 90 mcg/actuation inhaler Inhale 2 puffs into the lungs every 4 (four) hours as needed.  4    aspirin (ECOTRIN) 81 MG EC tablet Take 81 mg by mouth once daily.      escitalopram oxalate (LEXAPRO) 10 MG tablet Take 10 mg by mouth once daily.      ferrous sulfate 325 mg (65 mg iron) Tab tablet Take 1 tablet by mouth once daily.  0    multivit-min/FA/lycopen/lutein (CENTRUM SILVER ULTRA MEN'S ORAL) Take 1 tablet by mouth once daily.      omeprazole (PRILOSEC) 20 MG capsule Take by mouth daily as needed.  4    potassium chloride (MICRO-K) 10 MEQ CpSR Take 10 mEq by mouth  once.             Lab Results   Component Value Date    WBC 11.20 01/30/2019    HGB 13.0 (L) 01/30/2019    HCT 37.3 (L) 01/30/2019    MCV 86 01/30/2019     (H) 01/30/2019     BMP  Lab Results   Component Value Date     01/30/2019    K 4.3 01/30/2019     01/30/2019    CO2 27 01/30/2019    BUN 17 01/30/2019    CREATININE 1.0 01/30/2019    CALCIUM 9.7 01/30/2019    ANIONGAP 11 01/30/2019    ESTGFRAFRICA >60.0 01/30/2019    EGFRNONAA >60.0 01/30/2019         Anesthesia Evaluation    I have reviewed the Patient Summary Reports.     I have reviewed the Medications.     Review of Systems  Anesthesia Hx:  No problems with previous Anesthesia  History of prior surgery of interest to airway management or planning: Denies Family Hx of Anesthesia complications.   Denies Personal Hx of Anesthesia complications.   Cardiovascular:   Hypertension  Hypertension    Neurological:   Seizures        Physical Exam  General:  Well nourished    Airway/Jaw/Neck:  Airway Findings:      Chest/Lungs:  Chest/Lungs Findings: Clear to auscultation, Normal Respiratory Rate     Heart/Vascular:  Heart Findings: Rate: Normal  Rhythm: Regular Rhythm        Mental Status:  Mental Status Findings:  Cooperative         Anesthesia Plan  Type of Anesthesia, risks & benefits discussed:  Anesthesia Type:  general  Patient's Preference:   Intra-op Monitoring Plan: standard ASA monitors  Intra-op Monitoring Plan Comments:   Post Op Pain Control Plan:   Post Op Pain Control Plan Comments:   Induction:   IV  Beta Blocker:  Patient is on a Beta-Blocker and has received one dose within the past 24 hours (No further documentation required).       Informed Consent: Patient understands risks and agrees with Anesthesia plan.  Questions answered. Anesthesia consent signed with patient.  ASA Score: 3     Day of Surgery Review of History & Physical: I have interviewed and examined the patient. I have reviewed the patient's H&P dated:  There are no  significant changes.          Ready For Surgery From Anesthesia Perspective.

## 2019-02-22 NOTE — NURSING
Md called RN stating is pt does not urinate by 7 or 8 pm, place chanel and notify MD. Will cont to monitor.

## 2019-02-22 NOTE — PLAN OF CARE
Problem: Adult Inpatient Plan of Care  Goal: Plan of Care Review   02/22/19 1512   Plan of Care Review   Plan of Care Reviewed With patient     Joy Score 9/10 Patient is AAO x 4. Vitals are stable. Dressing is c/d/i. Pain managed. He denies nausea and is tolerating ice chips at this time.  Field removed at 1440 per orders. Recovery care is complete. Patient transferred in stable condition on 2 liters of oxygen.     Problem: Bleeding (Bowel Resection)  Goal: Absence of Bleeding    Intervention: Monitor and Manage Bleeding   02/22/19 1512   Prevent and Manage Risk of Hemorrhage   Bleeding Management dressing monitored         Problem: Ongoing Anesthesia Effects (Bowel Resection)  Goal: Anesthesia/Sedation Recovery    Intervention: Optimize Anesthesia Recovery   02/22/19 1416 02/22/19 1512   Optimize Anesthesia Recovery   Anesthesia/Sedation Recovery --  criteria met for transfer;recovered to baseline   Optimize Balance and Safe Activity   Safety Promotion/Fall Prevention side rails raised x 2;pulse ox --          Problem: Pain (Bowel Resection)  Goal: Acceptable Pain Control    Intervention: Prevent or Manage Pain   02/22/19 1512   Prevent or Manage Pain   Pain Management Interventions pain management plan reviewed with patient/caregiver         Problem: Postoperative Nausea and Vomiting (Bowel Resection)  Goal: Nausea and Vomiting Relief    Intervention: Prevent or Manage Postoperative Nausea and Vomiting   02/22/19 1512   Prevent or Manage Postoperative Nausea and Vomiting   Nausea/Vomiting Interventions stimuli minimized

## 2019-02-22 NOTE — ANESTHESIA POSTPROCEDURE EVALUATION
"Anesthesia Post Evaluation    Patient: Justin Adams    Procedure(s) Performed: Procedure(s) (LRB):  RESECTION, RECTUM, LOW ANTERIOR, LAPAROSCOPIC, WITH SIGMOID COLECTOMY (N/A)    Final Anesthesia Type: general  Patient location during evaluation: PACU  Patient participation: Yes- Able to Participate  Level of consciousness: awake  Post-procedure vital signs: reviewed and stable  Pain management: adequate  Airway patency: patent  PONV status at discharge: No PONV  Anesthetic complications: no      Cardiovascular status: hemodynamically stable  Respiratory status: unassisted and spontaneous ventilation  Hydration status: euvolemic  Follow-up not needed.        Visit Vitals  BP 98/61   Pulse 60   Temp 36.1 °C (96.9 °F)   Resp 16   Ht 5' 4" (1.626 m)   Wt 98 kg (216 lb)   SpO2 (!) 90%   BMI 37.08 kg/m²     Wt Readings from Last 3 Encounters:   02/21/19 98 kg (216 lb)   02/14/19 86.1 kg (189 lb 13.1 oz)   02/06/19 98 kg (216 lb)     Temp Readings from Last 3 Encounters:   02/22/19 36.1 °C (96.9 °F)   02/14/19 36.8 °C (98.2 °F) (Oral)   12/31/18 36.7 °C (98 °F)     BP Readings from Last 3 Encounters:   02/22/19 98/61   02/14/19 105/62   02/06/19 107/66     Pulse Readings from Last 3 Encounters:   02/22/19 60   02/14/19 69   02/06/19 80     Pain/Joy Score: Pain Rating Prior to Med Admin: 6 (2/22/2019  2:00 PM)        "

## 2019-02-22 NOTE — BRIEF OP NOTE
[facility]  Brief Operative Note    SUMMARY     Surgery Date: 2/22/2019     Surgeon(s) and Role:     * Mat Glass MD - Primary    Assisting Surgeon: Coby Harp MD    Pre-op Diagnosis:  Rectal cancer [C20]    Post-op Diagnosis:  Post-Op Diagnosis Codes:     * Rectal cancer [C20]    Procedure(s) (LRB):  RESECTION, RECTUM, LOW ANTERIOR, LAPAROSCOPIC, WITH SIGMOID COLECTOMY (N/A)    Anesthesia: General    Description of Procedure: laparoscopic LAR with Total mesorectal excision    Description of the findings of the procedure: almost no residual tumor appreciated with anatomosis at the pelvic diaphragm    Estimated Blood Loss: 50 mL         Specimens:   Specimen (12h ago, onward)    None

## 2019-02-22 NOTE — TRANSFER OF CARE
"Anesthesia Transfer of Care Note    Patient: Justin Adams    Procedure(s) Performed: Procedure(s) (LRB):  RESECTION, RECTUM, LOW ANTERIOR, LAPAROSCOPIC, WITH SIGMOID COLECTOMY (N/A)    Patient location: PACU    Anesthesia Type: general    Transport from OR: Transported from OR on room air with adequate spontaneous ventilation    Post pain: adequate analgesia    Post assessment: no apparent anesthetic complications    Post vital signs: stable    Level of consciousness: awake, alert and oriented    Nausea/Vomiting: no nausea/vomiting    Complications: none    Transfer of care protocol was followed      Last vitals:   Visit Vitals  /70   Pulse 79   Temp 36.1 °C (96.9 °F)   Resp 18   Ht 5' 4" (1.626 m)   Wt 98 kg (216 lb)   SpO2 97%   BMI 37.08 kg/m²     "

## 2019-02-23 LAB
ALBUMIN SERPL BCP-MCNC: 3 G/DL
ALP SERPL-CCNC: 52 U/L
ALT SERPL W/O P-5'-P-CCNC: 11 U/L
ANION GAP SERPL CALC-SCNC: 10 MMOL/L
ANION GAP SERPL CALC-SCNC: 10 MMOL/L
AST SERPL-CCNC: 14 U/L
BASOPHILS # BLD AUTO: 0.01 K/UL
BASOPHILS NFR BLD: 0.1 %
BILIRUB SERPL-MCNC: 0.6 MG/DL
BUN SERPL-MCNC: 13 MG/DL
BUN SERPL-MCNC: 13 MG/DL
CALCIUM SERPL-MCNC: 8.6 MG/DL
CALCIUM SERPL-MCNC: 8.6 MG/DL
CHLORIDE SERPL-SCNC: 108 MMOL/L
CHLORIDE SERPL-SCNC: 108 MMOL/L
CO2 SERPL-SCNC: 22 MMOL/L
CO2 SERPL-SCNC: 22 MMOL/L
CREAT SERPL-MCNC: 0.7 MG/DL
CREAT SERPL-MCNC: 0.7 MG/DL
DIFFERENTIAL METHOD: ABNORMAL
EOSINOPHIL # BLD AUTO: 0.1 K/UL
EOSINOPHIL NFR BLD: 0.9 %
ERYTHROCYTE [DISTWIDTH] IN BLOOD BY AUTOMATED COUNT: 16.8 %
EST. GFR  (AFRICAN AMERICAN): >60 ML/MIN/1.73 M^2
EST. GFR  (AFRICAN AMERICAN): >60 ML/MIN/1.73 M^2
EST. GFR  (NON AFRICAN AMERICAN): >60 ML/MIN/1.73 M^2
EST. GFR  (NON AFRICAN AMERICAN): >60 ML/MIN/1.73 M^2
GLUCOSE SERPL-MCNC: 88 MG/DL
GLUCOSE SERPL-MCNC: 88 MG/DL
HCT VFR BLD AUTO: 36 %
HGB BLD-MCNC: 12.1 G/DL
LYMPHOCYTES # BLD AUTO: 1 K/UL
LYMPHOCYTES NFR BLD: 8.3 %
MAGNESIUM SERPL-MCNC: 1.6 MG/DL
MCH RBC QN AUTO: 29.3 PG
MCHC RBC AUTO-ENTMCNC: 33.6 G/DL
MCV RBC AUTO: 87 FL
MONOCYTES # BLD AUTO: 0.9 K/UL
MONOCYTES NFR BLD: 7.5 %
NEUTROPHILS # BLD AUTO: 9.5 K/UL
NEUTROPHILS NFR BLD: 83.2 %
PLATELET # BLD AUTO: 347 K/UL
PMV BLD AUTO: 9.5 FL
POTASSIUM SERPL-SCNC: 3.6 MMOL/L
POTASSIUM SERPL-SCNC: 3.6 MMOL/L
PROT SERPL-MCNC: 5.8 G/DL
RBC # BLD AUTO: 4.13 M/UL
SODIUM SERPL-SCNC: 140 MMOL/L
SODIUM SERPL-SCNC: 140 MMOL/L
WBC # BLD AUTO: 11.42 K/UL

## 2019-02-23 PROCEDURE — 85025 COMPLETE CBC W/AUTO DIFF WBC: CPT

## 2019-02-23 PROCEDURE — 63600175 PHARM REV CODE 636 W HCPCS: Performed by: SURGERY

## 2019-02-23 PROCEDURE — 80053 COMPREHEN METABOLIC PANEL: CPT

## 2019-02-23 PROCEDURE — 94799 UNLISTED PULMONARY SVC/PX: CPT

## 2019-02-23 PROCEDURE — 94640 AIRWAY INHALATION TREATMENT: CPT

## 2019-02-23 PROCEDURE — 83735 ASSAY OF MAGNESIUM: CPT

## 2019-02-23 PROCEDURE — 25000003 PHARM REV CODE 250: Performed by: STUDENT IN AN ORGANIZED HEALTH CARE EDUCATION/TRAINING PROGRAM

## 2019-02-23 PROCEDURE — 11000001 HC ACUTE MED/SURG PRIVATE ROOM

## 2019-02-23 PROCEDURE — 25000003 PHARM REV CODE 250: Performed by: SURGERY

## 2019-02-23 PROCEDURE — 25000242 PHARM REV CODE 250 ALT 637 W/ HCPCS: Performed by: SURGERY

## 2019-02-23 PROCEDURE — 36415 COLL VENOUS BLD VENIPUNCTURE: CPT

## 2019-02-23 PROCEDURE — 25000242 PHARM REV CODE 250 ALT 637 W/ HCPCS: Performed by: STUDENT IN AN ORGANIZED HEALTH CARE EDUCATION/TRAINING PROGRAM

## 2019-02-23 PROCEDURE — 94761 N-INVAS EAR/PLS OXIMETRY MLT: CPT

## 2019-02-23 RX ORDER — OXYCODONE HYDROCHLORIDE 5 MG/1
10 TABLET ORAL EVERY 6 HOURS PRN
Status: DISCONTINUED | OUTPATIENT
Start: 2019-02-23 | End: 2019-02-25 | Stop reason: HOSPADM

## 2019-02-23 RX ORDER — OXYCODONE HYDROCHLORIDE 5 MG/1
5 TABLET ORAL EVERY 4 HOURS PRN
Status: DISCONTINUED | OUTPATIENT
Start: 2019-02-23 | End: 2019-02-25 | Stop reason: HOSPADM

## 2019-02-23 RX ORDER — MORPHINE SULFATE 10 MG/ML
5 INJECTION INTRAMUSCULAR; INTRAVENOUS; SUBCUTANEOUS EVERY 4 HOURS PRN
Status: DISCONTINUED | OUTPATIENT
Start: 2019-02-23 | End: 2019-02-24

## 2019-02-23 RX ORDER — IPRATROPIUM BROMIDE AND ALBUTEROL SULFATE 2.5; .5 MG/3ML; MG/3ML
3 SOLUTION RESPIRATORY (INHALATION)
Status: COMPLETED | OUTPATIENT
Start: 2019-02-23 | End: 2019-02-24

## 2019-02-23 RX ORDER — GABAPENTIN 300 MG/1
300 CAPSULE ORAL 3 TIMES DAILY
Status: DISCONTINUED | OUTPATIENT
Start: 2019-02-23 | End: 2019-02-25 | Stop reason: HOSPADM

## 2019-02-23 RX ORDER — ACETAMINOPHEN 325 MG/1
650 TABLET ORAL EVERY 6 HOURS
Status: DISCONTINUED | OUTPATIENT
Start: 2019-02-23 | End: 2019-02-25 | Stop reason: HOSPADM

## 2019-02-23 RX ADMIN — IPRATROPIUM BROMIDE AND ALBUTEROL SULFATE 3 ML: .5; 3 SOLUTION RESPIRATORY (INHALATION) at 08:02

## 2019-02-23 RX ADMIN — ENOXAPARIN SODIUM 40 MG: 100 INJECTION SUBCUTANEOUS at 05:02

## 2019-02-23 RX ADMIN — OXYCODONE HYDROCHLORIDE 10 MG: 5 TABLET ORAL at 12:02

## 2019-02-23 RX ADMIN — GABAPENTIN 300 MG: 300 CAPSULE ORAL at 09:02

## 2019-02-23 RX ADMIN — MORPHINE SULFATE 5 MG: 10 INJECTION INTRAVENOUS at 03:02

## 2019-02-23 RX ADMIN — LEVETIRACETAM 500 MG: 500 TABLET ORAL at 09:02

## 2019-02-23 RX ADMIN — ALVIMOPAN 12 MG: 12 CAPSULE ORAL at 08:02

## 2019-02-23 RX ADMIN — MORPHINE SULFATE 5 MG: 10 INJECTION INTRAVENOUS at 08:02

## 2019-02-23 RX ADMIN — IPRATROPIUM BROMIDE AND ALBUTEROL SULFATE 3 ML: .5; 3 SOLUTION RESPIRATORY (INHALATION) at 01:02

## 2019-02-23 RX ADMIN — ESCITALOPRAM OXALATE 10 MG: 10 TABLET ORAL at 08:02

## 2019-02-23 RX ADMIN — FLUTICASONE FUROATE AND VILANTEROL TRIFENATATE 1 PUFF: 100; 25 POWDER RESPIRATORY (INHALATION) at 09:02

## 2019-02-23 RX ADMIN — ALBUTEROL SULFATE 2 PUFF: 90 AEROSOL, METERED RESPIRATORY (INHALATION) at 09:02

## 2019-02-23 RX ADMIN — ACETAMINOPHEN 650 MG: 325 TABLET ORAL at 12:02

## 2019-02-23 RX ADMIN — GABAPENTIN 300 MG: 300 CAPSULE ORAL at 03:02

## 2019-02-23 RX ADMIN — LEVETIRACETAM 500 MG: 500 TABLET ORAL at 08:02

## 2019-02-23 RX ADMIN — OXYCODONE HYDROCHLORIDE 10 MG: 5 TABLET ORAL at 06:02

## 2019-02-23 RX ADMIN — ACETAMINOPHEN 650 MG: 325 TABLET ORAL at 06:02

## 2019-02-23 RX ADMIN — ALVIMOPAN 12 MG: 12 CAPSULE ORAL at 09:02

## 2019-02-23 RX ADMIN — LISINOPRIL 5 MG: 5 TABLET ORAL at 08:02

## 2019-02-23 RX ADMIN — HYDROCHLOROTHIAZIDE 25 MG: 25 TABLET ORAL at 06:02

## 2019-02-23 NOTE — PLAN OF CARE
Problem: Adult Inpatient Plan of Care  Goal: Plan of Care Review  Outcome: Ongoing (interventions implemented as appropriate)  Pt free from falls, injury or any further trauma throughout shift. Pt AAOx4. Continued medications as ordered. Lap site to abd c/d/i. Mild complaints of pain throughout shift. Pt in no distress. Will cont to monitor.    02/22/19 6424   Plan of Care Review   Plan of Care Reviewed With patient

## 2019-02-23 NOTE — PROGRESS NOTES
[facility]  General Surgery  Progress Note    Subjective:     Interval History: incision pain, has not been out of bed. Denies nausea, vomiting, flatus. Tolerating diet. Using IS.     Post-Op Info:  Procedure(s) (LRB):  RESECTION, RECTUM, LOW ANTERIOR, LAPAROSCOPIC, WITH SIGMOID COLECTOMY (N/A)   1 Day Post-Op      Medications:  Continuous Infusions:   sodium chloride 0.9% 125 mL/hr at 02/22/19 1627     Scheduled Meds:   alvimopan  12 mg Oral BID    enoxaparin  40 mg Subcutaneous Daily    escitalopram oxalate  10 mg Oral Daily    fluticasone-vilanterol  1 puff Inhalation Daily    hydroCHLOROthiazide  25 mg Oral QAM    levETIRAcetam  500 mg Oral BID    lisinopril  5 mg Oral Daily     PRN Meds:albuterol, morphine, promethazine (PHENERGAN) IVPB     Objective:     Vital Signs (Most Recent):  Temp: 98.9 °F (37.2 °C) (02/23/19 0834)  Pulse: 78 (02/23/19 0906)  Resp: 18 (02/23/19 0906)  BP: 134/69 (02/23/19 0834)  SpO2: (!) 92 % (02/23/19 0906) Vital Signs (24h Range):  Temp:  [96.9 °F (36.1 °C)-98.9 °F (37.2 °C)] 98.9 °F (37.2 °C)  Pulse:  [50-79] 78  Resp:  [11-19] 18  SpO2:  [90 %-97 %] 92 %  BP: ()/(55-80) 134/69       Intake/Output Summary (Last 24 hours) at 2/23/2019 1129  Last data filed at 2/23/2019 0805  Gross per 24 hour   Intake 1920 ml   Output 1085 ml   Net 835 ml       Physical Exam  Awake alert, no distress  RRR  No increased work of breathing  Abdomen with incisions intact, minimal ecchymosis, appropriately tender, non distended  No edema, SCDs in place    Significant Labs:  CBC:   Recent Labs   Lab 02/23/19  0435   WBC 11.42   RBC 4.13*   HGB 12.1*   HCT 36.0*      MCV 87   MCH 29.3   MCHC 33.6     CMP:   Recent Labs   Lab 02/23/19  0435   GLU 88  88   CALCIUM 8.6*  8.6*   ALBUMIN 3.0*   PROT 5.8*     140   K 3.6  3.6   CO2 22*  22*     108   BUN 13  13   CREATININE 0.7  0.7   ALKPHOS 52*   ALT 11   AST 14   BILITOT 0.6       Significant  Diagnostics:  None    Assessment/Plan:   68M with rectal cancer s/p lap LAR 2/22/19 POD#1  Regular diet, continue entereg  Continue home HTN meds  HLIV  PO pain regimen, continue home excitalopram  Strict I/Os, monitor for urinary retention  IS, add duonebs, continue home prn albuterol and symbicort  OOB, Lovenox      Active Diagnoses:    Diagnosis Date Noted POA    PRINCIPAL PROBLEM:  Rectal cancer [C20] 11/26/2018 Yes      Problems Resolved During this Admission:         Coby Harp MD  General Surgery  [facility]

## 2019-02-23 NOTE — PLAN OF CARE
02/23/19 1354   Discharge Assessment   Assessment Type Discharge Planning Assessment   Confirmed/corrected address and phone number on facesheet? Yes   Assessment information obtained from? Patient   Prior to hospitilization cognitive status: Alert/Oriented   Prior to hospitalization functional status: Independent   Current cognitive status: Alert/Oriented   Current Functional Status: Independent   Facility Arrived From: homes   Lives With other relative(s)   Able to Return to Prior Arrangements yes   Is patient able to care for self after discharge? Unable to determine at this time (comments)   Who are your caregiver(s) and their phone number(s)? Nathan Katz    Patient's perception of discharge disposition home or selfcare   Readmission Within the Last 30 Days no previous admission in last 30 days   Patient currently being followed by outpatient case management? No   Patient currently receives any other outside agency services? No   Equipment Currently Used at Home shower chair;walker, rolling   Do you have any problems affording any of your prescribed medications? No   Is the patient taking medications as prescribed? yes   Does the patient have transportation home? Yes   Transportation Anticipated family or friend will provide   Dialysis Name and Scheduled days N/A   Does the patient receive services at the [facility]? No   Discharge Plan A Home with family;Home Health   Discharge Plan B Home with family   DME Needed Upon Discharge  none   Patient/Family in Agreement with Plan yes       AMY met with pt and pt's family at bedside. SW explained her role in Care Management. Pt voiced understanding. SW inquired about HELP AT HOME. Pt stated that he will have Gianna at home to help for support. SW voiced understanding. SW inquired about responsibilities when it comes to  MANAGING HER/HIS HEALTH at home and what it entails. Pt inquired about details. SW informed pt of RESPONSIBILITIES of:    1. Follow up  "appointments  2. Getting Prescriptions filled  3. Taking medications as prescribed.     Pt voiced understanding.  SW explained "My Health Packet" blue folder and the pink and green tabs that are on the folder as well. Discharge Brochure given to pt with Care Team information. Pt voiced understanding.     Pt's pharmacy:   [facility] [address] [place] [address] EXP AT [facility] [address] [address]  Phone: [phone] Fax: [phone]    [id number] [place] [facility] [address]  [address]  [facility] [address]  Phone: [phone] Fax: [phone]      Pt's preference for appointments:afternoon       "

## 2019-02-24 LAB
ANION GAP SERPL CALC-SCNC: 8 MMOL/L
BASOPHILS # BLD AUTO: 0.01 K/UL
BASOPHILS NFR BLD: 0.1 %
BUN SERPL-MCNC: 8 MG/DL
CALCIUM SERPL-MCNC: 9.3 MG/DL
CHLORIDE SERPL-SCNC: 104 MMOL/L
CO2 SERPL-SCNC: 27 MMOL/L
CREAT SERPL-MCNC: 0.8 MG/DL
DIFFERENTIAL METHOD: ABNORMAL
EOSINOPHIL # BLD AUTO: 0.4 K/UL
EOSINOPHIL NFR BLD: 3 %
ERYTHROCYTE [DISTWIDTH] IN BLOOD BY AUTOMATED COUNT: 17 %
EST. GFR  (AFRICAN AMERICAN): >60 ML/MIN/1.73 M^2
EST. GFR  (NON AFRICAN AMERICAN): >60 ML/MIN/1.73 M^2
GLUCOSE SERPL-MCNC: 137 MG/DL
HCT VFR BLD AUTO: 35.8 %
HGB BLD-MCNC: 12 G/DL
LYMPHOCYTES # BLD AUTO: 1.5 K/UL
LYMPHOCYTES NFR BLD: 11.5 %
MCH RBC QN AUTO: 29.6 PG
MCHC RBC AUTO-ENTMCNC: 33.5 G/DL
MCV RBC AUTO: 88 FL
MONOCYTES # BLD AUTO: 0.9 K/UL
MONOCYTES NFR BLD: 7.1 %
NEUTROPHILS # BLD AUTO: 9.9 K/UL
NEUTROPHILS NFR BLD: 78.3 %
PLATELET # BLD AUTO: 280 K/UL
PMV BLD AUTO: 9 FL
POTASSIUM SERPL-SCNC: 3.5 MMOL/L
RBC # BLD AUTO: 4.05 M/UL
SODIUM SERPL-SCNC: 139 MMOL/L
WBC # BLD AUTO: 12.6 K/UL

## 2019-02-24 PROCEDURE — 85025 COMPLETE CBC W/AUTO DIFF WBC: CPT

## 2019-02-24 PROCEDURE — 11000001 HC ACUTE MED/SURG PRIVATE ROOM

## 2019-02-24 PROCEDURE — 25000003 PHARM REV CODE 250: Performed by: STUDENT IN AN ORGANIZED HEALTH CARE EDUCATION/TRAINING PROGRAM

## 2019-02-24 PROCEDURE — 94761 N-INVAS EAR/PLS OXIMETRY MLT: CPT

## 2019-02-24 PROCEDURE — 94640 AIRWAY INHALATION TREATMENT: CPT

## 2019-02-24 PROCEDURE — 80048 BASIC METABOLIC PNL TOTAL CA: CPT

## 2019-02-24 PROCEDURE — 97161 PT EVAL LOW COMPLEX 20 MIN: CPT

## 2019-02-24 PROCEDURE — 99900035 HC TECH TIME PER 15 MIN (STAT)

## 2019-02-24 PROCEDURE — 25000003 PHARM REV CODE 250: Performed by: SURGERY

## 2019-02-24 PROCEDURE — 36415 COLL VENOUS BLD VENIPUNCTURE: CPT

## 2019-02-24 PROCEDURE — 94799 UNLISTED PULMONARY SVC/PX: CPT

## 2019-02-24 PROCEDURE — 27000221 HC OXYGEN, UP TO 24 HOURS

## 2019-02-24 PROCEDURE — 63600175 PHARM REV CODE 636 W HCPCS: Performed by: SURGERY

## 2019-02-24 PROCEDURE — 25000242 PHARM REV CODE 250 ALT 637 W/ HCPCS: Performed by: STUDENT IN AN ORGANIZED HEALTH CARE EDUCATION/TRAINING PROGRAM

## 2019-02-24 RX ORDER — MORPHINE SULFATE 10 MG/ML
2 INJECTION INTRAMUSCULAR; INTRAVENOUS; SUBCUTANEOUS EVERY 4 HOURS PRN
Status: DISCONTINUED | OUTPATIENT
Start: 2019-02-24 | End: 2019-02-25 | Stop reason: HOSPADM

## 2019-02-24 RX ADMIN — OXYCODONE HYDROCHLORIDE 10 MG: 5 TABLET ORAL at 08:02

## 2019-02-24 RX ADMIN — ALVIMOPAN 12 MG: 12 CAPSULE ORAL at 08:02

## 2019-02-24 RX ADMIN — ACETAMINOPHEN 650 MG: 325 TABLET ORAL at 12:02

## 2019-02-24 RX ADMIN — OXYCODONE HYDROCHLORIDE 10 MG: 5 TABLET ORAL at 12:02

## 2019-02-24 RX ADMIN — LEVETIRACETAM 500 MG: 500 TABLET ORAL at 08:02

## 2019-02-24 RX ADMIN — GABAPENTIN 300 MG: 300 CAPSULE ORAL at 08:02

## 2019-02-24 RX ADMIN — ACETAMINOPHEN 650 MG: 325 TABLET ORAL at 06:02

## 2019-02-24 RX ADMIN — OXYCODONE HYDROCHLORIDE 5 MG: 5 TABLET ORAL at 06:02

## 2019-02-24 RX ADMIN — ENOXAPARIN SODIUM 40 MG: 100 INJECTION SUBCUTANEOUS at 04:02

## 2019-02-24 RX ADMIN — ESCITALOPRAM OXALATE 10 MG: 10 TABLET ORAL at 08:02

## 2019-02-24 RX ADMIN — FLUTICASONE FUROATE AND VILANTEROL TRIFENATATE 1 PUFF: 100; 25 POWDER RESPIRATORY (INHALATION) at 07:02

## 2019-02-24 RX ADMIN — ACETAMINOPHEN 650 MG: 325 TABLET ORAL at 05:02

## 2019-02-24 RX ADMIN — IPRATROPIUM BROMIDE AND ALBUTEROL SULFATE 3 ML: .5; 3 SOLUTION RESPIRATORY (INHALATION) at 07:02

## 2019-02-24 NOTE — NURSING
"Patient ambulated the unit x1 with personal rolling walker.   Patient declined usage of oxygen during walking.  Patient denied having any acute distress or SOB.  Patient verbalized not wanting to sit in the recliner to eat lunch but wanted to sit on the seat of his walker.  Patient said, "This is as good as a seat as any."  Walker brakes are on/locked.  Call bell within reach.    "

## 2019-02-24 NOTE — PLAN OF CARE
Problem: Physical Therapy Goal  Goal: Physical Therapy Goal  Outcome: Outcome(s) achieved Date Met: 02/24/19  Pt ambulated ~500 ft mod I using rollator.  Pt with no needs for acute skilled PT services.  Please continue to encourage pt to ambulate in the hallway while in the hospital.

## 2019-02-24 NOTE — PROGRESS NOTES
[facility]  General Surgery  Progress Note    Subjective:     Interval History: tolerating diet without nausea. Better pain control with oral regimen. +flatus. Had not been out of bed as of this am, but ambulated with walker this afternoon. Still on NC, using IS but infrequently.     Post-Op Info:  Procedure(s) (LRB):  RESECTION, RECTUM, LOW ANTERIOR, LAPAROSCOPIC, WITH SIGMOID COLECTOMY (N/A)   2 Days Post-Op      Medications:  Continuous Infusions:  Scheduled Meds:   acetaminophen  650 mg Oral Q6H    alvimopan  12 mg Oral BID    enoxaparin  40 mg Subcutaneous Daily    escitalopram oxalate  10 mg Oral Daily    fluticasone-vilanterol  1 puff Inhalation Daily    gabapentin  300 mg Oral TID    hydroCHLOROthiazide  25 mg Oral QAM    levETIRAcetam  500 mg Oral BID    lisinopril  5 mg Oral Daily     PRN Meds:albuterol, morphine, oxyCODONE, oxyCODONE, promethazine (PHENERGAN) IVPB     Objective:     Vital Signs (Most Recent):  Temp: 99.6 °F (37.6 °C) (02/24/19 1247)  Pulse: 86 (02/24/19 1247)  Resp: 18 (02/24/19 1247)  BP: 98/60 (02/24/19 1247)  SpO2: 95 % (02/24/19 1230) Vital Signs (24h Range):  Temp:  [98.2 °F (36.8 °C)-99.6 °F (37.6 °C)] 99.6 °F (37.6 °C)  Pulse:  [67-89] 86  Resp:  [16-18] 18  SpO2:  [94 %-96 %] 95 %  BP: ()/(57-62) 98/60       Intake/Output Summary (Last 24 hours) at 2/24/2019 1259  Last data filed at 2/24/2019 1000  Gross per 24 hour   Intake 505 ml   Output --   Net 505 ml       Physical Exam  Awake alert, no distress  RRR  No increased work of breathing, NC in place  Abdomen with incisions intact, minimal ecchymosis, less tender, non distended  No edema      Significant Labs:  BMP:   Recent Labs   Lab 02/23/19  0435 02/24/19  0836   GLU 88  88 137*     140 139   K 3.6  3.6 3.5     108 104   CO2 22*  22* 27   BUN 13  13 8   CREATININE 0.7  0.7 0.8   CALCIUM 8.6*  8.6* 9.3   MG 1.6  --      CBC:   Recent Labs   Lab 02/24/19  0836   WBC 12.60    RBC 4.05*   HGB 12.0*   HCT 35.8*      MCV 88   MCH 29.6   MCHC 33.5       Significant Diagnostics:  None    Assessment/Plan:   68M with rectal cancer s/p lap LAR 2/22/19 POD#2. Bowel function returning.   Regular diet, continue entereg  Continue home HTN meds  PO pain regimen, continue home excitalopram  Strict I/Os   IS, continue home prn albuterol and symbicort, wean O2  OOB, Ambulate, Lovenox      Active Diagnoses:    Diagnosis Date Noted POA    PRINCIPAL PROBLEM:  Rectal cancer [C20] 11/26/2018 Yes      Problems Resolved During this Admission:         Cboy Harp MD  General Surgery  [facility]

## 2019-02-24 NOTE — PT/OT/SLP EVAL
Physical Therapy Evaluation and Discharge Note    Patient Name:  Justin Adams   MRN:  [id number]    Recommendations:     Discharge Recommendations:  (home)   Discharge Equipment Recommendations: none   Barriers to discharge: None    Assessment:     Justin Adams is a 68 y.o. male admitted with a medical diagnosis of Rectal cancer.  At this time, patient is functioning at their prior level of function and does not require further acute PT services.     Recent Surgery: Procedure(s) (LRB):  RESECTION, RECTUM, LOW ANTERIOR, LAPAROSCOPIC, WITH SIGMOID COLECTOMY (N/A) 2 Days Post-Op    Plan:     During this hospitalization, patient does not require further acute PT services.  Please re-consult if situation changes.      Subjective     Chief Complaint: minimal abdominal and rectal pain  Patient/Family Comments/goals: to get well   Pain/Comfort:  · Pain Rating 1: (Pt with minimal abdominal pain.)    Living Environment:  Pt lives with great-niece in a  house with 1 step at entry.   Prior to admission, patients level of function was mod I with rollator in the community.  Equipment used at home: rollator, shower chair.  Upon discharge, patient will have assistance from great-niece.    Objective:     Patient found in bed upon PT entry to room found with: peripheral IV, oxygen 1L    General Precautions: Standard, fall   Orthopedic Precautions:N/A   Braces: N/A     Exams:  · Cognitive Exam:  Patient was able to follow multiple commands.   · Gross Motor Coordination:  WFL  · Postural Exam:  Patient presented with the following abnormalities:    · -       Rounded shoulders  · Sensation:    · -       Intact  light/touch BUE/BLE  · Skin Integrity/Edema:      · -       Skin integrity: Visible skin intact  · -       Edema: None noted BUE/BLE  · BUE ROM: WFL  · BUE Strength: WFL  · BLE ROM: WFL  · BLE Strength: WFL    Functional Mobility:  · Bed Mobility:     · Rolling Left:  modified independence  · Scooting: modified  independence  · Supine to Sit: modified independence with HOB elevated   · Transfers:     · Sit to Stand:  modified independence with no AD and 4 wheeled walker; Pt tolerated diaper change in standing.    · Bed to Chair: modified independence with  4 wheeled walker  using  Step Transfer  · Gait: Pt ambulated ~250 ft with mod I using rollator.  Pt with no major gait deviations.   · Balance: Pt with good balance.    AM-PAC 6 CLICK MOBILITY  Total Score:24       Therapeutic Activities and Exercises:  Pt encouraged to be OOB>chair and ambulate in the hallway with nursing supervision.     Patient left up in chair with all lines intact, call button in reach and nurse Carmina notified.    GOALS:   Multidisciplinary Problems     Physical Therapy Goals     Not on file          Multidisciplinary Problems (Resolved)        Problem: Physical Therapy Goal    Goal Priority Disciplines Outcome Goal Variances Interventions   Physical Therapy Goal   (Resolved)     PT, PT/OT Outcome(s) achieved                     History:     Past Medical History:   Diagnosis Date    Aphagia     Cancer     prostate & colon    Cardiomegaly     Colon cancer     rectal cancer    Dysphagia     Hypertension     Prostate cancer     Seizures     Subdural hemorrhage        Past Surgical History:   Procedure Laterality Date    brain coiling      COLECTOMY,LAPAROSCOPIC N/A 9/10/2018    [facility]    COLON SURGERY      Exam under anesthesia N/A 10/30/2018    [facility]    [id number]-L-CATH N/A 11/26/2018    [facility]    pilonadal cyst      PROSTATE SURGERY      TONSILLECTOMY      TRACHEOSTOMY TUBE PLACEMENT         Time Tracking:     PT Received On: 02/24/19  PT Start Time: 1534     PT Stop Time: 1546  PT Total Time (min): 12 min     Billable Minutes: Evaluation 12 min      Carla Rebollar, PT  02/24/2019

## 2019-02-24 NOTE — NURSING
Patient refused gabapentin and says never taken prior.  Per MAR, patient has been taking med on floor but not in home med on admission.  Will clarify with MD.

## 2019-02-24 NOTE — NURSING
Patient was educated on his strict I/O.  Patient verbalized understanding.  Urinal placed within reach, but patient verbalized that he does not like to use the urinal.  Pt does verbalized having urination urgency.  Nursing staff has been charting output in terms of how many diapers have been used/changed on patient.

## 2019-02-25 VITALS
HEART RATE: 74 BPM | SYSTOLIC BLOOD PRESSURE: 110 MMHG | OXYGEN SATURATION: 94 % | WEIGHT: 216 LBS | RESPIRATION RATE: 18 BRPM | DIASTOLIC BLOOD PRESSURE: 56 MMHG | BODY MASS INDEX: 36.88 KG/M2 | TEMPERATURE: 98 F | HEIGHT: 64 IN

## 2019-02-25 LAB
ANION GAP SERPL CALC-SCNC: 7 MMOL/L
BASOPHILS # BLD AUTO: 0.01 K/UL
BASOPHILS NFR BLD: 0.1 %
BUN SERPL-MCNC: 9 MG/DL
CALCIUM SERPL-MCNC: 9.1 MG/DL
CHLORIDE SERPL-SCNC: 105 MMOL/L
CO2 SERPL-SCNC: 31 MMOL/L
CREAT SERPL-MCNC: 0.8 MG/DL
DIFFERENTIAL METHOD: ABNORMAL
EOSINOPHIL # BLD AUTO: 0.4 K/UL
EOSINOPHIL NFR BLD: 3.8 %
ERYTHROCYTE [DISTWIDTH] IN BLOOD BY AUTOMATED COUNT: 17.1 %
EST. GFR  (AFRICAN AMERICAN): >60 ML/MIN/1.73 M^2
EST. GFR  (NON AFRICAN AMERICAN): >60 ML/MIN/1.73 M^2
GLUCOSE SERPL-MCNC: 114 MG/DL
HCT VFR BLD AUTO: 33.3 %
HGB BLD-MCNC: 10.8 G/DL
LYMPHOCYTES # BLD AUTO: 1 K/UL
LYMPHOCYTES NFR BLD: 9.7 %
MCH RBC QN AUTO: 29.2 PG
MCHC RBC AUTO-ENTMCNC: 32.4 G/DL
MCV RBC AUTO: 90 FL
MONOCYTES # BLD AUTO: 0.8 K/UL
MONOCYTES NFR BLD: 7.1 %
NEUTROPHILS # BLD AUTO: 8.4 K/UL
NEUTROPHILS NFR BLD: 79.3 %
PLATELET # BLD AUTO: 304 K/UL
PMV BLD AUTO: 9.7 FL
POTASSIUM SERPL-SCNC: 3.8 MMOL/L
RBC # BLD AUTO: 3.7 M/UL
SODIUM SERPL-SCNC: 143 MMOL/L
WBC # BLD AUTO: 10.63 K/UL

## 2019-02-25 PROCEDURE — 85025 COMPLETE CBC W/AUTO DIFF WBC: CPT

## 2019-02-25 PROCEDURE — 36415 COLL VENOUS BLD VENIPUNCTURE: CPT

## 2019-02-25 PROCEDURE — 25000003 PHARM REV CODE 250: Performed by: SURGERY

## 2019-02-25 PROCEDURE — 94799 UNLISTED PULMONARY SVC/PX: CPT

## 2019-02-25 PROCEDURE — 99900035 HC TECH TIME PER 15 MIN (STAT)

## 2019-02-25 PROCEDURE — 25000003 PHARM REV CODE 250: Performed by: STUDENT IN AN ORGANIZED HEALTH CARE EDUCATION/TRAINING PROGRAM

## 2019-02-25 PROCEDURE — 80048 BASIC METABOLIC PNL TOTAL CA: CPT

## 2019-02-25 RX ORDER — OXYCODONE HYDROCHLORIDE 5 MG/1
5 TABLET ORAL EVERY 4 HOURS PRN
Qty: 25 TABLET | Refills: 0 | Status: ON HOLD | OUTPATIENT
Start: 2019-02-25 | End: 2019-03-21 | Stop reason: HOSPADM

## 2019-02-25 RX ORDER — ALVIMOPAN 12 MG/1
12 CAPSULE ORAL 2 TIMES DAILY
Qty: 2 CAPSULE | Refills: 0 | Status: SHIPPED | OUTPATIENT
Start: 2019-02-25 | End: 2019-02-25

## 2019-02-25 RX ORDER — ALVIMOPAN 12 MG/1
12 CAPSULE ORAL DAILY
Qty: 2 CAPSULE | Refills: 0 | Status: ON HOLD | OUTPATIENT
Start: 2019-02-25 | End: 2019-03-21 | Stop reason: HOSPADM

## 2019-02-25 RX ADMIN — FLUTICASONE FUROATE AND VILANTEROL TRIFENATATE 1 PUFF: 100; 25 POWDER RESPIRATORY (INHALATION) at 08:02

## 2019-02-25 RX ADMIN — LEVETIRACETAM 500 MG: 500 TABLET ORAL at 10:02

## 2019-02-25 RX ADMIN — HYDROCHLOROTHIAZIDE 25 MG: 25 TABLET ORAL at 06:02

## 2019-02-25 RX ADMIN — ESCITALOPRAM OXALATE 10 MG: 10 TABLET ORAL at 10:02

## 2019-02-25 RX ADMIN — OXYCODONE HYDROCHLORIDE 10 MG: 5 TABLET ORAL at 12:02

## 2019-02-25 RX ADMIN — ALVIMOPAN 12 MG: 12 CAPSULE ORAL at 10:02

## 2019-02-25 RX ADMIN — ACETAMINOPHEN 650 MG: 325 TABLET ORAL at 12:02

## 2019-02-25 RX ADMIN — ACETAMINOPHEN 650 MG: 325 TABLET ORAL at 06:02

## 2019-02-25 RX ADMIN — OXYCODONE HYDROCHLORIDE 5 MG: 5 TABLET ORAL at 10:02

## 2019-02-25 RX ADMIN — GABAPENTIN 300 MG: 300 CAPSULE ORAL at 10:02

## 2019-02-25 NOTE — PLAN OF CARE
02/25/19 1450   Final Note   Assessment Type Final Discharge Note   Anticipated Discharge Disposition Home   What phone number can be called within the next 1-3 days to see how you are doing after discharge? (see chart)   Hospital Follow Up  Appt(s) scheduled? Yes   Discharge plans and expectations educations in teach back method with documentation complete? Yes   Right Care Referral Info   Post Acute Recommendation Home-care   Referral Type NO CARE

## 2019-02-25 NOTE — DISCHARGE SUMMARY
[facility]  Discharge Summary      Admit Date: 2/22/2019    Discharge Date and Time:  02/25/2019 12:16 PM    Attending Physician: Mat Glass MD     Reason for Admission: Rectal cancer    Procedures Performed: Procedure(s) (LRB):  RESECTION, RECTUM, LOW ANTERIOR, LAPAROSCOPIC, WITH SIGMOID COLECTOMY (N/A)    Hospital Course (synopsis of major diagnoses, care, treatment, and services provided during the course of the hospital stay): Mr Adams is a 68 year old man with rectal cancer, who underwent neoadjuvant treatment and presented for laparoscopic LAR, which he tolerated well. Field was removed after surgery and regular diet was initiated immediately. He was weaned off of oxygen, and had return of bowel function. He met all milestones for discharge on POD#3.     Consults: PT    Significant Diagnostic Studies: none      Final Diagnoses:    Principal Problem: Rectal cancer   Secondary Diagnoses:   Active Hospital Problems    Diagnosis  POA    *Rectal cancer [C20]  Yes      Resolved Hospital Problems   No resolved problems to display.       Discharged Condition: good    Disposition: Home or Self Care    Follow Up/Patient Instructions:     Medications:  Reconciled Home Medications:      Medication List      START taking these medications    alvimopan 12 mg capsule  Commonly known as:  ENTEREG  Take 1 capsule (12 mg total) by mouth once daily.     oxyCODONE 5 MG immediate release tablet  Commonly known as:  ROXICODONE  Take 1 tablet (5 mg total) by mouth every 4 (four) hours as needed.        CONTINUE taking these medications    aspirin 81 MG EC tablet  Commonly known as:  ECOTRIN  Take 81 mg by mouth once daily.     atorvastatin 10 MG tablet  Commonly known as:  LIPITOR  TAKE 1 TABLET BY MOUTH EVERY BEDTIME     CENTRUM SILVER ULTRA MEN'S ORAL  Take 1 tablet by mouth once daily.     docusate sodium 100 MG capsule  Commonly known as:  COLACE  Take 100 mg by mouth 2 (two) times daily.      escitalopram oxalate 10 MG tablet  Commonly known as:  LEXAPRO  Take 10 mg by mouth once daily.     ferrous sulfate 325 mg (65 mg iron) Tab tablet  Commonly known as:  FEOSOL  Take 1 tablet by mouth once daily.     GAVILYTE-G 236-22.74-6.74 -5.86 gram suspension  Generic drug:  polyethylene glycol  TAKE 4,000 MLS (4 L TOTAL) BY MOUTH ONCE. FOR 1 DOSE     hydroCHLOROthiazide 25 MG tablet  Commonly known as:  HYDRODIURIL  Take 25 mg by mouth every morning.     levETIRAcetam 500 MG Tab  Commonly known as:  KEPPRA  Take 500 mg by mouth 2 (two) times daily.     lisinopril 5 MG tablet  Commonly known as:  PRINIVIL,ZESTRIL  Take 5 mg by mouth once daily.     metroNIDAZOLE 500 MG tablet  Commonly known as:  FLAGYL  Take 2 tabs at 7pm & 11pm night before surgery     neomycin 500 mg Tab  Commonly known as:  MYCIFRADIN  Take 2 tabs at 7pm & 11pm night before surgery     omeprazole 20 MG capsule  Commonly known as:  PRILOSEC  Take by mouth daily as needed.     ondansetron 8 MG tablet  Commonly known as:  ZOFRAN  Take 1 tablet (8 mg total) by mouth every 8 (eight) hours as needed for Nausea.     oxyCODONE-acetaminophen  mg per tablet  Commonly known as:  PERCOCET  TK 1 T PO Q 4 H PRN P FOR UP TO 10 DAYS. MDD 6 TS     potassium chloride 10 MEQ Cpsr  Commonly known as:  MICRO-K  Take 10 mEq by mouth once.     PROCTOSOL HC 2.5 % rectal cream  Generic drug:  hydrocortisone     SYMBICORT 160-4.5 mcg/actuation Hfaa  Generic drug:  budesonide-formoterol 160-4.5 mcg  2 puffs 2 (two) times daily.     VENTOLIN HFA 90 mcg/actuation inhaler  Generic drug:  albuterol  Inhale 2 puffs into the lungs every 4 (four) hours as needed.          Discharge Procedure Orders   Diet Adult Regular     Lifting restrictions   Order Comments: Do not lift >10lbs x 6 weeks     Notify your health care provider if you experience any of the following:  redness, tenderness, or signs of infection (pain, swelling, redness, odor or green/yellow discharge  around incision site)     Notify your health care provider if you experience any of the following:  persistent nausea and vomiting or diarrhea     Notify your health care provider if you experience any of the following:  temperature >100.4     Notify your health care provider if you experience any of the following:  severe uncontrolled pain     No dressing needed     Activity as tolerated     Follow-up Information     Mat Glass MD. Schedule an appointment as soon as possible for a visit in 2 weeks.    Specialties:  General Surgery, Oncology  Contact information:  [address]  [address] [place] [address] [phone]

## 2019-02-25 NOTE — PROGRESS NOTES
WRITTEN DISCHARGE INFORMATION:  Follow-up Information     Mat Glass MD. Schedule an appointment as soon as possible for a visit on 3/7/2019.    Specialties:  General Surgery, Oncology  Why:  out patient services:  9:30AM follow up from the [facility] [place]  Contact information:  [address]  [address]  [place] [address]  [phone]             Dandre Massey MD. Call on 3/6/2019.    Specialty:  Internal Medicine  Why:  out patient services:10:45AM follow up from the hospital  Contact information:  [address]  [place] [id number] [phone]                Things that YOU are responsible for, to HELP YOU, Manage Your Care At Home:  1. Getting your prescriptions filled.  2. Taking you medications as directed. DO NOT MISS ANY DOSES!  3. Going to your follow-up doctor appointments. This is important because it allows the doctor to monitor your progress and to determine if any changes need to be made to your treatment plan.                                                         Help at Home  After discharge for assistance [facility] 24/7 assistance  [phone]   Thank you for choosing [facility] for your care.    Sincerely, Your [facility] Healthcare Manager is,  Cee Miller RN [facility] [phone]

## 2019-02-25 NOTE — PROGRESS NOTES
[facility]  General Surgery  Progress Note    Subjective:     Interval History: ambulated using walker with PT, off oxygen, without issues. Tolerating diet, denies nausea, pain well controlled. +flatus and small bm     Post-Op Info:  Procedure(s) (LRB):  RESECTION, RECTUM, LOW ANTERIOR, LAPAROSCOPIC, WITH SIGMOID COLECTOMY (N/A)   3 Days Post-Op      Medications:  Continuous Infusions:  Scheduled Meds:   acetaminophen  650 mg Oral Q6H    alvimopan  12 mg Oral BID    enoxaparin  40 mg Subcutaneous Daily    escitalopram oxalate  10 mg Oral Daily    fluticasone-vilanterol  1 puff Inhalation Daily    gabapentin  300 mg Oral TID    hydroCHLOROthiazide  25 mg Oral QAM    levETIRAcetam  500 mg Oral BID    lisinopril  5 mg Oral Daily     PRN Meds:albuterol, morphine, oxyCODONE, oxyCODONE, promethazine (PHENERGAN) IVPB     Objective:     Vital Signs (Most Recent):  Temp: 99.2 °F (37.3 °C) (02/24/19 2307)  Pulse: 83 (02/24/19 2307)  Resp: 18 (02/24/19 2307)  BP: 124/67 (02/24/19 2307)  SpO2: (!) 91 % (02/24/19 2307) Vital Signs (24h Range):  Temp:  [99.1 °F (37.3 °C)-99.6 °F (37.6 °C)] 99.2 °F (37.3 °C)  Pulse:  [67-86] 83  Resp:  [16-20] 18  SpO2:  [90 %-95 %] 91 %  BP: ()/(59-67) 124/67       Intake/Output Summary (Last 24 hours) at 2/25/2019 0718  Last data filed at 2/24/2019 1802  Gross per 24 hour   Intake 720 ml   Output --   Net 720 ml       Physical Exam  Awake alert, no distress  RRR  No increased work of breathing, NC in place  Abdomen with incisions intact, minimal ecchymosis, minimally tender, non distended  No edema       Significant Labs:  CBC:   Recent Labs   Lab 02/25/19  0346   WBC 10.63   RBC 3.70*   HGB 10.8*   HCT 33.3*      MCV 90   MCH 29.2   MCHC 32.4     CMP:   Recent Labs   Lab 02/25/19  0346   *   CALCIUM 9.1      K 3.8   CO2 31*      BUN 9   CREATININE 0.8       Significant Diagnostics:  None    Assessment/Plan:   68M with rectal cancer s/p  lap LAR 2/22/19 POD#3. Bowel function returned.   Regular diet, continue entereg  Continue home HTN meds  PO pain regimen, continue home excitalopram  Strict I/Os   IS, continue home prn albuterol and symbicort, wean off O2  OOB, Ambulate, Lovenox  Likely dc home today    Active Diagnoses:    Diagnosis Date Noted POA    PRINCIPAL PROBLEM:  Rectal cancer [C20] 11/26/2018 Yes      Problems Resolved During this Admission:         Coby Harp MD  General Surgery  [facility]

## 2019-02-25 NOTE — PLAN OF CARE
02/25/19 1439   Medicare Message   Important Message from Medicare regarding Discharge Appeal Rights Given to patient/caregiver;Signed/date by patient/caregiver;Explained to patient/caregiver   Date IMM was signed 02/25/19   Time IMM was signed 1284

## 2019-02-25 NOTE — PLAN OF CARE
"Problem: Adult Inpatient Plan of Care  Goal: Plan of Care Review  Outcome: Ongoing (interventions implemented as appropriate)   02/25/19 0406   Plan of Care Review   Plan of Care Reviewed With patient   A+OX4. Pt free from falls, injury, and trauma. VSS. Afebrile. derma bond on abdomen has same dry drainage on it. Pt denies any severe pain he says he is , "mostly sore". Pt ambulates with help from equipment. Scheduled meds given. No BM. Also no NV. Will continue to monitor.      "

## 2019-02-25 NOTE — PROGRESS NOTES
TN taught Symptoms and Problems for Post Op home care with pt and sisterGrace  with teach back:  1. Smelly drainage form incision site, 2.Temp of 100.4 or higher. TN placed education sheet in Vital Therapies..     Help at home will be from sisterGrace, assisting in pt's recovery.     TN taught patient about things he is responsible for when discharged TO HELP WITH his RECOVERY:  Particularly on how to Manage his Care At Home:  1. Getting his prescriptions filled.  2. Taking his medications as directed. DO NOT MISS ANY DOSES!  3. Going to his follow-up doctor appointments.   .  Cee Miller RN, BSN, STN CCM

## 2019-02-25 NOTE — PROGRESS NOTES
TN informed med surg nurseStephanie and charge nurse Damaris that patient is cleared for discharge from [facility] viewpoint..Cee Miller RN, BSN, STN [facility]  2/25/2019

## 2019-02-25 NOTE — PROGRESS NOTES
Discharge Instructions and RX given to pt and family member. Both stated understanding. Pt ambulated via walker with LILY Aragon for d/c. No distress noted. All questions answered.

## 2019-02-26 ENCOUNTER — NURSE TRIAGE (OUTPATIENT)
Dept: ADMINISTRATIVE | Facility: CLINIC | Age: 69
End: 2019-02-26

## 2019-02-26 ENCOUNTER — HOSPITAL ENCOUNTER (INPATIENT)
Facility: HOSPITAL | Age: 69
LOS: 23 days | Discharge: NURSING, REHAB OR LONG-TERM CARE | DRG: 907 | End: 2019-03-21
Attending: EMERGENCY MEDICINE | Admitting: SURGERY
Payer: MEDICARE

## 2019-02-26 DIAGNOSIS — R07.89 OTHER CHEST PAIN: ICD-10-CM

## 2019-02-26 DIAGNOSIS — K65.9 PERITONITIS: ICD-10-CM

## 2019-02-26 DIAGNOSIS — R41.82 ALTERED MENTAL STATUS, UNSPECIFIED ALTERED MENTAL STATUS TYPE: ICD-10-CM

## 2019-02-26 DIAGNOSIS — R09.02 HYPOXEMIA: ICD-10-CM

## 2019-02-26 DIAGNOSIS — R50.9 FEVER, UNSPECIFIED FEVER CAUSE: ICD-10-CM

## 2019-02-26 DIAGNOSIS — R10.84 GENERALIZED ABDOMINAL PAIN: ICD-10-CM

## 2019-02-26 DIAGNOSIS — R07.9 CHEST PAIN: ICD-10-CM

## 2019-02-26 DIAGNOSIS — R00.0 SINUS TACHYCARDIA: ICD-10-CM

## 2019-02-26 DIAGNOSIS — R10.9 ABDOMINAL PAIN, UNSPECIFIED ABDOMINAL LOCATION: Primary | ICD-10-CM

## 2019-02-26 LAB
ALBUMIN SERPL-MCNC: 3.4 G/DL (ref 3.3–5.5)
ALBUMIN SERPL-MCNC: 3.5 G/DL (ref 3.3–5.5)
ALP SERPL-CCNC: 70 U/L (ref 42–141)
ALP SERPL-CCNC: 71 U/L (ref 42–141)
BILIRUB SERPL-MCNC: 0.5 MG/DL (ref 0.2–1.6)
BILIRUB SERPL-MCNC: 0.7 MG/DL (ref 0.2–1.6)
BILIRUBIN, POC UA: ABNORMAL
BLOOD, POC UA: NEGATIVE
BUN SERPL-MCNC: 9 MG/DL (ref 7–22)
CALCIUM SERPL-MCNC: 10.1 MG/DL (ref 8–10.3)
CHLORIDE SERPL-SCNC: 102 MMOL/L (ref 98–108)
CLARITY, POC UA: CLEAR
COLOR, POC UA: YELLOW
CREAT SERPL-MCNC: 0.8 MG/DL (ref 0.6–1.2)
GLUCOSE SERPL-MCNC: 115 MG/DL (ref 73–118)
GLUCOSE, POC UA: NEGATIVE
HCO3 UR-SCNC: 30.7 MMOL/L (ref 24–28)
KETONES, POC UA: ABNORMAL
LDH SERPL L TO P-CCNC: 1.28 MMOL/L (ref 0.5–2.2)
LEUKOCYTE EST, POC UA: NEGATIVE
NITRITE, POC UA: NEGATIVE
PCO2 BLDA: 48.1 MMHG (ref 35–45)
PH SMN: 7.41 [PH] (ref 7.35–7.45)
PH UR STRIP: 5.5 [PH]
PO2 BLDA: 28 MMHG (ref 40–60)
POC ALT (SGPT): 26 U/L (ref 10–47)
POC ALT (SGPT): 6 U/L (ref 10–47)
POC AMYLASE: 25 U/L (ref 14–97)
POC AST (SGOT): 28 U/L (ref 11–38)
POC AST (SGOT): 30 U/L (ref 11–38)
POC BE: 5 MMOL/L
POC CARDIAC TROPONIN I: 0 NG/ML
POC GGT: 29 U/L (ref 5–65)
POC SATURATED O2: 53 % (ref 95–100)
POC TCO2: 30 MMOL/L (ref 18–33)
POC TCO2: 32 MMOL/L (ref 24–29)
POTASSIUM BLD-SCNC: 3.8 MMOL/L (ref 3.6–5.1)
PROTEIN, POC UA: ABNORMAL
PROTEIN, POC: 7.2 G/DL (ref 6.4–8.1)
PROTEIN, POC: 7.4 G/DL (ref 6.4–8.1)
SAMPLE: ABNORMAL
SAMPLE: NORMAL
SODIUM BLD-SCNC: 142 MMOL/L (ref 128–145)
SPECIFIC GRAVITY, POC UA: 1.02
UROBILINOGEN, POC UA: 0.2 E.U./DL

## 2019-02-26 PROCEDURE — 96367 TX/PROPH/DG ADDL SEQ IV INF: CPT | Mod: ER

## 2019-02-26 PROCEDURE — 81003 URINALYSIS AUTO W/O SCOPE: CPT | Mod: ER

## 2019-02-26 PROCEDURE — 25000003 PHARM REV CODE 250: Mod: ER | Performed by: STUDENT IN AN ORGANIZED HEALTH CARE EDUCATION/TRAINING PROGRAM

## 2019-02-26 PROCEDURE — 84484 ASSAY OF TROPONIN QUANT: CPT | Mod: ER

## 2019-02-26 PROCEDURE — 82040 ASSAY OF SERUM ALBUMIN: CPT | Mod: ER

## 2019-02-26 PROCEDURE — S0030 INJECTION, METRONIDAZOLE: HCPCS | Mod: ER | Performed by: STUDENT IN AN ORGANIZED HEALTH CARE EDUCATION/TRAINING PROGRAM

## 2019-02-26 PROCEDURE — 85025 COMPLETE CBC W/AUTO DIFF WBC: CPT | Mod: ER

## 2019-02-26 PROCEDURE — 96365 THER/PROPH/DIAG IV INF INIT: CPT | Mod: ER

## 2019-02-26 PROCEDURE — 80053 COMPREHEN METABOLIC PANEL: CPT | Mod: ER

## 2019-02-26 PROCEDURE — 83605 ASSAY OF LACTIC ACID: CPT | Mod: ER

## 2019-02-26 PROCEDURE — 96376 TX/PRO/DX INJ SAME DRUG ADON: CPT | Mod: ER

## 2019-02-26 PROCEDURE — 99285 EMERGENCY DEPT VISIT HI MDM: CPT | Mod: 25,ER

## 2019-02-26 PROCEDURE — 96361 HYDRATE IV INFUSION ADD-ON: CPT | Mod: ER

## 2019-02-26 PROCEDURE — 25000003 PHARM REV CODE 250: Mod: ER | Performed by: PHYSICIAN ASSISTANT

## 2019-02-26 PROCEDURE — 25500020 PHARM REV CODE 255: Mod: ER | Performed by: PHYSICIAN ASSISTANT

## 2019-02-26 PROCEDURE — 21400001 HC TELEMETRY ROOM

## 2019-02-26 PROCEDURE — 63600175 PHARM REV CODE 636 W HCPCS: Mod: ER | Performed by: PHYSICIAN ASSISTANT

## 2019-02-26 PROCEDURE — 96375 TX/PRO/DX INJ NEW DRUG ADDON: CPT | Mod: ER

## 2019-02-26 RX ORDER — MORPHINE SULFATE 8 MG/ML
4 INJECTION INTRAMUSCULAR; INTRAVENOUS; SUBCUTANEOUS
Status: COMPLETED | OUTPATIENT
Start: 2019-02-26 | End: 2019-02-26

## 2019-02-26 RX ORDER — MORPHINE SULFATE 8 MG/ML
2 INJECTION INTRAMUSCULAR; INTRAVENOUS; SUBCUTANEOUS
Status: COMPLETED | OUTPATIENT
Start: 2019-02-26 | End: 2019-02-26

## 2019-02-26 RX ORDER — METRONIDAZOLE 500 MG/100ML
500 INJECTION, SOLUTION INTRAVENOUS
Status: DISCONTINUED | OUTPATIENT
Start: 2019-02-26 | End: 2019-02-27

## 2019-02-26 RX ADMIN — IOHEXOL 100 ML: 350 INJECTION, SOLUTION INTRAVENOUS at 08:02

## 2019-02-26 RX ADMIN — METRONIDAZOLE 500 MG: 500 INJECTION, SOLUTION INTRAVENOUS at 10:02

## 2019-02-26 RX ADMIN — SODIUM CHLORIDE 1000 ML: 0.9 INJECTION, SOLUTION INTRAVENOUS at 10:02

## 2019-02-26 RX ADMIN — MORPHINE SULFATE 2 MG: 8 INJECTION INTRAVENOUS at 07:02

## 2019-02-26 RX ADMIN — SODIUM CHLORIDE 1000 ML: 0.9 INJECTION, SOLUTION INTRAVENOUS at 07:02

## 2019-02-26 RX ADMIN — PIPERACILLIN AND TAZOBACTAM 4.5 G: 4; .5 INJECTION, POWDER, LYOPHILIZED, FOR SOLUTION INTRAVENOUS; PARENTERAL at 09:02

## 2019-02-26 RX ADMIN — MORPHINE SULFATE 4 MG: 8 INJECTION INTRAVENOUS at 09:02

## 2019-02-26 NOTE — TELEPHONE ENCOUNTER
Reason for Disposition   [1] Neutropenia known or suspected (e.g., recent cancer chemotherapy) AND [2] fever > 100.4 F (38.0 C)    Protocols used:  CANCER - FEVER-A-AH    Pt states he has fever today of 100.4 and pt c/o pain from abd to back and shoulder. Pt denies relief from oxycodone. Pt states he had surgery Thursday 2/22/19. Pt last chemo 2/7/19. Pt advised to ED per protocol and pt verbalizes understanding.

## 2019-02-27 ENCOUNTER — TELEPHONE (OUTPATIENT)
Dept: SURGERY | Facility: CLINIC | Age: 69
End: 2019-02-27

## 2019-02-27 LAB
ANION GAP SERPL CALC-SCNC: 8 MMOL/L
BASOPHILS # BLD AUTO: 0.01 K/UL
BASOPHILS NFR BLD: 0.1 %
BUN SERPL-MCNC: 8 MG/DL
CALCIUM SERPL-MCNC: 8.9 MG/DL
CHLORIDE SERPL-SCNC: 104 MMOL/L
CO2 SERPL-SCNC: 29 MMOL/L
CREAT SERPL-MCNC: 0.7 MG/DL
DIFFERENTIAL METHOD: ABNORMAL
EOSINOPHIL # BLD AUTO: 0.1 K/UL
EOSINOPHIL NFR BLD: 1 %
ERYTHROCYTE [DISTWIDTH] IN BLOOD BY AUTOMATED COUNT: 16.7 %
EST. GFR  (AFRICAN AMERICAN): >60 ML/MIN/1.73 M^2
EST. GFR  (NON AFRICAN AMERICAN): >60 ML/MIN/1.73 M^2
GLUCOSE SERPL-MCNC: 102 MG/DL
HCT VFR BLD AUTO: 33 %
HGB BLD-MCNC: 10.8 G/DL
LYMPHOCYTES # BLD AUTO: 0.8 K/UL
LYMPHOCYTES NFR BLD: 6.6 %
MCH RBC QN AUTO: 28.7 PG
MCHC RBC AUTO-ENTMCNC: 32.7 G/DL
MCV RBC AUTO: 88 FL
MONOCYTES # BLD AUTO: 0.5 K/UL
MONOCYTES NFR BLD: 4.2 %
NEUTROPHILS # BLD AUTO: 10.4 K/UL
NEUTROPHILS NFR BLD: 88.1 %
PLATELET # BLD AUTO: 338 K/UL
PMV BLD AUTO: 9.2 FL
POTASSIUM SERPL-SCNC: 4 MMOL/L
RBC # BLD AUTO: 3.76 M/UL
SODIUM SERPL-SCNC: 141 MMOL/L
WBC # BLD AUTO: 11.8 K/UL

## 2019-02-27 PROCEDURE — 25000242 PHARM REV CODE 250 ALT 637 W/ HCPCS: Performed by: STUDENT IN AN ORGANIZED HEALTH CARE EDUCATION/TRAINING PROGRAM

## 2019-02-27 PROCEDURE — 85025 COMPLETE CBC W/AUTO DIFF WBC: CPT

## 2019-02-27 PROCEDURE — 80048 BASIC METABOLIC PNL TOTAL CA: CPT

## 2019-02-27 PROCEDURE — 27000221 HC OXYGEN, UP TO 24 HOURS

## 2019-02-27 PROCEDURE — S0030 INJECTION, METRONIDAZOLE: HCPCS | Performed by: STUDENT IN AN ORGANIZED HEALTH CARE EDUCATION/TRAINING PROGRAM

## 2019-02-27 PROCEDURE — 36415 COLL VENOUS BLD VENIPUNCTURE: CPT

## 2019-02-27 PROCEDURE — 63600175 PHARM REV CODE 636 W HCPCS: Performed by: STUDENT IN AN ORGANIZED HEALTH CARE EDUCATION/TRAINING PROGRAM

## 2019-02-27 PROCEDURE — 63600175 PHARM REV CODE 636 W HCPCS: Performed by: SURGERY

## 2019-02-27 PROCEDURE — 21400001 HC TELEMETRY ROOM

## 2019-02-27 PROCEDURE — 63600175 PHARM REV CODE 636 W HCPCS: Performed by: EMERGENCY MEDICINE

## 2019-02-27 PROCEDURE — 25000003 PHARM REV CODE 250: Performed by: STUDENT IN AN ORGANIZED HEALTH CARE EDUCATION/TRAINING PROGRAM

## 2019-02-27 PROCEDURE — 25000003 PHARM REV CODE 250: Performed by: PHYSICIAN ASSISTANT

## 2019-02-27 PROCEDURE — 94640 AIRWAY INHALATION TREATMENT: CPT

## 2019-02-27 PROCEDURE — 94761 N-INVAS EAR/PLS OXIMETRY MLT: CPT

## 2019-02-27 RX ORDER — ONDANSETRON 2 MG/ML
4 INJECTION INTRAMUSCULAR; INTRAVENOUS EVERY 8 HOURS PRN
Status: DISCONTINUED | OUTPATIENT
Start: 2019-02-27 | End: 2019-03-21

## 2019-02-27 RX ORDER — SODIUM CHLORIDE 0.9 % (FLUSH) 0.9 %
5 SYRINGE (ML) INJECTION
Status: DISCONTINUED | OUTPATIENT
Start: 2019-02-27 | End: 2019-03-21 | Stop reason: HOSPADM

## 2019-02-27 RX ORDER — MORPHINE SULFATE 10 MG/ML
2 INJECTION INTRAMUSCULAR; INTRAVENOUS; SUBCUTANEOUS EVERY 4 HOURS PRN
Status: DISCONTINUED | OUTPATIENT
Start: 2019-02-27 | End: 2019-02-27

## 2019-02-27 RX ORDER — HYDROCHLOROTHIAZIDE 25 MG/1
25 TABLET ORAL EVERY MORNING
Status: DISCONTINUED | OUTPATIENT
Start: 2019-02-27 | End: 2019-02-28

## 2019-02-27 RX ORDER — OXYCODONE HYDROCHLORIDE 5 MG/1
10 TABLET ORAL EVERY 6 HOURS PRN
Status: DISCONTINUED | OUTPATIENT
Start: 2019-02-27 | End: 2019-02-27

## 2019-02-27 RX ORDER — HYDROMORPHONE HYDROCHLORIDE 2 MG/ML
1 INJECTION, SOLUTION INTRAMUSCULAR; INTRAVENOUS; SUBCUTANEOUS EVERY 4 HOURS PRN
Status: DISCONTINUED | OUTPATIENT
Start: 2019-02-27 | End: 2019-02-27

## 2019-02-27 RX ORDER — ASPIRIN 81 MG/1
81 TABLET ORAL DAILY
Status: DISCONTINUED | OUTPATIENT
Start: 2019-02-27 | End: 2019-03-21 | Stop reason: HOSPADM

## 2019-02-27 RX ORDER — HYDROMORPHONE HYDROCHLORIDE 2 MG/ML
1 INJECTION, SOLUTION INTRAMUSCULAR; INTRAVENOUS; SUBCUTANEOUS
Status: ACTIVE | OUTPATIENT
Start: 2019-02-27 | End: 2019-02-27

## 2019-02-27 RX ORDER — ACETAMINOPHEN 325 MG/1
650 TABLET ORAL EVERY 6 HOURS
Status: DISCONTINUED | OUTPATIENT
Start: 2019-02-27 | End: 2019-03-01

## 2019-02-27 RX ORDER — LEVETIRACETAM 500 MG/1
500 TABLET ORAL 2 TIMES DAILY
Status: DISCONTINUED | OUTPATIENT
Start: 2019-02-27 | End: 2019-03-01

## 2019-02-27 RX ORDER — FLUTICASONE FUROATE AND VILANTEROL 100; 25 UG/1; UG/1
1 POWDER RESPIRATORY (INHALATION) DAILY
Status: DISCONTINUED | OUTPATIENT
Start: 2019-02-27 | End: 2019-03-21 | Stop reason: HOSPADM

## 2019-02-27 RX ORDER — OXYCODONE HYDROCHLORIDE 5 MG/1
5 TABLET ORAL EVERY 4 HOURS PRN
Status: DISCONTINUED | OUTPATIENT
Start: 2019-02-27 | End: 2019-03-01

## 2019-02-27 RX ORDER — SODIUM CHLORIDE 9 MG/ML
INJECTION, SOLUTION INTRAVENOUS CONTINUOUS
Status: DISCONTINUED | OUTPATIENT
Start: 2019-02-27 | End: 2019-03-06

## 2019-02-27 RX ORDER — PANTOPRAZOLE SODIUM 40 MG/1
40 TABLET, DELAYED RELEASE ORAL DAILY
Status: DISCONTINUED | OUTPATIENT
Start: 2019-02-27 | End: 2019-03-01

## 2019-02-27 RX ORDER — ESCITALOPRAM OXALATE 10 MG/1
10 TABLET ORAL DAILY
Status: DISCONTINUED | OUTPATIENT
Start: 2019-02-27 | End: 2019-03-21 | Stop reason: HOSPADM

## 2019-02-27 RX ORDER — ALBUTEROL SULFATE 90 UG/1
2 AEROSOL, METERED RESPIRATORY (INHALATION) EVERY 4 HOURS PRN
Status: DISCONTINUED | OUTPATIENT
Start: 2019-02-27 | End: 2019-03-21 | Stop reason: HOSPADM

## 2019-02-27 RX ORDER — ONDANSETRON 4 MG/1
8 TABLET, FILM COATED ORAL EVERY 6 HOURS PRN
Status: DISCONTINUED | OUTPATIENT
Start: 2019-02-27 | End: 2019-03-20

## 2019-02-27 RX ORDER — KETOROLAC TROMETHAMINE 30 MG/ML
15 INJECTION, SOLUTION INTRAMUSCULAR; INTRAVENOUS EVERY 6 HOURS
Status: DISCONTINUED | OUTPATIENT
Start: 2019-02-27 | End: 2019-03-01

## 2019-02-27 RX ORDER — ENOXAPARIN SODIUM 100 MG/ML
40 INJECTION SUBCUTANEOUS EVERY 24 HOURS
Status: DISCONTINUED | OUTPATIENT
Start: 2019-02-27 | End: 2019-03-21 | Stop reason: HOSPADM

## 2019-02-27 RX ORDER — KETOROLAC TROMETHAMINE 30 MG/ML
15 INJECTION, SOLUTION INTRAMUSCULAR; INTRAVENOUS EVERY 6 HOURS
Status: DISCONTINUED | OUTPATIENT
Start: 2019-02-27 | End: 2019-02-27

## 2019-02-27 RX ADMIN — HYDROCHLOROTHIAZIDE 25 MG: 25 TABLET ORAL at 02:02

## 2019-02-27 RX ADMIN — FLUTICASONE FUROATE AND VILANTEROL TRIFENATATE 1 PUFF: 100; 25 POWDER RESPIRATORY (INHALATION) at 02:02

## 2019-02-27 RX ADMIN — HYDROMORPHONE HYDROCHLORIDE 1 MG: 2 INJECTION, SOLUTION INTRAMUSCULAR; INTRAVENOUS; SUBCUTANEOUS at 04:02

## 2019-02-27 RX ADMIN — ASPIRIN 81 MG: 81 TABLET, COATED ORAL at 02:02

## 2019-02-27 RX ADMIN — LEVETIRACETAM 500 MG: 500 TABLET ORAL at 09:02

## 2019-02-27 RX ADMIN — OXYCODONE HYDROCHLORIDE 10 MG: 5 TABLET ORAL at 11:02

## 2019-02-27 RX ADMIN — ACETAMINOPHEN 650 MG: 325 TABLET, FILM COATED ORAL at 11:02

## 2019-02-27 RX ADMIN — SODIUM CHLORIDE: 0.9 INJECTION, SOLUTION INTRAVENOUS at 12:02

## 2019-02-27 RX ADMIN — KETOROLAC TROMETHAMINE 15 MG: 30 INJECTION, SOLUTION INTRAMUSCULAR at 11:02

## 2019-02-27 RX ADMIN — ESCITALOPRAM OXALATE 10 MG: 10 TABLET ORAL at 02:02

## 2019-02-27 RX ADMIN — PANTOPRAZOLE SODIUM 40 MG: 40 TABLET, DELAYED RELEASE ORAL at 02:02

## 2019-02-27 RX ADMIN — ENOXAPARIN SODIUM 40 MG: 100 INJECTION SUBCUTANEOUS at 05:02

## 2019-02-27 RX ADMIN — ACETAMINOPHEN 650 MG: 325 TABLET, FILM COATED ORAL at 05:02

## 2019-02-27 RX ADMIN — METRONIDAZOLE 500 MG: 500 INJECTION, SOLUTION INTRAVENOUS at 06:02

## 2019-02-27 RX ADMIN — PIPERACILLIN AND TAZOBACTAM 4.5 G: 4; .5 INJECTION, POWDER, LYOPHILIZED, FOR SOLUTION INTRAVENOUS; PARENTERAL at 07:02

## 2019-02-27 RX ADMIN — KETOROLAC TROMETHAMINE 15 MG: 30 INJECTION, SOLUTION INTRAMUSCULAR at 02:02

## 2019-02-27 RX ADMIN — SODIUM CHLORIDE 1000 ML: 0.9 INJECTION, SOLUTION INTRAVENOUS at 01:02

## 2019-02-27 NOTE — CONSULTS
"RN generated nutr consult received re: "NPO".  Pt's diet has been advanced to Regular. MD notes reviewed re: w/u of abd pain.  PMH & PSH also reviewed from recent admission.  Pt w/ no nutr needs @ this point. Please re-consult RD prn.   "

## 2019-02-27 NOTE — PLAN OF CARE
Problem: Adult Inpatient Plan of Care  Goal: Plan of Care Review  Outcome: Ongoing (interventions implemented as appropriate)   02/27/19 0635   Plan of Care Review   Plan of Care Reviewed With patient       Problem: Infection  Goal: Infection Symptom Resolution    Intervention: Prevent or Manage Infection   02/26/19 2145 02/27/19 0030   Prevent or Manage Infection   Fever Reduction/Comfort Measures medication administered --    Infection Management --  aseptic technique maintained         Problem: Pain Acute  Goal: Optimal Pain Control    Intervention: Develop Pain Management Plan   02/27/19 0030   Prevent or Manage Pain   Pain Management Interventions medication offered but refused     Intervention: Optimize Psychosocial Wellbeing   02/27/19 0030   Promote Anxiety Reduction   Supportive Measures active listening utilized         Problem: Skin Injury Risk Increased  Goal: Skin Health and Integrity    Intervention: Optimize Skin Protection   02/27/19 0600   Prevent Additional Skin Injury   Head of Bed (HOB) HOB at 20-30 degrees         Problem: Fall Injury Risk  Goal: Absence of Fall and Fall-Related Injury    Intervention: Identify and Manage Contributors to Fall Injury Risk   02/27/19 0030   Identify and Manage Contributors to Fall Injury Risk   Self-Care Promotion independence encouraged;BADL personal objects within reach   Manage Acute Allergic Reaction   Medication Review/Management medications reviewed   Plan of care reviewed with patient. Informed of orders for KUB.

## 2019-02-27 NOTE — H&P
[facility]  History & Physical     Subjective:     Chief Complaint/Reason for Admission: abdominal pain, intraperitoneal air after surgery    History of Present Illness:   Mr Adams is a 68 year old man with rectal cancer, who underwent neoadjuvant treatment and presented for laparoscopic LAR on 2/22/19, which he tolerated well. He met all milestones for discharge on 2/25/19, but presented to the [facility] ED on 2/26/19 complaining of worsening abdominal pain.   Work up in the ED noted normal vitals and labs, with Ct scan demonstrating intraperitoneal free air in the post operative setting, without free fluid.   Patient reports upper abdominal pain, denies fever, nausea, and is passing flatus.     Patient Active Problem List    Diagnosis Date Noted    Abdominal pain 02/26/2019    Rectal cancer 11/26/2018    Carcinoma of rectum 11/12/2018    Carcinoma of transverse colon 11/12/2018    Personal history of prostate cancer 11/12/2018    Malignant neoplasm of transverse colon 08/20/2018        Medications Prior to Admission   Medication Sig Dispense Refill Last Dose    alvimopan (ENTEREG) 12 mg capsule Take 1 capsule (12 mg total) by mouth once daily. 2 capsule 0     aspirin (ECOTRIN) 81 MG EC tablet Take 81 mg by mouth once daily.   2/14/2019    atorvastatin (LIPITOR) 10 MG tablet TAKE 1 TABLET BY MOUTH EVERY BEDTIME  4 2/21/2019 at 200    docusate sodium (COLACE) 100 MG capsule Take 100 mg by mouth 2 (two) times daily.   2/21/2019 at 0900    escitalopram oxalate (LEXAPRO) 10 MG tablet Take 10 mg by mouth once daily.   Unknown at Unknown time    ferrous sulfate 325 mg (65 mg iron) Tab tablet Take 1 tablet by mouth once daily.  0 2/14/2019    GAVILYTE-G 236-22.74-6.74 -5.86 gram suspension TAKE 4,000 MLS (4 L TOTAL) BY MOUTH ONCE. FOR 1 DOSE  0 2/21/2019 at Unknown time    hydroCHLOROthiazide (HYDRODIURIL) 25 MG tablet Take 25 mg by mouth every morning.  4 2/21/2019 at 0800     levetiracetam (KEPPRA) 500 MG Tab Take 500 mg by mouth 2 (two) times daily.   2/22/2019 at 0600    lisinopril (PRINIVIL,ZESTRIL) 5 MG tablet Take 5 mg by mouth once daily.   2/22/2019 at 0800    metroNIDAZOLE (FLAGYL) 500 MG tablet Take 2 tabs at 7pm & 11pm night before surgery 4 tablet 0 2/21/2019 at 2300    multivit-min/FA/lycopen/lutein (CENTRUM SILVER ULTRA MEN'S ORAL) Take 1 tablet by mouth once daily.   2/14/2019    neomycin (MYCIFRADIN) 500 mg Tab Take 2 tabs at 7pm & 11pm night before surgery 4 tablet 0 2/21/2019 at 2300    omeprazole (PRILOSEC) 20 MG capsule Take by mouth daily as needed.  4 Unknown at Unknown time    ondansetron (ZOFRAN) 8 MG tablet Take 1 tablet (8 mg total) by mouth every 8 (eight) hours as needed for Nausea. 30 tablet 1 Past Month at Unknown time    oxyCODONE (ROXICODONE) 5 MG immediate release tablet Take 1 tablet (5 mg total) by mouth every 4 (four) hours as needed. 25 tablet 0     oxyCODONE-acetaminophen (PERCOCET)  mg per tablet TK 1 T PO Q 4 H PRN P FOR UP TO 10 DAYS. MDD 6 TS  0 2/21/2019 at 2200    potassium chloride (MICRO-K) 10 MEQ CpSR Take 10 mEq by mouth once.   More than a month at Unknown time    PROCTOSOL HC 2.5 % rectal cream    2/20/2019    SYMBICORT 160-4.5 mcg/actuation HFAA 2 puffs 2 (two) times daily.  2 2/22/2019 at 0600    VENTOLIN HFA 90 mcg/actuation inhaler Inhale 2 puffs into the lungs every 4 (four) hours as needed.  4 2/22/2019 at 0600     Review of patient's allergies indicates:  No Known Allergies     Past Medical History:   Diagnosis Date    Aphagia     Cancer     prostate & colon    Cardiomegaly     Colon cancer     rectal cancer    Dysphagia     Hypertension     Prostate cancer     Seizures     Subdural hemorrhage       Past Surgical History:   Procedure Laterality Date    brain coiling      COLECTOMY,LAPAROSCOPIC N/A 9/10/2018    [facility] OR    COLON SURGERY      Exam under anesthesia N/A  10/30/2018    [facility]    SEAJRZZDE-HTZF-F-CATH N/A 11/26/2018    Per[facility] by [facility] at [facility] OR    pilonadal cyst      PROSTATE SURGERY      RESECTION, RECTUM, LOW ANTERIOR, LAPAROSCOPIC, WITH SIGMOID COLECTOMY N/A 2/22/2019    Per[facility] at [facility] OR    TONSILLECTOMY      TRACHEOSTOMY TUBE PLACEMENT        Family History   Problem Relation Age of Onset    Aneurysm Sister       Social History     Tobacco Use    Smoking status: Current Every Day Smoker     Packs/day: 0.50     Types: Cigars    Smokeless tobacco: Never Used    Tobacco comment: one daily   Substance Use Topics    Alcohol use: Yes     Comment: occassional        Review of Systems:  Denies fever, chills  Denies change in vision  Denies dysphagia  Denies chest pain, palpitations  +SOB  Abdominal pain per HPI  Denies dysuria, hematuria  Denies MSK pain or swelling  Denies rash or jaundice  Denies abnormal bleeding or bruising      OBJECTIVE:     Patient Vitals for the past 8 hrs:   BP Temp Temp src Pulse Resp SpO2   02/27/19 1150 (!) 141/69 99.1 °F (37.3 °C) Oral 73 16 (!) 94 %   02/27/19 0750 -- -- -- -- -- 98 %   02/27/19 0740 122/70 98.4 °F (36.9 °C) -- 79 18 97 %     Awake, alert, mild distress  EOMI, no scleral icterus  MMM  RRR  No increased work of breathing  Abd incisions intact, healing well, soft, diffusely tender to voluntary guarding, non distended  No MSK deformity  No rash or jaundice  Normal affect  CN intact, non focal  Perineum grossly normal, no digital exam performed    Data Review:  CBC:   Recent Labs   Lab 02/27/19  0614   WBC 11.80   RBC 3.76*   HGB 10.8*   HCT 33.0*      MCV 88   MCH 28.7   MCHC 32.7     CMP:   Recent Labs   Lab 02/23/19  0435  02/27/19  0614   GLU 88  88   < > 102   CALCIUM 8.6*  8.6*   < > 8.9   ALBUMIN 3.0*  --   --    PROT 5.8*  --   --      140   < > 141   K 3.6  3.6   < > 4.0   CO2 22*  22*   < > 29      108   < > 104   BUN 13  13   < > 8   CREATININE 0.7  0.7   < > 0.7   ALKPHOS 52*  --   --    ALT 11  --   --    AST 14  --   --    BILITOT 0.6  --   --     < > = values in this interval not displayed.   CT: 1. Moderate intraperitoneal free air which is greater than typically expected despite recent postoperative status.  Potential perforation not excluded.  2. Postsurgical changes including interval postsurgical changes of partial distal colon resection.  Small volume dependent fluid seen within the pelvis.  No evidence of organized focal fluid collections or rim enhancing abscess.  3. No evidence of PE.  4. Mild dependent secretions versus aspirated material seen within the distal trachea near the level of the kaycee.  5. Additional findings as detailed above.    ASSESSMENT/PLAN:   68M s/p recent lap LAR for rectal cancer presenting with increasing abdominal pain, CT with intraperitoneal free air.   Expect intraperitoneal air at this point in the post op setting, no free fluid, and labs and vitals are reassuring. Onset of pain coinciding with duration of action of long acting local anesthetic used in the OR  Active Hospital Problems    Diagnosis  POA    Abdominal pain [R10.9]  Yes      Resolved Hospital Problems   No resolved problems to display.       Plan:  Diet as tolerated  Analgesia  Serial abdominal exams    Coby Harp MD  Surgery

## 2019-02-27 NOTE — PLAN OF CARE
02/27/19 1249   Discharge Assessment   Assessment Type Discharge Planning Assessment   Assessment information obtained from? Patient   Prior to hospitilization cognitive status: Alert/Oriented   Prior to hospitalization functional status: Independent   Current cognitive status: Alert/Oriented   Current Functional Status: Independent   Facility Arrived From: home   Lives With other relative(s)   Able to Return to Prior Arrangements yes   Is patient able to care for self after discharge? Unable to determine at this time (comments)   Who are your caregiver(s) and their phone number(s)? (sister Grace- [phone])   Patient's perception of discharge disposition (TBD)   Readmission Within the Last 30 Days previous discharge plan unsuccessful   Patient currently being followed by outpatient case management? No   Patient currently receives any other outside agency services? No   Equipment Currently Used at Home walker, rolling;shower chair   Do you have any problems affording any of your prescribed medications? No   Is the patient taking medications as prescribed? yes   Does the patient have transportation home? Yes   Transportation Anticipated family or friend will provide   Does the patient receive services at the [facility]? No   Discharge Plan A Home with family;Home Health   Discharge Plan B Home with family  (with follow up appointments)   DME Needed Upon Discharge  none   Patient/Family in Agreement with Plan yes     [facility] [address] [place] [address] AT [facility] OF [address] [address]  Phone: [phone] Fax: [phone]    [facility]/pharmacy #[id number] - [place] - [phone]  [place] [address]  Phone: [phone] Fax: [phone]

## 2019-02-27 NOTE — NURSING
Received via ambulance from [facility]. Alert, 02 2 liters nasal canula in use. 02 sats 93%. Normal saline infusing to left antecubital, placed on alaris at 125ml/hr. Abd firm with active BS. C/O tenderness and pain to upper abd. Steri strips intact to lower  and right abd. Declines pain med at present. Attend saturated, yomaira care done and moisture barrier applied. Tele applied with NSR

## 2019-02-27 NOTE — ED PROVIDER NOTES
"Encounter Date: 2/26/2019    SCRIBE #1 NOTE: I, Tony Armendariz, am scribing for, and in the presence of,  SONIA Doyle. I have scribed the following portions of the note - Other sections scribed: HPI, ROS, PE.       History     Chief Complaint   Patient presents with    Abdominal Pain     reports having rectal cancer surgery at [facility] on 2/22. DC on 2/25. Took Oxycodone with acetaminophen for pain, but had a temp of 100.4 prior to taking med. Reports having a non productive cough for the last 2 days     This is a 68 y.o. male with history of rectal cancer who presents to the ED with a complaint of generalized abdominal pain that began this morning.  This is a new problem that has been gradually worsening throughout the day.  He describes his pain as a "throbbing" sensation.  Moving and palpation worsens his pain.  Nothing provides relief.  He rates his pain a ten out of ten in severity.  He took a percocet at 10:30 a.m today without relief.  Pt recently had rectal cancer surgery on 02/22/19 at [facility] performed by Dr. Glass.  Pt also reports endorses fever with a maximum temperature of 100.4 and central "pressured" chest pain.  He reports some increased SOB and used his albuterol inhaler prior to coming to the ER.  He denies current SOB.  He denies  dizziness, lightheadedness, black or bloody stool, emesis, diarrhea, or rectal pain.  His last BM was this morning and describes his stool as soft and brown.  He was dc 'd from the hospital yesterday.  Pt did require oxygen while in hospital, but does not use O2 at home.  Pt admits to intermittent tobacco use.  His PMHx includes COPD.       The history is provided by the patient.     Review of patient's allergies indicates:  No Known Allergies  Past Medical History:   Diagnosis Date    Aphagia     Cancer     prostate & colon    Cardiomegaly     Colon cancer     rectal cancer    Dysphagia     Hypertension     Prostate cancer     Seizures     " "Subdural hemorrhage      Past Surgical History:   Procedure Laterality Date    brain coiling      COLECTOMY,LAPAROSCOPIC N/A 9/10/2018    [facility] OR    COLON SURGERY      Exam under anesthesia N/A 10/30/2018    [facility] at [facility] OR    [id number] N/A 11/26/2018    [facility] at [facility] OR    pilonadal cyst      PROSTATE SURGERY      RESECTION, RECTUM, LOW ANTERIOR, LAPAROSCOPIC, WITH SIGMOID COLECTOMY N/A 2/22/2019    [facility] OR    TONSILLECTOMY      TRACHEOSTOMY TUBE PLACEMENT       Family History   Problem Relation Age of Onset    Aneurysm Sister      Social History     Tobacco Use    Smoking status: Current Every Day Smoker     Packs/day: 0.50     Types: Cigars    Smokeless tobacco: Never Used    Tobacco comment: one daily   Substance Use Topics    Alcohol use: Yes     Comment: occassional    Drug use: No     Review of Systems   Constitutional: Positive for fever.   Respiratory: Positive for wheezing. Negative for shortness of breath.    Cardiovascular: Positive for chest pain.   Gastrointestinal: Positive for abdominal pain. Negative for blood in stool, constipation, diarrhea, nausea, rectal pain and vomiting.   Neurological: Negative for dizziness and light-headedness.   All other systems reviewed and are negative.      Physical Exam     Initial Vitals [02/26/19 1816]   BP Pulse Resp Temp SpO2   (!) 109/56 94 20 98.5 °F (36.9 °C) (!) 92 %      MAP       --         Vitals:    02/26/19 1816 02/26/19 1827 02/26/19 2108 02/26/19 2109   BP: (!) 109/56   113/60   Pulse: 94 91  95   Resp: 20      Temp: 98.5 °F (36.9 °C)      TempSrc: Oral      SpO2: (!) 92% 95% (!) 92% (!) 93%   Weight: 98 kg (216 lb)      Height: 5' 4" (1.626 m)       02/26/19 2130   BP: (!) 112/58   Pulse: 97   Resp:    Temp:    TempSrc:    SpO2: (!) 94%   Weight:    Height:        Physical Exam    Nursing note " and vitals reviewed.  Constitutional: He appears well-developed and well-nourished.   HENT:   Head: Normocephalic and atraumatic.   Eyes: Conjunctivae are normal.   Neck: Normal range of motion. Neck supple.   Cardiovascular: Normal rate, regular rhythm, normal heart sounds and intact distal pulses. Exam reveals no gallop and no friction rub.    No murmur heard.  Pulmonary/Chest: Breath sounds normal. No respiratory distress. He has no wheezes. He has no rhonchi. He has no rales. He exhibits no tenderness.   Abdominal: Soft. Bowel sounds are normal. He exhibits distension. He exhibits no mass. There is generalized tenderness and tenderness in the right lower quadrant. There is guarding. There is no rigidity and no rebound.   Musculoskeletal: Normal range of motion.   Neurological: He is alert and oriented to person, place, and time.   Skin: Skin is warm and dry. Capillary refill takes less than 2 seconds.   Psychiatric: He has a normal mood and affect. His behavior is normal.         ED Course   Procedures  Labs Reviewed   POCT URINALYSIS W/O SCOPE - Abnormal; Notable for the following components:       Result Value    Glucose, UA Negative (*)     Bilirubin, UA 1+ (*)     Ketones, UA 2+ (*)     Blood, UA Negative (*)     Protein, UA 1+ (*)     Nitrite, UA Negative (*)     Leukocytes, UA Negative (*)     All other components within normal limits   ISTAT PROCEDURE - Abnormal; Notable for the following components:    POC PCO2 48.1 (*)     POC PO2 28 (*)     POC HCO3 30.7 (*)     POC SATURATED O2 53 (*)     POC TCO2 32 (*)     All other components within normal limits   POCT LIVER PANEL - Abnormal; Notable for the following components:    ALT (SGPT), POC 6 (*)     All other components within normal limits   TROPONIN ISTAT   POCT CBC   POCT URINALYSIS W/O SCOPE   POCT LACTIC ACID (LACTATE)   POCT CMP   POCT TROPONIN   POCT LIVER PANEL   POCT CMP          Imaging Results          CT Abdomen Pelvis With Contrast (Final  result)  Result time 02/26/19 21:05:32    Final result by Richie Palacio MD (02/26/19 21:05:32)                 Impression:      1. Moderate intraperitoneal free air which is greater than typically expected despite recent postoperative status.  Potential perforation not excluded.  2. Postsurgical changes including interval postsurgical changes of partial distal colon resection.  Small volume dependent fluid seen within the pelvis.  No evidence of organized focal fluid collections or rim enhancing abscess.  3. No evidence of PE.  4. Mild dependent secretions versus aspirated material seen within the distal trachea near the level of the kaycee.  5. Additional findings as detailed above.    COMMUNICATION  This critical result was discovered/received on 2/26/2019 at 20:56.    The critical information above was relayed directly by Richie Palacio MD by telephone to Dr. Anjali Rey on 2/26/2019 at 20:56.      Electronically signed by: Richie Palacio MD  Date:    02/26/2019  Time:    21:05             Narrative:    EXAMINATION:  CT ABDOMEN PELVIS WITH CONTRAST; CTA CHEST NON CORONARY    CLINICAL HISTORY:  abdominal pain s/p rectal surgery;; shortness;    TECHNIQUE:  CTA chest and CT abdomen and pelvis were performed following administration of 100 cc Omnipaque 350 IV contrast.    COMPARISON:  CT abdomen and pelvis from 01/31/2019.    FINDINGS:  Structures at the base of the neck are unremarkable.  Aorta is non-aneurysmal.  The heart is normal in size without pericardial effusion.  No intraluminal filling defects within the pulmonary arteries to suggest pulmonary thromboembolism.   There is no evidence of mediastinal, axillary, or hilar lymph node enlargement.  Scattered air ingested material is seen in the esophagus which may be seen in setting of gastroesophageal reflux.    The trachea and bronchi are patent.  Mild dependent filling defects are seen at the level of the kaycee which may represent retained secretions or  aspirated material.  The lungs are symmetrically expanded.  Mild bibasilar atelectatic changes are seen.  Otherwise no evidence of confluent focal consolidation, mass, or pleural effusion.    Interval postsurgical changes of partial colon resection are seen with suture lines visualized at the distal colon and rectum.  Multiple surgical clips are seen in the pelvis.  There is small amount of dependent fluid seen within the pelvis.  No evidence of organized focal fluid collection or rim enhancing abscess.  There is moderate attended intraperitoneal free air with additional scattered foci seen throughout the mesentery.  Volume of free air is greater than would be typically expected despite recent postoperative status.  Additional postsurgical changes are seen consistent with partial right colon resection.  Moderate stool is seen.  No evidence of small bowel dilatation or obstruction.    No significant hepatic abnormalities are seen.  Gallbladder is unremarkable.  Stomach, pancreas, spleen, and adrenal glands are unremarkable.  Multiple left renal cysts and small right renal cyst are visualized.  No evidence of hydronephrosis.  Urinary bladder wall is thickened noting incomplete distension.  Small focus of intraluminal air is seen within the urinary bladder likely relating to recent catheter placement.    Additional scattered foci of air seen throughout the subcutaneous soft tissues of the lower abdominal wall.  Postsurgical stranding/inflammatory changes seen involving the left lower quadrant anterior abdominal wall without evidence of focal fluid collections or abscess.  Osseous structures demonstrate degenerative change.  There is grade 2 anterolisthesis of L5 on S1.                               CTA Chest Non-Coronary (PE Study) (Final result)  Result time 02/26/19 21:05:32    Final result by Richie Palacio MD (02/26/19 21:05:32)                 Impression:      1. Moderate intraperitoneal free air which is  greater than typically expected despite recent postoperative status.  Potential perforation not excluded.  2. Postsurgical changes including interval postsurgical changes of partial distal colon resection.  Small volume dependent fluid seen within the pelvis.  No evidence of organized focal fluid collections or rim enhancing abscess.  3. No evidence of PE.  4. Mild dependent secretions versus aspirated material seen within the distal trachea near the level of the kaycee.  5. Additional findings as detailed above.    COMMUNICATION  This critical result was discovered/received on 2/26/2019 at 20:56.    The critical information above was relayed directly by Richie Palacio MD by telephone to Dr. Anjali Rey on 2/26/2019 at 20:56.      Electronically signed by: Richie Palacio MD  Date:    02/26/2019  Time:    21:05             Narrative:    EXAMINATION:  CT ABDOMEN PELVIS WITH CONTRAST; CTA CHEST NON CORONARY    CLINICAL HISTORY:  abdominal pain s/p rectal surgery;; shortness;    TECHNIQUE:  CTA chest and CT abdomen and pelvis were performed following administration of 100 cc Omnipaque 350 IV contrast.    COMPARISON:  CT abdomen and pelvis from 01/31/2019.    FINDINGS:  Structures at the base of the neck are unremarkable.  Aorta is non-aneurysmal.  The heart is normal in size without pericardial effusion.  No intraluminal filling defects within the pulmonary arteries to suggest pulmonary thromboembolism.   There is no evidence of mediastinal, axillary, or hilar lymph node enlargement.  Scattered air ingested material is seen in the esophagus which may be seen in setting of gastroesophageal reflux.    The trachea and bronchi are patent.  Mild dependent filling defects are seen at the level of the kaycee which may represent retained secretions or aspirated material.  The lungs are symmetrically expanded.  Mild bibasilar atelectatic changes are seen.  Otherwise no evidence of confluent focal consolidation, mass, or  pleural effusion.    Interval postsurgical changes of partial colon resection are seen with suture lines visualized at the distal colon and rectum.  Multiple surgical clips are seen in the pelvis.  There is small amount of dependent fluid seen within the pelvis.  No evidence of organized focal fluid collection or rim enhancing abscess.  There is moderate attended intraperitoneal free air with additional scattered foci seen throughout the mesentery.  Volume of free air is greater than would be typically expected despite recent postoperative status.  Additional postsurgical changes are seen consistent with partial right colon resection.  Moderate stool is seen.  No evidence of small bowel dilatation or obstruction.    No significant hepatic abnormalities are seen.  Gallbladder is unremarkable.  Stomach, pancreas, spleen, and adrenal glands are unremarkable.  Multiple left renal cysts and small right renal cyst are visualized.  No evidence of hydronephrosis.  Urinary bladder wall is thickened noting incomplete distension.  Small focus of intraluminal air is seen within the urinary bladder likely relating to recent catheter placement.    Additional scattered foci of air seen throughout the subcutaneous soft tissues of the lower abdominal wall.  Postsurgical stranding/inflammatory changes seen involving the left lower quadrant anterior abdominal wall without evidence of focal fluid collections or abscess.  Osseous structures demonstrate degenerative change.  There is grade 2 anterolisthesis of L5 on S1.                                 Medical Decision Making:   Clinical Tests:   Lab Tests: Ordered  Radiological Study: Ordered       APC / Resident Notes:   Patient presents to the ER due to gradually worsening abdominal pain for 1 day.  I reviewed the patient's chart.  He had a laparoscopic rectal resection and sigmoid colectomy on 2/22 without complications during hospital stay.  He was discharged yesterday and developed  "abdominal pain and fever today.  On exam he has generalized abdominal tenderness, but his pain is worse in the lower abdomen.  He has guarding and appears to have some distention, but abdomen is not rigid on exam.  He has + BS.  We will order CT abd/pelvic.  Patient also reports worsening SOB and midsternal CP so we will perform CTA PE study.    Patient is given 6 mg IV morphine with minimal improvement of his pain. Patient's labs are significant for elevated WBC count of 12.4, patient is hemodynamically stable, UA negative for evidence of infection, CMP largely within normal limits without evidence of severe dehydration, lactic acid is normal.    CTA is negative for PE, CT abd reveals "Moderate intraperitoneal free air which is greater than typically expected despite recent postoperative status.  Potential perforation not excluded."  I have discussed the patient's presentation with Dr. Crawford, on call for the patients surgeon Dr. Glass.  She is familiar with the patient.  She agrees with plan for IV Zosyn, continued IV fluids, and transfer to [facility] for admission to general surgery team.  She has accepted the patient for admission to inpatient service.  Patient and his wife are comfortable with plan for transfer to [facility] for admission.  I discussed the care of this patient with my supervising physician.  Patient care is transferred to Dr. Rey at 10:30 p.m. pending transport to [facility].       Scribe Attestation:   Scribe #1: I performed the above scribed service and the documentation accurately describes the services I performed. I attest to the accuracy of the note.    I, Eva Hopper, personally performed the services described in this documentation. All medical record entries made by the scribe were at my direction and in my presence.  I have reviewed the chart and agree that the record reflects my personal performance and is accurate and complete. Eva Hopper, APC.  9:15 PM " 02/26/2019             Clinical Impression:     1. Abdominal pain, unspecified abdominal location    2. Fever, unspecified fever cause                                  SONIA Farah  02/26/19 [id number]

## 2019-02-27 NOTE — TELEPHONE ENCOUNTER
Called pt to f/u for ER visit , left VM. PT is currently admitted at this time. Pt has post op appt with  on 3/7/19.

## 2019-02-28 LAB
ANION GAP SERPL CALC-SCNC: 11 MMOL/L
BASOPHILS # BLD AUTO: 0.01 K/UL
BASOPHILS NFR BLD: 0.1 %
BUN SERPL-MCNC: 11 MG/DL
CALCIUM SERPL-MCNC: 9.3 MG/DL
CHLORIDE SERPL-SCNC: 104 MMOL/L
CO2 SERPL-SCNC: 27 MMOL/L
CREAT SERPL-MCNC: 0.7 MG/DL
DIFFERENTIAL METHOD: ABNORMAL
EOSINOPHIL # BLD AUTO: 0.2 K/UL
EOSINOPHIL NFR BLD: 1.3 %
ERYTHROCYTE [DISTWIDTH] IN BLOOD BY AUTOMATED COUNT: 16.5 %
EST. GFR  (AFRICAN AMERICAN): >60 ML/MIN/1.73 M^2
EST. GFR  (NON AFRICAN AMERICAN): >60 ML/MIN/1.73 M^2
GLUCOSE SERPL-MCNC: 85 MG/DL
HCT VFR BLD AUTO: 31.2 %
HGB BLD-MCNC: 10.4 G/DL
LYMPHOCYTES # BLD AUTO: 0.8 K/UL
LYMPHOCYTES NFR BLD: 5.6 %
MCH RBC QN AUTO: 28.9 PG
MCHC RBC AUTO-ENTMCNC: 33.3 G/DL
MCV RBC AUTO: 87 FL
MONOCYTES # BLD AUTO: 0.5 K/UL
MONOCYTES NFR BLD: 3.4 %
NEUTROPHILS # BLD AUTO: 13.2 K/UL
NEUTROPHILS NFR BLD: 89.4 %
PLATELET # BLD AUTO: 340 K/UL
PMV BLD AUTO: 8.8 FL
POTASSIUM SERPL-SCNC: 3.8 MMOL/L
RBC # BLD AUTO: 3.6 M/UL
SODIUM SERPL-SCNC: 142 MMOL/L
WBC # BLD AUTO: 14.7 K/UL

## 2019-02-28 PROCEDURE — 63600175 PHARM REV CODE 636 W HCPCS: Performed by: STUDENT IN AN ORGANIZED HEALTH CARE EDUCATION/TRAINING PROGRAM

## 2019-02-28 PROCEDURE — 21400001 HC TELEMETRY ROOM

## 2019-02-28 PROCEDURE — 94799 UNLISTED PULMONARY SVC/PX: CPT

## 2019-02-28 PROCEDURE — 25000242 PHARM REV CODE 250 ALT 637 W/ HCPCS: Performed by: STUDENT IN AN ORGANIZED HEALTH CARE EDUCATION/TRAINING PROGRAM

## 2019-02-28 PROCEDURE — 25000003 PHARM REV CODE 250: Performed by: STUDENT IN AN ORGANIZED HEALTH CARE EDUCATION/TRAINING PROGRAM

## 2019-02-28 PROCEDURE — 63600175 PHARM REV CODE 636 W HCPCS: Performed by: SURGERY

## 2019-02-28 PROCEDURE — 25000003 PHARM REV CODE 250: Performed by: SURGERY

## 2019-02-28 PROCEDURE — 80048 BASIC METABOLIC PNL TOTAL CA: CPT

## 2019-02-28 PROCEDURE — S0030 INJECTION, METRONIDAZOLE: HCPCS | Performed by: STUDENT IN AN ORGANIZED HEALTH CARE EDUCATION/TRAINING PROGRAM

## 2019-02-28 PROCEDURE — 94640 AIRWAY INHALATION TREATMENT: CPT

## 2019-02-28 PROCEDURE — 36415 COLL VENOUS BLD VENIPUNCTURE: CPT

## 2019-02-28 PROCEDURE — 85025 COMPLETE CBC W/AUTO DIFF WBC: CPT

## 2019-02-28 PROCEDURE — 27100098 HC SPACER

## 2019-02-28 PROCEDURE — 94761 N-INVAS EAR/PLS OXIMETRY MLT: CPT

## 2019-02-28 RX ORDER — METRONIDAZOLE 500 MG/100ML
500 INJECTION, SOLUTION INTRAVENOUS
Status: DISCONTINUED | OUTPATIENT
Start: 2019-02-28 | End: 2019-03-01

## 2019-02-28 RX ORDER — HYDROCHLOROTHIAZIDE 25 MG/1
25 TABLET ORAL DAILY
Status: DISCONTINUED | OUTPATIENT
Start: 2019-02-28 | End: 2019-03-03

## 2019-02-28 RX ADMIN — ALBUTEROL SULFATE 2 PUFF: 90 AEROSOL, METERED RESPIRATORY (INHALATION) at 08:02

## 2019-02-28 RX ADMIN — OXYCODONE HYDROCHLORIDE 5 MG: 5 TABLET ORAL at 09:02

## 2019-02-28 RX ADMIN — METRONIDAZOLE 500 MG: 500 INJECTION, SOLUTION INTRAVENOUS at 06:02

## 2019-02-28 RX ADMIN — LEVETIRACETAM 500 MG: 500 TABLET ORAL at 08:02

## 2019-02-28 RX ADMIN — METRONIDAZOLE 500 MG: 500 INJECTION, SOLUTION INTRAVENOUS at 11:02

## 2019-02-28 RX ADMIN — FLUTICASONE FUROATE AND VILANTEROL TRIFENATATE 1 PUFF: 100; 25 POWDER RESPIRATORY (INHALATION) at 08:02

## 2019-02-28 RX ADMIN — OXYCODONE HYDROCHLORIDE 5 MG: 5 TABLET ORAL at 05:02

## 2019-02-28 RX ADMIN — HYDROCHLOROTHIAZIDE 25 MG: 25 TABLET ORAL at 09:02

## 2019-02-28 RX ADMIN — KETOROLAC TROMETHAMINE 15 MG: 30 INJECTION, SOLUTION INTRAMUSCULAR at 05:02

## 2019-02-28 RX ADMIN — OXYCODONE HYDROCHLORIDE 5 MG: 5 TABLET ORAL at 02:02

## 2019-02-28 RX ADMIN — PIPERACILLIN AND TAZOBACTAM 4.5 G: 4; .5 INJECTION, POWDER, LYOPHILIZED, FOR SOLUTION INTRAVENOUS; PARENTERAL at 12:02

## 2019-02-28 RX ADMIN — ACETAMINOPHEN 650 MG: 325 TABLET, FILM COATED ORAL at 11:02

## 2019-02-28 RX ADMIN — ASPIRIN 81 MG: 81 TABLET, COATED ORAL at 09:02

## 2019-02-28 RX ADMIN — ENOXAPARIN SODIUM 40 MG: 100 INJECTION SUBCUTANEOUS at 05:02

## 2019-02-28 RX ADMIN — LEVETIRACETAM 500 MG: 500 TABLET ORAL at 09:02

## 2019-02-28 RX ADMIN — KETOROLAC TROMETHAMINE 15 MG: 30 INJECTION, SOLUTION INTRAMUSCULAR at 11:02

## 2019-02-28 RX ADMIN — ACETAMINOPHEN 650 MG: 325 TABLET, FILM COATED ORAL at 05:02

## 2019-02-28 RX ADMIN — PANTOPRAZOLE SODIUM 40 MG: 40 TABLET, DELAYED RELEASE ORAL at 09:02

## 2019-02-28 RX ADMIN — ESCITALOPRAM OXALATE 10 MG: 10 TABLET ORAL at 09:02

## 2019-02-28 RX ADMIN — SODIUM CHLORIDE: 0.9 INJECTION, SOLUTION INTRAVENOUS at 11:02

## 2019-02-28 RX ADMIN — PIPERACILLIN AND TAZOBACTAM 4.5 G: 4; .5 INJECTION, POWDER, LYOPHILIZED, FOR SOLUTION INTRAVENOUS; PARENTERAL at 07:02

## 2019-02-28 NOTE — NURSING
Pt lying in bed AAOx3.  Resp even NL on 2L NC.  Pt c/o pain to his abdomen.  PRN pain medication administered.  Abdominal incision clean dry and intact.  Steri strips intact and incision well approximated.  IV infusing NS @ 75/hr.  Plan of care discussed with pt.  Advised pt to call for assistance.  Call light in reach, bed alarm on.

## 2019-02-28 NOTE — PLAN OF CARE
Problem: Adult Inpatient Plan of Care  Goal: Plan of Care Review  Outcome: Ongoing (interventions implemented as appropriate)   02/28/19 0635   Plan of Care Review   Plan of Care Reviewed With patient       Problem: Pain Acute  Goal: Optimal Pain Control  Outcome: Ongoing (interventions implemented as appropriate)  Intervention: Develop Pain Management Plan   02/28/19 0635   Prevent or Manage Pain   Pain Management Interventions pain management plan reviewed with patient/caregiver         Problem: Skin Injury Risk Increased  Goal: Skin Health and Integrity  Outcome: Ongoing (interventions implemented as appropriate)  Intervention: Optimize Skin Protection   02/28/19 0635   Prevent Additional Skin Injury   Head of Bed (HOB) HOB flat   Pressure Reduction Techniques frequent weight shift encouraged

## 2019-02-28 NOTE — PROGRESS NOTES
[facility]  General Surgery  Progress Note    Subjective:     Interval History: continues to complain of pain. No fever. Minimal OOB. Tolerating diet. 2 BM.     Post-Op Info:  * No surgery found *          Medications:  Continuous Infusions:   sodium chloride 0.9% 75 mL/hr at 02/27/19 1314     Scheduled Meds:   acetaminophen  650 mg Oral Q6H    aspirin  81 mg Oral Daily    enoxaparin  40 mg Subcutaneous Daily    escitalopram oxalate  10 mg Oral Daily    fluticasone-vilanterol  1 puff Inhalation Daily    hydroCHLOROthiazide  25 mg Oral Daily    ketorolac  15 mg Intravenous Q6H    levETIRAcetam  500 mg Oral BID    pantoprazole  40 mg Oral Daily     PRN Meds:albuterol, ondansetron, ondansetron, oxyCODONE, promethazine (PHENERGAN) IVPB, sodium chloride 0.9%     Objective:     Vital Signs (Most Recent):  Temp: 97.6 °F (36.4 °C) (02/28/19 0702)  Pulse: 73 (02/28/19 0702)  Resp: 18 (02/28/19 0702)  BP: 120/64 (02/28/19 0702)  SpO2: (!) 92 % (02/28/19 0702) Vital Signs (24h Range):  Temp:  [97.6 °F (36.4 °C)-99.1 °F (37.3 °C)] 97.6 °F (36.4 °C)  Pulse:  [60-80] 73  Resp:  [16-18] 18  SpO2:  [90 %-95 %] 92 %  BP: (119-141)/(59-69) 120/64       Intake/Output Summary (Last 24 hours) at 2/28/2019 0813  Last data filed at 2/28/2019 0600  Gross per 24 hour   Intake 3074.17 ml   Output 600 ml   Net 2474.17 ml       Physical Exam  Awake, alert, mild distress  RRR  No increased work of breathing  Abd soft, less tender than yesterday, no guarding, no distension      Significant Labs:  BMP:   Recent Labs   Lab 02/28/19 0552   GLU 85      K 3.8      CO2 27   BUN 11   CREATININE 0.7   CALCIUM 9.3     CBC:   Recent Labs   Lab 02/28/19 0552   WBC 14.70*   RBC 3.60*   HGB 10.4*   HCT 31.2*      MCV 87   MCH 28.9   MCHC 33.3       Significant Diagnostics:  None    Assessment/Plan:   68M s/p recent lap LAR for rectal cancer on 2/22 presenting with increasing abdominal pain, CT with  intraperitoneal free air in the postoperative setting. Abdominal exam improving, WBC uptrending, without fever.   Continue diet as tolerated  Home meds  Encourage IS and ambulation  Restart ABX  Trend WBC, serial exams. Repeat CT scan may be warranted, pending the above.        Active Diagnoses:    Diagnosis Date Noted POA    PRINCIPAL PROBLEM:  Abdominal pain [R10.9] 02/26/2019 Yes      Problems Resolved During this Admission:         Coby Harp MD  General Surgery  [facility]

## 2019-03-01 LAB
ALBUMIN SERPL BCP-MCNC: 2.1 G/DL
ALP SERPL-CCNC: 87 U/L
ALT SERPL W/O P-5'-P-CCNC: 12 U/L
ANION GAP SERPL CALC-SCNC: 10 MMOL/L
ANION GAP SERPL CALC-SCNC: 14 MMOL/L
AST SERPL-CCNC: 11 U/L
BASOPHILS # BLD AUTO: 0 K/UL
BASOPHILS # BLD AUTO: 0.01 K/UL
BASOPHILS NFR BLD: 0 %
BASOPHILS NFR BLD: 0.1 %
BILIRUB SERPL-MCNC: 0.7 MG/DL
BUN SERPL-MCNC: 13 MG/DL
BUN SERPL-MCNC: 14 MG/DL
CALCIUM SERPL-MCNC: 9.5 MG/DL
CALCIUM SERPL-MCNC: 9.6 MG/DL
CHLORIDE SERPL-SCNC: 104 MMOL/L
CHLORIDE SERPL-SCNC: 104 MMOL/L
CO2 SERPL-SCNC: 25 MMOL/L
CO2 SERPL-SCNC: 28 MMOL/L
CREAT SERPL-MCNC: 0.7 MG/DL
CREAT SERPL-MCNC: 0.8 MG/DL
DIFFERENTIAL METHOD: ABNORMAL
DIFFERENTIAL METHOD: ABNORMAL
EOSINOPHIL # BLD AUTO: 0 K/UL
EOSINOPHIL # BLD AUTO: 0.5 K/UL
EOSINOPHIL NFR BLD: 0 %
EOSINOPHIL NFR BLD: 3.7 %
ERYTHROCYTE [DISTWIDTH] IN BLOOD BY AUTOMATED COUNT: 16.4 %
ERYTHROCYTE [DISTWIDTH] IN BLOOD BY AUTOMATED COUNT: 16.6 %
EST. GFR  (AFRICAN AMERICAN): >60 ML/MIN/1.73 M^2
EST. GFR  (AFRICAN AMERICAN): >60 ML/MIN/1.73 M^2
EST. GFR  (NON AFRICAN AMERICAN): >60 ML/MIN/1.73 M^2
EST. GFR  (NON AFRICAN AMERICAN): >60 ML/MIN/1.73 M^2
GLUCOSE SERPL-MCNC: 91 MG/DL
GLUCOSE SERPL-MCNC: 96 MG/DL
HCT VFR BLD AUTO: 32.6 %
HCT VFR BLD AUTO: 32.7 %
HGB BLD-MCNC: 11 G/DL
HGB BLD-MCNC: 11.1 G/DL
LACTATE SERPL-SCNC: 1.1 MMOL/L
LYMPHOCYTES # BLD AUTO: 0.2 K/UL
LYMPHOCYTES # BLD AUTO: 0.6 K/UL
LYMPHOCYTES NFR BLD: 4.2 %
LYMPHOCYTES NFR BLD: 7.7 %
MCH RBC QN AUTO: 29 PG
MCH RBC QN AUTO: 29.4 PG
MCHC RBC AUTO-ENTMCNC: 33.6 G/DL
MCHC RBC AUTO-ENTMCNC: 34 G/DL
MCV RBC AUTO: 85 FL
MCV RBC AUTO: 87 FL
MONOCYTES # BLD AUTO: 0.1 K/UL
MONOCYTES # BLD AUTO: 0.6 K/UL
MONOCYTES NFR BLD: 2.6 %
MONOCYTES NFR BLD: 4.3 %
NEUTROPHILS # BLD AUTO: 12.6 K/UL
NEUTROPHILS # BLD AUTO: 2.4 K/UL
NEUTROPHILS NFR BLD: 87.7 %
NEUTROPHILS NFR BLD: 90.1 %
PLATELET # BLD AUTO: 384 K/UL
PLATELET # BLD AUTO: 395 K/UL
PMV BLD AUTO: 9 FL
PMV BLD AUTO: 9.2 FL
POTASSIUM SERPL-SCNC: 3.2 MMOL/L
POTASSIUM SERPL-SCNC: 4 MMOL/L
PROT SERPL-MCNC: 6.1 G/DL
RBC # BLD AUTO: 3.74 M/UL
RBC # BLD AUTO: 3.83 M/UL
SODIUM SERPL-SCNC: 142 MMOL/L
SODIUM SERPL-SCNC: 143 MMOL/L
TROPONIN I SERPL DL<=0.01 NG/ML-MCNC: 0.05 NG/ML
WBC # BLD AUTO: 14.42 K/UL
WBC # BLD AUTO: 2.71 K/UL

## 2019-03-01 PROCEDURE — 84484 ASSAY OF TROPONIN QUANT: CPT

## 2019-03-01 PROCEDURE — 94761 N-INVAS EAR/PLS OXIMETRY MLT: CPT

## 2019-03-01 PROCEDURE — 63600175 PHARM REV CODE 636 W HCPCS: Performed by: SURGERY

## 2019-03-01 PROCEDURE — 63600175 PHARM REV CODE 636 W HCPCS: Performed by: STUDENT IN AN ORGANIZED HEALTH CARE EDUCATION/TRAINING PROGRAM

## 2019-03-01 PROCEDURE — 93010 EKG 12-LEAD: ICD-10-PCS | Mod: ,,, | Performed by: INTERNAL MEDICINE

## 2019-03-01 PROCEDURE — 94640 AIRWAY INHALATION TREATMENT: CPT

## 2019-03-01 PROCEDURE — S0030 INJECTION, METRONIDAZOLE: HCPCS | Performed by: SURGERY

## 2019-03-01 PROCEDURE — 83605 ASSAY OF LACTIC ACID: CPT

## 2019-03-01 PROCEDURE — 85025 COMPLETE CBC W/AUTO DIFF WBC: CPT | Mod: 91

## 2019-03-01 PROCEDURE — 27000221 HC OXYGEN, UP TO 24 HOURS

## 2019-03-01 PROCEDURE — 94799 UNLISTED PULMONARY SVC/PX: CPT

## 2019-03-01 PROCEDURE — 20000000 HC ICU ROOM

## 2019-03-01 PROCEDURE — 80053 COMPREHEN METABOLIC PANEL: CPT

## 2019-03-01 PROCEDURE — S0030 INJECTION, METRONIDAZOLE: HCPCS | Performed by: STUDENT IN AN ORGANIZED HEALTH CARE EDUCATION/TRAINING PROGRAM

## 2019-03-01 PROCEDURE — 87040 BLOOD CULTURE FOR BACTERIA: CPT | Mod: 59

## 2019-03-01 PROCEDURE — 25000003 PHARM REV CODE 250: Performed by: STUDENT IN AN ORGANIZED HEALTH CARE EDUCATION/TRAINING PROGRAM

## 2019-03-01 PROCEDURE — 87040 BLOOD CULTURE FOR BACTERIA: CPT

## 2019-03-01 PROCEDURE — 80048 BASIC METABOLIC PNL TOTAL CA: CPT

## 2019-03-01 PROCEDURE — 25000003 PHARM REV CODE 250: Performed by: SURGERY

## 2019-03-01 PROCEDURE — 93010 ELECTROCARDIOGRAM REPORT: CPT | Mod: ,,, | Performed by: INTERNAL MEDICINE

## 2019-03-01 PROCEDURE — 93005 ELECTROCARDIOGRAM TRACING: CPT

## 2019-03-01 PROCEDURE — 36415 COLL VENOUS BLD VENIPUNCTURE: CPT

## 2019-03-01 RX ORDER — HYDROCODONE BITARTRATE AND ACETAMINOPHEN 7.5; 325 MG/1; MG/1
1 TABLET ORAL EVERY 6 HOURS PRN
Status: DISCONTINUED | OUTPATIENT
Start: 2019-03-01 | End: 2019-03-01

## 2019-03-01 RX ORDER — ACETAMINOPHEN 10 MG/ML
1000 INJECTION, SOLUTION INTRAVENOUS ONCE
Status: COMPLETED | OUTPATIENT
Start: 2019-03-02 | End: 2019-03-02

## 2019-03-01 RX ORDER — LEVETIRACETAM 5 MG/ML
500 INJECTION INTRAVASCULAR EVERY 12 HOURS
Status: DISCONTINUED | OUTPATIENT
Start: 2019-03-01 | End: 2019-03-20

## 2019-03-01 RX ORDER — ACETAMINOPHEN 10 MG/ML
1000 INJECTION, SOLUTION INTRAVENOUS ONCE
Status: COMPLETED | OUTPATIENT
Start: 2019-03-01 | End: 2019-03-01

## 2019-03-01 RX ORDER — PANTOPRAZOLE SODIUM 40 MG/10ML
40 INJECTION, POWDER, LYOPHILIZED, FOR SOLUTION INTRAVENOUS DAILY
Status: DISCONTINUED | OUTPATIENT
Start: 2019-03-02 | End: 2019-03-20

## 2019-03-01 RX ORDER — POTASSIUM CHLORIDE 7.45 MG/ML
10 INJECTION INTRAVENOUS
Status: COMPLETED | OUTPATIENT
Start: 2019-03-01 | End: 2019-03-02

## 2019-03-01 RX ORDER — METRONIDAZOLE 500 MG/100ML
500 INJECTION, SOLUTION INTRAVENOUS
Status: DISCONTINUED | OUTPATIENT
Start: 2019-03-01 | End: 2019-03-20

## 2019-03-01 RX ORDER — MORPHINE SULFATE 10 MG/ML
2 INJECTION INTRAMUSCULAR; INTRAVENOUS; SUBCUTANEOUS
Status: DISCONTINUED | OUTPATIENT
Start: 2019-03-01 | End: 2019-03-02

## 2019-03-01 RX ADMIN — PANTOPRAZOLE SODIUM 40 MG: 40 TABLET, DELAYED RELEASE ORAL at 08:03

## 2019-03-01 RX ADMIN — PIPERACILLIN AND TAZOBACTAM 4.5 G: 4; .5 INJECTION, POWDER, LYOPHILIZED, FOR SOLUTION INTRAVENOUS; PARENTERAL at 03:03

## 2019-03-01 RX ADMIN — SODIUM CHLORIDE, SODIUM LACTATE, POTASSIUM CHLORIDE, AND CALCIUM CHLORIDE 1000 ML: .6; .31; .03; .02 INJECTION, SOLUTION INTRAVENOUS at 11:03

## 2019-03-01 RX ADMIN — SODIUM CHLORIDE, SODIUM LACTATE, POTASSIUM CHLORIDE, AND CALCIUM CHLORIDE 1000 ML: .6; .31; .03; .02 INJECTION, SOLUTION INTRAVENOUS at 09:03

## 2019-03-01 RX ADMIN — METRONIDAZOLE 500 MG: 500 INJECTION, SOLUTION INTRAVENOUS at 02:03

## 2019-03-01 RX ADMIN — VANCOMYCIN HYDROCHLORIDE 1500 MG: 1 INJECTION, POWDER, LYOPHILIZED, FOR SOLUTION INTRAVENOUS at 10:03

## 2019-03-01 RX ADMIN — SODIUM CHLORIDE: 0.9 INJECTION, SOLUTION INTRAVENOUS at 05:03

## 2019-03-01 RX ADMIN — ONDANSETRON HYDROCHLORIDE 8 MG: 4 TABLET, FILM COATED ORAL at 05:03

## 2019-03-01 RX ADMIN — ACETAMINOPHEN 650 MG: 325 TABLET, FILM COATED ORAL at 12:03

## 2019-03-01 RX ADMIN — POTASSIUM CHLORIDE 10 MEQ: 7.46 INJECTION, SOLUTION INTRAVENOUS at 11:03

## 2019-03-01 RX ADMIN — ENOXAPARIN SODIUM 40 MG: 100 INJECTION SUBCUTANEOUS at 05:03

## 2019-03-01 RX ADMIN — HYDROCHLOROTHIAZIDE 25 MG: 25 TABLET ORAL at 08:03

## 2019-03-01 RX ADMIN — POTASSIUM CHLORIDE 10 MEQ: 7.46 INJECTION, SOLUTION INTRAVENOUS at 10:03

## 2019-03-01 RX ADMIN — ACETAMINOPHEN 650 MG: 325 TABLET, FILM COATED ORAL at 05:03

## 2019-03-01 RX ADMIN — HYDROCODONE BITARTRATE AND ACETAMINOPHEN 1 TABLET: 7.5; 325 TABLET ORAL at 05:03

## 2019-03-01 RX ADMIN — PIPERACILLIN AND TAZOBACTAM 4.5 G: 4; .5 INJECTION, POWDER, LYOPHILIZED, FOR SOLUTION INTRAVENOUS; PARENTERAL at 01:03

## 2019-03-01 RX ADMIN — METRONIDAZOLE 500 MG: 500 INJECTION, SOLUTION INTRAVENOUS at 09:03

## 2019-03-01 RX ADMIN — OXYCODONE HYDROCHLORIDE 5 MG: 5 TABLET ORAL at 09:03

## 2019-03-01 RX ADMIN — LEVETIRACETAM 500 MG: 5 INJECTION INTRAVENOUS at 10:03

## 2019-03-01 RX ADMIN — ESCITALOPRAM OXALATE 10 MG: 10 TABLET ORAL at 08:03

## 2019-03-01 RX ADMIN — PIPERACILLIN AND TAZOBACTAM 4.5 G: 4; .5 INJECTION, POWDER, LYOPHILIZED, FOR SOLUTION INTRAVENOUS; PARENTERAL at 10:03

## 2019-03-01 RX ADMIN — KETOROLAC TROMETHAMINE 15 MG: 30 INJECTION, SOLUTION INTRAMUSCULAR at 05:03

## 2019-03-01 RX ADMIN — ASPIRIN 81 MG: 81 TABLET, COATED ORAL at 08:03

## 2019-03-01 RX ADMIN — ACETAMINOPHEN 1000 MG: 10 INJECTION, SOLUTION INTRAVENOUS at 09:03

## 2019-03-01 RX ADMIN — LEVETIRACETAM 500 MG: 500 TABLET ORAL at 08:03

## 2019-03-01 RX ADMIN — METRONIDAZOLE 500 MG: 500 INJECTION, SOLUTION INTRAVENOUS at 01:03

## 2019-03-01 RX ADMIN — FLUTICASONE FUROATE AND VILANTEROL TRIFENATATE 1 PUFF: 100; 25 POWDER RESPIRATORY (INHALATION) at 07:03

## 2019-03-01 NOTE — ANESTHESIA POSTPROCEDURE EVALUATION
"Anesthesia Post Evaluation    Patient: Justin Adams    Procedure(s) Performed: Procedure(s) (LRB):  RESECTION, RECTUM, LOW ANTERIOR, LAPAROSCOPIC, WITH SIGMOID COLECTOMY (N/A)    Final Anesthesia Type: general  Patient location during evaluation: PACU  Patient participation: Yes- Able to Participate  Level of consciousness: awake and alert  Post-procedure vital signs: reviewed and stable  Pain management: adequate  Airway patency: patent  PONV status at discharge: No PONV  Anesthetic complications: no      Cardiovascular status: hemodynamically stable  Respiratory status: unassisted and spontaneous ventilation  Hydration status: euvolemic  Follow-up not needed.        Visit Vitals  BP (!) 110/56 (BP Location: Left arm, Patient Position: Sitting)   Pulse 74   Temp 36.8 °C (98.3 °F) (Oral)   Resp 18   Ht 5' 4" (1.626 m)   Wt 98 kg (216 lb)   SpO2 (!) 94%   BMI 37.08 kg/m²       Pain/Joy Score: Pain Rating Prior to Med Admin: 8 (3/1/2019  9:10 AM)  Pain Rating Post Med Admin: 4 (2/28/2019 12:28 PM)        "

## 2019-03-01 NOTE — PROGRESS NOTES
[facility]  General Surgery  Progress Note    Subjective:     Interval History: still complains of pain, tolerating diet, minimal ambulation yesterday, +flatus    Post-Op Info:  * No surgery found *          Medications:  Continuous Infusions:   sodium chloride 0.9% 75 mL/hr at 02/28/19 1156     Scheduled Meds:   acetaminophen  650 mg Oral Q6H    aspirin  81 mg Oral Daily    enoxaparin  40 mg Subcutaneous Daily    escitalopram oxalate  10 mg Oral Daily    fluticasone-vilanterol  1 puff Inhalation Daily    hydroCHLOROthiazide  25 mg Oral Daily    ketorolac  15 mg Intravenous Q6H    levETIRAcetam  500 mg Oral BID    metronidazole  500 mg Intravenous Q8H    pantoprazole  40 mg Oral Daily    piperacillin-tazobactam (ZOSYN) IVPB  4.5 g Intravenous Q8H     PRN Meds:albuterol, ondansetron, ondansetron, oxyCODONE, promethazine (PHENERGAN) IVPB, sodium chloride 0.9%     Objective:     Vital Signs (Most Recent):  Temp: 98.3 °F (36.8 °C) (03/01/19 0656)  Pulse: 74 (03/01/19 0656)  Resp: 18 (03/01/19 0656)  BP: 123/65 (03/01/19 0656)  SpO2: (!) 94 % (03/01/19 0656) Vital Signs (24h Range):  Temp:  [98.1 °F (36.7 °C)-99.1 °F (37.3 °C)] 98.3 °F (36.8 °C)  Pulse:  [72-80] 74  Resp:  [18-20] 18  SpO2:  [91 %-95 %] 94 %  BP: (115-140)/(59-67) 123/65       Intake/Output Summary (Last 24 hours) at 3/1/2019 0725  Last data filed at 2/28/2019 1801  Gross per 24 hour   Intake 1050 ml   Output --   Net 1050 ml       Physical Exam  Awake, alert, mild distress  RRR  No increased work of breathing  Abd soft, diffusely tender same as yesterday, no guarding, non distended      Significant Labs:  BMP:   Recent Labs   Lab 03/01/19  0554   GLU 96      K 4.0      CO2 28   BUN 13   CREATININE 0.7   CALCIUM 9.5     CBC:   Recent Labs   Lab 03/01/19  0554   WBC 14.42*   RBC 3.74*   HGB 11.0*   HCT 32.7*   *   MCV 87   MCH 29.4   MCHC 33.6       Significant Diagnostics:  None    Assessment/Plan:   68M  s/p recent lap LAR for rectal cancer on 2/22 presenting with increasing abdominal pain, CT with intraperitoneal free air in the postoperative setting. Abdominal exam improving, leukocytosis without fever.   Continue diet as tolerated  Change pain meds to norco prn  Home meds  Encourage IS and ambulation  ABX  Trend WBC, serial exams.  Will get flat and erect abdominal films         Active Diagnoses:    Diagnosis Date Noted POA    PRINCIPAL PROBLEM:  Abdominal pain [R10.9] 02/26/2019 Yes      Problems Resolved During this Admission:         Coby Harp MD  General Surgery  [facility]

## 2019-03-02 PROBLEM — R07.89 OTHER CHEST PAIN: Status: ACTIVE | Noted: 2019-03-02

## 2019-03-02 PROBLEM — I10 HYPERTENSION: Status: ACTIVE | Noted: 2019-03-02

## 2019-03-02 PROBLEM — E78.2 MIXED HYPERLIPIDEMIA: Status: ACTIVE | Noted: 2019-03-02

## 2019-03-02 LAB
ALBUMIN SERPL BCP-MCNC: 1.7 G/DL
ALP SERPL-CCNC: 64 U/L
ALT SERPL W/O P-5'-P-CCNC: 11 U/L
ANION GAP SERPL CALC-SCNC: 10 MMOL/L
ANION GAP SERPL CALC-SCNC: 8 MMOL/L
AORTIC ROOT ANNULUS: 3.63 CM
AORTIC VALVE CUSP SEPERATION: 1.85 CM
ASCENDING AORTA: 2.54 CM
AST SERPL-CCNC: 13 U/L
AV INDEX (PROSTH): 0.7
AV MEAN GRADIENT: 7.43 MMHG
AV PEAK GRADIENT: 11.56 MMHG
AV VALVE AREA: 2.81 CM2
AV VELOCITY RATIO: 0.69
BASOPHILS # BLD AUTO: ABNORMAL K/UL
BASOPHILS NFR BLD: 0 %
BASOPHILS NFR BLD: 0 %
BILIRUB SERPL-MCNC: 0.4 MG/DL
BSA FOR ECHO PROCEDURE: 2.03 M2
BUN SERPL-MCNC: 15 MG/DL
BUN SERPL-MCNC: 17 MG/DL
CALCIUM SERPL-MCNC: 8.8 MG/DL
CALCIUM SERPL-MCNC: 9.2 MG/DL
CHLORIDE SERPL-SCNC: 108 MMOL/L
CHLORIDE SERPL-SCNC: 108 MMOL/L
CO2 SERPL-SCNC: 24 MMOL/L
CO2 SERPL-SCNC: 25 MMOL/L
CREAT SERPL-MCNC: 0.9 MG/DL
CREAT SERPL-MCNC: 1 MG/DL
CV ECHO LV RWT: 0.81 CM
DIFFERENTIAL METHOD: ABNORMAL
DIFFERENTIAL METHOD: ABNORMAL
DOP CALC AO PEAK VEL: 1.7 M/S
DOP CALC AO VTI: 38.7 CM
DOP CALC LVOT AREA: 4.05 CM2
DOP CALC LVOT DIAMETER: 2.27 CM
DOP CALC LVOT PEAK VEL: 1.17 M/S
DOP CALC LVOT STROKE VOLUME: 108.89 CM3
DOP CALCLVOT PEAK VEL VTI: 26.92 CM
E WAVE DECELERATION TIME: 194.8 MSEC
E/A RATIO: 1.3
E/E' RATIO: 6.35
ECHO LV POSTERIOR WALL: 1.49 CM (ref 0.6–1.1)
EOSINOPHIL # BLD AUTO: ABNORMAL K/UL
EOSINOPHIL NFR BLD: 0 %
EOSINOPHIL NFR BLD: 1 %
ERYTHROCYTE [DISTWIDTH] IN BLOOD BY AUTOMATED COUNT: 16.6 %
ERYTHROCYTE [DISTWIDTH] IN BLOOD BY AUTOMATED COUNT: 17 %
EST. GFR  (AFRICAN AMERICAN): >60 ML/MIN/1.73 M^2
EST. GFR  (AFRICAN AMERICAN): >60 ML/MIN/1.73 M^2
EST. GFR  (NON AFRICAN AMERICAN): >60 ML/MIN/1.73 M^2
EST. GFR  (NON AFRICAN AMERICAN): >60 ML/MIN/1.73 M^2
FRACTIONAL SHORTENING: 28 % (ref 28–44)
GLUCOSE SERPL-MCNC: 92 MG/DL
GLUCOSE SERPL-MCNC: 99 MG/DL
HCT VFR BLD AUTO: 32 %
HCT VFR BLD AUTO: 33.5 %
HGB BLD-MCNC: 10.8 G/DL
HGB BLD-MCNC: 11.2 G/DL
INTERVENTRICULAR SEPTUM: 1.55 CM (ref 0.6–1.1)
IVRT: 0.11 MSEC
LA MAJOR: 5.77 CM
LA MINOR: 5.21 CM
LA WIDTH: 3.55 CM
LACTATE SERPL-SCNC: 1.5 MMOL/L
LEFT ATRIUM SIZE: 3.96 CM
LEFT ATRIUM VOLUME INDEX: 33.4 ML/M2
LEFT ATRIUM VOLUME: 65.43 CM3
LEFT INTERNAL DIMENSION IN SYSTOLE: 2.63 CM (ref 2.1–4)
LEFT VENTRICLE DIASTOLIC VOLUME INDEX: 29.08 ML/M2
LEFT VENTRICLE DIASTOLIC VOLUME: 57.01 ML
LEFT VENTRICLE MASS INDEX: 107.6 G/M2
LEFT VENTRICLE SYSTOLIC VOLUME INDEX: 13 ML/M2
LEFT VENTRICLE SYSTOLIC VOLUME: 25.4 ML
LEFT VENTRICULAR INTERNAL DIMENSION IN DIASTOLE: 3.67 CM (ref 3.5–6)
LEFT VENTRICULAR MASS: 210.83 G
LV LATERAL E/E' RATIO: 5.21
LV SEPTAL E/E' RATIO: 8.11
LYMPHOCYTES # BLD AUTO: ABNORMAL K/UL
LYMPHOCYTES NFR BLD: 7 %
LYMPHOCYTES NFR BLD: 8 %
MCH RBC QN AUTO: 29 PG
MCH RBC QN AUTO: 29 PG
MCHC RBC AUTO-ENTMCNC: 33.4 G/DL
MCHC RBC AUTO-ENTMCNC: 33.8 G/DL
MCV RBC AUTO: 86 FL
MCV RBC AUTO: 87 FL
MONOCYTES # BLD AUTO: ABNORMAL K/UL
MONOCYTES NFR BLD: 2 %
MONOCYTES NFR BLD: 4 %
MV PEAK A VEL: 0.56 M/S
MV PEAK E VEL: 0.73 M/S
NEUTROPHILS NFR BLD: 36 %
NEUTROPHILS NFR BLD: 9 %
NEUTS BAND NFR BLD MANUAL: 53 %
NEUTS BAND NFR BLD MANUAL: 80 %
PISA TR MAX VEL: 2.21 M/S
PLATELET # BLD AUTO: 416 K/UL
PLATELET # BLD AUTO: 487 K/UL
PLATELET BLD QL SMEAR: ABNORMAL
PLATELET BLD QL SMEAR: ABNORMAL
PMV BLD AUTO: 9.2 FL
PMV BLD AUTO: 9.3 FL
POTASSIUM SERPL-SCNC: 3.3 MMOL/L
POTASSIUM SERPL-SCNC: 4 MMOL/L
PROT SERPL-MCNC: 5.7 G/DL
PV PEAK VELOCITY: 1.21 CM/S
RA MAJOR: 5.87 CM
RA WIDTH: 3.39 CM
RBC # BLD AUTO: 3.73 M/UL
RBC # BLD AUTO: 3.86 M/UL
RIGHT VENTRICULAR END-DIASTOLIC DIMENSION: 4.53 CM
RV TISSUE DOPPLER FREE WALL SYSTOLIC VELOCITY 1 (APICAL 4 CHAMBER VIEW): 9.09 M/S
SINUS: 2.93 CM
SODIUM SERPL-SCNC: 141 MMOL/L
SODIUM SERPL-SCNC: 142 MMOL/L
STJ: 2.26 CM
TDI LATERAL: 0.14
TDI SEPTAL: 0.09
TDI: 0.12
TR MAX PG: 19.54 MMHG
TRICUSPID ANNULAR PLANE SYSTOLIC EXCURSION: 2.72 CM
TROPONIN I SERPL DL<=0.01 NG/ML-MCNC: 0.07 NG/ML
WBC # BLD AUTO: 10.36 K/UL
WBC # BLD AUTO: 12.95 K/UL
WBC TOXIC VACUOLES BLD QL SMEAR: PRESENT

## 2019-03-02 PROCEDURE — 25000003 PHARM REV CODE 250: Performed by: STUDENT IN AN ORGANIZED HEALTH CARE EDUCATION/TRAINING PROGRAM

## 2019-03-02 PROCEDURE — 94761 N-INVAS EAR/PLS OXIMETRY MLT: CPT

## 2019-03-02 PROCEDURE — 85007 BL SMEAR W/DIFF WBC COUNT: CPT | Mod: 91

## 2019-03-02 PROCEDURE — 99291 PR CRITICAL CARE, E/M 30-74 MINUTES: ICD-10-PCS | Mod: 25,,, | Performed by: INTERNAL MEDICINE

## 2019-03-02 PROCEDURE — C9113 INJ PANTOPRAZOLE SODIUM, VIA: HCPCS | Performed by: STUDENT IN AN ORGANIZED HEALTH CARE EDUCATION/TRAINING PROGRAM

## 2019-03-02 PROCEDURE — 63600175 PHARM REV CODE 636 W HCPCS: Performed by: STUDENT IN AN ORGANIZED HEALTH CARE EDUCATION/TRAINING PROGRAM

## 2019-03-02 PROCEDURE — 93010 EKG 12-LEAD: ICD-10-PCS | Mod: ,,, | Performed by: INTERNAL MEDICINE

## 2019-03-02 PROCEDURE — 99291 CRITICAL CARE FIRST HOUR: CPT | Mod: 25,,, | Performed by: INTERNAL MEDICINE

## 2019-03-02 PROCEDURE — 93005 ELECTROCARDIOGRAM TRACING: CPT

## 2019-03-02 PROCEDURE — S0030 INJECTION, METRONIDAZOLE: HCPCS | Performed by: SURGERY

## 2019-03-02 PROCEDURE — 63600175 PHARM REV CODE 636 W HCPCS: Performed by: EMERGENCY MEDICINE

## 2019-03-02 PROCEDURE — 25500020 PHARM REV CODE 255: Performed by: SURGERY

## 2019-03-02 PROCEDURE — 25000003 PHARM REV CODE 250: Performed by: SURGERY

## 2019-03-02 PROCEDURE — 84484 ASSAY OF TROPONIN QUANT: CPT

## 2019-03-02 PROCEDURE — 94799 UNLISTED PULMONARY SVC/PX: CPT

## 2019-03-02 PROCEDURE — 80048 BASIC METABOLIC PNL TOTAL CA: CPT

## 2019-03-02 PROCEDURE — 36569 INSJ PICC 5 YR+ W/O IMAGING: CPT

## 2019-03-02 PROCEDURE — 63600175 PHARM REV CODE 636 W HCPCS: Performed by: SURGERY

## 2019-03-02 PROCEDURE — 80053 COMPREHEN METABOLIC PANEL: CPT

## 2019-03-02 PROCEDURE — 25000003 PHARM REV CODE 250: Performed by: EMERGENCY MEDICINE

## 2019-03-02 PROCEDURE — 20000000 HC ICU ROOM

## 2019-03-02 PROCEDURE — 83605 ASSAY OF LACTIC ACID: CPT

## 2019-03-02 PROCEDURE — 93010 ELECTROCARDIOGRAM REPORT: CPT | Mod: ,,, | Performed by: INTERNAL MEDICINE

## 2019-03-02 PROCEDURE — 85027 COMPLETE CBC AUTOMATED: CPT | Mod: 91

## 2019-03-02 PROCEDURE — 36415 COLL VENOUS BLD VENIPUNCTURE: CPT

## 2019-03-02 PROCEDURE — 94640 AIRWAY INHALATION TREATMENT: CPT

## 2019-03-02 PROCEDURE — 63600175 PHARM REV CODE 636 W HCPCS

## 2019-03-02 PROCEDURE — 27000221 HC OXYGEN, UP TO 24 HOURS

## 2019-03-02 RX ORDER — POTASSIUM CHLORIDE 7.45 MG/ML
10 INJECTION INTRAVENOUS
Status: COMPLETED | OUTPATIENT
Start: 2019-03-02 | End: 2019-03-02

## 2019-03-02 RX ORDER — POTASSIUM CHLORIDE 7.45 MG/ML
INJECTION INTRAVENOUS
Status: COMPLETED
Start: 2019-03-02 | End: 2019-03-02

## 2019-03-02 RX ORDER — MORPHINE SULFATE 10 MG/ML
2 INJECTION INTRAMUSCULAR; INTRAVENOUS; SUBCUTANEOUS
Status: DISCONTINUED | OUTPATIENT
Start: 2019-03-02 | End: 2019-03-06

## 2019-03-02 RX ORDER — SODIUM CHLORIDE 0.9 % (FLUSH) 0.9 %
10 SYRINGE (ML) INJECTION EVERY 6 HOURS
Status: DISCONTINUED | OUTPATIENT
Start: 2019-03-03 | End: 2019-03-21 | Stop reason: HOSPADM

## 2019-03-02 RX ORDER — SODIUM CHLORIDE 0.9 % (FLUSH) 0.9 %
10 SYRINGE (ML) INJECTION
Status: DISCONTINUED | OUTPATIENT
Start: 2019-03-02 | End: 2019-03-21 | Stop reason: HOSPADM

## 2019-03-02 RX ADMIN — METRONIDAZOLE 500 MG: 500 INJECTION, SOLUTION INTRAVENOUS at 05:03

## 2019-03-02 RX ADMIN — POTASSIUM CHLORIDE 10 MEQ: 7.46 INJECTION, SOLUTION INTRAVENOUS at 10:03

## 2019-03-02 RX ADMIN — SODIUM CHLORIDE: 0.9 INJECTION, SOLUTION INTRAVENOUS at 08:03

## 2019-03-02 RX ADMIN — ENOXAPARIN SODIUM 40 MG: 100 INJECTION SUBCUTANEOUS at 05:03

## 2019-03-02 RX ADMIN — SODIUM CHLORIDE: 0.9 INJECTION, SOLUTION INTRAVENOUS at 05:03

## 2019-03-02 RX ADMIN — PROMETHAZINE HYDROCHLORIDE 6.25 MG: 25 INJECTION INTRAMUSCULAR; INTRAVENOUS at 02:03

## 2019-03-02 RX ADMIN — MORPHINE SULFATE 2 MG: 10 INJECTION INTRAVENOUS at 07:03

## 2019-03-02 RX ADMIN — ACETAMINOPHEN 1000 MG: 10 INJECTION, SOLUTION INTRAVENOUS at 05:03

## 2019-03-02 RX ADMIN — POTASSIUM CHLORIDE 10 MEQ: 7.46 INJECTION, SOLUTION INTRAVENOUS at 07:03

## 2019-03-02 RX ADMIN — POTASSIUM CHLORIDE 10 MEQ: 7.46 INJECTION, SOLUTION INTRAVENOUS at 12:03

## 2019-03-02 RX ADMIN — ESCITALOPRAM OXALATE 10 MG: 10 TABLET ORAL at 10:03

## 2019-03-02 RX ADMIN — PIPERACILLIN AND TAZOBACTAM 4.5 G: 4; .5 INJECTION, POWDER, LYOPHILIZED, FOR SOLUTION INTRAVENOUS; PARENTERAL at 11:03

## 2019-03-02 RX ADMIN — VANCOMYCIN HYDROCHLORIDE 1500 MG: 1 INJECTION, POWDER, LYOPHILIZED, FOR SOLUTION INTRAVENOUS at 11:03

## 2019-03-02 RX ADMIN — LEVETIRACETAM 500 MG: 5 INJECTION INTRAVENOUS at 10:03

## 2019-03-02 RX ADMIN — MORPHINE SULFATE 2 MG: 10 INJECTION INTRAVENOUS at 03:03

## 2019-03-02 RX ADMIN — ASPIRIN 81 MG: 81 TABLET, COATED ORAL at 10:03

## 2019-03-02 RX ADMIN — VANCOMYCIN HYDROCHLORIDE 1500 MG: 1 INJECTION, POWDER, LYOPHILIZED, FOR SOLUTION INTRAVENOUS at 10:03

## 2019-03-02 RX ADMIN — SODIUM CHLORIDE, SODIUM LACTATE, POTASSIUM CHLORIDE, AND CALCIUM CHLORIDE 1000 ML: .6; .31; .03; .02 INJECTION, SOLUTION INTRAVENOUS at 07:03

## 2019-03-02 RX ADMIN — ONDANSETRON 4 MG: 2 INJECTION INTRAMUSCULAR; INTRAVENOUS at 03:03

## 2019-03-02 RX ADMIN — HYDROCHLOROTHIAZIDE 25 MG: 25 TABLET ORAL at 10:03

## 2019-03-02 RX ADMIN — PANTOPRAZOLE SODIUM 40 MG: 40 INJECTION, POWDER, LYOPHILIZED, FOR SOLUTION INTRAVENOUS at 10:03

## 2019-03-02 RX ADMIN — MORPHINE SULFATE 2 MG: 10 INJECTION INTRAVENOUS at 12:03

## 2019-03-02 RX ADMIN — LEVETIRACETAM 500 MG: 5 INJECTION INTRAVENOUS at 08:03

## 2019-03-02 RX ADMIN — FLUTICASONE FUROATE AND VILANTEROL TRIFENATATE 1 PUFF: 100; 25 POWDER RESPIRATORY (INHALATION) at 07:03

## 2019-03-02 RX ADMIN — PIPERACILLIN AND TAZOBACTAM 4.5 G: 4; .5 INJECTION, POWDER, LYOPHILIZED, FOR SOLUTION INTRAVENOUS; PARENTERAL at 02:03

## 2019-03-02 RX ADMIN — IOHEXOL 15 ML: 300 INJECTION, SOLUTION INTRAVENOUS at 02:03

## 2019-03-02 RX ADMIN — MORPHINE SULFATE 2 MG: 10 INJECTION INTRAVENOUS at 11:03

## 2019-03-02 RX ADMIN — METRONIDAZOLE 500 MG: 500 INJECTION, SOLUTION INTRAVENOUS at 12:03

## 2019-03-02 RX ADMIN — IOHEXOL 100 ML: 350 INJECTION, SOLUTION INTRAVENOUS at 04:03

## 2019-03-02 RX ADMIN — MORPHINE SULFATE 2 MG: 10 INJECTION INTRAVENOUS at 05:03

## 2019-03-02 RX ADMIN — SODIUM CHLORIDE: 0.9 INJECTION, SOLUTION INTRAVENOUS at 12:03

## 2019-03-02 RX ADMIN — METRONIDAZOLE 500 MG: 500 INJECTION, SOLUTION INTRAVENOUS at 08:03

## 2019-03-02 RX ADMIN — PIPERACILLIN AND TAZOBACTAM 4.5 G: 4; .5 INJECTION, POWDER, LYOPHILIZED, FOR SOLUTION INTRAVENOUS; PARENTERAL at 05:03

## 2019-03-02 NOTE — NURSING
1545- report on patient received from jonathan long rn and updated handoff report sheet received. Tolerating iv fluids. No nausea or pain. Call light in reach head of bed up side rail up x 3 .       1630-assessment completed . Abdomen distended no bowel movement today . No needs voiced . No changes on telemetry. Side rail up x 3 bed alarm on .     1813-  Medicated for abdominal pain and upset stomach without nausea . No changes on telemetry. Bed alarm on call light in reach head of bed up side. Abdomen distended and tender to touch has scope sites to abdomen with steristrips in place . No drainage .

## 2019-03-02 NOTE — PROGRESS NOTES
Called by nurse regarding patients tachycardia and increase in NC oxygen needs. EKG obtained which demonstrated sinus tachycardia. Nursing instructed to bolus one liter of LR, change diet to NPO, increase MIVF, place chanel, get stat CBC, CMP, lactic acid, blood culture and troponin's, and to transfer the patient to the ICU while this writer was in route. On arrival patient febrile and tachycardic to the 120's while sating 95% on 3L NC. Blood pressure remains stable.      Nursing instructed to bolus additional liter of LR for a total of 2 L,  continue IV antibiotics along with IV tylenol and pain medicatoins.

## 2019-03-02 NOTE — PROGRESS NOTES
0623: Called Dr. Randall to inform of a K at 3.3. New orders to replace. Troponin bump to 0.074. And urine output about 250 mL for shift. Will continue to monitor.

## 2019-03-02 NOTE — ASSESSMENT & PLAN NOTE
Atypical symptoms mostly radiating from his ongoing abdominal issues  Minimal troponin elevation likely not indicative of ischemia  Preliminary echo shows normal LV function  Okay to consider ischemic workup when stable

## 2019-03-02 NOTE — PROGRESS NOTES
[facility]  General Surgery  Progress Note    Subjective:     Interval History:   Yesterday evening found to be febrile, with sinus tachycardic in the 130's with worsening abdominal pain and oxygen requirement    Transferred to the ICU, labs obtained, bolused, and broad spectrum ABX initiated       Post-Op Info:  * No surgery found *          Medications:  Continuous Infusions:   sodium chloride 0.9% 150 mL/hr at 03/02/19 0800     Scheduled Meds:   aspirin  81 mg Oral Daily    enoxaparin  40 mg Subcutaneous Daily    escitalopram oxalate  10 mg Oral Daily    fluticasone-vilanterol  1 puff Inhalation Daily    hydroCHLOROthiazide  25 mg Oral Daily    levetiracetam IVPB  500 mg Intravenous Q12H    metronidazole  500 mg Intravenous Q8H    pantoprazole  40 mg Intravenous Daily    piperacillin-tazobactam (ZOSYN) IVPB  4.5 g Intravenous Q8H    potassium chloride in water  10 mEq Intravenous Q1H    vancomycin (VANCOCIN) IVPB  1,500 mg Intravenous Q12H     PRN Meds:albuterol, morphine, ondansetron, ondansetron, promethazine (PHENERGAN) IVPB, sodium chloride 0.9%     Objective:     Vital Signs (Most Recent):  Temp: 97.9 °F (36.6 °C) (03/02/19 0715)  Pulse: 80 (03/02/19 0730)  Resp: 18 (03/02/19 0730)  BP: (!) 89/52 (03/02/19 0730)  SpO2: (!) 93 % (03/02/19 0730) Vital Signs (24h Range):  Temp:  [97.8 °F (36.6 °C)-99.8 °F (37.7 °C)] 97.9 °F (36.6 °C)  Pulse:  [] 80  Resp:  [16-28] 18  SpO2:  [88 %-95 %] 93 %  BP: ()/(50-93) 89/52       Intake/Output Summary (Last 24 hours) at 3/2/2019 1015  Last data filed at 3/2/2019 0800  Gross per 24 hour   Intake 5857.5 ml   Output 331 ml   Net 5526.5 ml       Physical Exam  Awake, alert, mild distress  HR in 90's   HEENT: NC in nares   No increased work of breathing  Abd soft, diffusely tender and distended  : chanel in place   EXT; moves all       Significant Labs:  BMP:   Recent Labs   Lab 03/02/19  0411   GLU 99      K 3.3*       CO2 24   BUN 15   CREATININE 1.0   CALCIUM 8.8     CBC:   Recent Labs   Lab 03/02/19  0411   WBC 10.36   RBC 3.73*   HGB 10.8*   HCT 32.0*   *   MCV 86   MCH 29.0   MCHC 33.8       Significant Diagnostics:  None    Assessment/Plan:   68M s/p recent lap LAR for rectal cancer on 2/22 presenting with increasing abdominal pain, CT with intraperitoneal free air in the postoperative setting. Evening of 3/1 patient febrile, tachycardic and subsequently transferred to the ICU     Neuro: Alert and oriented  -Continue IV pain meds  -Keppra changed to IV as NPO    CV: HR to the 140's. Bolused 2 L LR, HR improved in 90  -Troponin elevated to 0.074. Trend   -Repeat EKG and consult cardiology  -MAPS remain stable    PULM: CXR without acute process  -NC to keep oxygen sats > 94%  -IS hourly while awake  -Home inhalers     FEN/GI: s/p LAR on 2/22. Has had significant diarrhea   -NPO and MIVF  -Replace electrolytes per protocol  -Will discuss the need for repeat CT scan     : chanel placed in acute setting last night   -BUN 13 and R 1.0. .   -Will bolus after evaluated by cardiology     Heme/ID: leukocytosis of 14 now at 10.   -blood cultures w/o growth.  -Previous zosyn and flagyl, vancomycin added on 3/1. Will adjust per cultures        Lovenox and Protonix (home med)       Active Diagnoses:    Diagnosis Date Noted POA    PRINCIPAL PROBLEM:  Abdominal pain [R10.9] 02/26/2019 Yes      Problems Resolved During this Admission:         Crow Randall MD  General Surgery  [facility] [place]

## 2019-03-02 NOTE — PLAN OF CARE
Problem: Adult Inpatient Plan of Care  Goal: Plan of Care Review  Outcome: Ongoing (interventions implemented as appropriate)  Patient admitted to ICU from telemetry floor. Upon arrival to shift blood cultures were drawn by lab and chest X-ray was done. Pt temp was 99.8 oral. Sinus tach on monitor. Antibiotics were started, IV tylenol, and a 2 L bolus of LR. Pt currently NS on monitor. Sats 95% on 3 L NC and pt is Afebrile. PRN morphine given for pt complaining of abdominal pain. Field in place with minimal urine output. Normal Saline at 150 mL/hr infusing. Pt sister at bedside throughout the night. Pt denies chest pain and shortness of breath. Pt currently resting comfortably. No falls, injuries, or skin breakdown. Pt remains NPO. Pt and family updated on plan of care.

## 2019-03-02 NOTE — NURSING
1930- bedside rounding report given to kevin ventura at bedside . Oxygen in use at 2 liters. No change on telemetry .

## 2019-03-02 NOTE — SUBJECTIVE & OBJECTIVE
Past Medical History:   Diagnosis Date    Aphagia     Cancer     prostate & colon    Cardiomegaly     Colon cancer     rectal cancer    Dysphagia     Hypertension     Prostate cancer     Seizures     Subdural hemorrhage        Past Surgical History:   Procedure Laterality Date    brain coiling      COLECTOMY,LAPAROSCOPIC N/A 9/10/2018    Performed by [facility] at [facility] OR    COLON SURGERY      Exam under anesthesia N/A 10/30/2018    Performed by [facility] at [facility] OR    JIQQURHAJ-QAVM-Z-CATH N/A 11/26/2018    Performed by [facility] at [facility] OR    pilonadal cyst      PROSTATE SURGERY      RESECTION, RECTUM, LOW ANTERIOR, LAPAROSCOPIC, WITH SIGMOID COLECTOMY N/A 2/22/2019    Performed by [facility] at [facility] OR    TONSILLECTOMY      TRACHEOSTOMY TUBE PLACEMENT         Review of patient's allergies indicates:  No Known Allergies    No current facility-administered medications on file prior to encounter.      Current Outpatient Medications on File Prior to Encounter   Medication Sig    alvimopan (ENTEREG) 12 mg capsule Take 1 capsule (12 mg total) by mouth once daily.    aspirin (ECOTRIN) 81 MG EC tablet Take 81 mg by mouth once daily.    atorvastatin (LIPITOR) 10 MG tablet TAKE 1 TABLET BY MOUTH EVERY BEDTIME    docusate sodium (COLACE) 100 MG capsule Take 100 mg by mouth 2 (two) times daily.    escitalopram oxalate (LEXAPRO) 10 MG tablet Take 10 mg by mouth once daily.    ferrous sulfate 325 mg (65 mg iron) Tab tablet Take 1 tablet by mouth once daily.    GAVILYTE-G 236-22.74-6.74 -5.86 gram suspension TAKE 4,000 MLS (4 L TOTAL) BY MOUTH ONCE. FOR 1 DOSE    hydroCHLOROthiazide (HYDRODIURIL) 25 MG tablet Take 25 mg by mouth every morning.    levetiracetam (KEPPRA) 500 MG Tab Take 500 mg by mouth 2 (two) times daily.    lisinopril (PRINIVIL,ZESTRIL) 5 MG tablet Take 5 mg by mouth once daily.    metroNIDAZOLE (FLAGYL) 500 MG tablet Take 2 tabs at 7pm &  11pm night before surgery    multivit-min/FA/lycopen/lutein (CENTRUM SILVER ULTRA MEN'S ORAL) Take 1 tablet by mouth once daily.    neomycin (MYCIFRADIN) 500 mg Tab Take 2 tabs at 7pm & 11pm night before surgery    omeprazole (PRILOSEC) 20 MG capsule Take by mouth daily as needed.    ondansetron (ZOFRAN) 8 MG tablet Take 1 tablet (8 mg total) by mouth every 8 (eight) hours as needed for Nausea.    oxyCODONE (ROXICODONE) 5 MG immediate release tablet Take 1 tablet (5 mg total) by mouth every 4 (four) hours as needed.    oxyCODONE-acetaminophen (PERCOCET)  mg per tablet TK 1 T PO Q 4 H PRN P FOR UP TO 10 DAYS. MDD 6 TS    potassium chloride (MICRO-K) 10 MEQ CpSR Take 10 mEq by mouth once.    PROCTOSOL HC 2.5 % rectal cream     SYMBICORT 160-4.5 mcg/actuation HFAA 2 puffs 2 (two) times daily.    VENTOLIN HFA 90 mcg/actuation inhaler Inhale 2 puffs into the lungs every 4 (four) hours as needed.     Family History     Problem Relation (Age of Onset)    Aneurysm Sister        Tobacco Use    Smoking status: Current Every Day Smoker     Packs/day: 0.50     Types: Cigars    Smokeless tobacco: Never Used    Tobacco comment: one daily   Substance and Sexual Activity    Alcohol use: Yes     Comment: occassional    Drug use: No    Sexual activity: Not Currently     Partners: Female     Review of Systems   Constitution: Negative.   HENT: Negative.    Eyes: Negative.    Cardiovascular: Positive for chest pain. Negative for dyspnea on exertion, irregular heartbeat, leg swelling, near-syncope, orthopnea, palpitations, paroxysmal nocturnal dyspnea and syncope.   Respiratory: Negative for shortness of breath.    Skin: Negative.    Musculoskeletal: Negative.    Gastrointestinal: Positive for abdominal pain and nausea. Negative for constipation and diarrhea.   Genitourinary: Negative for dysuria.   Neurological: Negative for dizziness.   Psychiatric/Behavioral: Negative.      Objective:     Vital Signs (Most  Recent):  Temp: 98.1 °F (36.7 °C) (03/02/19 1100)  Pulse: 78 (03/02/19 1200)  Resp: 17 (03/02/19 1200)  BP: (!) 86/50 (03/02/19 1200)  SpO2: (!) 94 % (03/02/19 1200) Vital Signs (24h Range):  Temp:  [97.8 °F (36.6 °C)-99.8 °F (37.7 °C)] 98.1 °F (36.7 °C)  Pulse:  [] 78  Resp:  [16-28] 17  SpO2:  [88 %-96 %] 94 %  BP: ()/(50-93) 86/50     Weight: 91.2 kg (201 lb)  Body mass index is 34.5 kg/m².    SpO2: (!) 94 %  O2 Device (Oxygen Therapy): nasal cannula      Intake/Output Summary (Last 24 hours) at 3/2/2019 1204  Last data filed at 3/2/2019 0800  Gross per 24 hour   Intake 5857.5 ml   Output 331 ml   Net 5526.5 ml       Lines/Drains/Airways     Central Venous Catheter Line                 Port A Cath Single Lumen 11/26/18 1252 left subclavian 95 days          Drain                 Urethral Catheter 03/01/19 2201 less than 1 day          Peripheral Intravenous Line                 Peripheral IV - Single Lumen 03/01/19 2145 Left Antecubital less than 1 day         Peripheral IV - Single Lumen 03/01/19 2200 Right Antecubital less than 1 day         Peripheral IV - Single Lumen 03/01/19 2254 Anterior;Distal;Right Forearm less than 1 day                Physical Exam   Constitutional: He is oriented to person, place, and time. He appears well-developed and well-nourished. No distress.   HENT:   Head: Normocephalic and atraumatic.   Eyes: Conjunctivae and EOM are normal. Pupils are equal, round, and reactive to light.   Neck: Normal range of motion. Neck supple. No thyromegaly present.   Cardiovascular: Normal rate, regular rhythm and normal heart sounds.   No murmur heard.  Pulmonary/Chest: Effort normal and breath sounds normal. No respiratory distress. He has no wheezes. He has no rales. He exhibits no tenderness.   Abdominal: Soft. Bowel sounds are normal. He exhibits distension. There is tenderness. There is no rebound.   Musculoskeletal: He exhibits no edema.   Neurological: He is alert and oriented to  person, place, and time.   Skin: Skin is warm and dry.   Psychiatric: He has a normal mood and affect. His behavior is normal.       Significant Labs:   CMP   Recent Labs   Lab 03/01/19  0554 03/01/19 2105 03/02/19 0411    143 142   K 4.0 3.2* 3.3*    104 108   CO2 28 25 24   GLU 96 91 99   BUN 13 14 15   CREATININE 0.7 0.8 1.0   CALCIUM 9.5 9.6 8.8   PROT  --  6.1  --    ALBUMIN  --  2.1*  --    BILITOT  --  0.7  --    ALKPHOS  --  87  --    AST  --  11  --    ALT  --  12  --    ANIONGAP 10 14 10   ESTGFRAFRICA >60 >60 >60   EGFRNONAA >60 >60 >60   , CBC   Recent Labs   Lab 03/01/19  0554 03/01/19 2150 03/02/19 0411   WBC 14.42* 2.71* 10.36   HGB 11.0* 11.1* 10.8*   HCT 32.7* 32.6* 32.0*   * 395* 416*   , INR No results for input(s): INR, PROTIME in the last 48 hours., Lipid Panel No results for input(s): CHOL, HDL, LDLCALC, TRIG, CHOLHDL in the last 48 hours. and Troponin   Recent Labs   Lab 03/01/19 2105 03/02/19 0411   TROPONINI 0.048* 0.074*       Significant Imaging: Echocardiogram:   Transthoracic echo (TTE) complete (Cupid Only):   Results for orders placed or performed during the hospital encounter of 02/26/19   Transthoracic echo (TTE) complete (Cupid Only)   Result Value Ref Range    BSA 2.03 m2    TDI SEPTAL 0.09     LV LATERAL E/E' RATIO 5.21     LV SEPTAL E/E' RATIO 8.11     LA WIDTH 3.55 cm    AORTIC VALVE CUSP SEPERATION 1.85 cm    TDI LATERAL 0.14     PV PEAK VELOCITY 1.21 cm/s    LVIDD 3.67 3.5 - 6.0 cm    IVS 1.55 (A) 0.6 - 1.1 cm    PW 1.49 (A) 0.6 - 1.1 cm    Ao root annulus 3.63 cm    LVIDS 2.63 2.1 - 4.0 cm    FS 28 28 - 44 %    LA volume 65.43 cm3    Sinus 2.93 cm    STJ 2.26 cm    Ascending aorta 2.54 cm    LV mass 210.83 g    LA size 3.96 cm    RVDD 4.53 cm    TAPSE 2.72 cm    RV S' 9.09 m/s    Left Ventricle Relative Wall Thickness 0.81 cm    AV mean gradient 7.43 mmHg    AV valve area 2.81 cm2    AV Velocity Ratio 0.69     AV index (prosthetic) 0.70     E/A ratio  1.30     Mean e' 0.12     E wave decelartion time 194.80 msec    IVRT 0.11 msec    LVOT diameter 2.27 cm    LVOT area 4.05 cm2    LVOT peak shaka [id number] m/s    LVOT peak VTI 26.92 cm    Ao peak shaka 1.70 m/s    Ao VTI 38.70 cm    LVOT stroke volume 108.89 cm3    AV peak gradient 11.56 mmHg    E/E' ratio 6.35     MV Peak E Shaka 0.73 m/s    TR Max Shaka 2.21 m/s    MV Peak A Shaka 0.56 m/s    LV Systolic Volume 25.40 mL    LV Systolic Volume Index 13.0 mL/m2    LV Diastolic Volume 57.01 mL    LV Diastolic Volume Index 29.08 mL/m2    LA Volume Index 33.4 mL/m2    LV Mass Index 107.6 g/m2    RA Major Axis 5.87 cm    Left Atrium Minor Axis 5.21 cm    Left Atrium Major Axis 5.77 cm    Triscuspid Valve Regurgitation Peak Gradient 19.54 mmHg    RA Width 3.39 cm     · TDS  · Normal left ventricular systolic function. The estimated ejection fraction is 55%  · Concentric left ventricular remodeling.

## 2019-03-02 NOTE — HPI
"68 y.o. male with history of rectal cancer who presents to the ED with a complaint of generalized abdominal pain that began this morning.  This is a new problem that has been gradually worsening throughout the day.  He describes his pain as a "throbbing" sensation.  Moving and palpation worsens his pain.  Nothing provides relief.  He rates his pain a ten out of ten in severity.  He took a percocet at 10:30 a.m today without relief.  Pt recently had rectal cancer surgery on 02/22/19 at [facility] performed by Dr. Glsas.  Pt also reports endorses fever with a maximum temperature of 100.4 and central "pressured" chest pain.  He reports some increased SOB and used his albuterol inhaler prior to coming to the ER.  He denies current SOB.  He denies  dizziness, lightheadedness, black or bloody stool, emesis, diarrhea, or rectal pain.  His last BM was this morning and describes his stool as soft and brown.  He was dc 'd from the hospital yesterday.  Pt did require oxygen while in hospital, but does not use O2 at home.  Pt admits to intermittent tobacco use.  His PMHx includes COPD.     Currently being observed in the ICU by surgery.  He says he developed some chest pains yesterday and troponins were drawn.  He had some minor EKG changes which were nonspecific.  Troponin elevation is minimal and he relates that most of the chest pain is radiating from his abdomen which is tender.  He has never had any cardiac issues or any testing.  Preliminarily echocardiogram shows normal LV function.  He denies any associated shortness of breath or palpitations.  He has experienced no PND, orthopnea or lower extremity edema.  He denies any dizziness to the point of presyncope or syncope.  "

## 2019-03-02 NOTE — NURSING
"At onset of shift - this nurse went in to room to check on Patient while waiting on day off going report. Pt alert and forgetful. Tech to report Pt with low O2 SAT - in low 80's. Pt on 1 liter O2 per NC. Pt with increases respirations and pursed-lip breathing. Pt stating "I hurt all over". Pt hot to touch. Pt's oxygen increased to 3L on NC with SATS increasing to 90-91%. Temp elevated. IV NS running at 50ml/hr.  Pt currently qualifies as severe sepsis with elevated WBC from this a.m.. Dr. Crow Gutierrez notified with new orders received. EKG done and result called to Dr. Gutierrez. Dr. Gutierrez to give further orders. 16fr chanel cath placed without difficulty. Pt's sister at bedside. Pt and sister notified that Pt will be moving to ICU room 280. Pt and sister voiced understanding.   "

## 2019-03-02 NOTE — NURSING
Pt in a lot of discomfort, s/w Dr Randall who gave orders for morphine to be given q 2 hrs prn instead of q 3 hrs. abd looks more distended. Per Dr Randall if pt is still in a lot of discomfort on return from CT ok to place ng tube to LIS

## 2019-03-02 NOTE — CONSULTS
"[facility]  Cardiology  Consult Note    Patient Name: Justin Adams  MRN: [id number]  Admission Date: 2/26/2019  Hospital Length of Stay: 4 days  Code Status: Full Code   Attending Provider: Mat Glass MD   Consulting Provider: Andrei Neil MD  Primary Care Physician: Dandre Massey MD  Principal Problem:Other chest pain    Patient information was obtained from patient, past medical records and ER records.     Inpatient consult to Cardiology  Consult performed by: Andrei Neil MD  Consult ordered by: Crow Randall MD  Reason for consult: Chest pain, elevated cardiac markers        Subjective:     Chief Complaint:  Chest pain     HPI:   68 y.o. male with history of rectal cancer who presents to the ED with a complaint of generalized abdominal pain that began this morning.  This is a new problem that has been gradually worsening throughout the day.  He describes his pain as a "throbbing" sensation.  Moving and palpation worsens his pain.  Nothing provides relief.  He rates his pain a ten out of ten in severity.  He took a percocet at 10:30 a.m today without relief.  Pt recently had rectal cancer surgery on 02/22/19 at [facility] performed by Dr. Glass.  Pt also reports endorses fever with a maximum temperature of 100.4 and central "pressured" chest pain.  He reports some increased SOB and used his albuterol inhaler prior to coming to the ER.  He denies current SOB.  He denies  dizziness, lightheadedness, black or bloody stool, emesis, diarrhea, or rectal pain.  His last BM was this morning and describes his stool as soft and brown.  He was dc 'd from the hospital yesterday.  Pt did require oxygen while in hospital, but does not use O2 at home.  Pt admits to intermittent tobacco use.  His PMHx includes COPD.     Currently being observed in the ICU by surgery.  He says he developed some chest pains yesterday and troponins were drawn.  He had some minor EKG " changes which were nonspecific.  Troponin elevation is minimal and he relates that most of the chest pain is radiating from his abdomen which is tender.  He has never had any cardiac issues or any testing.  Preliminarily echocardiogram shows normal LV function.  He denies any associated shortness of breath or palpitations.  He has experienced no PND, orthopnea or lower extremity edema.  He denies any dizziness to the point of presyncope or syncope.    Past Medical History:   Diagnosis Date    Aphagia     Cancer     prostate & colon    Cardiomegaly     Colon cancer     rectal cancer    Dysphagia     Hypertension     Prostate cancer     Seizures     Subdural hemorrhage        Past Surgical History:   Procedure Laterality Date    brain coiling      COLECTOMY,LAPAROSCOPIC N/A 9/10/2018    [facility]formed [facility] at [facility] OR    COLON SURGERY      Exam under anesthesia N/A 10/30/2018    Performed by [facility] at [facility] OR    JXUDROOSA-EGCI-F-CATH N/A 11/26/2018    Performed by [facility] at [facility] OR    pilonadal cyst      PROSTATE SURGERY      RESECTION, RECTUM, LOW ANTERIOR, LAPAROSCOPIC, WITH SIGMOID COLECTOMY N/A 2/22/2019    Performed by [facility] at [facility] OR    TONSILLECTOMY      TRACHEOSTOMY TUBE PLACEMENT         Review of patient's allergies indicates:  No Known Allergies    No current facility-administered medications on file prior to encounter.      Current Outpatient Medications on File Prior to Encounter   Medication Sig    alvimopan (ENTEREG) 12 mg capsule Take 1 capsule (12 mg total) by mouth once daily.    aspirin (ECOTRIN) 81 MG EC tablet Take 81 mg by mouth once daily.    atorvastatin (LIPITOR) 10 MG tablet TAKE 1 TABLET BY MOUTH EVERY BEDTIME    docusate sodium (COLACE) 100 MG capsule Take 100 mg by mouth 2 (two) times daily.    escitalopram oxalate (LEXAPRO) 10 MG tablet Take 10 mg by mouth once daily.    ferrous sulfate 325 mg (65 mg iron)  Tab tablet Take 1 tablet by mouth once daily.    GAVILYTE-G 236-22.74-6.74 -5.86 gram suspension TAKE 4,000 MLS (4 L TOTAL) BY MOUTH ONCE. FOR 1 DOSE    hydroCHLOROthiazide (HYDRODIURIL) 25 MG tablet Take 25 mg by mouth every morning.    levetiracetam (KEPPRA) 500 MG Tab Take 500 mg by mouth 2 (two) times daily.    lisinopril (PRINIVIL,ZESTRIL) 5 MG tablet Take 5 mg by mouth once daily.    metroNIDAZOLE (FLAGYL) 500 MG tablet Take 2 tabs at 7pm & 11pm night before surgery    multivit-min/FA/lycopen/lutein (CENTRUM SILVER ULTRA MEN'S ORAL) Take 1 tablet by mouth once daily.    neomycin (MYCIFRADIN) 500 mg Tab Take 2 tabs at 7pm & 11pm night before surgery    omeprazole (PRILOSEC) 20 MG capsule Take by mouth daily as needed.    ondansetron (ZOFRAN) 8 MG tablet Take 1 tablet (8 mg total) by mouth every 8 (eight) hours as needed for Nausea.    oxyCODONE (ROXICODONE) 5 MG immediate release tablet Take 1 tablet (5 mg total) by mouth every 4 (four) hours as needed.    oxyCODONE-acetaminophen (PERCOCET)  mg per tablet TK 1 T PO Q 4 H PRN P FOR UP TO 10 DAYS. MDD 6 TS    potassium chloride (MICRO-K) 10 MEQ CpSR Take 10 mEq by mouth once.    PROCTOSOL HC 2.5 % rectal cream     SYMBICORT 160-4.5 mcg/actuation HFAA 2 puffs 2 (two) times daily.    VENTOLIN HFA 90 mcg/actuation inhaler Inhale 2 puffs into the lungs every 4 (four) hours as needed.     Family History     Problem Relation (Age of Onset)    Aneurysm Sister        Tobacco Use    Smoking status: Current Every Day Smoker     Packs/day: 0.50     Types: Cigars    Smokeless tobacco: Never Used    Tobacco comment: one daily   Substance and Sexual Activity    Alcohol use: Yes     Comment: occassional    Drug use: No    Sexual activity: Not Currently     Partners: Female     Review of Systems   Constitution: Negative.   HENT: Negative.    Eyes: Negative.    Cardiovascular: Positive for chest pain. Negative for dyspnea on exertion, irregular  heartbeat, leg swelling, near-syncope, orthopnea, palpitations, paroxysmal nocturnal dyspnea and syncope.   Respiratory: Negative for shortness of breath.    Skin: Negative.    Musculoskeletal: Negative.    Gastrointestinal: Positive for abdominal pain and nausea. Negative for constipation and diarrhea.   Genitourinary: Negative for dysuria.   Neurological: Negative for dizziness.   Psychiatric/Behavioral: Negative.      Objective:     Vital Signs (Most Recent):  Temp: 98.1 °F (36.7 °C) (03/02/19 1100)  Pulse: 78 (03/02/19 1200)  Resp: 17 (03/02/19 1200)  BP: (!) 86/50 (03/02/19 1200)  SpO2: (!) 94 % (03/02/19 1200) Vital Signs (24h Range):  Temp:  [97.8 °F (36.6 °C)-99.8 °F (37.7 °C)] 98.1 °F (36.7 °C)  Pulse:  [] 78  Resp:  [16-28] 17  SpO2:  [88 %-96 %] 94 %  BP: ()/(50-93) 86/50     Weight: 91.2 kg (201 lb)  Body mass index is 34.5 kg/m².    SpO2: (!) 94 %  O2 Device (Oxygen Therapy): nasal cannula      Intake/Output Summary (Last 24 hours) at 3/2/2019 1204  Last data filed at 3/2/2019 0800  Gross per 24 hour   Intake 5857.5 ml   Output 331 ml   Net 5526.5 ml       Lines/Drains/Airways     Central Venous Catheter Line                 Port A Cath Single Lumen 11/26/18 1252 left subclavian 95 days          Drain                 Urethral Catheter 03/01/19 2201 less than 1 day          Peripheral Intravenous Line                 Peripheral IV - Single Lumen 03/01/19 2145 Left Antecubital less than 1 day         Peripheral IV - Single Lumen 03/01/19 2200 Right Antecubital less than 1 day         Peripheral IV - Single Lumen 03/01/19 2254 Anterior;Distal;Right Forearm less than 1 day                Physical Exam   Constitutional: He is oriented to person, place, and time. He appears well-developed and well-nourished. No distress.   HENT:   Head: Normocephalic and atraumatic.   Eyes: Conjunctivae and EOM are normal. Pupils are equal, round, and reactive to light.   Neck: Normal range of motion. Neck supple.  No thyromegaly present.   Cardiovascular: Normal rate, regular rhythm and normal heart sounds.   No murmur heard.  Pulmonary/Chest: Effort normal and breath sounds normal. No respiratory distress. He has no wheezes. He has no rales. He exhibits no tenderness.   Abdominal: Soft. Bowel sounds are normal. He exhibits distension. There is tenderness. There is no rebound.   Musculoskeletal: He exhibits no edema.   Neurological: He is alert and oriented to person, place, and time.   Skin: Skin is warm and dry.   Psychiatric: He has a normal mood and affect. His behavior is normal.       Significant Labs:   CMP   Recent Labs   Lab 03/01/19  0554 03/01/19 2105 03/02/19 0411    143 142   K 4.0 3.2* 3.3*    104 108   CO2 28 25 24   GLU 96 91 99   BUN 13 14 15   CREATININE 0.7 0.8 1.0   CALCIUM 9.5 9.6 8.8   PROT  --  6.1  --    ALBUMIN  --  2.1*  --    BILITOT  --  0.7  --    ALKPHOS  --  87  --    AST  --  11  --    ALT  --  12  --    ANIONGAP 10 14 10   ESTGFRAFRICA >60 >60 >60   EGFRNONAA >60 >60 >60   , CBC   Recent Labs   Lab 03/01/19  0554 03/01/19  2150 03/02/19  0411   WBC 14.42* 2.71* 10.36   HGB 11.0* 11.1* 10.8*   HCT 32.7* 32.6* 32.0*   * 395* 416*   , INR No results for input(s): INR, PROTIME in the last 48 hours., Lipid Panel No results for input(s): CHOL, HDL, LDLCALC, TRIG, CHOLHDL in the last 48 hours. and Troponin   Recent Labs   Lab 03/01/19 2105 03/02/19  0411   TROPONINI 0.048* 0.074*       Significant Imaging: Echocardiogram:   Transthoracic echo (TTE) complete (Cupid Only):   Results for orders placed or performed during the hospital encounter of 02/26/19   Transthoracic echo (TTE) complete (Cupid Only)   Result Value Ref Range    BSA 2.03 m2    TDI SEPTAL 0.09     LV LATERAL E/E' RATIO 5.21     LV SEPTAL E/E' RATIO 8.11     LA WIDTH 3.55 cm    AORTIC VALVE CUSP SEPERATION 1.85 cm    TDI LATERAL 0.14     PV PEAK VELOCITY 1.21 cm/s    LVIDD 3.67 3.5 - 6.0 cm    IVS 1.55 (A) 0.6 -  1.1 cm    PW 1.49 (A) 0.6 - 1.1 cm    Ao root annulus 3.63 cm    LVIDS 2.63 2.1 - 4.0 cm    FS 28 28 - 44 %    LA volume 65.43 cm3    Sinus 2.93 cm    STJ 2.26 cm    Ascending aorta 2.54 cm    LV mass 210.83 g    LA size 3.96 cm    RVDD 4.53 cm    TAPSE 2.72 cm    RV S' 9.09 m/s    Left Ventricle Relative Wall Thickness 0.81 cm    AV mean gradient 7.43 mmHg    AV valve area 2.81 cm2    AV Velocity Ratio 0.69     AV index (prosthetic) 0.70     E/A ratio 1.30     Mean e' 0.12     E wave decelartion time 194.80 msec    IVRT 0.11 msec    LVOT diameter 2.27 cm    LVOT area 4.05 cm2    LVOT peak shaka 5.9254902549 m/s    LVOT peak VTI 26.92 cm    Ao peak shaka 1.70 m/s    Ao VTI 38.70 cm    LVOT stroke volume 108.89 cm3    AV peak gradient 11.56 mmHg    E/E' ratio 6.35     MV Peak E Shaka 0.73 m/s    TR Max Shaka 2.21 m/s    MV Peak A Shaka 0.56 m/s    LV Systolic Volume 25.40 mL    LV Systolic Volume Index 13.0 mL/m2    LV Diastolic Volume 57.01 mL    LV Diastolic Volume Index 29.08 mL/m2    LA Volume Index 33.4 mL/m2    LV Mass Index 107.6 g/m2    RA Major Axis 5.87 cm    Left Atrium Minor Axis 5.21 cm    Left Atrium Major Axis 5.77 cm    Triscuspid Valve Regurgitation Peak Gradient 19.54 mmHg    RA Width 3.39 cm     · TDS  · Normal left ventricular systolic function. The estimated ejection fraction is 55%  · Concentric left ventricular remodeling.    Assessment and Plan:     * Other chest pain    Atypical symptoms mostly radiating from his ongoing abdominal issues  Minimal troponin elevation likely not indicative of ischemia  Preliminary echo shows normal LV function  Okay to consider ischemic workup when stable     Abdominal pain    Free air postoperatively  Management per General surgery     Carcinoma of rectum    Status post resection  Receiving chemo and radiation previously     Hypertension    Currently stable on medicines     Mixed hyperlipidemia    On statin         VTE Risk Mitigation (From admission, onward)         Ordered     enoxaparin injection 40 mg  Daily      02/27/19 1106     Place SARAH hose  Until discontinued      02/27/19 0051     Place sequential compression device  Until discontinued      02/27/19 0051     IP VTE HIGH RISK PATIENT  Once      02/27/19 0051     Place SAARH hose  Until discontinued      02/27/19 0051     Place sequential compression device  Until discontinued      02/27/19 0051        * total ICU time spent on case 35 min    Thank you for your consult. I will follow-up with patient. Please contact us if you have any additional questions.    Andrei Neil MD  Cardiology   [facility]

## 2019-03-03 LAB
ALBUMIN SERPL BCP-MCNC: 1.5 G/DL
ALP SERPL-CCNC: 54 U/L
ALT SERPL W/O P-5'-P-CCNC: 9 U/L
ANION GAP SERPL CALC-SCNC: 10 MMOL/L
ANISOCYTOSIS BLD QL SMEAR: SLIGHT
AST SERPL-CCNC: 12 U/L
BASOPHILS NFR BLD: 0 %
BILIRUB SERPL-MCNC: 0.3 MG/DL
BUN SERPL-MCNC: 17 MG/DL
CALCIUM SERPL-MCNC: 8.6 MG/DL
CHLORIDE SERPL-SCNC: 109 MMOL/L
CO2 SERPL-SCNC: 24 MMOL/L
CREAT SERPL-MCNC: 0.9 MG/DL
DIFFERENTIAL METHOD: ABNORMAL
EOSINOPHIL NFR BLD: 0 %
ERYTHROCYTE [DISTWIDTH] IN BLOOD BY AUTOMATED COUNT: 17 %
EST. GFR  (AFRICAN AMERICAN): >60 ML/MIN/1.73 M^2
EST. GFR  (NON AFRICAN AMERICAN): >60 ML/MIN/1.73 M^2
GLUCOSE SERPL-MCNC: 97 MG/DL
HCT VFR BLD AUTO: 30.3 %
HGB BLD-MCNC: 10.1 G/DL
LACTATE SERPL-SCNC: 0.7 MMOL/L
LYMPHOCYTES NFR BLD: 3 %
MCH RBC QN AUTO: 28.8 PG
MCHC RBC AUTO-ENTMCNC: 33.3 G/DL
MCV RBC AUTO: 86 FL
MONOCYTES NFR BLD: 1 %
NEUTROPHILS NFR BLD: 12 %
NEUTS BAND NFR BLD MANUAL: 84 %
PLATELET # BLD AUTO: 457 K/UL
PLATELET BLD QL SMEAR: ABNORMAL
PMV BLD AUTO: 9.3 FL
POTASSIUM SERPL-SCNC: 3.6 MMOL/L
PROT SERPL-MCNC: 4.9 G/DL
RBC # BLD AUTO: 3.51 M/UL
SODIUM SERPL-SCNC: 143 MMOL/L
VANCOMYCIN TROUGH SERPL-MCNC: 21.9 UG/ML
WBC # BLD AUTO: 12.9 K/UL

## 2019-03-03 PROCEDURE — 63600175 PHARM REV CODE 636 W HCPCS: Performed by: STUDENT IN AN ORGANIZED HEALTH CARE EDUCATION/TRAINING PROGRAM

## 2019-03-03 PROCEDURE — C9113 INJ PANTOPRAZOLE SODIUM, VIA: HCPCS | Performed by: STUDENT IN AN ORGANIZED HEALTH CARE EDUCATION/TRAINING PROGRAM

## 2019-03-03 PROCEDURE — 63600175 PHARM REV CODE 636 W HCPCS: Performed by: SURGERY

## 2019-03-03 PROCEDURE — 25000003 PHARM REV CODE 250: Performed by: SURGERY

## 2019-03-03 PROCEDURE — 25000003 PHARM REV CODE 250: Performed by: STUDENT IN AN ORGANIZED HEALTH CARE EDUCATION/TRAINING PROGRAM

## 2019-03-03 PROCEDURE — 80053 COMPREHEN METABOLIC PANEL: CPT

## 2019-03-03 PROCEDURE — 36415 COLL VENOUS BLD VENIPUNCTURE: CPT

## 2019-03-03 PROCEDURE — 99232 SBSQ HOSP IP/OBS MODERATE 35: CPT | Mod: ,,, | Performed by: INTERNAL MEDICINE

## 2019-03-03 PROCEDURE — 27000221 HC OXYGEN, UP TO 24 HOURS

## 2019-03-03 PROCEDURE — 20000000 HC ICU ROOM

## 2019-03-03 PROCEDURE — 80202 ASSAY OF VANCOMYCIN: CPT

## 2019-03-03 PROCEDURE — 94640 AIRWAY INHALATION TREATMENT: CPT

## 2019-03-03 PROCEDURE — 99232 PR SUBSEQUENT HOSPITAL CARE,LEVL II: ICD-10-PCS | Mod: ,,, | Performed by: INTERNAL MEDICINE

## 2019-03-03 PROCEDURE — S0030 INJECTION, METRONIDAZOLE: HCPCS | Performed by: SURGERY

## 2019-03-03 PROCEDURE — A4216 STERILE WATER/SALINE, 10 ML: HCPCS | Performed by: STUDENT IN AN ORGANIZED HEALTH CARE EDUCATION/TRAINING PROGRAM

## 2019-03-03 PROCEDURE — 99900035 HC TECH TIME PER 15 MIN (STAT)

## 2019-03-03 PROCEDURE — 83605 ASSAY OF LACTIC ACID: CPT

## 2019-03-03 PROCEDURE — 94761 N-INVAS EAR/PLS OXIMETRY MLT: CPT

## 2019-03-03 PROCEDURE — 85007 BL SMEAR W/DIFF WBC COUNT: CPT

## 2019-03-03 PROCEDURE — 85027 COMPLETE CBC AUTOMATED: CPT

## 2019-03-03 RX ADMIN — SODIUM CHLORIDE: 0.9 INJECTION, SOLUTION INTRAVENOUS at 07:03

## 2019-03-03 RX ADMIN — PIPERACILLIN AND TAZOBACTAM 4.5 G: 4; .5 INJECTION, POWDER, LYOPHILIZED, FOR SOLUTION INTRAVENOUS; PARENTERAL at 08:03

## 2019-03-03 RX ADMIN — LEVETIRACETAM 500 MG: 5 INJECTION INTRAVENOUS at 08:03

## 2019-03-03 RX ADMIN — MORPHINE SULFATE 2 MG: 10 INJECTION INTRAVENOUS at 09:03

## 2019-03-03 RX ADMIN — METRONIDAZOLE 500 MG: 500 INJECTION, SOLUTION INTRAVENOUS at 03:03

## 2019-03-03 RX ADMIN — VANCOMYCIN HYDROCHLORIDE 1500 MG: 1 INJECTION, POWDER, LYOPHILIZED, FOR SOLUTION INTRAVENOUS at 10:03

## 2019-03-03 RX ADMIN — PIPERACILLIN AND TAZOBACTAM 4.5 G: 4; .5 INJECTION, POWDER, LYOPHILIZED, FOR SOLUTION INTRAVENOUS; PARENTERAL at 01:03

## 2019-03-03 RX ADMIN — MORPHINE SULFATE 2 MG: 10 INJECTION INTRAVENOUS at 07:03

## 2019-03-03 RX ADMIN — MORPHINE SULFATE 2 MG: 10 INJECTION INTRAVENOUS at 01:03

## 2019-03-03 RX ADMIN — FLUTICASONE FUROATE AND VILANTEROL TRIFENATATE 1 PUFF: 100; 25 POWDER RESPIRATORY (INHALATION) at 08:03

## 2019-03-03 RX ADMIN — LEVETIRACETAM 500 MG: 5 INJECTION INTRAVENOUS at 09:03

## 2019-03-03 RX ADMIN — Medication 10 ML: at 12:03

## 2019-03-03 RX ADMIN — Medication 10 ML: at 06:03

## 2019-03-03 RX ADMIN — SODIUM CHLORIDE: 0.9 INJECTION, SOLUTION INTRAVENOUS at 01:03

## 2019-03-03 RX ADMIN — PIPERACILLIN AND TAZOBACTAM 4.5 G: 4; .5 INJECTION, POWDER, LYOPHILIZED, FOR SOLUTION INTRAVENOUS; PARENTERAL at 06:03

## 2019-03-03 RX ADMIN — MORPHINE SULFATE 2 MG: 10 INJECTION INTRAVENOUS at 06:03

## 2019-03-03 RX ADMIN — ENOXAPARIN SODIUM 40 MG: 100 INJECTION SUBCUTANEOUS at 05:03

## 2019-03-03 RX ADMIN — METRONIDAZOLE 500 MG: 500 INJECTION, SOLUTION INTRAVENOUS at 08:03

## 2019-03-03 RX ADMIN — ASPIRIN 81 MG: 81 TABLET, COATED ORAL at 09:03

## 2019-03-03 RX ADMIN — MORPHINE SULFATE 2 MG: 10 INJECTION INTRAVENOUS at 05:03

## 2019-03-03 RX ADMIN — SODIUM CHLORIDE: 0.9 INJECTION, SOLUTION INTRAVENOUS at 06:03

## 2019-03-03 RX ADMIN — Medication 10 ML: at 05:03

## 2019-03-03 RX ADMIN — METRONIDAZOLE 500 MG: 500 INJECTION, SOLUTION INTRAVENOUS at 12:03

## 2019-03-03 RX ADMIN — ESCITALOPRAM OXALATE 10 MG: 10 TABLET ORAL at 09:03

## 2019-03-03 RX ADMIN — PANTOPRAZOLE SODIUM 40 MG: 40 INJECTION, POWDER, LYOPHILIZED, FOR SOLUTION INTRAVENOUS at 09:03

## 2019-03-03 NOTE — PROGRESS NOTES
[facility]  General Surgery  Progress Note    Subjective:     Interval History:   Remains HDS and making adequate UOP  Abdomen remains distended  NG placed to LIWS  Repeat CT scan and PICC placed         Post-Op Info:  * No surgery found *          Medications:  Continuous Infusions:   sodium chloride 0.9% 150 mL/hr at 03/03/19 0800     Scheduled Meds:   aspirin  81 mg Oral Daily    enoxaparin  40 mg Subcutaneous Daily    escitalopram oxalate  10 mg Oral Daily    fluticasone-vilanterol  1 puff Inhalation Daily    levetiracetam IVPB  500 mg Intravenous Q12H    metronidazole  500 mg Intravenous Q8H    pantoprazole  40 mg Intravenous Daily    piperacillin-tazobactam (ZOSYN) IVPB  4.5 g Intravenous Q8H    sodium chloride 0.9%  10 mL Intravenous Q6H    vancomycin (VANCOCIN) IVPB  1,500 mg Intravenous Q12H     PRN Meds:albuterol, morphine, ondansetron, ondansetron, promethazine (PHENERGAN) IVPB, Flushing PICC Protocol **AND** sodium chloride 0.9% **AND** sodium chloride 0.9%, sodium chloride 0.9%     Objective:     Vital Signs (Most Recent):  Temp: 98.2 °F (36.8 °C) (03/03/19 0715)  Pulse: 89 (03/03/19 0842)  Resp: 16 (03/03/19 0842)  BP: (!) 103/56 (03/03/19 0830)  SpO2: (!) 16 % (03/03/19 0842) Vital Signs (24h Range):  Temp:  [98.1 °F (36.7 °C)-98.6 °F (37 °C)] 98.2 °F (36.8 °C)  Pulse:  [75-99] 89  Resp:  [15-24] 16  SpO2:  [16 %-97 %] 16 %  BP: ()/(50-71) 103/56       Intake/Output Summary (Last 24 hours) at 3/3/2019 0920  Last data filed at 3/3/2019 0800  Gross per 24 hour   Intake 5650 ml   Output 1785 ml   Net 3865 ml       Physical Exam  Awake, alert, intermittent sleeping   HR in 90's   HEENT: NC in nares, NG in nares   No increased work of breathing  Abd soft, diffusely tender and distended  : chanel in place   EXT; moves all       Significant Labs:  BMP:   Recent Labs   Lab 03/03/19  0303   GLU 97      K 3.6      CO2 24   BUN 17   CREATININE 0.9   CALCIUM 8.6*      CBC:   Recent Labs   Lab 03/03/19  0303   WBC 12.90*   RBC 3.51*   HGB 10.1*   HCT 30.3*   *   MCV 86   MCH 28.8   MCHC 33.3       Significant Diagnostics:    CT abd/pelvis     Status post partial colonic resection with mildly worsened degree pneumoperitoneum and abdominopelvic free fluid.  Again, possible perforation not excluded though is not definitely seen.    Long segment small bowel wall edema and thickening within the right mid to lower abdomen.  Findings potentially infectious versus inflammatory.  Ischemic changes felt less likely given vascular patency though correlation with trending lactic acid still recommended.    Assessment/Plan:   68M s/p recent lap LAR for rectal cancer on 2/22 presenting with increasing abdominal pain, CT with intraperitoneal free air in the postoperative setting. Evening of 3/1 patient febrile, tachycardic and subsequently transferred to the ICU. Hemodynamics normalized overnight on 3/1 and he has remained HDS without lactic acidosis or metabolic durrangements. Repeat CT scan with readministration of free air. NG placed and PICC line obtained.     Neuro: Alert and oriented  -Morphine Q2 PRN. Can consider PCA   -hx of seizures- Keppra IV    CV: HR 90's and MAPs > 65. S/p 3 L LR   -Cardiology evaluated- EF wnl.   -MAPS remain stable without use of vasopressors   -Hold home BP meds     PULM: CXR without acute process  -NC to keep oxygen sats > 94%  -IS hourly while awake  -Home inhalers     FEN/GI: s/p LAR on 2/22. NG with 900 out   -NG to LIWS NPO and MIVF  -Replace electrolytes per protocol     : chanel placed in acute setting last night   -BUN 17 Cr 0.9. .   -Will bolus after evaluated by cardiology     Heme/ID: WBC 14--> 10--> 12.9  -blood cultures w/o growth.  -Previous zosyn and flagyl, vancomycin added on 3/1. Will adjust per cultures    -FU on vanc trough and adjust dose accordingly      PPX: Lovenox and Protonix (home med)     Lines: NG, PICC, Chanel      Dispo: As remains HDS, will discuss return to OR with primary surgeon. If his clinical situation changes, will take emergently. Continue ICU care     Active Diagnoses:    Diagnosis Date Noted POA    PRINCIPAL PROBLEM:  Other chest pain [R07.89] 03/02/2019 Yes    Hypertension [I10] 03/02/2019 Unknown    Mixed hyperlipidemia [E78.2] 03/02/2019 Yes    Abdominal pain [R10.9] 02/26/2019 Yes    Carcinoma of rectum [C20] 11/12/2018 Yes      Problems Resolved During this Admission:         Crow Randall MD  General Surgery  [facility]

## 2019-03-03 NOTE — PROGRESS NOTES
[facility]  Cardiology  Progress Note    Patient Name: Justin Adams  MRN: [id number]  Admission Date: 2/26/2019  Hospital Length of Stay: 5 days  Code Status: Full Code   Attending Physician: Mat Glass MD   Primary Care Physician: Dandre Massey MD  Expected Discharge Date:   Principal Problem:Other chest pain    Subjective:     Hospital Course:   3-2:  Consult for chest pain, admitted for abdominal pain with history of free air post colon resection    Interval History:  Says chest pain is improved but still there.  He denies any further chest pain issues.    Telemetry:  Normal sinus rhythm    Review of Systems   All other systems reviewed and are negative.    Objective:     Vital Signs (Most Recent):  Temp: 98.2 °F (36.8 °C) (03/03/19 0715)  Pulse: 89 (03/03/19 0842)  Resp: 16 (03/03/19 0842)  BP: (!) 103/56 (03/03/19 0830)  SpO2: (!) 16 % (03/03/19 0842) Vital Signs (24h Range):  Temp:  [98.1 °F (36.7 °C)-98.6 °F (37 °C)] 98.2 °F (36.8 °C)  Pulse:  [76-99] 89  Resp:  [15-24] 16  SpO2:  [16 %-97 %] 16 %  BP: ()/(50-71) 103/56     Weight: 91.2 kg (201 lb)  Body mass index is 34.5 kg/m².     SpO2: (!) 16 %  O2 Device (Oxygen Therapy): nasal cannula      Intake/Output Summary (Last 24 hours) at 3/3/2019 0944  Last data filed at 3/3/2019 0900  Gross per 24 hour   Intake 5800 ml   Output 1910 ml   Net 3890 ml       Lines/Drains/Airways     Peripherally Inserted Central Catheter Line                 PICC Double Lumen 03/02/19 2205 right basilic less than 1 day          Central Venous Catheter Line                 Port A Cath Single Lumen 11/26/18 1252 left subclavian 96 days          Drain                 Urethral Catheter 03/01/19 2201 1 day         NG/OG Tube 03/02/19 1653 nasogastric 16 Fr. Left nostril less than 1 day          Peripheral Intravenous Line                 Peripheral IV - Single Lumen 03/01/19 2145 Left Antecubital 1 day                Physical Exam    Constitutional: He is oriented to person, place, and time. He appears well-developed and well-nourished. No distress.   HENT:   Head: Normocephalic and atraumatic.   Eyes: Conjunctivae and EOM are normal. Pupils are equal, round, and reactive to light.   Neck: Normal range of motion. Neck supple. No thyromegaly present.   Cardiovascular: Normal rate, regular rhythm and normal heart sounds.   No murmur heard.  Pulmonary/Chest: Effort normal and breath sounds normal. No respiratory distress. He has no wheezes. He has no rales. He exhibits no tenderness.   Abdominal: He exhibits distension. There is tenderness. There is no rebound and no guarding.   Musculoskeletal: He exhibits no edema.   Neurological: He is alert and oriented to person, place, and time.   Skin: Skin is warm and dry.   Psychiatric: He has a normal mood and affect. His behavior is normal.       Significant Labs:   BMP:   Recent Labs   Lab 03/02/19 0411 03/02/19 1837 03/03/19  0303   GLU 99 92 97    141 143   K 3.3* 4.0 3.6    108 109   CO2 24 25 24   BUN 15 17 17   CREATININE 1.0 0.9 0.9   CALCIUM 8.8 9.2 8.6*    and CBC   Recent Labs   Lab 03/02/19 0411 03/02/19 1837 03/03/19  0303   WBC 10.36 12.95* 12.90*   HGB 10.8* 11.2* 10.1*   HCT 32.0* 33.5* 30.3*   * 487* 457*       Significant Imaging: Echocardiogram:   Transthoracic echo (TTE) complete (Cupid Only):   Results for orders placed or performed during the hospital encounter of 02/26/19   Transthoracic echo (TTE) complete (Cupid Only)   Result Value Ref Range    BSA 2.03 m2    TDI SEPTAL 0.09     LV LATERAL E/E' RATIO 5.21     LV SEPTAL E/E' RATIO 8.11     LA WIDTH 3.55 cm    AORTIC VALVE CUSP SEPERATION 1.85 cm    TDI LATERAL 0.14     PV PEAK VELOCITY 1.21 cm/s    LVIDD 3.67 3.5 - 6.0 cm    IVS 1.55 (A) 0.6 - 1.1 cm    PW 1.49 (A) 0.6 - 1.1 cm    Ao root annulus 3.63 cm    LVIDS 2.63 2.1 - 4.0 cm    FS 28 28 - 44 %    LA volume 65.43 cm3    Sinus 2.93 cm    STJ 2.26 cm     Ascending aorta 2.54 cm    LV mass 210.83 g    LA size 3.96 cm    RVDD 4.53 cm    TAPSE 2.72 cm    RV S' 9.09 m/s    Left Ventricle Relative Wall Thickness 0.81 cm    AV mean gradient 7.43 mmHg    AV valve area 2.81 cm2    AV Velocity Ratio 0.69     AV index (prosthetic) 0.70     E/A ratio 1.30     Mean e' 0.12     E wave decelartion time 194.80 msec    IVRT 0.11 msec    LVOT diameter 2.27 cm    LVOT area 4.05 cm2    LVOT peak shaka 3.1902707457 m/s    LVOT peak VTI 26.92 cm    Ao peak shaka 1.70 m/s    Ao VTI 38.70 cm    LVOT stroke volume 108.89 cm3    AV peak gradient 11.56 mmHg    E/E' ratio 6.35     MV Peak E Shaka 0.73 m/s    TR Max Shaka 2.21 m/s    MV Peak A Shaka 0.56 m/s    LV Systolic Volume 25.40 mL    LV Systolic Volume Index 13.0 mL/m2    LV Diastolic Volume 57.01 mL    LV Diastolic Volume Index 29.08 mL/m2    LA Volume Index 33.4 mL/m2    LV Mass Index 107.6 g/m2    RA Major Axis 5.87 cm    Left Atrium Minor Axis 5.21 cm    Left Atrium Major Axis 5.77 cm    Triscuspid Valve Regurgitation Peak Gradient 19.54 mmHg    RA Width 3.39 cm     · TDS  · Normal left ventricular systolic function. The estimated ejection fraction is 55%  · Concentric left ventricular remodeling.    * all labs reviewed and echocardiogram personally interpreted    Assessment and Plan:     Brief HPI:  No further chest pain symptoms.  Ruled out for ACS and echocardiogram shows preserved function.  Can consider outpatient workup but otherwise no further recommendations at this time.    * Other chest pain    Atypical symptoms mostly radiating from his ongoing abdominal issues  Minimal troponin elevation likely not indicative of ischemia  Preliminary echo shows normal LV function  Okay to consider ischemic workup when stable     Abdominal pain    Free air postoperatively  Management per General surgery     Carcinoma of rectum    Status post resection  Receiving chemo and radiation previously     Hypertension    Currently stable on medicines      Mixed hyperlipidemia    On statin       We will see as needed.    VTE Risk Mitigation (From admission, onward)        Ordered     enoxaparin injection 40 mg  Daily      02/27/19 1106     Place SARAH hose  Until discontinued      02/27/19 0051     Place sequential compression device  Until discontinued      02/27/19 0051     IP VTE HIGH RISK PATIENT  Once      02/27/19 0051     Place SARAH hose  Until discontinued      02/27/19 0051     Place sequential compression device  Until discontinued      02/27/19 0051          Andrei Neil MD  Cardiology  [facility]

## 2019-03-03 NOTE — PROGRESS NOTES
2052: Pt IV access infiltrated right arm is swollen. Fluids paused. Antibiotics held.  Poor venous access, unsuccesful IV attempt. Called Dr. Randall for PICC line placement. New orders placed.     2100: Called [facility] to alert PICC Team.     2117: DIT consulted per [facility]. Idalmis     2143: PICC team at bedside. Consent in chart.     2218: X ray at bedside.

## 2019-03-03 NOTE — HOSPITAL COURSE
3-2:  Consult for chest pain, admitted for abdominal pain with history of free air post colon resection

## 2019-03-03 NOTE — SUBJECTIVE & OBJECTIVE
Interval History:  Says chest pain is improved but still there.  He denies any further chest pain issues.    Telemetry:  Normal sinus rhythm    Review of Systems   All other systems reviewed and are negative.    Objective:     Vital Signs (Most Recent):  Temp: 98.2 °F (36.8 °C) (03/03/19 0715)  Pulse: 89 (03/03/19 0842)  Resp: 16 (03/03/19 0842)  BP: (!) 103/56 (03/03/19 0830)  SpO2: (!) 16 % (03/03/19 0842) Vital Signs (24h Range):  Temp:  [98.1 °F (36.7 °C)-98.6 °F (37 °C)] 98.2 °F (36.8 °C)  Pulse:  [76-99] 89  Resp:  [15-24] 16  SpO2:  [16 %-97 %] 16 %  BP: ()/(50-71) 103/56     Weight: 91.2 kg (201 lb)  Body mass index is 34.5 kg/m².     SpO2: (!) 16 %  O2 Device (Oxygen Therapy): nasal cannula      Intake/Output Summary (Last 24 hours) at 3/3/2019 0944  Last data filed at 3/3/2019 0900  Gross per 24 hour   Intake 5800 ml   Output 1910 ml   Net 3890 ml       Lines/Drains/Airways     Peripherally Inserted Central Catheter Line                 PICC Double Lumen 03/02/19 2205 right basilic less than 1 day          Central Venous Catheter Line                 Port A Cath Single Lumen 11/26/18 1252 left subclavian 96 days          Drain                 Urethral Catheter 03/01/19 2201 1 day         NG/OG Tube 03/02/19 1653 nasogastric 16 Fr. Left nostril less than 1 day          Peripheral Intravenous Line                 Peripheral IV - Single Lumen 03/01/19 2145 Left Antecubital 1 day                Physical Exam   Constitutional: He is oriented to person, place, and time. He appears well-developed and well-nourished. No distress.   HENT:   Head: Normocephalic and atraumatic.   Eyes: Conjunctivae and EOM are normal. Pupils are equal, round, and reactive to light.   Neck: Normal range of motion. Neck supple. No thyromegaly present.   Cardiovascular: Normal rate, regular rhythm and normal heart sounds.   No murmur heard.  Pulmonary/Chest: Effort normal and breath sounds normal. No respiratory distress. He has  no wheezes. He has no rales. He exhibits no tenderness.   Abdominal: He exhibits distension. There is tenderness. There is no rebound and no guarding.   Musculoskeletal: He exhibits no edema.   Neurological: He is alert and oriented to person, place, and time.   Skin: Skin is warm and dry.   Psychiatric: He has a normal mood and affect. His behavior is normal.       Significant Labs:   BMP:   Recent Labs   Lab 03/02/19 0411 03/02/19 1837 03/03/19  0303   GLU 99 92 97    141 143   K 3.3* 4.0 3.6    108 109   CO2 24 25 24   BUN 15 17 17   CREATININE 1.0 0.9 0.9   CALCIUM 8.8 9.2 8.6*    and CBC   Recent Labs   Lab 03/02/19 0411 03/02/19 1837 03/03/19  0303   WBC 10.36 12.95* 12.90*   HGB 10.8* 11.2* 10.1*   HCT 32.0* 33.5* 30.3*   * 487* 457*       Significant Imaging: Echocardiogram:   Transthoracic echo (TTE) complete (Cupid Only):   Results for orders placed or performed during the hospital encounter of 02/26/19   Transthoracic echo (TTE) complete (Cupid Only)   Result Value Ref Range    BSA 2.03 m2    TDI SEPTAL 0.09     LV LATERAL E/E' RATIO 5.21     LV SEPTAL E/E' RATIO 8.11     LA WIDTH 3.55 cm    AORTIC VALVE CUSP SEPERATION 1.85 cm    TDI LATERAL 0.14     PV PEAK VELOCITY 1.21 cm/s    LVIDD 3.67 3.5 - 6.0 cm    IVS 1.55 (A) 0.6 - 1.1 cm    PW 1.49 (A) 0.6 - 1.1 cm    Ao root annulus 3.63 cm    LVIDS 2.63 2.1 - 4.0 cm    FS 28 28 - 44 %    LA volume 65.43 cm3    Sinus 2.93 cm    STJ 2.26 cm    Ascending aorta 2.54 cm    LV mass 210.83 g    LA size 3.96 cm    RVDD 4.53 cm    TAPSE 2.72 cm    RV S' 9.09 m/s    Left Ventricle Relative Wall Thickness 0.81 cm    AV mean gradient 7.43 mmHg    AV valve area 2.81 cm2    AV Velocity Ratio 0.69     AV index (prosthetic) 0.70     E/A ratio 1.30     Mean e' 0.12     E wave decelartion time 194.80 msec    IVRT 0.11 msec    LVOT diameter 2.27 cm    LVOT area 4.05 cm2    LVOT peak darion [id number] m/s    LVOT peak VTI 26.92 cm    Ao peak darion 1.70 m/s     Ao VTI 38.70 cm    LVOT stroke volume 108.89 cm3    AV peak gradient 11.56 mmHg    E/E' ratio 6.35     MV Peak E Shaka 0.73 m/s    TR Max Shaka 2.21 m/s    MV Peak A Shaka 0.56 m/s    LV Systolic Volume 25.40 mL    LV Systolic Volume Index 13.0 mL/m2    LV Diastolic Volume 57.01 mL    LV Diastolic Volume Index 29.08 mL/m2    LA Volume Index 33.4 mL/m2    LV Mass Index 107.6 g/m2    RA Major Axis 5.87 cm    Left Atrium Minor Axis 5.21 cm    Left Atrium Major Axis 5.77 cm    Triscuspid Valve Regurgitation Peak Gradient 19.54 mmHg    RA Width 3.39 cm     · TDS  · Normal left ventricular systolic function. The estimated ejection fraction is 55%  · Concentric left ventricular remodeling.    * all labs reviewed and echocardiogram personally interpreted

## 2019-03-03 NOTE — PROCEDURES
"Justin Adams is a 68 y.o. male patient.    Temp: 98.4 °F (36.9 °C) (03/02/19 1910)  Pulse: 98 (03/02/19 2201)  Resp: (!) 23 (03/02/19 2201)  BP: (!) 106/59 (03/02/19 2200)  SpO2: 95 % (03/02/19 2201)  Weight: 91.2 kg (201 lb) (03/02/19 0730)  Height: 5' 4" (162.6 cm) (03/02/19 0730)    PICC  Date/Time: 3/2/2019 10:05 PM  Performed by: Manuel Lopez RN  Consent Done: Yes  Time out: Immediately prior to procedure a time out was called to verify the correct patient, procedure, equipment, support staff and site/side marked as required  Indications: med administration and vascular access  Anesthesia: local infiltration  Local anesthetic: lidocaine 1% without epinephrine  Anesthetic Total (mL): 2  Preparation: skin prepped with ChloraPrep  Skin prep agent dried: skin prep agent completely dried prior to procedure  Sterile barriers: all five maximum sterile barriers used - cap, mask, sterile gown, sterile gloves, and large sterile sheet  Hand hygiene: hand hygiene performed prior to central venous catheter insertion  Location details: right basilic  Catheter type: double lumen  Catheter size: 5 Fr  Catheter Length: 37cm    Ultrasound guidance: yes  Vessel Caliber: large, compressibility normal  Needle advanced into vessel with real time Ultrasound guidance.  Guidewire confirmed in vessel.  Sterile sheath used.  Number of attempts: 1  Post-procedure: blood return through all ports, chlorhexidine patch and sterile dressing applied            Manuel Lopez  3/2/2019  "

## 2019-03-03 NOTE — PLAN OF CARE
Problem: Adult Inpatient Plan of Care  Goal: Plan of Care Review  Outcome: Ongoing (interventions implemented as appropriate)  Patient A/O Xs 4 on 3 L nasal canula. PICC line placed this shift. Right arm with mild swelling. NS on monitor. BP stable and afebrile. NG to low intermittent suction. Field in place with adequate urine output. PRN morphine given for abdominal pain. No falls, injuries, or skin breakdown. Pt updated on plan of care.

## 2019-03-03 NOTE — PLAN OF CARE
Problem: Adult Inpatient Plan of Care  Goal: Plan of Care Review  Outcome: Ongoing (interventions implemented as appropriate)  Pt remains in ICU, pain was difficult to control today. Orders received for increased frequency of morphine and ng tube placement. Pt has 16 Fr NG tube to L nare, which upon insertion drained off 600 mL of brown liquid. Urine output has picked up, pt will receive 1L LR bolus per Dr Randall. Pt had CT abd/pelvis this afternoon prior to placing NG tube. CBC, CMP and lactic were drawn and orders to call Dr Randall with results will be passed on to night RN as they are not available yet. Pt skin remains intact. He has had no falls on this shift

## 2019-03-03 NOTE — EICU
Raul Note :    Called by the [facility]:    Problem:  PICC line placement   Pertinent History and labs reviewed :  Problem List:  : Other chest pain  : Hypertension  : Mixed hyperlipidemia  : Abdominal pain      Treatment /Intervention given: R PICC in place         Marycruz Carter Physician

## 2019-03-04 ENCOUNTER — ANESTHESIA EVENT (OUTPATIENT)
Dept: SURGERY | Facility: HOSPITAL | Age: 69
DRG: 907 | End: 2019-03-04
Payer: MEDICARE

## 2019-03-04 ENCOUNTER — ANESTHESIA (OUTPATIENT)
Dept: SURGERY | Facility: HOSPITAL | Age: 69
DRG: 907 | End: 2019-03-04
Payer: MEDICARE

## 2019-03-04 LAB
ABO + RH BLD: NORMAL
ALBUMIN SERPL BCP-MCNC: 1.4 G/DL
ALP SERPL-CCNC: 61 U/L
ALT SERPL W/O P-5'-P-CCNC: 9 U/L
ANION GAP SERPL CALC-SCNC: 10 MMOL/L
ANISOCYTOSIS BLD QL SMEAR: SLIGHT
AST SERPL-CCNC: 11 U/L
BASOPHILS NFR BLD: 1 %
BILIRUB SERPL-MCNC: 0.3 MG/DL
BLD GP AB SCN CELLS X3 SERPL QL: NORMAL
BUN SERPL-MCNC: 21 MG/DL
CALCIUM SERPL-MCNC: 8.5 MG/DL
CHLORIDE SERPL-SCNC: 112 MMOL/L
CO2 SERPL-SCNC: 22 MMOL/L
CREAT SERPL-MCNC: 1 MG/DL
CRP SERPL-MCNC: 394.7 MG/L
DIFFERENTIAL METHOD: ABNORMAL
EOSINOPHIL NFR BLD: 2 %
ERYTHROCYTE [DISTWIDTH] IN BLOOD BY AUTOMATED COUNT: 17.3 %
EST. GFR  (AFRICAN AMERICAN): >60 ML/MIN/1.73 M^2
EST. GFR  (NON AFRICAN AMERICAN): >60 ML/MIN/1.73 M^2
GLUCOSE SERPL-MCNC: 86 MG/DL
HCT VFR BLD AUTO: 29.3 %
HGB BLD-MCNC: 9.6 G/DL
LYMPHOCYTES NFR BLD: 3 %
MCH RBC QN AUTO: 28.7 PG
MCHC RBC AUTO-ENTMCNC: 32.8 G/DL
MCV RBC AUTO: 88 FL
MONOCYTES NFR BLD: 2 %
NEUTROPHILS NFR BLD: 40 %
NEUTS BAND NFR BLD MANUAL: 52 %
PLATELET # BLD AUTO: 487 K/UL
PLATELET BLD QL SMEAR: ABNORMAL
PMV BLD AUTO: 9.2 FL
POTASSIUM SERPL-SCNC: 3.7 MMOL/L
PREALB SERPL-MCNC: 3 MG/DL
PROT SERPL-MCNC: 4.7 G/DL
RBC # BLD AUTO: 3.35 M/UL
SODIUM SERPL-SCNC: 144 MMOL/L
VANCOMYCIN SERPL-MCNC: 19.1 UG/ML
WBC # BLD AUTO: 14.9 K/UL

## 2019-03-04 PROCEDURE — S0020 INJECTION, BUPIVICAINE HYDRO: HCPCS | Performed by: SURGERY

## 2019-03-04 PROCEDURE — 44310 PR ILEOSTOMY/JEJUNOSTOMY,NONTUBE: ICD-10-PCS | Mod: 78,,, | Performed by: SURGERY

## 2019-03-04 PROCEDURE — 25000003 PHARM REV CODE 250: Performed by: SURGERY

## 2019-03-04 PROCEDURE — 27100098 HC SPACER

## 2019-03-04 PROCEDURE — 20000000 HC ICU ROOM

## 2019-03-04 PROCEDURE — C9113 INJ PANTOPRAZOLE SODIUM, VIA: HCPCS | Performed by: STUDENT IN AN ORGANIZED HEALTH CARE EDUCATION/TRAINING PROGRAM

## 2019-03-04 PROCEDURE — 36415 COLL VENOUS BLD VENIPUNCTURE: CPT

## 2019-03-04 PROCEDURE — D9220A PRA ANESTHESIA: Mod: ANES,,, | Performed by: ANESTHESIOLOGY

## 2019-03-04 PROCEDURE — 63600175 PHARM REV CODE 636 W HCPCS: Performed by: REGISTERED NURSE

## 2019-03-04 PROCEDURE — A4216 STERILE WATER/SALINE, 10 ML: HCPCS | Performed by: STUDENT IN AN ORGANIZED HEALTH CARE EDUCATION/TRAINING PROGRAM

## 2019-03-04 PROCEDURE — 25000003 PHARM REV CODE 250: Performed by: STUDENT IN AN ORGANIZED HEALTH CARE EDUCATION/TRAINING PROGRAM

## 2019-03-04 PROCEDURE — 84134 ASSAY OF PREALBUMIN: CPT

## 2019-03-04 PROCEDURE — D9220A PRA ANESTHESIA: ICD-10-PCS | Mod: ANES,,, | Performed by: ANESTHESIOLOGY

## 2019-03-04 PROCEDURE — D9220A PRA ANESTHESIA: Mod: CRNA,,, | Performed by: REGISTERED NURSE

## 2019-03-04 PROCEDURE — 80202 ASSAY OF VANCOMYCIN: CPT

## 2019-03-04 PROCEDURE — 25000003 PHARM REV CODE 250: Performed by: REGISTERED NURSE

## 2019-03-04 PROCEDURE — 85007 BL SMEAR W/DIFF WBC COUNT: CPT

## 2019-03-04 PROCEDURE — 63600175 PHARM REV CODE 636 W HCPCS: Performed by: SURGERY

## 2019-03-04 PROCEDURE — 94761 N-INVAS EAR/PLS OXIMETRY MLT: CPT

## 2019-03-04 PROCEDURE — 37000008 HC ANESTHESIA 1ST 15 MINUTES: Performed by: SURGERY

## 2019-03-04 PROCEDURE — S0030 INJECTION, METRONIDAZOLE: HCPCS | Performed by: SURGERY

## 2019-03-04 PROCEDURE — 27201423 OPTIME MED/SURG SUP & DEVICES STERILE SUPPLY: Performed by: SURGERY

## 2019-03-04 PROCEDURE — 94640 AIRWAY INHALATION TREATMENT: CPT

## 2019-03-04 PROCEDURE — 85027 COMPLETE CBC AUTOMATED: CPT

## 2019-03-04 PROCEDURE — D9220A PRA ANESTHESIA: ICD-10-PCS | Mod: CRNA,,, | Performed by: REGISTERED NURSE

## 2019-03-04 PROCEDURE — B4185 PARENTERAL SOL 10 GM LIPIDS: HCPCS | Performed by: STUDENT IN AN ORGANIZED HEALTH CARE EDUCATION/TRAINING PROGRAM

## 2019-03-04 PROCEDURE — 36000708 HC OR TIME LEV III 1ST 15 MIN: Performed by: SURGERY

## 2019-03-04 PROCEDURE — 63600175 PHARM REV CODE 636 W HCPCS: Performed by: STUDENT IN AN ORGANIZED HEALTH CARE EDUCATION/TRAINING PROGRAM

## 2019-03-04 PROCEDURE — C9290 INJ, BUPIVACAINE LIPOSOME: HCPCS | Performed by: SURGERY

## 2019-03-04 PROCEDURE — 44310 ILEOSTOMY/JEJUNOSTOMY: CPT | Mod: 78,,, | Performed by: SURGERY

## 2019-03-04 PROCEDURE — 86901 BLOOD TYPING SEROLOGIC RH(D): CPT

## 2019-03-04 PROCEDURE — 36000709 HC OR TIME LEV III EA ADD 15 MIN: Performed by: SURGERY

## 2019-03-04 PROCEDURE — 37000009 HC ANESTHESIA EA ADD 15 MINS: Performed by: SURGERY

## 2019-03-04 PROCEDURE — 86140 C-REACTIVE PROTEIN: CPT

## 2019-03-04 PROCEDURE — 80053 COMPREHEN METABOLIC PANEL: CPT

## 2019-03-04 RX ORDER — FENTANYL CITRATE 50 UG/ML
INJECTION, SOLUTION INTRAMUSCULAR; INTRAVENOUS
Status: DISCONTINUED | OUTPATIENT
Start: 2019-03-04 | End: 2019-03-04

## 2019-03-04 RX ORDER — BUPIVACAINE HYDROCHLORIDE 2.5 MG/ML
INJECTION, SOLUTION INFILTRATION; PERINEURAL
Status: DISCONTINUED | OUTPATIENT
Start: 2019-03-04 | End: 2019-03-04 | Stop reason: HOSPADM

## 2019-03-04 RX ORDER — ONDANSETRON 2 MG/ML
INJECTION INTRAMUSCULAR; INTRAVENOUS
Status: DISCONTINUED | OUTPATIENT
Start: 2019-03-04 | End: 2019-03-04

## 2019-03-04 RX ORDER — METRONIDAZOLE 500 MG/100ML
500 INJECTION, SOLUTION INTRAVENOUS
Status: DISCONTINUED | OUTPATIENT
Start: 2019-03-04 | End: 2019-03-04

## 2019-03-04 RX ORDER — ROCURONIUM BROMIDE 10 MG/ML
INJECTION, SOLUTION INTRAVENOUS
Status: DISCONTINUED | OUTPATIENT
Start: 2019-03-04 | End: 2019-03-04

## 2019-03-04 RX ORDER — PROPOFOL 10 MG/ML
VIAL (ML) INTRAVENOUS
Status: DISCONTINUED | OUTPATIENT
Start: 2019-03-04 | End: 2019-03-04

## 2019-03-04 RX ORDER — HYDROMORPHONE HYDROCHLORIDE 2 MG/ML
0.2 INJECTION, SOLUTION INTRAMUSCULAR; INTRAVENOUS; SUBCUTANEOUS EVERY 5 MIN PRN
Status: DISCONTINUED | OUTPATIENT
Start: 2019-03-04 | End: 2019-03-04

## 2019-03-04 RX ORDER — MIDAZOLAM HYDROCHLORIDE 1 MG/ML
INJECTION, SOLUTION INTRAMUSCULAR; INTRAVENOUS
Status: DISCONTINUED | OUTPATIENT
Start: 2019-03-04 | End: 2019-03-04

## 2019-03-04 RX ORDER — NALOXONE HCL 0.4 MG/ML
0.02 VIAL (ML) INJECTION
Status: DISCONTINUED | OUTPATIENT
Start: 2019-03-04 | End: 2019-03-21 | Stop reason: HOSPADM

## 2019-03-04 RX ORDER — SUCCINYLCHOLINE CHLORIDE 20 MG/ML
INJECTION INTRAMUSCULAR; INTRAVENOUS
Status: DISCONTINUED | OUTPATIENT
Start: 2019-03-04 | End: 2019-03-04

## 2019-03-04 RX ORDER — FENTANYL CITRATE 50 UG/ML
25 INJECTION, SOLUTION INTRAMUSCULAR; INTRAVENOUS EVERY 5 MIN PRN
Status: DISCONTINUED | OUTPATIENT
Start: 2019-03-04 | End: 2019-03-04

## 2019-03-04 RX ORDER — SODIUM CHLORIDE, SODIUM LACTATE, POTASSIUM CHLORIDE, CALCIUM CHLORIDE 600; 310; 30; 20 MG/100ML; MG/100ML; MG/100ML; MG/100ML
INJECTION, SOLUTION INTRAVENOUS CONTINUOUS PRN
Status: DISCONTINUED | OUTPATIENT
Start: 2019-03-04 | End: 2019-03-04

## 2019-03-04 RX ORDER — FLUCONAZOLE 2 MG/ML
200 INJECTION, SOLUTION INTRAVENOUS
Status: DISCONTINUED | OUTPATIENT
Start: 2019-03-04 | End: 2019-03-20

## 2019-03-04 RX ORDER — LIDOCAINE HCL/PF 100 MG/5ML
SYRINGE (ML) INTRAVENOUS
Status: DISCONTINUED | OUTPATIENT
Start: 2019-03-04 | End: 2019-03-04

## 2019-03-04 RX ORDER — PHENYLEPHRINE HYDROCHLORIDE 10 MG/ML
INJECTION INTRAVENOUS
Status: DISCONTINUED | OUTPATIENT
Start: 2019-03-04 | End: 2019-03-04

## 2019-03-04 RX ORDER — HYDROMORPHONE HCL IN 0.9% NACL 6 MG/30 ML
PATIENT CONTROLLED ANALGESIA SYRINGE INTRAVENOUS CONTINUOUS
Status: DISCONTINUED | OUTPATIENT
Start: 2019-03-04 | End: 2019-03-06

## 2019-03-04 RX ORDER — GLYCOPYRROLATE 0.2 MG/ML
INJECTION INTRAMUSCULAR; INTRAVENOUS
Status: DISCONTINUED | OUTPATIENT
Start: 2019-03-04 | End: 2019-03-04

## 2019-03-04 RX ORDER — NEOSTIGMINE METHYLSULFATE 1 MG/ML
INJECTION, SOLUTION INTRAVENOUS
Status: DISCONTINUED | OUTPATIENT
Start: 2019-03-04 | End: 2019-03-04

## 2019-03-04 RX ORDER — SODIUM CHLORIDE 0.9 % (FLUSH) 0.9 %
3 SYRINGE (ML) INJECTION
Status: DISCONTINUED | OUTPATIENT
Start: 2019-03-04 | End: 2019-03-04

## 2019-03-04 RX ADMIN — PIPERACILLIN AND TAZOBACTAM 4.5 G: 4; .5 INJECTION, POWDER, LYOPHILIZED, FOR SOLUTION INTRAVENOUS; PARENTERAL at 04:03

## 2019-03-04 RX ADMIN — MIDAZOLAM HYDROCHLORIDE 2 MG: 1 INJECTION, SOLUTION INTRAMUSCULAR; INTRAVENOUS at 01:03

## 2019-03-04 RX ADMIN — METRONIDAZOLE 500 MG: 500 INJECTION, SOLUTION INTRAVENOUS at 04:03

## 2019-03-04 RX ADMIN — FLUCONAZOLE, SODIUM CHLORIDE 200 MG: 2 INJECTION INTRAVENOUS at 08:03

## 2019-03-04 RX ADMIN — ROCURONIUM BROMIDE 20 MG: 10 INJECTION, SOLUTION INTRAVENOUS at 02:03

## 2019-03-04 RX ADMIN — METRONIDAZOLE 500 MG: 500 INJECTION, SOLUTION INTRAVENOUS at 01:03

## 2019-03-04 RX ADMIN — NEOSTIGMINE METHYLSULFATE 5 MG: 1 INJECTION INTRAVENOUS at 03:03

## 2019-03-04 RX ADMIN — ROCURONIUM BROMIDE 10 MG: 10 INJECTION, SOLUTION INTRAVENOUS at 02:03

## 2019-03-04 RX ADMIN — SODIUM CHLORIDE: 0.9 INJECTION, SOLUTION INTRAVENOUS at 02:03

## 2019-03-04 RX ADMIN — SUCCINYLCHOLINE CHLORIDE 140 MG: 20 INJECTION, SOLUTION INTRAMUSCULAR; INTRAVENOUS at 01:03

## 2019-03-04 RX ADMIN — PROPOFOL 70 MG: 10 INJECTION, EMULSION INTRAVENOUS at 01:03

## 2019-03-04 RX ADMIN — RETINOL, ERGOCALCIFEROL, .ALPHA.-TOCOPHEROL ACETATE, DL-, PHYTONADIONE, ASCORBIC ACID, NIACINAMIDE, RIBOFLAVIN 5-PHOSPHATE SODIUM, THIAMINE HYDROCHLORIDE, PYRIDOXINE HYDROCHLORIDE, DEXPANTHENOL, BIOTIN, FOLIC ACID, AND CYANOCOBALAMIN: KIT at 10:03

## 2019-03-04 RX ADMIN — ENOXAPARIN SODIUM 40 MG: 100 INJECTION SUBCUTANEOUS at 05:03

## 2019-03-04 RX ADMIN — SODIUM CHLORIDE, SODIUM LACTATE, POTASSIUM CHLORIDE, AND CALCIUM CHLORIDE: .6; .31; .03; .02 INJECTION, SOLUTION INTRAVENOUS at 03:03

## 2019-03-04 RX ADMIN — LEVETIRACETAM 500 MG: 5 INJECTION INTRAVENOUS at 08:03

## 2019-03-04 RX ADMIN — PHENYLEPHRINE HYDROCHLORIDE 100 MCG: 10 INJECTION INTRAVENOUS at 01:03

## 2019-03-04 RX ADMIN — MORPHINE SULFATE 2 MG: 10 INJECTION INTRAVENOUS at 04:03

## 2019-03-04 RX ADMIN — PANTOPRAZOLE SODIUM 40 MG: 40 INJECTION, POWDER, LYOPHILIZED, FOR SOLUTION INTRAVENOUS at 08:03

## 2019-03-04 RX ADMIN — PIPERACILLIN AND TAZOBACTAM 4.5 G: 4; .5 INJECTION, POWDER, LYOPHILIZED, FOR SOLUTION INTRAVENOUS; PARENTERAL at 09:03

## 2019-03-04 RX ADMIN — SODIUM CHLORIDE: 0.9 INJECTION, SOLUTION INTRAVENOUS at 09:03

## 2019-03-04 RX ADMIN — FLUTICASONE FUROATE AND VILANTEROL TRIFENATATE 1 PUFF: 100; 25 POWDER RESPIRATORY (INHALATION) at 08:03

## 2019-03-04 RX ADMIN — GLYCOPYRROLATE 0.6 MG: 0.2 INJECTION, SOLUTION INTRAMUSCULAR; INTRAVENOUS at 03:03

## 2019-03-04 RX ADMIN — SOYBEAN OIL 250 ML: 20 INJECTION, SOLUTION INTRAVENOUS at 10:03

## 2019-03-04 RX ADMIN — Medication: at 04:03

## 2019-03-04 RX ADMIN — ALBUTEROL SULFATE 2 PUFF: 90 AEROSOL, METERED RESPIRATORY (INHALATION) at 08:03

## 2019-03-04 RX ADMIN — Medication 10 ML: at 12:03

## 2019-03-04 RX ADMIN — MORPHINE SULFATE 2 MG: 10 INJECTION INTRAVENOUS at 07:03

## 2019-03-04 RX ADMIN — LIDOCAINE HYDROCHLORIDE 100 MG: 20 INJECTION, SOLUTION INTRAVENOUS at 01:03

## 2019-03-04 RX ADMIN — SODIUM CHLORIDE: 0.9 INJECTION, SOLUTION INTRAVENOUS at 04:03

## 2019-03-04 RX ADMIN — METRONIDAZOLE 500 MG: 500 INJECTION, SOLUTION INTRAVENOUS at 08:03

## 2019-03-04 RX ADMIN — PIPERACILLIN AND TAZOBACTAM 4.5 G: 4; .5 INJECTION, POWDER, LYOPHILIZED, FOR SOLUTION INTRAVENOUS; PARENTERAL at 01:03

## 2019-03-04 RX ADMIN — LEVETIRACETAM 500 MG: 5 INJECTION INTRAVENOUS at 09:03

## 2019-03-04 RX ADMIN — FENTANYL CITRATE 100 MCG: 50 INJECTION INTRAMUSCULAR; INTRAVENOUS at 01:03

## 2019-03-04 RX ADMIN — GLYCOPYRROLATE 0.2 MG: 0.2 INJECTION, SOLUTION INTRAMUSCULAR; INTRAVENOUS at 01:03

## 2019-03-04 RX ADMIN — ONDANSETRON 4 MG: 2 INJECTION, SOLUTION INTRAMUSCULAR; INTRAVENOUS at 01:03

## 2019-03-04 RX ADMIN — MORPHINE SULFATE 2 MG: 10 INJECTION INTRAVENOUS at 12:03

## 2019-03-04 NOTE — PROGRESS NOTES
[facility]  General Surgery  Progress Note    Subjective:     Interval History: continued abdominal pain requiring morphine, +flatus. No fever, no nausea.     Post-Op Info:  Procedure(s) (LRB):  LAPAROSCOPY, DIAGNOSTIC (N/A)          Medications:  Continuous Infusions:   sodium chloride 0.9% 150 mL/hr at 03/04/19 0700    Amino acid 5% - dextrose 20% (CLINIMIX-E) solution with additives (2L  Provides 100 gm AA, 400 gm CHO (1360 kcal from dextrose), Na 70, K 60, Mg 10, Ca 9, Acetate 160, Cl 78, Phos 30)       Scheduled Meds:   aspirin  81 mg Oral Daily    enoxaparin  40 mg Subcutaneous Daily    escitalopram oxalate  10 mg Oral Daily    fat emulsion 20%  250 mL Intravenous Daily    fluticasone-vilanterol  1 puff Inhalation Daily    levetiracetam IVPB  500 mg Intravenous Q12H    metronidazole  500 mg Intravenous Q8H    pantoprazole  40 mg Intravenous Daily    piperacillin-tazobactam (ZOSYN) IVPB  4.5 g Intravenous Q8H    sodium chloride 0.9%  10 mL Intravenous Q6H     PRN Meds:albuterol, morphine, ondansetron, ondansetron, promethazine (PHENERGAN) IVPB, Flushing PICC Protocol **AND** sodium chloride 0.9% **AND** sodium chloride 0.9%, sodium chloride 0.9%     Objective:     Vital Signs (Most Recent):  Temp: 98.3 °F (36.8 °C) (03/04/19 0700)  Pulse: 84 (03/04/19 0700)  Resp: 14 (03/04/19 0700)  BP: (!) 105/59 (03/04/19 0700)  SpO2: (!) 93 % (03/04/19 0700) Vital Signs (24h Range):  Temp:  [97.9 °F (36.6 °C)-98.6 °F (37 °C)] 98.3 °F (36.8 °C)  Pulse:  [81-97] 84  Resp:  [13-25] 14  SpO2:  [91 %-95 %] 93 %  BP: ()/(51-69) 105/59       Intake/Output Summary (Last 24 hours) at 3/4/2019 4789  Last data filed at 3/4/2019 0700  Gross per 24 hour   Intake 4680 ml   Output 1171 ml   Net 3509 ml       Physical Exam  Awake, alert, mild distress  NGT in place with thin bilious output  RRR  Abdomen distended, tympanic, tender to palpation diffusely with +guarding  Field in place with yellow  urine    Significant Labs:  CBC:   Recent Labs   Lab 03/04/19  0235   WBC 14.90*   RBC 3.35*   HGB 9.6*   HCT 29.3*   *   MCV 88   MCH 28.7   MCHC 32.8     CMP:   Recent Labs   Lab 03/04/19  0235   GLU 86   CALCIUM 8.5*   ALBUMIN 1.4*   PROT 4.7*      K 3.7   CO2 22*   *   BUN 21   CREATININE 1.0   ALKPHOS 61   ALT 9*   AST 11   BILITOT 0.3       Significant Diagnostics:  I have reviewed all pertinent imaging results/findings within the past 24 hours.    Assessment/Plan:   68M s/p recent lap LAR for rectal cancer on 2/22 presenting with increasing abdominal pain, CT with intraperitoneal free air in the postoperative setting. Evening of 3/1 patient febrile, tachycardic and subsequently transferred to the ICU. Hemodynamics normalized overnight on 3/1 and he has remained HDS without lactic acidosis or metabolic durrangements. Repeat CT scan with readministration of free air. NG placed and PICC line obtained.      Neuro: Alert and oriented  -Morphine Q2 PRN.  -hx of seizures- Keppra IV     CV: HDS.   -Cardiology evaluated- ACS ruled out  -MAPS remain stable without use of vasopressors   -Hold home BP meds      PULM: CXR without acute process  -NC to keep oxygen sats > 94%  -IS hourly while awake  -Home inhalers      FEN/GI: s/p LAR on 2/22. NG with 250 out   -NG to LIWS NPO and MIVF  -Nutrition labs, start TPN  -Replace electrolytes per protocol      : chanel placed in acute setting last night   -BUN 17 Cr 0.9. .   -Will bolus after evaluated by cardiology      Heme/ID: WBC 14--> 10--> 12.9--> 14.9  -blood cultures NG.  -Zosyn and flagyl     PPX: Lovenox and Protonix (home med)      Lines: NG, PICC, Chanel      Dispo: OR today for diagnostic laparoscopy       Active Diagnoses:    Diagnosis Date Noted POA    PRINCIPAL PROBLEM:  Other chest pain [R07.89] 03/02/2019 Yes    Hypertension [I10] 03/02/2019 Unknown    Mixed hyperlipidemia [E78.2] 03/02/2019 Yes    Abdominal pain [R10.9] 02/26/2019  Yes    Carcinoma of rectum [C20] 11/12/2018 Yes      Problems Resolved During this Admission:         Coby Harp MD  General Surgery  [facility]

## 2019-03-04 NOTE — TRANSFER OF CARE
"Anesthesia Transfer of Care Note    Patient: Justin Adams    Procedure(s) Performed: Procedure(s) (LRB):  LAPAROSCOPY, DIAGNOSTIC (N/A)  LAPAROTOMY, EXPLORATORY    Patient location: ICU    Anesthesia Type: general    Transport from OR: Transported from OR on 100% O2 by closed face mask with adequate spontaneous ventilation    Post pain: adequate analgesia    Post assessment: no apparent anesthetic complications and tolerated procedure well    Post vital signs: stable    Level of consciousness: awake and alert    Nausea/Vomiting: no nausea/vomiting    Complications: none    Transfer of care protocol was followedComments: Nurse at bedside, reconnected to monitors upon arrival       Last vitals:   Visit Vitals  BP (!) 110/58   Pulse 85   Temp 36.6 °C (97.9 °F) (Oral)   Resp (!) 23   Ht 5' 4" (1.626 m)   Wt 101.4 kg (223 lb 8.7 oz)   SpO2 95%   BMI 38.37 kg/m²     "

## 2019-03-04 NOTE — ANESTHESIA PREPROCEDURE EVALUATION
03/04/2019  Justin Adams is a 68 y.o., male.    Anesthesia Evaluation     I have reviewed the Nursing Notes.      Review of Systems  Anesthesia Hx:  No problems with previous Anesthesia   Social:  Smoker    Hematology/Oncology:        Current/Recent Cancer. (colon ca & prostate ca)   EENT/Dental:   Hx trach   Cardiovascular:   Denies Pacemaker. Hypertension  Denies Valvular problems/Murmurs.  hyperlipidemia    Pulmonary:   Denies Pneumonia COPD Denies Asthma.  Denies Recent URI.    Hepatic/GI:   Denies Hiatal Hernia. GERD Pt has pneumoperitoneum & requires ex-lap   Neurological:   Denies CVA. Seizures    Endocrine:  Endocrine Normal        Physical Exam  General:  Obesity    Airway/Jaw/Neck:  AIRWAY FINDINGS: Normal      Chest/Lungs:  Chest/Lungs Clear    Heart/Vascular:  Heart Findings: Normal            Anesthesia Plan  Type of Anesthesia, risks & benefits discussed:  Anesthesia Type:  general  Patient's Preference:   Intra-op Monitoring Plan: standard ASA monitors  Intra-op Monitoring Plan Comments:   Post Op Pain Control Plan:   Post Op Pain Control Plan Comments:   Induction:   IV  Beta Blocker:  Patient is on a Beta-Blocker and has received one dose within the past 24 hours (No further documentation required).       Informed Consent: Patient understands risks and agrees with Anesthesia plan.  Questions answered. Anesthesia consent signed with patient.  ASA Score: 4     Day of Surgery Review of History & Physical:  There are no significant changes.  H&P update referred to the provider.         Ready For Surgery From Anesthesia Perspective.

## 2019-03-04 NOTE — OP NOTE
[facility]  General Surgery  Operative Note    SUMMARY     Date of Procedure: 3/4/2019     Procedure: Procedure(s) (LRB):  LAPAROSCOPY, DIAGNOSTIC (N/A)  LAPAROTOMY, EXPLORATORY   Loop ileostomy    Surgeon(s) and Role:     * Mat Glass MD - Primary    Assisting Surgeon: Coby Harp MD PGY-5    Pre-Operative Diagnosis: Abdominal pain, unspecified abdominal location [R10.9]    Post-Operative Diagnosis: Post-Op Diagnosis Codes:     * Abdominal pain, unspecified abdominal location [R10.9]    Anesthesia: General    Technical Procedures Used:  Patient was taken to the operating room placed on operating table in supine position.  Under adequate general anesthesia he was prepped and draped around his abdomen in the usual sterile fashion. An incision was made through previous incision site through which a 5 mm port was inserted under direct vision. The abdomen was insufflated with renal have the use of CO2.  There was significant amount of fluid as well as bile stained succus and almost feculent ascites.  At this point I decided to go ahead and open his abdomen.  Upon opening his abdomen and extending the incision there was what appeared in his abdominal cavity towards his distal anastomosis a significant amount of feculent fluid.  The abdomen was then irrigated copiously with the at least 2-3 L of saline. Following this a loop of ileum was then brought through ileostomy fashion in his right lower quadrant. This was matured around the red rubber catheter.  His abdominal cavity was then closed in layers with absorbable suture. Skin clips were used with Telfa diane.  An appliance was then placed over the ileostomy as well as a bandage he was awakened and transported to recovery room in stable condition.    Description of the Findings of the Procedure:  Distal anastomotic dehiscence that was walled off with feculent peritonitis    Significant Surgical Tasks Conducted by the Assistant(s), if  Applicable:  Greater than 50%    Complications: No    Estimated Blood Loss (EBL):  Minimal           Implants: * No implants in log *    Specimens:   Specimen (12h ago, onward)    None                  Condition: Stable    Disposition: PACU - hemodynamically stable.    Attestation: I was present and scrubbed for the entire procedure.

## 2019-03-04 NOTE — PLAN OF CARE
Problem: Adult Inpatient Plan of Care  Goal: Plan of Care Review  Outcome: Ongoing (interventions implemented as appropriate)  Pt remains in ICU. 3L NC, IS encouraged. No SOB reported. Pt states abdominal pain remains 6-9/10, Morphine Q2H PRN. Field with adequate output. NS@150cc/hr. IV antibiotics. VSS. Wife at bedside.

## 2019-03-04 NOTE — NURSING
Paged Dr Harp to clarify lovenox order. Hold since pt just had surgery? Ok to give lovenox per Dr Harp

## 2019-03-04 NOTE — BRIEF OP NOTE
[facility]  Brief Operative Note     SUMMARY     Surgery Date: 3/4/2019     Surgeon(s) and Role:     * Mat Glass MD - Primary    Assisting Surgeon: Coby Harp MD PGY5    Pre-op Diagnosis:  Abdominal pain, unspecified abdominal location [R10.9]    Post-op Diagnosis:  Post-Op Diagnosis Codes:     * Abdominal pain, unspecified abdominal location [R10.9]    Procedure(s) (LRB):  LAPAROSCOPY, DIAGNOSTIC (N/A)  LAPAROTOMY, EXPLORATORY    Anesthesia: General    Description of the findings of the procedure: Distal anastomotic dehiscence that was walled off with feculent peritonitis    Estimated Blood Loss: * No values recorded between 3/4/2019  2:07 PM and 3/4/2019  3:29 PM *         Specimens:   Specimen (12h ago, onward)    None

## 2019-03-04 NOTE — CONSULTS
A 68 year old male admitted 2/26/19 with abdominal pain; S/P Laparoscopic low anterior resection with total mesorectal excision per Dr. Glass for rectal cancer  PMH: Aphagia; prostate and colon cancer; cardiomegaly; HTN; seizures; subdural hemorrhage; smoker  3/4 WBC 14.9 Hgb 9.6 Hct 29.3 Alb 1.4 Prealbumin 3  3/4/19 Exploratory laparotomy; loop ileostomy per Dr. Glass for distal anastomotic dehiscence that was walled off with feculent peritonitis  Consulted for education and ileostomy supplies  Plan: Will assist with ostomy teaching determine appropriate appliance.

## 2019-03-04 NOTE — PLAN OF CARE
Problem: Adult Inpatient Plan of Care  Goal: Plan of Care Review  Outcome: Ongoing (interventions implemented as appropriate)  Pt remains in ICU. Pain is being controlled with IV morphine. NG L nare to LIWS producing brown to green drainage-100mL out this shift. Pt continue to receive antibiotics and IV maintenance fluids. Urine output has increased. Scrotum is slightly reddened d/t diarrhea otherwise skin has remained intact. Barrier cream applied. Pt has had no falls during this shift

## 2019-03-05 LAB
ALBUMIN SERPL BCP-MCNC: 1.3 G/DL
ALP SERPL-CCNC: 55 U/L
ALT SERPL W/O P-5'-P-CCNC: 8 U/L
ANION GAP SERPL CALC-SCNC: 6 MMOL/L
ANISOCYTOSIS BLD QL SMEAR: SLIGHT
AST SERPL-CCNC: 14 U/L
BASOPHILS NFR BLD: 0 %
BILIRUB SERPL-MCNC: 0.2 MG/DL
BUN SERPL-MCNC: 27 MG/DL
CALCIUM SERPL-MCNC: 8.9 MG/DL
CHLORIDE SERPL-SCNC: 116 MMOL/L
CO2 SERPL-SCNC: 23 MMOL/L
CREAT SERPL-MCNC: 1.1 MG/DL
DIFFERENTIAL METHOD: ABNORMAL
EOSINOPHIL NFR BLD: 0 %
ERYTHROCYTE [DISTWIDTH] IN BLOOD BY AUTOMATED COUNT: 17.7 %
EST. GFR  (AFRICAN AMERICAN): >60 ML/MIN/1.73 M^2
EST. GFR  (NON AFRICAN AMERICAN): >60 ML/MIN/1.73 M^2
GLUCOSE SERPL-MCNC: 310 MG/DL
HCT VFR BLD AUTO: 30.5 %
HGB BLD-MCNC: 10 G/DL
LYMPHOCYTES NFR BLD: 3 %
MCH RBC QN AUTO: 28.7 PG
MCHC RBC AUTO-ENTMCNC: 32.8 G/DL
MCV RBC AUTO: 87 FL
MONOCYTES NFR BLD: 7 %
NEUTROPHILS NFR BLD: 65 %
NEUTS BAND NFR BLD MANUAL: 25 %
PLATELET # BLD AUTO: 544 K/UL
PLATELET BLD QL SMEAR: ABNORMAL
PMV BLD AUTO: 9.4 FL
POTASSIUM SERPL-SCNC: 3.5 MMOL/L
PROT SERPL-MCNC: 4.7 G/DL
RBC # BLD AUTO: 3.49 M/UL
SODIUM SERPL-SCNC: 145 MMOL/L
WBC # BLD AUTO: 16.03 K/UL

## 2019-03-05 PROCEDURE — 85007 BL SMEAR W/DIFF WBC COUNT: CPT

## 2019-03-05 PROCEDURE — 20000000 HC ICU ROOM

## 2019-03-05 PROCEDURE — 36415 COLL VENOUS BLD VENIPUNCTURE: CPT

## 2019-03-05 PROCEDURE — 25000003 PHARM REV CODE 250: Performed by: SURGERY

## 2019-03-05 PROCEDURE — 94761 N-INVAS EAR/PLS OXIMETRY MLT: CPT

## 2019-03-05 PROCEDURE — 94640 AIRWAY INHALATION TREATMENT: CPT

## 2019-03-05 PROCEDURE — 63600175 PHARM REV CODE 636 W HCPCS: Performed by: SURGERY

## 2019-03-05 PROCEDURE — B4185 PARENTERAL SOL 10 GM LIPIDS: HCPCS | Performed by: STUDENT IN AN ORGANIZED HEALTH CARE EDUCATION/TRAINING PROGRAM

## 2019-03-05 PROCEDURE — 25000003 PHARM REV CODE 250: Performed by: STUDENT IN AN ORGANIZED HEALTH CARE EDUCATION/TRAINING PROGRAM

## 2019-03-05 PROCEDURE — A4216 STERILE WATER/SALINE, 10 ML: HCPCS | Performed by: STUDENT IN AN ORGANIZED HEALTH CARE EDUCATION/TRAINING PROGRAM

## 2019-03-05 PROCEDURE — 80053 COMPREHEN METABOLIC PANEL: CPT

## 2019-03-05 PROCEDURE — 27000221 HC OXYGEN, UP TO 24 HOURS

## 2019-03-05 PROCEDURE — 63600175 PHARM REV CODE 636 W HCPCS: Performed by: STUDENT IN AN ORGANIZED HEALTH CARE EDUCATION/TRAINING PROGRAM

## 2019-03-05 PROCEDURE — S0030 INJECTION, METRONIDAZOLE: HCPCS | Performed by: SURGERY

## 2019-03-05 PROCEDURE — C9113 INJ PANTOPRAZOLE SODIUM, VIA: HCPCS | Performed by: STUDENT IN AN ORGANIZED HEALTH CARE EDUCATION/TRAINING PROGRAM

## 2019-03-05 PROCEDURE — 85027 COMPLETE CBC AUTOMATED: CPT

## 2019-03-05 RX ADMIN — ESCITALOPRAM OXALATE 10 MG: 10 TABLET ORAL at 09:03

## 2019-03-05 RX ADMIN — Medication 10 ML: at 06:03

## 2019-03-05 RX ADMIN — LEVETIRACETAM 500 MG: 5 INJECTION INTRAVENOUS at 09:03

## 2019-03-05 RX ADMIN — FLUCONAZOLE, SODIUM CHLORIDE 200 MG: 2 INJECTION INTRAVENOUS at 08:03

## 2019-03-05 RX ADMIN — SODIUM CHLORIDE: 0.9 INJECTION, SOLUTION INTRAVENOUS at 02:03

## 2019-03-05 RX ADMIN — METRONIDAZOLE 500 MG: 500 INJECTION, SOLUTION INTRAVENOUS at 01:03

## 2019-03-05 RX ADMIN — I.V. FAT EMULSION 250 ML: 20 EMULSION INTRAVENOUS at 10:03

## 2019-03-05 RX ADMIN — RETINOL, ERGOCALCIFEROL, .ALPHA.-TOCOPHEROL ACETATE, DL-, PHYTONADIONE, ASCORBIC ACID, NIACINAMIDE, RIBOFLAVIN 5-PHOSPHATE SODIUM, THIAMINE HYDROCHLORIDE, PYRIDOXINE HYDROCHLORIDE, DEXPANTHENOL, BIOTIN, FOLIC ACID, AND CYANOCOBALAMIN: KIT at 10:03

## 2019-03-05 RX ADMIN — ENOXAPARIN SODIUM 40 MG: 100 INJECTION SUBCUTANEOUS at 04:03

## 2019-03-05 RX ADMIN — ASPIRIN 81 MG: 81 TABLET, COATED ORAL at 09:03

## 2019-03-05 RX ADMIN — PIPERACILLIN AND TAZOBACTAM 4.5 G: 4; .5 INJECTION, POWDER, LYOPHILIZED, FOR SOLUTION INTRAVENOUS; PARENTERAL at 01:03

## 2019-03-05 RX ADMIN — FLUTICASONE FUROATE AND VILANTEROL TRIFENATATE 1 PUFF: 100; 25 POWDER RESPIRATORY (INHALATION) at 08:03

## 2019-03-05 RX ADMIN — PIPERACILLIN AND TAZOBACTAM 4.5 G: 4; .5 INJECTION, POWDER, LYOPHILIZED, FOR SOLUTION INTRAVENOUS; PARENTERAL at 05:03

## 2019-03-05 RX ADMIN — PIPERACILLIN AND TAZOBACTAM 4.5 G: 4; .5 INJECTION, POWDER, LYOPHILIZED, FOR SOLUTION INTRAVENOUS; PARENTERAL at 09:03

## 2019-03-05 RX ADMIN — METRONIDAZOLE 500 MG: 500 INJECTION, SOLUTION INTRAVENOUS at 08:03

## 2019-03-05 RX ADMIN — Medication 10 ML: at 12:03

## 2019-03-05 RX ADMIN — METRONIDAZOLE 500 MG: 500 INJECTION, SOLUTION INTRAVENOUS at 04:03

## 2019-03-05 RX ADMIN — PANTOPRAZOLE SODIUM 40 MG: 40 INJECTION, POWDER, LYOPHILIZED, FOR SOLUTION INTRAVENOUS at 09:03

## 2019-03-05 RX ADMIN — Medication 10 ML: at 01:03

## 2019-03-05 NOTE — PROGRESS NOTES
1730 VSS, dressing reinforced, small amount serosanguinous drainage to dressing, thin pink drainage to illeostomy, stoma rosebud appearance, pain well controlled throughout shift with PCA, afebrile throughout shift, small amount green drainage from NG tube, remains on non rebreather, breath sounds clear but diminished, turned Q2, resting comfortably with eyes closed, bed in low locked position, able to make needs known; no needs at this time.

## 2019-03-05 NOTE — PLAN OF CARE
Patient from home with family. Wound care consulted for ostomy teaching. Patient may need ostomy supplies when medically stable to discharge from hospital.          03/05/19 0941   Discharge Reassessment   Assessment Type Discharge Planning Reassessment   Provided patient/caregiver education on the expected discharge date and the discharge plan No   Do you have any problems affording any of your prescribed medications? No   Discharge Plan A Skilled Nursing Facility   Discharge Plan B Home with family;Home Health   DME Needed Upon Discharge  (TBD)   Patient choice form signed by patient/caregiver N/A   Anticipated Discharge Disposition SNF   Can the patient answer the patient profile reliably? (Unable to assess)   How does the patient rate their overall health at the present time? Poor   Describe the patient's ability to walk at the present time. Does not walk or unable to take any steps at all   How often would a person be available to care for the patient? Infrequently

## 2019-03-05 NOTE — PLAN OF CARE
Problem: Adult Inpatient Plan of Care  Goal: Plan of Care Review  Outcome: Ongoing (interventions implemented as appropriate)  Pt remains in ICU after surgery today. He remains on a nonrebreather and is lethargic, but arouses easily. Distal portion of midline inc with sanguinous drainage reinforced with gauze. Ileostomy producing sanguinous drainage, stoma remains moist and pink. Pt denies pain. Dilaudid pca available when pt arouses more from sedation. L nare NG still to LIWS. VSS. Urine output continues to increase

## 2019-03-05 NOTE — PLAN OF CARE
Problem: Adult Inpatient Plan of Care  Goal: Plan of Care Review  Outcome: Ongoing (interventions implemented as appropriate)   03/05/19 8550   Plan of Care Review   Plan of Care Reviewed With patient     Patient has S/P  Exploratory Laparotomy with loop ileosotmy post-op day 1. He Appears to be drowsy able to ope eyes spontaneously and follow simple command at the beginning of the shift. He gets occasionally confused during the shift but he aapears to be more alert at the later part of the shift. Right lower quadrant ileosomy noted draining serosangionous drainage. Stmao appears to be ruy pink and moist. Foly with dark juan c urine. NGT clamped. Dilaudid PCA was noted. Initially patient wasn't using it but after a bed bath dome and pt being more awake he did start pressing the PCA. Vital signs stable and within limits. No fall or injury No breakdown was noted.

## 2019-03-05 NOTE — PLAN OF CARE
Problem: Adult Inpatient Plan of Care  Goal: Plan of Care Review  Recommendations     1. Change to TPN to custom provision of 15% dextrose (or 360 g), 5% amino acid @ 100 mL/hr + lipid daily to provide 2204 cals, 120 g protein, & 2400 mL free fl; customization of lytes per MD discretion daily    2. Check Mg, Phos daily while on TPN    3. Check triglyceride; if it is >300, change lipids to 3 x weekly vs daily     Goals: TPN to meet % or EEN/EPN  Nutrition Goal Status: new

## 2019-03-05 NOTE — PROGRESS NOTES
"[facility] [place]  Adult Nutrition  Progress Note    SUMMARY   Addendum:  I spoke to Dr. Randall who will customize TPN per recommendations below.     Recommendations    1. Change to TPN to custom provision of 15% dextrose (or 360 g), 5% amino acid @ 100 mL/hr + lipid daily to provide 2204 cals, 120 g protein, & 2400 mL free fl; customization of lytes per MD discretion daily    2. Check Mg, Phos daily while on TPN    3. Check triglyceride; if it is >300, change lipids to 3 x weekly vs daily    Goals: TPN to meet % or EEN/EPN  Nutrition Goal Status: new  Communication of RD Recs: (plan of care)    Reason for Assessment    Reason For Assessment: new TPN  Diagnosis: surgery/postoperative complications  Relevant Medical History: HTN, cardiomegaly, rectal ca s/p LAR 2/22/19   General Information Comments: Pt tx'd to ICU after sx yesterday. POD# 1 s/p ex-lap w/ loop ileostomy & washout 2/2 anastomotic dehis w/ feculent peritonitis. Prior LAR done 2/22/19. Pt on non-rebreather & asleep when seen this AM. I did speak to pt on day one of this admission & he reported he had been eating regular food pta; diet had been advanced during this hospital stay as well until being made NPO on 3/1. NFPE performed today; see malnurition section of note for details. Low prealb noted but CRP is very elevated.     Nutrition Discharge Planning: Unable to determine @ this time    Nutrition Risk Screen    Nutrition Risk Screen: no indicators present    Nutrition/Diet History    Spiritual, Cultural Beliefs, Islam Practices, Values that Affect Care: no  Food Allergies: NKFA  Factors Affecting Nutritional Intake: altered gastrointestinal function    Anthropometrics    Temp: 98.5 °F (36.9 °C)  Height Method: Stated  Height: 5' 4" (162.6 cm)  Height (inches): 64 in  Weight Method: Bed Scale  Weight: 101.4 kg (223 lb 8.7 oz)  Weight (lb): 223.55 lb  Ideal Body Weight (IBW), Male: 130 lb  % Ideal Body Weight, Male (lb): 171.96 " lb  BMI (Calculated): 38.5  BMI Grade: 35 - 39.9 - obesity - grade II  Usual Body Weight (UBW), k kg  % Usual Body Weight: 103.69  % Weight Change From Usual Weight: 3.47 %       Lab/Procedures/Meds    Pertinent Labs Reviewed: reviewed  Pertinent Labs Comments: Glu 310, BUN 27, .7, prealb 3, Alb 1.3  Pertinent Medications Reviewed: reviewed  Pertinent Medications Comments: pantoprazole, flagyl    Estimated/Assessed Needs    Weight Used For Calorie Calculations: 101.4 kg (223 lb 8.7 oz)  Energy Calorie Requirements (kcal):   Energy Need Method: Yancey-St Jeor(x 1.25 (PAL))     Protein Requirements: 101-122 g (1-1.2 g/kg)  Weight Used For Protein Calculations: 101.4 kg (223 lb 8.7 oz)     Estimated Fluid Requirement Method: RDA Method  RDA Method (mL):        Nutrition Prescription Ordered    Current Diet Order: NPO  Current Nutrition Support Formula Ordered: Clinimix E   Current Nutrition Support Rate Ordered: 100 (ml)  Current Nutrition Support Frequency Ordered: mL/hr x 24 hrs daily    Evaluation of Received Nutrient/Fluid Intake    Parenteral Calories (kcal):   Parenteral Protein (gm): 120  Parenteral Fluid (mL): 2400  Lipid Calories (kcals): 500 kcals  GIR (Glucose Infusion Rate) (mg/kg/min): 3.3 mg/kg/min  Total Calories (kcal): 2612  % Kcal Needs: 123  Energy Calories Required: exceeds needs  Fluid Required: meeting needs  Comments: LBM 3/4; good uop  % Intake of Estimated Energy Needs: > 100 %  % Meal Intake: NPO    Nutrition Risk    Level of Risk/Frequency of Follow-up: (F/u 2 x weekly)     Assessment and Plan    Nutrition Problem  Altered GI Function    Related to (etiology):   Post-op sx complications    Signs and Symptoms (as evidenced by):   NPO w/ need for gut rest/PN    Interventions:  Collaboration w/ other providers    Nutrition Diagnosis Status:   New      Monitor and Evaluation    Food and Nutrient Intake: energy intake, parenteral nutrition intake  Food and Nutrient  Adminstration: enteral and parenteral nutrition administration, diet order  Physical Activity and Function: nutrition-related ADLs and IADLs  Anthropometric Measurements: weight change, weight  Biochemical Data, Medical Tests and Procedures: electrolyte and renal panel, glucose/endocrine profile, inflammatory profile, lipid profile  Nutrition-Focused Physical Findings: overall appearance     Malnutrition Assessment             Energy Intake (Malnutrition): less than or equal to 50% for greater than or equal to 5 days               Subcutaneous Fat Loss (Final Summary): well nourished  Muscle Loss Evaluation (Final Summary): well nourished         Nutrition Follow-Up    RD Follow-up?: Yes

## 2019-03-05 NOTE — PROGRESS NOTES
[facility]  General Surgery  Progress Note    Subjective:     Interval History:   POD 1 s/p ex lap with loop ostomy   Remains stable in the ICU       Post-Op Info:  Procedure(s) (LRB):  LAPAROSCOPY, DIAGNOSTIC (N/A)  LAPAROTOMY, EXPLORATORY   1 Day Post-Op      Medications:  Continuous Infusions:   sodium chloride 0.9% 50 mL/hr at 03/05/19 0602    Amino acid 5% - dextrose 20% (CLINIMIX-E) solution with additives (2L  Provides 100 gm AA, 400 gm CHO (1360 kcal from dextrose), Na 70, K 60, Mg 10, Ca 9, Acetate 160, Cl 78, Phos 30) 100 mL/hr at 03/05/19 0602    Amino acid 5% - dextrose 20% (CLINIMIX-E) solution with additives (2L  Provides 100 gm AA, 400 gm CHO (1360 kcal from dextrose), Na 70, K 60, Mg 10, Ca 9, Acetate 160, Cl 78, Phos 30)      hydromorphone in 0.9 % NaCl 6 mg/30 ml       Scheduled Meds:   aspirin  81 mg Oral Daily    enoxaparin  40 mg Subcutaneous Daily    escitalopram oxalate  10 mg Oral Daily    fat emulsion  250 mL Intravenous Daily    fat emulsion 20%  250 mL Intravenous Daily    fluconazole  200 mg Intravenous Q24H    fluticasone-vilanterol  1 puff Inhalation Daily    levetiracetam IVPB  500 mg Intravenous Q12H    metronidazole  500 mg Intravenous Q8H    pantoprazole  40 mg Intravenous Daily    piperacillin-tazobactam (ZOSYN) IVPB  4.5 g Intravenous Q8H    sodium chloride 0.9%  10 mL Intravenous Q6H     PRN Meds:albuterol, morphine, naloxone, ondansetron, ondansetron, promethazine (PHENERGAN) IVPB, Flushing PICC Protocol **AND** sodium chloride 0.9% **AND** sodium chloride 0.9%, sodium chloride 0.9%     Objective:     Vital Signs (Most Recent):  Temp: 98.1 °F (36.7 °C) (03/05/19 0300)  Pulse: 79 (03/05/19 0853)  Resp: 18 (03/05/19 0853)  BP: 130/60 (03/05/19 0600)  SpO2: 95 % (03/05/19 0853) Vital Signs (24h Range):  Temp:  [96.7 °F (35.9 °C)-98.4 °F (36.9 °C)] 98.1 °F (36.7 °C)  Pulse:  [72-87] 79  Resp:  [13-24] 18  SpO2:  [69 %-97 %] 95 %  BP:  (101166)/(55-71) 130/60       Intake/Output Summary (Last 24 hours) at 3/5/2019 1003  Last data filed at 3/5/2019 0602  Gross per 24 hour   Intake 5234.04 ml   Output 885 ml   Net 4349.04 ml       Physical Exam  Awake, lethargic, oriented   NGT in place with thin bilious output, NC in place   RRR  Abdomen distended, soft, ostomy pink and patent.   Chanel in place with yellow urine    Significant Labs:  CBC:   Recent Labs   Lab 03/05/19  0306   WBC 16.03*   RBC 3.49*   HGB 10.0*   HCT 30.5*   *   MCV 87   MCH 28.7   MCHC 32.8     CMP:   Recent Labs   Lab 03/05/19  0300   *   CALCIUM 8.9   ALBUMIN 1.3*   PROT 4.7*      K 3.5   CO2 23   *   BUN 27*   CREATININE 1.1   ALKPHOS 55   ALT 8*   AST 14   BILITOT 0.2       Significant Diagnostics:  I have reviewed all pertinent imaging results/findings within the past 24 hours.    Assessment/Plan:   68M s/p recent lap LAR for rectal cancer on 2/22 presenting with increasing abdominal pain, CT with intraperitoneal free air in the postoperative setting. Evening of 3/1 patient febrile, tachycardic and subsequently transferred to the ICU. Hemodynamics normalized overnight on 3/1 and he has remained HDS without lactic acidosis or metabolic durrangements. Repeat CT scan with readministration of free air. NG placed and PICC line obtained. Taken to the OR on 3/4 for ex lap and diverting loop ostomy. Returned to the ICU in stable condition      Neuro: Alert and oriented, lethargic   -PCA   -hx of seizures- Keppra IV     CV: HDS.   -Cardiology evaluated- ACS ruled out  -MAPS remain stable without use of vasopressors   -Hold home BP meds      PULM: CXR without acute process  -NC to keep oxygen sats > 94%  -IS hourly while awake  -Home inhalers      FEN/GI: s/p LAR on 2/22, washout and loop ostomy 3/4   -NG to LIWS and NPO   -Continue TPN   -Replace electrolytes per protocol      : chanel   -BUN 27 Cr 1.1. .        Heme/ID: WBC 14--> 10--> 12.9--> 14.9-->  16.03  -blood cultures NG.  -continue Zosyn and flagyl (vancomycin discontinued)      PPX: Lovenox and Protonix (home med)      Lines: NG, PICC, Field, ostomy      Dispo: [facility] ICU care       Active Diagnoses:    Diagnosis Date Noted POA    PRINCIPAL PROBLEM:  Other chest pain [R07.89] 03/02/2019 Yes    Hypertension [I10] 03/02/2019 Unknown    Mixed hyperlipidemia [E78.2] 03/02/2019 Yes    Abdominal pain [R10.9] 02/26/2019 Yes    Carcinoma of rectum [C20] 11/12/2018 Yes      Problems Resolved During this Admission:         Crow Randall MD  General Surgery  [facility]

## 2019-03-06 LAB
ALBUMIN SERPL BCP-MCNC: 1.4 G/DL
ALLENS TEST: ABNORMAL
ALP SERPL-CCNC: 58 U/L
ALT SERPL W/O P-5'-P-CCNC: 10 U/L
ANION GAP SERPL CALC-SCNC: 6 MMOL/L
ANION GAP SERPL CALC-SCNC: 6 MMOL/L
ANISOCYTOSIS BLD QL SMEAR: SLIGHT
AST SERPL-CCNC: 9 U/L
BACTERIA BLD CULT: NORMAL
BACTERIA BLD CULT: NORMAL
BASOPHILS NFR BLD: 0 %
BILIRUB SERPL-MCNC: 0.2 MG/DL
BUN SERPL-MCNC: 28 MG/DL
BUN SERPL-MCNC: 29 MG/DL
CALCIUM SERPL-MCNC: 8.7 MG/DL
CALCIUM SERPL-MCNC: 8.9 MG/DL
CHLORIDE SERPL-SCNC: 110 MMOL/L
CHLORIDE SERPL-SCNC: 117 MMOL/L
CO2 SERPL-SCNC: 27 MMOL/L
CO2 SERPL-SCNC: 31 MMOL/L
CREAT SERPL-MCNC: 0.9 MG/DL
CREAT SERPL-MCNC: 0.9 MG/DL
DELSYS: ABNORMAL
DIFFERENTIAL METHOD: ABNORMAL
EOSINOPHIL NFR BLD: 0 %
ERYTHROCYTE [DISTWIDTH] IN BLOOD BY AUTOMATED COUNT: 17.5 %
EST. GFR  (AFRICAN AMERICAN): >60 ML/MIN/1.73 M^2
EST. GFR  (AFRICAN AMERICAN): >60 ML/MIN/1.73 M^2
EST. GFR  (NON AFRICAN AMERICAN): >60 ML/MIN/1.73 M^2
EST. GFR  (NON AFRICAN AMERICAN): >60 ML/MIN/1.73 M^2
FLOW: 3.5
GLUCOSE SERPL-MCNC: 190 MG/DL
GLUCOSE SERPL-MCNC: 204 MG/DL
HCO3 UR-SCNC: 30.5 MMOL/L (ref 24–28)
HCT VFR BLD AUTO: 29.5 %
HGB BLD-MCNC: 9.8 G/DL
LYMPHOCYTES NFR BLD: 4 %
MCH RBC QN AUTO: 28.7 PG
MCHC RBC AUTO-ENTMCNC: 33.2 G/DL
MCV RBC AUTO: 86 FL
MODE: ABNORMAL
MONOCYTES NFR BLD: 8 %
NEUTROPHILS NFR BLD: 45 %
NEUTS BAND NFR BLD MANUAL: 43 %
PCO2 BLDA: 49.4 MMHG (ref 35–45)
PH SMN: 7.4 [PH] (ref 7.35–7.45)
PLATELET # BLD AUTO: 571 K/UL
PLATELET BLD QL SMEAR: ABNORMAL
PMV BLD AUTO: 9.7 FL
PO2 BLDA: 83 MMHG (ref 80–100)
POC BE: 5 MMOL/L
POC SATURATED O2: 96 % (ref 95–100)
POC TCO2: 32 MMOL/L (ref 23–27)
POCT GLUCOSE: 186 MG/DL (ref 70–110)
POCT GLUCOSE: 191 MG/DL (ref 70–110)
POCT GLUCOSE: 234 MG/DL (ref 70–110)
POTASSIUM SERPL-SCNC: 3.2 MMOL/L
POTASSIUM SERPL-SCNC: 3.2 MMOL/L
PROT SERPL-MCNC: 4.9 G/DL
RBC # BLD AUTO: 3.42 M/UL
SAMPLE: ABNORMAL
SITE: ABNORMAL
SODIUM SERPL-SCNC: 147 MMOL/L
SODIUM SERPL-SCNC: 150 MMOL/L
SP02: 93
WBC # BLD AUTO: 18.91 K/UL

## 2019-03-06 PROCEDURE — A4216 STERILE WATER/SALINE, 10 ML: HCPCS | Performed by: STUDENT IN AN ORGANIZED HEALTH CARE EDUCATION/TRAINING PROGRAM

## 2019-03-06 PROCEDURE — 63600175 PHARM REV CODE 636 W HCPCS: Performed by: SURGERY

## 2019-03-06 PROCEDURE — 99900035 HC TECH TIME PER 15 MIN (STAT)

## 2019-03-06 PROCEDURE — 63600175 PHARM REV CODE 636 W HCPCS: Performed by: STUDENT IN AN ORGANIZED HEALTH CARE EDUCATION/TRAINING PROGRAM

## 2019-03-06 PROCEDURE — S0030 INJECTION, METRONIDAZOLE: HCPCS | Performed by: SURGERY

## 2019-03-06 PROCEDURE — 94640 AIRWAY INHALATION TREATMENT: CPT

## 2019-03-06 PROCEDURE — 85027 COMPLETE CBC AUTOMATED: CPT

## 2019-03-06 PROCEDURE — 20000000 HC ICU ROOM

## 2019-03-06 PROCEDURE — 36415 COLL VENOUS BLD VENIPUNCTURE: CPT

## 2019-03-06 PROCEDURE — C9113 INJ PANTOPRAZOLE SODIUM, VIA: HCPCS | Performed by: STUDENT IN AN ORGANIZED HEALTH CARE EDUCATION/TRAINING PROGRAM

## 2019-03-06 PROCEDURE — 25000003 PHARM REV CODE 250: Performed by: STUDENT IN AN ORGANIZED HEALTH CARE EDUCATION/TRAINING PROGRAM

## 2019-03-06 PROCEDURE — 85007 BL SMEAR W/DIFF WBC COUNT: CPT

## 2019-03-06 PROCEDURE — 36600 WITHDRAWAL OF ARTERIAL BLOOD: CPT

## 2019-03-06 PROCEDURE — B4185 PARENTERAL SOL 10 GM LIPIDS: HCPCS | Performed by: STUDENT IN AN ORGANIZED HEALTH CARE EDUCATION/TRAINING PROGRAM

## 2019-03-06 PROCEDURE — 27000221 HC OXYGEN, UP TO 24 HOURS

## 2019-03-06 PROCEDURE — 80048 BASIC METABOLIC PNL TOTAL CA: CPT

## 2019-03-06 PROCEDURE — 25000003 PHARM REV CODE 250: Performed by: SURGERY

## 2019-03-06 PROCEDURE — 80053 COMPREHEN METABOLIC PANEL: CPT

## 2019-03-06 PROCEDURE — 82803 BLOOD GASES ANY COMBINATION: CPT

## 2019-03-06 PROCEDURE — 94761 N-INVAS EAR/PLS OXIMETRY MLT: CPT

## 2019-03-06 RX ORDER — FUROSEMIDE 10 MG/ML
40 INJECTION INTRAMUSCULAR; INTRAVENOUS ONCE
Status: COMPLETED | OUTPATIENT
Start: 2019-03-06 | End: 2019-03-06

## 2019-03-06 RX ORDER — GLUCAGON 1 MG
1 KIT INJECTION
Status: DISCONTINUED | OUTPATIENT
Start: 2019-03-06 | End: 2019-03-21 | Stop reason: HOSPADM

## 2019-03-06 RX ORDER — OXYCODONE HYDROCHLORIDE 5 MG/1
5 TABLET ORAL EVERY 4 HOURS PRN
Status: DISCONTINUED | OUTPATIENT
Start: 2019-03-06 | End: 2019-03-06

## 2019-03-06 RX ORDER — ACETAMINOPHEN 325 MG/1
650 TABLET ORAL EVERY 6 HOURS
Status: DISCONTINUED | OUTPATIENT
Start: 2019-03-06 | End: 2019-03-20

## 2019-03-06 RX ORDER — MORPHINE SULFATE 10 MG/ML
2 INJECTION INTRAMUSCULAR; INTRAVENOUS; SUBCUTANEOUS
Status: DISCONTINUED | OUTPATIENT
Start: 2019-03-06 | End: 2019-03-06

## 2019-03-06 RX ORDER — GABAPENTIN 100 MG/1
100 CAPSULE ORAL 3 TIMES DAILY
Status: DISCONTINUED | OUTPATIENT
Start: 2019-03-06 | End: 2019-03-13

## 2019-03-06 RX ORDER — POTASSIUM CHLORIDE 14.9 MG/ML
20 INJECTION INTRAVENOUS
Status: COMPLETED | OUTPATIENT
Start: 2019-03-06 | End: 2019-03-06

## 2019-03-06 RX ORDER — INSULIN ASPART 100 [IU]/ML
0-5 INJECTION, SOLUTION INTRAVENOUS; SUBCUTANEOUS EVERY 6 HOURS PRN
Status: DISCONTINUED | OUTPATIENT
Start: 2019-03-06 | End: 2019-03-08

## 2019-03-06 RX ADMIN — POTASSIUM CHLORIDE 20 MEQ: 200 INJECTION, SOLUTION INTRAVENOUS at 04:03

## 2019-03-06 RX ADMIN — FUROSEMIDE 40 MG: 10 INJECTION, SOLUTION INTRAVENOUS at 04:03

## 2019-03-06 RX ADMIN — FUROSEMIDE 40 MG: 10 INJECTION, SOLUTION INTRAVENOUS at 09:03

## 2019-03-06 RX ADMIN — PIPERACILLIN AND TAZOBACTAM 4.5 G: 4; .5 INJECTION, POWDER, LYOPHILIZED, FOR SOLUTION INTRAVENOUS; PARENTERAL at 12:03

## 2019-03-06 RX ADMIN — LEVETIRACETAM 500 MG: 5 INJECTION INTRAVENOUS at 08:03

## 2019-03-06 RX ADMIN — POTASSIUM CHLORIDE 20 MEQ: 200 INJECTION, SOLUTION INTRAVENOUS at 11:03

## 2019-03-06 RX ADMIN — ALBUTEROL SULFATE 2 PUFF: 90 AEROSOL, METERED RESPIRATORY (INHALATION) at 03:03

## 2019-03-06 RX ADMIN — GABAPENTIN 100 MG: 100 CAPSULE ORAL at 09:03

## 2019-03-06 RX ADMIN — METRONIDAZOLE 500 MG: 500 INJECTION, SOLUTION INTRAVENOUS at 07:03

## 2019-03-06 RX ADMIN — ENOXAPARIN SODIUM 40 MG: 100 INJECTION SUBCUTANEOUS at 05:03

## 2019-03-06 RX ADMIN — POTASSIUM CHLORIDE 20 MEQ: 200 INJECTION, SOLUTION INTRAVENOUS at 12:03

## 2019-03-06 RX ADMIN — METRONIDAZOLE 500 MG: 500 INJECTION, SOLUTION INTRAVENOUS at 11:03

## 2019-03-06 RX ADMIN — FLUCONAZOLE, SODIUM CHLORIDE 200 MG: 2 INJECTION INTRAVENOUS at 07:03

## 2019-03-06 RX ADMIN — RETINOL, ERGOCALCIFEROL, .ALPHA.-TOCOPHEROL ACETATE, DL-, PHYTONADIONE, ASCORBIC ACID, NIACINAMIDE, RIBOFLAVIN 5-PHOSPHATE SODIUM, THIAMINE HYDROCHLORIDE, PYRIDOXINE HYDROCHLORIDE, DEXPANTHENOL, BIOTIN, FOLIC ACID, AND CYANOCOBALAMIN: KIT at 10:03

## 2019-03-06 RX ADMIN — PANTOPRAZOLE SODIUM 40 MG: 40 INJECTION, POWDER, LYOPHILIZED, FOR SOLUTION INTRAVENOUS at 08:03

## 2019-03-06 RX ADMIN — PIPERACILLIN AND TAZOBACTAM 4.5 G: 4; .5 INJECTION, POWDER, LYOPHILIZED, FOR SOLUTION INTRAVENOUS; PARENTERAL at 09:03

## 2019-03-06 RX ADMIN — INSULIN ASPART 2 UNITS: 100 INJECTION, SOLUTION INTRAVENOUS; SUBCUTANEOUS at 08:03

## 2019-03-06 RX ADMIN — NALOXONE HYDROCHLORIDE 0.02 MG: 0.4 INJECTION, SOLUTION INTRAMUSCULAR; INTRAVENOUS; SUBCUTANEOUS at 02:03

## 2019-03-06 RX ADMIN — PIPERACILLIN AND TAZOBACTAM 4.5 G: 4; .5 INJECTION, POWDER, LYOPHILIZED, FOR SOLUTION INTRAVENOUS; PARENTERAL at 05:03

## 2019-03-06 RX ADMIN — POTASSIUM CHLORIDE 20 MEQ: 200 INJECTION, SOLUTION INTRAVENOUS at 09:03

## 2019-03-06 RX ADMIN — Medication 10 ML: at 12:03

## 2019-03-06 RX ADMIN — METRONIDAZOLE 500 MG: 500 INJECTION, SOLUTION INTRAVENOUS at 04:03

## 2019-03-06 RX ADMIN — LEVETIRACETAM 500 MG: 5 INJECTION INTRAVENOUS at 09:03

## 2019-03-06 RX ADMIN — POTASSIUM CHLORIDE 20 MEQ: 200 INJECTION, SOLUTION INTRAVENOUS at 07:03

## 2019-03-06 RX ADMIN — Medication 10 ML: at 06:03

## 2019-03-06 RX ADMIN — POTASSIUM CHLORIDE 20 MEQ: 200 INJECTION, SOLUTION INTRAVENOUS at 05:03

## 2019-03-06 RX ADMIN — SOYBEAN OIL 250 ML: 20 INJECTION, SOLUTION INTRAVENOUS at 10:03

## 2019-03-06 RX ADMIN — FLUTICASONE FUROATE AND VILANTEROL TRIFENATATE 1 PUFF: 100; 25 POWDER RESPIRATORY (INHALATION) at 07:03

## 2019-03-06 NOTE — PROGRESS NOTES
[facility]  General Surgery  Progress Note    Subjective:     Interval History: respiratory distress reported by RN this am, improved with Lasix. No other acute issues.     Post-Op Info:  Procedure(s) (LRB):  LAPAROSCOPY, DIAGNOSTIC (N/A)  LAPAROTOMY, EXPLORATORY   2 Days Post-Op      Medications:  Continuous Infusions:   Amino acid 5% - dextrose 15% (CLINIMIX-E) solution with additives (1L  provides 510 kcal/L dextrose, with 50 gm AA, 150 gm CHO, Na 35, K 30, Mg 5, Ca 4.5, Acetate 80, Cl 39, Phos 15) 100 mL/hr at 03/06/19 0600     Scheduled Meds:   aspirin  81 mg Oral Daily    enoxaparin  40 mg Subcutaneous Daily    escitalopram oxalate  10 mg Oral Daily    fat emulsion  250 mL Intravenous Daily    fluconazole  200 mg Intravenous Q24H    fluticasone-vilanterol  1 puff Inhalation Daily    levetiracetam IVPB  500 mg Intravenous Q12H    metronidazole  500 mg Intravenous Q8H    pantoprazole  40 mg Intravenous Daily    piperacillin-tazobactam (ZOSYN) IVPB  4.5 g Intravenous Q8H    potassium chloride in water  20 mEq Intravenous Q2H    sodium chloride 0.9%  10 mL Intravenous Q6H     PRN Meds:albuterol, dextrose 50%, glucagon (human recombinant), insulin aspart U-100, morphine, naloxone, ondansetron, ondansetron, promethazine (PHENERGAN) IVPB, Flushing PICC Protocol **AND** sodium chloride 0.9% **AND** sodium chloride 0.9%, sodium chloride 0.9%     Objective:     Vital Signs (Most Recent):  Temp: 98.9 °F (37.2 °C) (03/06/19 0300)  Pulse: 80 (03/06/19 0600)  Resp: (!) 24 (03/06/19 0400)  BP: (!) 147/78 (03/06/19 0600)  SpO2: 96 % (03/06/19 0600) Vital Signs (24h Range):  Temp:  [98.2 °F (36.8 °C)-98.9 °F (37.2 °C)] 98.9 °F (37.2 °C)  Pulse:  [76-92] 80  Resp:  [17-30] 24  SpO2:  [86 %-98 %] 96 %  BP: (131-183)/(63-88) 147/78       Intake/Output Summary (Last 24 hours) at 3/6/2019 0705  Last data filed at 3/6/2019 0600  Gross per 24 hour   Intake 4928.51 ml   Output 4230 ml   Net 698.51 ml        Physical Exam  Somnolent this am  On NRB, good sats, no increased work of breathing  RRR  Abd soft, non distended. Midline with diane, removed, no purulence  Ostomy pink, minimal edema, bowel sweat in bag  Chanel in place with light yellow urine    Significant Labs:  CBC:   Recent Labs   Lab 03/06/19  0348   WBC 18.91*   RBC 3.42*   HGB 9.8*   HCT 29.5*   *   MCV 86   MCH 28.7   MCHC 33.2     CMP:   Recent Labs   Lab 03/06/19  0348   *   CALCIUM 8.9   ALBUMIN 1.4*   PROT 4.9*   *   K 3.2*   CO2 27   *   BUN 28*   CREATININE 0.9   ALKPHOS 58   ALT 10   AST 9*   BILITOT 0.2       Significant Diagnostics:  I have reviewed all pertinent imaging results/findings within the past 24 hours.    Assessment/Plan:   68M s/p recent lap LAR for rectal cancer on 2/22 presenting with increasing abdominal pain, CT with intraperitoneal free air in the postoperative setting. Evening of 3/1 patient febrile, tachycardic and subsequently transferred to the ICU. Hemodynamics normalized overnight on 3/1 and he has remained HDS without lactic acidosis or metabolic durrangements. Repeat CT scan with readministration of free air. NG placed and PICC line obtained. Taken to the OR on 3/4 for ex lap and diverting loop ostomy. Returned to the ICU in stable condition      Neuro: Alert and oriented, lethargic   -prn morphine, start oral regimen today  -hx of seizures- Keppra IV     CV: HDS.   -Cardiology evaluated- ACS ruled out  -MAPS remain stable without use of vasopressors   -Hold home BP meds      PULM: CXR without acute process, needs to improve pulmonary toilet  -NC to keep oxygen sats > 94%  -IS hourly while awake  -Home inhalers      FEN/GI: s/p LAR on 2/22, washout and loop ostomy 3/4   -dc NG, likely start PO once more alert and sitting up in chair today  -Continue TPN, dc mIVF. Nutrition labs Thursday  -Replace  K, plan to alter TPN due to uptrending Na levels     : YOVANI chanel 2870  -Lasix this am with  good response   -BUN, Cr 28, 0.9     Heme/ID: WBC uptrending, CXR doesn't look to be source, likely risidual intra abdominal, will continue to trend  -blood cultures NG.  -continue Zosyn, fluconazole and flagyl    Wounds: daily betadine [place] of midline     PPX: Lovenox and Protonix (home med)      Lines: NG (dc), PICC, Field, ostomy      Dispo: Step down today with cardiac/O2 monitoring      Active Diagnoses:    Diagnosis Date Noted POA    PRINCIPAL PROBLEM:  Other chest pain [R07.89] 03/02/2019 Yes    Hypertension [I10] 03/02/2019 Unknown    Mixed hyperlipidemia [E78.2] 03/02/2019 Yes    Abdominal pain [R10.9] 02/26/2019 Yes    Carcinoma of rectum [C20] 11/12/2018 Yes      Problems Resolved During this Admission:         Coby Harp MD  General Surgery  [facility]

## 2019-03-06 NOTE — PLAN OF CARE
Problem: Adult Inpatient Plan of Care  Goal: Plan of Care Review  Outcome: Ongoing (interventions implemented as appropriate)  Pt remains in ICU today. 60mg lasix total given today. 120 mEq K+ given this shift. CPT ordered q6. NGT discontinued per MD order. Accuchecks and sliding scale insulin added to plan of care. ABG done with results in normal range. Field with excellent output. One dose narcan given, pt remains lethargic and confused. Remains free from fall, injury, or breakdown throughout shift.

## 2019-03-06 NOTE — PLAN OF CARE
Problem: Adult Inpatient Plan of Care  Goal: Plan of Care Review  Outcome: Ongoing (interventions implemented as appropriate)   03/06/19 0601   Plan of Care Review   Plan of Care Reviewed With patient     Patient has S/P  Exploratory Laparotomy with loop ileosotmy post-op day 2. Patient appears drowsy able to ope eyes spontaneously and follow simple commands. He gets  confused at times. Right lower quadrant ileosomy noted draining serosangionous drainage with 50 ml for the shift. Stoma appears to be ruy pink and moist. Field with dark juan c urine. NGT to LIWS with 100 ml of greenish drainage. On Dilaudid PCA for pain control.  Vital signs stable and within limits. No fall or injury No breakdown was noted.

## 2019-03-06 NOTE — PROGRESS NOTES
POD #2 Exploratory laparotomy with loop ileostomy  Ai drainable pouch in place over right loop ileostomy. No feculent effluent post op- only bowel sweat in appliance.  Dressing in place over midline abdominal wound.  Sister, Grace, at bedside. Discussed ostomy management with sister. Reports patient is ambulatory, but doesn't perform any activities since subdural hemorrhage. Patient's niece stays with patient. Briefly described care of ostomy with sister and inquired about person to be able to assist with ostomy management when discharged home. Will provide teaching to family prior to discharge and determine appropriate pouch for ileostomy.

## 2019-03-06 NOTE — ANESTHESIA POSTPROCEDURE EVALUATION
"Anesthesia Post Evaluation    Patient: Justin Adams    Procedure(s) Performed: Procedure(s) (LRB):  LAPAROSCOPY, DIAGNOSTIC (N/A)  LAPAROTOMY, EXPLORATORY    Final Anesthesia Type: general  Patient location during evaluation: PACU  Patient participation: Yes- Able to Participate  Level of consciousness: awake and alert  Post-procedure vital signs: reviewed and stable  Pain management: adequate  Airway patency: patent    Anesthetic complications: no      Cardiovascular status: blood pressure returned to baseline and hemodynamically stable  Respiratory status: unassisted and spontaneous ventilation  Hydration status: euvolemic  Follow-up not needed.        Visit Vitals  BP (!) 147/78   Pulse 80   Temp 37.2 °C (98.9 °F) (Oral)   Resp (!) 24   Ht 5' 4" (1.626 m)   Wt 101.4 kg (223 lb 8.7 oz)   SpO2 96%   BMI 38.37 kg/m²       Pain/Joy Score: Pain Rating Prior to Med Admin: 0 (3/6/2019  6:00 AM)        "

## 2019-03-06 NOTE — UM SECONDARY REVIEW
VP Medical Affairs    IP Extended Stay > 10     Case discussed w/ Dr. Sol during LLOS    LOS: approved w/o recommendations due to severity of clinical picture

## 2019-03-06 NOTE — PT/OT/SLP PROGRESS
Physical Therapy      Patient Name:  Justin Adams   MRN:  [id number]    Patient not seen today secondary to not appropriate per nurse due to waiting for an ABG. Will follow-up again tomorrow.    Mónica Judd, PT

## 2019-03-07 LAB
ALBUMIN SERPL BCP-MCNC: 1.4 G/DL
ALP SERPL-CCNC: 65 U/L
ALT SERPL W/O P-5'-P-CCNC: 7 U/L
ANION GAP SERPL CALC-SCNC: 5 MMOL/L
ANISOCYTOSIS BLD QL SMEAR: SLIGHT
AST SERPL-CCNC: 12 U/L
BASOPHILS NFR BLD: 0 %
BILIRUB SERPL-MCNC: 0.2 MG/DL
BUN SERPL-MCNC: 31 MG/DL
CALCIUM SERPL-MCNC: 8.6 MG/DL
CHLORIDE SERPL-SCNC: 108 MMOL/L
CO2 SERPL-SCNC: 33 MMOL/L
CREAT SERPL-MCNC: 0.9 MG/DL
CRP SERPL-MCNC: 113.4 MG/L
DIFFERENTIAL METHOD: ABNORMAL
EOSINOPHIL NFR BLD: 0 %
ERYTHROCYTE [DISTWIDTH] IN BLOOD BY AUTOMATED COUNT: 17.1 %
EST. GFR  (AFRICAN AMERICAN): >60 ML/MIN/1.73 M^2
EST. GFR  (NON AFRICAN AMERICAN): >60 ML/MIN/1.73 M^2
GLUCOSE SERPL-MCNC: 251 MG/DL
HCT VFR BLD AUTO: 28.9 %
HGB BLD-MCNC: 9.7 G/DL
LYMPHOCYTES NFR BLD: 4 %
MAGNESIUM SERPL-MCNC: 1.5 MG/DL
MCH RBC QN AUTO: 28.8 PG
MCHC RBC AUTO-ENTMCNC: 33.6 G/DL
MCV RBC AUTO: 86 FL
METAMYELOCYTES NFR BLD MANUAL: 3 %
MONOCYTES NFR BLD: 6 %
NEUTROPHILS NFR BLD: 59 %
NEUTS BAND NFR BLD MANUAL: 28 %
PHOSPHATE SERPL-MCNC: 2.6 MG/DL
PLATELET # BLD AUTO: 650 K/UL
PLATELET BLD QL SMEAR: ABNORMAL
PMV BLD AUTO: 10.4 FL
POCT GLUCOSE: 200 MG/DL (ref 70–110)
POCT GLUCOSE: 267 MG/DL (ref 70–110)
POCT GLUCOSE: 292 MG/DL (ref 70–110)
POCT GLUCOSE: 335 MG/DL (ref 70–110)
POCT GLUCOSE: 390 MG/DL (ref 70–110)
POTASSIUM SERPL-SCNC: 3.5 MMOL/L
PREALB SERPL-MCNC: 6 MG/DL
PROT SERPL-MCNC: 4.9 G/DL
RBC # BLD AUTO: 3.37 M/UL
SODIUM SERPL-SCNC: 146 MMOL/L
WBC # BLD AUTO: 18.04 K/UL

## 2019-03-07 PROCEDURE — 63600175 PHARM REV CODE 636 W HCPCS: Performed by: STUDENT IN AN ORGANIZED HEALTH CARE EDUCATION/TRAINING PROGRAM

## 2019-03-07 PROCEDURE — 94640 AIRWAY INHALATION TREATMENT: CPT

## 2019-03-07 PROCEDURE — 20000000 HC ICU ROOM

## 2019-03-07 PROCEDURE — 83735 ASSAY OF MAGNESIUM: CPT

## 2019-03-07 PROCEDURE — S0030 INJECTION, METRONIDAZOLE: HCPCS | Performed by: SURGERY

## 2019-03-07 PROCEDURE — 84100 ASSAY OF PHOSPHORUS: CPT

## 2019-03-07 PROCEDURE — 94667 MNPJ CHEST WALL 1ST: CPT

## 2019-03-07 PROCEDURE — B4185 PARENTERAL SOL 10 GM LIPIDS: HCPCS | Performed by: STUDENT IN AN ORGANIZED HEALTH CARE EDUCATION/TRAINING PROGRAM

## 2019-03-07 PROCEDURE — 86140 C-REACTIVE PROTEIN: CPT

## 2019-03-07 PROCEDURE — 25000003 PHARM REV CODE 250: Performed by: SURGERY

## 2019-03-07 PROCEDURE — 27000221 HC OXYGEN, UP TO 24 HOURS

## 2019-03-07 PROCEDURE — A4216 STERILE WATER/SALINE, 10 ML: HCPCS | Performed by: STUDENT IN AN ORGANIZED HEALTH CARE EDUCATION/TRAINING PROGRAM

## 2019-03-07 PROCEDURE — 85027 COMPLETE CBC AUTOMATED: CPT

## 2019-03-07 PROCEDURE — 97165 OT EVAL LOW COMPLEX 30 MIN: CPT

## 2019-03-07 PROCEDURE — 97161 PT EVAL LOW COMPLEX 20 MIN: CPT

## 2019-03-07 PROCEDURE — 25000003 PHARM REV CODE 250: Performed by: STUDENT IN AN ORGANIZED HEALTH CARE EDUCATION/TRAINING PROGRAM

## 2019-03-07 PROCEDURE — 84134 ASSAY OF PREALBUMIN: CPT

## 2019-03-07 PROCEDURE — 63600175 PHARM REV CODE 636 W HCPCS: Performed by: SURGERY

## 2019-03-07 PROCEDURE — 94668 MNPJ CHEST WALL SBSQ: CPT

## 2019-03-07 PROCEDURE — 80053 COMPREHEN METABOLIC PANEL: CPT

## 2019-03-07 PROCEDURE — C9113 INJ PANTOPRAZOLE SODIUM, VIA: HCPCS | Performed by: STUDENT IN AN ORGANIZED HEALTH CARE EDUCATION/TRAINING PROGRAM

## 2019-03-07 PROCEDURE — 85007 BL SMEAR W/DIFF WBC COUNT: CPT

## 2019-03-07 PROCEDURE — 36415 COLL VENOUS BLD VENIPUNCTURE: CPT

## 2019-03-07 RX ORDER — BISMUTH SUBSALICYLATE 525 MG/30ML
30 LIQUID ORAL DAILY
Status: DISCONTINUED | OUTPATIENT
Start: 2019-03-07 | End: 2019-03-08

## 2019-03-07 RX ORDER — MAGNESIUM SULFATE 1 G/100ML
1 INJECTION INTRAVENOUS ONCE
Status: COMPLETED | OUTPATIENT
Start: 2019-03-07 | End: 2019-03-07

## 2019-03-07 RX ORDER — OXYCODONE HYDROCHLORIDE 5 MG/1
5 TABLET ORAL EVERY 6 HOURS PRN
Status: DISCONTINUED | OUTPATIENT
Start: 2019-03-07 | End: 2019-03-13

## 2019-03-07 RX ORDER — OCTREOTIDE ACETATE 50 UG/ML
50 INJECTION, SOLUTION INTRAVENOUS; SUBCUTANEOUS EVERY 8 HOURS
Status: DISCONTINUED | OUTPATIENT
Start: 2019-03-07 | End: 2019-03-07

## 2019-03-07 RX ORDER — KETOROLAC TROMETHAMINE 30 MG/ML
15 INJECTION, SOLUTION INTRAMUSCULAR; INTRAVENOUS EVERY 6 HOURS
Status: DISCONTINUED | OUTPATIENT
Start: 2019-03-07 | End: 2019-03-07

## 2019-03-07 RX ORDER — POTASSIUM CHLORIDE 29.8 MG/ML
40 INJECTION INTRAVENOUS ONCE
Status: COMPLETED | OUTPATIENT
Start: 2019-03-07 | End: 2019-03-07

## 2019-03-07 RX ORDER — KETOROLAC TROMETHAMINE 30 MG/ML
15 INJECTION, SOLUTION INTRAMUSCULAR; INTRAVENOUS EVERY 6 HOURS
Status: COMPLETED | OUTPATIENT
Start: 2019-03-07 | End: 2019-03-10

## 2019-03-07 RX ORDER — OCTREOTIDE ACETATE 100 UG/ML
100 INJECTION, SOLUTION INTRAVENOUS; SUBCUTANEOUS EVERY 8 HOURS
Status: DISCONTINUED | OUTPATIENT
Start: 2019-03-07 | End: 2019-03-21 | Stop reason: HOSPADM

## 2019-03-07 RX ADMIN — PIPERACILLIN AND TAZOBACTAM 4.5 G: 4; .5 INJECTION, POWDER, LYOPHILIZED, FOR SOLUTION INTRAVENOUS; PARENTERAL at 09:03

## 2019-03-07 RX ADMIN — Medication 10 ML: at 05:03

## 2019-03-07 RX ADMIN — RETINOL, ERGOCALCIFEROL, .ALPHA.-TOCOPHEROL ACETATE, DL-, PHYTONADIONE, ASCORBIC ACID, NIACINAMIDE, RIBOFLAVIN 5-PHOSPHATE SODIUM, THIAMINE HYDROCHLORIDE, PYRIDOXINE HYDROCHLORIDE, DEXPANTHENOL, BIOTIN, FOLIC ACID, AND CYANOCOBALAMIN: KIT at 10:03

## 2019-03-07 RX ADMIN — INSULIN ASPART 3 UNITS: 100 INJECTION, SOLUTION INTRAVENOUS; SUBCUTANEOUS at 11:03

## 2019-03-07 RX ADMIN — BISMUTH SUBSALICYLATE 30 ML: 262 LIQUID ORAL at 12:03

## 2019-03-07 RX ADMIN — GABAPENTIN 100 MG: 100 CAPSULE ORAL at 09:03

## 2019-03-07 RX ADMIN — OCTREOTIDE ACETATE 100 MCG: 100 INJECTION, SOLUTION INTRAVENOUS; SUBCUTANEOUS at 03:03

## 2019-03-07 RX ADMIN — METRONIDAZOLE 500 MG: 500 INJECTION, SOLUTION INTRAVENOUS at 09:03

## 2019-03-07 RX ADMIN — SOYBEAN OIL 250 ML: 20 INJECTION, SOLUTION INTRAVENOUS at 10:03

## 2019-03-07 RX ADMIN — POTASSIUM PHOSPHATE, MONOBASIC AND POTASSIUM PHOSPHATE, DIBASIC 15 MMOL: 224; 236 INJECTION, SOLUTION, CONCENTRATE INTRAVENOUS at 09:03

## 2019-03-07 RX ADMIN — ACETAMINOPHEN 650 MG: 325 TABLET, FILM COATED ORAL at 05:03

## 2019-03-07 RX ADMIN — ENOXAPARIN SODIUM 40 MG: 100 INJECTION SUBCUTANEOUS at 05:03

## 2019-03-07 RX ADMIN — MAGNESIUM SULFATE 1 G: 1 INJECTION INTRAVENOUS at 12:03

## 2019-03-07 RX ADMIN — PANTOPRAZOLE SODIUM 40 MG: 40 INJECTION, POWDER, LYOPHILIZED, FOR SOLUTION INTRAVENOUS at 08:03

## 2019-03-07 RX ADMIN — KETOROLAC TROMETHAMINE 15 MG: 30 INJECTION, SOLUTION INTRAMUSCULAR at 05:03

## 2019-03-07 RX ADMIN — ACETAMINOPHEN 650 MG: 325 TABLET, FILM COATED ORAL at 12:03

## 2019-03-07 RX ADMIN — METRONIDAZOLE 500 MG: 500 INJECTION, SOLUTION INTRAVENOUS at 01:03

## 2019-03-07 RX ADMIN — PIPERACILLIN AND TAZOBACTAM 4.5 G: 4; .5 INJECTION, POWDER, LYOPHILIZED, FOR SOLUTION INTRAVENOUS; PARENTERAL at 01:03

## 2019-03-07 RX ADMIN — METRONIDAZOLE 500 MG: 500 INJECTION, SOLUTION INTRAVENOUS at 04:03

## 2019-03-07 RX ADMIN — LEVETIRACETAM 500 MG: 5 INJECTION INTRAVENOUS at 08:03

## 2019-03-07 RX ADMIN — PIPERACILLIN AND TAZOBACTAM 4.5 G: 4; .5 INJECTION, POWDER, LYOPHILIZED, FOR SOLUTION INTRAVENOUS; PARENTERAL at 05:03

## 2019-03-07 RX ADMIN — KETOROLAC TROMETHAMINE 15 MG: 30 INJECTION, SOLUTION INTRAMUSCULAR at 11:03

## 2019-03-07 RX ADMIN — KETOROLAC TROMETHAMINE 15 MG: 30 INJECTION, SOLUTION INTRAMUSCULAR at 08:03

## 2019-03-07 RX ADMIN — INSULIN ASPART 3 UNITS: 100 INJECTION, SOLUTION INTRAVENOUS; SUBCUTANEOUS at 05:03

## 2019-03-07 RX ADMIN — FLUTICASONE FUROATE AND VILANTEROL TRIFENATATE 1 PUFF: 100; 25 POWDER RESPIRATORY (INHALATION) at 09:03

## 2019-03-07 RX ADMIN — ESCITALOPRAM OXALATE 10 MG: 10 TABLET ORAL at 09:03

## 2019-03-07 RX ADMIN — ACETAMINOPHEN 650 MG: 325 TABLET, FILM COATED ORAL at 11:03

## 2019-03-07 RX ADMIN — ASPIRIN 81 MG: 81 TABLET, COATED ORAL at 09:03

## 2019-03-07 RX ADMIN — Medication 10 ML: at 11:03

## 2019-03-07 RX ADMIN — POTASSIUM CHLORIDE 40 MEQ: 400 INJECTION, SOLUTION INTRAVENOUS at 08:03

## 2019-03-07 RX ADMIN — Medication 10 ML: at 12:03

## 2019-03-07 RX ADMIN — GABAPENTIN 100 MG: 100 CAPSULE ORAL at 03:03

## 2019-03-07 RX ADMIN — INSULIN ASPART 3 UNITS: 100 INJECTION, SOLUTION INTRAVENOUS; SUBCUTANEOUS at 12:03

## 2019-03-07 RX ADMIN — LEVETIRACETAM 500 MG: 5 INJECTION INTRAVENOUS at 09:03

## 2019-03-07 RX ADMIN — FLUCONAZOLE, SODIUM CHLORIDE 200 MG: 2 INJECTION INTRAVENOUS at 09:03

## 2019-03-07 RX ADMIN — OCTREOTIDE ACETATE 100 MCG: 100 INJECTION, SOLUTION INTRAVENOUS; SUBCUTANEOUS at 09:03

## 2019-03-07 NOTE — PROGRESS NOTES
1000 Pt reports moderate relief from toradol, up to chair one person max assist, large liquid green/brown stool from rectum when rising, large, frequent belches when sitting up, good cough effort, rales, expiratory wheezes, and coarse breath sounds present throughout all lung fields, lower portion of incision has gapping; surgeon packed with gauze/iodine.    1030 Per Loyd ok to advance to clear liquids, ok to give oxy IR 5 for breakthrough pain.  Done.    1830  Pt pain well controlled, afebrile, VSS, dressing dry and intact, wound open and packed as described above, pt able to move with moderate assist, tolerating clear liquids well, ostomy patent and draining brown liquid, Field catheter in place and patent, draining adequate clear yeollow urine to gravity, bed in low locked position, call light within reach, able to make needs known; no needs at this time.

## 2019-03-07 NOTE — PLAN OF CARE
Problem: Physical Therapy Goal  Goal: Physical Therapy Goal  Goals to be met by: 3/21/2019     Patient will increase functional independence with mobility by performin. Supine to sit with Stand-by Assistance  2. Sit to stand transfer with Stand-by Assistance  3. Gait  x 100 feet with Stand-by Assistance using rollator.   4. Lower extremity exercise program x10 reps per handout, with supervision    Outcome: Ongoing (interventions implemented as appropriate)  Initial PT evaluation performed.  Pt could benefit from skilled PT services 3-5x/wk in order to maximize function prior to D/c.  SNF recommended

## 2019-03-07 NOTE — PROGRESS NOTES
"[facility]  Adult Nutrition  Progress Note    SUMMARY       Recommendations    1. Change TPN to custom provision of 15% dextrose, 5% amino acid @ 100 mL/hr + lipid daily.  Current provision is exceeding pt's EEN. By making this change, it will also help w/ glycemic control.  Continue to replace lytes prn.    2. Continue to advance diet as tolerated per MD discretion      Goals: TPN to meet % or EEN/EPN  Nutrition Goal Status: progressing towards goal  Communication of RD Recs: (plan of care)    Reason for Assessment    Reason For Assessment: RD follow-up  Diagnosis: surgery/postoperative complications  Relevant Medical History: HTN, cardiomegaly, rectal ca s/p LAR 19   General Information Comments: POD# 3 s/p ex-lap w/ loop ileostomy & washout 2/ anastamotic dehis w/ feculent peritonitis. Pt on NC when seen this AM. NG removed yesterday & pt tolerating ice chips well. Diet has since been advanced to CL. + ostomy output. TPN & lipids re-ordered today. NFPE performed 3/5; see malnutrition section of note for details.     Nutrition Discharge Planning: Unable to determine @ this time    Nutrition Risk Screen    Nutrition Risk Screen: tube feeding or parenteral nutrition    Nutrition/Diet History    Spiritual, Cultural Beliefs, Anglican Practices, Values that Affect Care: no  Food Allergies: NKFA  Factors Affecting Nutritional Intake: altered gastrointestinal function    Anthropometrics    Temp: 97.8 °F (36.6 °C)  Height Method: Stated  Height: 5' 4" (162.6 cm)  Height (inches): 64 in  Weight Method: Bed Scale  Weight: 101.4 kg (223 lb 8.7 oz)  Weight (lb): 223.55 lb  Ideal Body Weight (IBW), Male: 130 lb  % Ideal Body Weight, Male (lb): 171.96 lb  BMI (Calculated): 38.5  BMI Grade: 35 - 39.9 - obesity - grade II  Usual Body Weight (UBW), k kg  % Usual Body Weight: 103.69  % Weight Change From Usual Weight: 3.47 %       Lab/Procedures/Meds    Pertinent Labs Reviewed: " reviewed  Pertinent Labs Comments: Na 146, CO2 33, Glu 251, POCT glu 186-267  Pertinent Medications Reviewed: reviewed  Pertinent Medications Comments: abx, KCl, Mg, octreotide, pantoprazole    Estimated/Assessed Needs    Weight Used For Calorie Calculations: 101.4 kg (223 lb 8.7 oz)  Energy Calorie Requirements (kcal): 2119  Energy Need Method: Boyd-St Jeor(x 1.25 (PAL))     Protein Requirements: 101-122 g (1-1.2 g/kg)  Weight Used For Protein Calculations: 101.4 kg (223 lb 8.7 oz)     Estimated Fluid Requirement Method: RDA Method  RDA Method (mL): 2119         Nutrition Prescription Ordered    Current Diet Order: NPO  Current Nutrition Support Formula Ordered: Clinimix E 5/20  Current Nutrition Support Rate Ordered: 100 (ml)  Current Nutrition Support Frequency Ordered: mL/hr x 24 hrs daily    Evaluation of Received Nutrient/Fluid Intake    Parenteral Calories (kcal): 2112  Parenteral Protein (gm): 120  Parenteral Fluid (mL): 2400  Lipid Calories (kcals): 500 kcals  GIR (Glucose Infusion Rate) (mg/kg/min): 3.3 mg/kg/min  Total Calories (kcal): 2612  % Kcal Needs: 123  Energy Calories Required: exceeds needs  Protein Required: meeting needs  Fluid Required: meeting needs  Comments: + ostomy output; good uop  Tolerance: tolerating  % Intake of Estimated Energy Needs: Other: > 100%  % Meal Intake: Other: CL diet    Nutrition Risk    Level of Risk/Frequency of Follow-up: (F/u 2 x weekly)     Assessment and Plan    Nutrition Problem  Altered GI Function     Related to (etiology):   Post-op sx complications     Signs and Symptoms (as evidenced by):   NPO w/ need for gut rest/PN     Interventions:  Collaboration w/ other providers     Nutrition Diagnosis Status:   Improving     Monitor and Evaluation    Food and Nutrient Intake: energy intake, parenteral nutrition intake  Food and Nutrient Adminstration: diet order, enteral and parenteral nutrition administration  Physical Activity and Function: nutrition-related  ADLs and IADLs  Anthropometric Measurements: weight change, weight  Biochemical Data, Medical Tests and Procedures: electrolyte and renal panel, glucose/endocrine profile, inflammatory profile, lipid profile  Nutrition-Focused Physical Findings: overall appearance     Malnutrition Assessment             Energy Intake (Malnutrition): less than or equal to 50% for greater than or equal to 5 days - resolved               Subcutaneous Fat Loss (Final Summary): well nourished  Muscle Loss Evaluation (Final Summary): well nourished         Nutrition Follow-Up    RD Follow-up?: Yes

## 2019-03-07 NOTE — PLAN OF CARE
Problem: Occupational Therapy Goal  Goal: Occupational Therapy Goal  Goals to be met by: 3/28/2019     Patient will increase functional independence with ADLs by performing:    Feeding with Modified [place].  UE Dressing with [place].  LE Dressing with Contact Guard Assistance.  Grooming while standing at sink with Contact Guard Assistance.  Toileting from bedside commode with Stand-by Assistance for hygiene and clothing management.   Sitting at edge of bed x15 minutes with Stand-by Assistance.  Rolling to Bilateral with Stand-by Assistance.   Supine to sit with Stand-by Assistance.  Step transfer with Contact Guard Assistance  Toilet transfer to toilet with Contact Guard Assistance.  Upper extremity exercise program with assistance as needed.    Outcome: Ongoing (interventions implemented as appropriate)  Patient tolerated evaluation fair, fair participation.  Patient will benefit from skilled OT services to address the above mentioned goals. GRANT Louis, MS

## 2019-03-07 NOTE — PLAN OF CARE
Problem: Adult Inpatient Plan of Care  Goal: Plan of Care Review  Outcome: Ongoing (interventions implemented as appropriate)   03/07/19 0528   Plan of Care Review   Plan of Care Reviewed With patient      Patient remain confused Open eyes spontaneously and follows commands. NGT was d/c'd yesterday able to swallow pills during the shift without any difficulty. Lung sounds remain coarse with non productive cough. CPT was done by resp. Therapist as per order. Able to maintain Saturation greater than 91% on 3 liters/min nasal cannula. Desaturates down to 85-86% on room air. Ostomy stating to put out good  Amount of greenish drainage. Stoma appears pink and moist. Field has adequate urine output during the shift. Dressing on Midline abdomen clean dry and intact Remain edematous +3 generalized edema with +4 scrotal edema noted. Np fall or injury No skin breakdown was noted.Vital signs stable and within normal limits. No significant lab results this morning.

## 2019-03-07 NOTE — PROGRESS NOTES
[facility]  General Surgery  Progress Note    Subjective:     Interval History: no acute events overnight. More wakeful this am.     Post-Op Info:  Procedure(s) (LRB):  LAPAROSCOPY, DIAGNOSTIC (N/A)  LAPAROTOMY, EXPLORATORY   3 Days Post-Op      Medications:  Continuous Infusions:   Amino acid 5% - dextrose 20% (CLINIMIX-E) solution with additives (2L  Provides 100 gm AA, 400 gm CHO (1360 kcal from dextrose), Na 70, K 60, Mg 10, Ca 9, Acetate 160, Cl 78, Phos 30) 100 mL/hr at 03/07/19 0600     Scheduled Meds:   acetaminophen  650 mg Oral Q6H    aspirin  81 mg Oral Daily    enoxaparin  40 mg Subcutaneous Daily    escitalopram oxalate  10 mg Oral Daily    fat emulsion  250 mL Intravenous Once    fluconazole  200 mg Intravenous Q24H    fluticasone-vilanterol  1 puff Inhalation Daily    gabapentin  100 mg Oral TID    levetiracetam IVPB  500 mg Intravenous Q12H    metronidazole  500 mg Intravenous Q8H    pantoprazole  40 mg Intravenous Daily    piperacillin-tazobactam (ZOSYN) IVPB  4.5 g Intravenous Q8H    sodium chloride 0.9%  10 mL Intravenous Q6H     PRN Meds:albuterol, dextrose 50%, glucagon (human recombinant), insulin aspart U-100, naloxone, ondansetron, ondansetron, Flushing PICC Protocol **AND** sodium chloride 0.9% **AND** sodium chloride 0.9%, sodium chloride 0.9%     Objective:     Vital Signs (Most Recent):  Temp: 97.9 °F (36.6 °C) (03/07/19 0314)  Pulse: 67 (03/07/19 0600)  Resp: (!) 23 (03/07/19 0600)  BP: (!) 155/86 (03/07/19 0600)  SpO2: (!) 93 % (03/07/19 0600) Vital Signs (24h Range):  Temp:  [97.9 °F (36.6 °C)-98.7 °F (37.1 °C)] 97.9 °F (36.6 °C)  Pulse:  [67-86] 67  Resp:  [19-56] 23  SpO2:  [87 %-95 %] 93 %  BP: (145-184)/(68-86) 155/86       Intake/Output Summary (Last 24 hours) at 3/7/2019 0756  Last data filed at 3/7/2019 0600  Gross per 24 hour   Intake 4126.37 ml   Output 8150 ml   Net -4023.63 ml       Physical Exam  Awake, more alert, answering more questions  appropriately  On NC  No increased work of breathing  RRR  Abd with dressing in place, cdi  Ostomy pink, patent, productive of green liquid stool  Chanel in place with light yellow urine  SCDs in place    Significant Labs:  BMP:   Recent Labs   Lab 03/07/19  0240   *   *   K 3.5      CO2 33*   BUN 31*   CREATININE 0.9   CALCIUM 8.6*     CBC:   Recent Labs   Lab 03/07/19  0240   WBC 18.04*   RBC 3.37*   HGB 9.7*   HCT 28.9*   *   MCV 86   MCH 28.8   MCHC 33.6       Significant Diagnostics:  None    Assessment/Plan:   68M s/p recent lap LAR for rectal cancer on 2/22 presenting with increasing abdominal pain, CT with intraperitoneal free air in the postoperative setting. Evening of 3/1 patient febrile, tachycardic and subsequently transferred to the ICU. Hemodynamics normalized overnight on 3/1 and he has remained HDS without lactic acidosis or metabolic durrangements. Repeat CT scan with readministration of free air. NG placed and PICC line obtained. Taken to the OR on 3/4 for ex lap and diverting loop ostomy. Returned to the ICU in stable condition      Neuro: More alert today  -no narcotics; scheduled tylenol and gabapentin  -home escitalopram  -toradol q6  -hx of seizures- Keppra IV     CV: HDS.   -Cardiology evaluated- ACS ruled out  -Hold home BP meds      PULM: CXR without acute process, needs to improve pulmonary toilet  -ABG yesterday pCO2 49, pO2 83  -NC to keep oxygen sats > 94%  -IS hourly while awake  -Home inhalers      FEN/GI: s/p LAR on 2/22, washout and loop ostomy 3/4, ostomy functioning, ostomy RN following  -NPO + meds, plan for swallow study pending alertness, and OOB  -Continue TPN, replete K, add on phos/mag  -PAB, CRP 6, 113     : chanel, diuresed, UOP 7375   -BUN, Cr 31, 0.9  -will dc chanel pending alertness and OOB     Heme/ID: WBC persists at 18, increase in PLT but CRP and bandemia improving    -blood cultures NG final  -continue Zosyn, fluconazole and flagyl for  presumed intraabdominal infection 2/2 feculent peritonitis    Endo: -267  -low dose ISS, accuchecks     MSK/Wounds:   -daily betadine [place] of midline  -PT/OT     PPX: Lovenox and Protonix (home med)      Lines: PICC, Ifeld, ostomy      Dispo: Step down today with cardiac/O2 monitoring pending mental status and pulm status      Active Diagnoses:    Diagnosis Date Noted POA    PRINCIPAL PROBLEM:  Other chest pain [R07.89] 03/02/2019 Yes    Hypertension [I10] 03/02/2019 Unknown    Mixed hyperlipidemia [E78.2] 03/02/2019 Yes    Abdominal pain [R10.9] 02/26/2019 Yes    Carcinoma of rectum [C20] 11/12/2018 Yes      Problems Resolved During this Admission:         Coby Harp MD  General Surgery  [facility]

## 2019-03-07 NOTE — PROGRESS NOTES
POD # 3 Loop ileostomy  Ileostomy has started functioning a dark green watery effluent. Stoma red in color. Nursing changed pouch today.   Midline abdominal incision with dry dressing. Noted telfa reyes between staples. Reyes saturated with red drainage.   Plan: Will assist with ostomy teaching when appropriate and determine appliance for discharge.

## 2019-03-07 NOTE — PLAN OF CARE
Problem: Adult Inpatient Plan of Care  Goal: Plan of Care Review  Recommendations     1. Change TPN to custom provision of 15% dextrose, 5% amino acid @ 100 mL/hr + lipid daily.  Current provision is exceeding pt's EEN. By making this change, it will also help w/ glycemic control.  Continue to replace lytes prn.    2. Continue to advance diet as tolerated per MD discretion       Goals: TPN to meet % or EEN/EPN  Nutrition Goal Status: progressing towards goal

## 2019-03-07 NOTE — PT/OT/SLP EVAL
Occupational Therapy   Evaluation    Name: Justin Adams  MRN: [id number]  Admitting Diagnosis:  Other chest pain 3 Days Post-Op    Recommendations:     Discharge Recommendations: nursing facility, skilled  Discharge Equipment Recommendations:  other (see comments)(TBD pending progress)  Barriers to discharge:  Decreased caregiver support    Assessment:     Justin Adams is a 68 y.o. male with a medical diagnosis of Other chest pain.  He presents with  independence for self-care and functional transfers. Patient will benefit from skilled OT services to address the above mentioned goals. GRANT Louis MS. Performance deficits affecting function: weakness, impaired endurance, impaired self care skills, impaired functional mobilty, impaired balance, impaired cognition, decreased coordination, decreased upper extremity function, decreased safety awareness, pain, impaired cardiopulmonary response to activity.      Rehab Prognosis: Fair; patient would benefit from acute skilled OT services to address these deficits and reach maximum level of function.       Plan:     Patient to be seen 3 x/week to address the above listed problems via self-care/home management, therapeutic activities, therapeutic exercises  · Plan of Care Expires: 19  · Plan of Care Reviewed with: patient    Subjective     Chief Complaint: unable to assess as patient unable or unwilling to answer  Patient/Family Comments/goals: unable    Occupational Profile:  Living Environment: lives with his great-niece in a [place] house with a 1 ADRIANNA  Previous level of function: modified independent with RW  Equipment Used at Home:  walker, rolling, shower chair, rollator  Assistance upon Discharge: from niece    Pain/Comfort:  · Pain Rating 1: other (see comments)(did not c/o pain, difficult to assess secondary to patient cognition)    Patients cultural, spiritual, Bahai conflicts given the current situation: no    Objective:      Communicated with: nurse prior to session.  Patient found up in chair with telemetry, peripheral IV, central line, oxygen, chanel catheter, pulse ox (continuous) upon OT entry to room.    General Precautions: Standard, fall   Orthopedic Precautions:N/A   Braces: N/A     Occupational Performance:    Bed Mobility:    · Patient found seated in bedside chair    Functional Mobility/Transfers:  · Patient completed Sit <> Stand Transfer with moderate assistance and of 2 persons  with  hand-held assist   · Functional Mobility: unable to take steps    Activities of Daily Living:  · Feeding:  moderate assistance    · Upper Body Dressing: moderate assistance    · Lower Body Dressing: total assistance      Cognitive/Visual Perceptual:  Cognitive/Psychosocial Skills:     -       Oriented to: Person   -       Follows Commands/attention:Inattentive and Easily distracted  -       Communication: dysarthria  -       Memory: Impaired STM, Impaired LTM and Poor immediate recall  -       Safety awareness/insight to disability: impaired   -       Mood/Affect/Coping skills/emotional control: Blunted and Flat affect    Physical Exam:  Balance:    -       sit balance fair; standing balance fair minus  Postural examination/scapula alignment:    -       Rounded shoulders  -       Forward head  Skin integrity: Visible skin intact  Upper Extremity Range of Motion:     -       Right Upper Extremity: WFL  -       Left Upper Extremity: WFL    AMPAC 6 Click ADL:  AMPAC Total Score: 9    Treatment & Education:  Evaluation   Education:    Patient left up in chair with all lines intact, call button in reach and nurse notified    GOALS:   Multidisciplinary Problems     Occupational Therapy Goals        Problem: Occupational Therapy Goal    Goal Priority Disciplines Outcome Interventions   Occupational Therapy Goal     OT, PT/OT Ongoing (interventions implemented as appropriate)    Description:  Goals to be met by: 3/28/2019     Patient will increase  functional independence with ADLs by performing:    Feeding with Modified [place].  UE Dressing with [place].  LE Dressing with Contact Guard Assistance.  Grooming while standing at sink with Contact Guard Assistance.  Toileting from bedside commode with Stand-by Assistance for hygiene and clothing management.   Sitting at edge of bed x15 minutes with Stand-by Assistance.  Rolling to Bilateral with Stand-by Assistance.   Supine to sit with Stand-by Assistance.  Step transfer with Contact Guard Assistance  Toilet transfer to toilet with Contact Guard Assistance.  Upper extremity exercise program with assistance as needed.                      History:     Past Medical History:   Diagnosis Date    Aphagia     Cancer     prostate & colon    Cardiomegaly     Colon cancer     rectal cancer    Dysphagia     Hypertension     Prostate cancer     Seizures     Subdural hemorrhage        Past Surgical History:   Procedure Laterality Date    brain coiling      COLECTOMY,LAPAROSCOPIC N/A 9/10/2018    [facility]    COLON SURGERY      Exam under anesthesia N/A 10/30/2018    [facility] OR    [id number] N/A 11/26/2018    [facility] OR    LAPAROSCOPY, DIAGNOSTIC N/A 3/4/2019    [facility] OR    LAPAROTOMY, EXPLORATORY  3/4/2019    [facility] OR    pilonadal cyst      PROSTATE SURGERY      RESECTION, RECTUM, LOW ANTERIOR, LAPAROSCOPIC, WITH SIGMOID COLECTOMY N/A 2/22/2019    [facility] OR    TONSILLECTOMY      TRACHEOSTOMY TUBE PLACEMENT         Time Tracking:     OT Date of Treatment: 03/07/19  OT Start Time: 1038  OT Stop Time: 1057  OT Total Time (min): 19 min    Billable Minutes:Evaluation 19 minutes with PT    GRANT Louis, MS  3/7/2019

## 2019-03-07 NOTE — PT/OT/SLP EVAL
Physical Therapy Evaluation    Patient Name:  Justin Adams   MRN:  [id number]    Recommendations:     Discharge Recommendations:  (SNF)   Discharge Equipment Recommendations: (ongoing assessment pending pt progress)   Barriers to discharge: Inaccessible home and Decreased caregiver support    Assessment:     Justin Adams is a 68 y.o. male admitted with a medical diagnosis of Other chest pain.  He presents with the following impairments/functional limitations:  weakness, impaired functional mobilty, decreased safety awareness, impaired coordination, impaired cardiopulmonary response to activity, impaired endurance, gait instability, decreased coordination, pain, impaired balance, decreased upper extremity function, impaired skin, impaired self care skills, decreased lower extremity function .    Rehab Prognosis: Good; patient would benefit from acute skilled PT services to address these deficits and reach maximum level of function.    Recent Surgery: Procedure(s) (LRB):  LAPAROSCOPY, DIAGNOSTIC (N/A)  LAPAROTOMY, EXPLORATORY 3 Days Post-Op    Plan:     During this hospitalization, patient to be seen (3-5x/wk) to address the identified rehab impairments via gait training, therapeutic activities, therapeutic exercises and progress toward the following goals:    · Plan of Care Expires:  03/21/19    Subjective     Chief Complaint: pain  Patient/Family Comments/goals: unable to state  Pain/Comfort:  · Pain Rating 1: (unable to rate or quantify)  · Location 1: abdomen  · Pain Addressed 1: Pre-medicate for activity, Reposition, Distraction, Cessation of Activity, Nurse notified    Patients cultural, spiritual, Faith conflicts given the current situation: no    Living Environment:  Pt lives with great niece in a [facility] with 1 ADRAINNA  Prior to admission, patients level of function was ambulatory with RW  Equipment used at home: rollator, shower chair.  DME owned (not currently used): none.  Upon discharge,  patient will have assistance from great niece.    Objective:     Communicated with nsg prior to session.  Patient found all lines intact, call button in reach and nsg present PICC line, oxygen, chanel catheter(ICU monitoring, )  upon PT entry to room.    General Precautions: Standard, fall   Orthopedic Precautions:N/A   Braces: N/A     Exams:  · Cognitive Exam:  Patient is oriented to Person and Place, lethargic  · Gross Motor Coordination:  max impaired 2/2 pain and gen weakness  · Postural Exam:  Patient presented with the following abnormalities:    · -       Rounded shoulders  · -       Forward head  · Sensation:    · -       Intact  B LE's and light/touch B LE's  · Skin Integrity/Edema:      · -       Skin integrity: Visible skin intact  · RLE ROM: WFL  · RLE Strength: grossly 3/5  · LLE ROM: WFL  · LLE Strength: grossly 3/5    Functional Mobility:  · Bed Mobility:   N/A, pt up in chair upon arrival  · Transfers:     · Sit to Stand:  maximal assistance and of 2 persons with rollator x 2 trials  · Gait: Pt marched in place x 5 reps with rollator and max A  · Balance: Fair+ sit, Poor+ stand      Therapeutic Activities and Exercises:   pt instructed to perform B AP's, and faq's, and fist pumps while up in chair.  Needs reinforcemetn  AM-PAC 6 CLICK MOBILITY  Total Score:11     Patient left up in chair with all lines intact and call button in reach.    GOALS:   Multidisciplinary Problems     Physical Therapy Goals        Problem: Physical Therapy Goal    Goal Priority Disciplines Outcome Goal Variances Interventions   Physical Therapy Goal     PT, PT/OT Ongoing (interventions implemented as appropriate)     Description:  Goals to be met by: 3/21/2019     Patient will increase functional independence with mobility by performin. Supine to sit with Stand-by Assistance  2. Sit to stand transfer with Stand-by Assistance  3. Gait  x 100 feet with Stand-by Assistance using rollator.   4. Lower extremity exercise  program x10 reps per handout, with supervision                      History:     Past Medical History:   Diagnosis Date    Aphagia     Cancer     prostate & colon    Cardiomegaly     Colon cancer     rectal cancer    Dysphagia     Hypertension     Prostate cancer     Seizures     Subdural hemorrhage        Past Surgical History:   Procedure Laterality Date    brain coiling      COLECTOMY,LAPAROSCOPIC N/A 9/10/2018    [facility] OR    COLON SURGERY      Exam under anesthesia N/A 10/30/2018    [facility] OR    [id number] N/A 11/26/2018    [facility] OR    LAPAROSCOPY, DIAGNOSTIC N/A 3/4/2019    [facility] OR    LAPAROTOMY, EXPLORATORY  3/4/2019    [facility] at [facility] OR    pilonadal cyst      PROSTATE SURGERY      RESECTION, RECTUM, LOW ANTERIOR, LAPAROSCOPIC, WITH SIGMOID COLECTOMY N/A 2/22/2019    [facility] OR    TONSILLECTOMY      TRACHEOSTOMY TUBE PLACEMENT         Time Tracking:     PT Received On: 03/07/19  PT Start Time: 1038     PT Stop Time: 1057  PT Total Time (min): 19 min     Billable Minutes: Evaluation 19 OT present      Rosio Haque, PT  03/07/2019

## 2019-03-08 PROBLEM — R07.89 OTHER CHEST PAIN: Status: RESOLVED | Noted: 2019-03-02 | Resolved: 2019-03-08

## 2019-03-08 LAB
ALBUMIN SERPL BCP-MCNC: 1.4 G/DL
ALP SERPL-CCNC: 59 U/L
ALT SERPL W/O P-5'-P-CCNC: 7 U/L
ANION GAP SERPL CALC-SCNC: 8 MMOL/L
ANISOCYTOSIS BLD QL SMEAR: SLIGHT
AST SERPL-CCNC: 14 U/L
BASOPHILS NFR BLD: 0 %
BILIRUB SERPL-MCNC: 0.2 MG/DL
BUN SERPL-MCNC: 34 MG/DL
CALCIUM SERPL-MCNC: 8.2 MG/DL
CHLORIDE SERPL-SCNC: 106 MMOL/L
CO2 SERPL-SCNC: 30 MMOL/L
CREAT SERPL-MCNC: 1 MG/DL
DIFFERENTIAL METHOD: ABNORMAL
EOSINOPHIL NFR BLD: 4 %
ERYTHROCYTE [DISTWIDTH] IN BLOOD BY AUTOMATED COUNT: 17.2 %
EST. GFR  (AFRICAN AMERICAN): >60 ML/MIN/1.73 M^2
EST. GFR  (NON AFRICAN AMERICAN): >60 ML/MIN/1.73 M^2
GIANT PLATELETS BLD QL SMEAR: PRESENT
GLUCOSE SERPL-MCNC: 385 MG/DL
HCT VFR BLD AUTO: 27.9 %
HGB BLD-MCNC: 9.3 G/DL
HYPOCHROMIA BLD QL SMEAR: ABNORMAL
LYMPHOCYTES NFR BLD: 3 %
MAGNESIUM SERPL-MCNC: 1.7 MG/DL
MCH RBC QN AUTO: 28.8 PG
MCHC RBC AUTO-ENTMCNC: 33.3 G/DL
MCV RBC AUTO: 86 FL
METAMYELOCYTES NFR BLD MANUAL: 4 %
MONOCYTES NFR BLD: 5 %
MYELOCYTES NFR BLD MANUAL: 1 %
NEUTROPHILS NFR BLD: 68 %
NEUTS BAND NFR BLD MANUAL: 15 %
PHOSPHATE SERPL-MCNC: 3.2 MG/DL
PLATELET # BLD AUTO: 649 K/UL
PLATELET BLD QL SMEAR: ABNORMAL
PMV BLD AUTO: 10 FL
POCT GLUCOSE: 258 MG/DL (ref 70–110)
POCT GLUCOSE: 344 MG/DL (ref 70–110)
POCT GLUCOSE: 364 MG/DL (ref 70–110)
POLYCHROMASIA BLD QL SMEAR: ABNORMAL
POTASSIUM SERPL-SCNC: 3.9 MMOL/L
PROT SERPL-MCNC: 4.7 G/DL
RBC # BLD AUTO: 3.23 M/UL
SODIUM SERPL-SCNC: 144 MMOL/L
TOXIC GRANULES BLD QL SMEAR: PRESENT
WBC # BLD AUTO: 17.36 K/UL

## 2019-03-08 PROCEDURE — 85007 BL SMEAR W/DIFF WBC COUNT: CPT

## 2019-03-08 PROCEDURE — 80053 COMPREHEN METABOLIC PANEL: CPT

## 2019-03-08 PROCEDURE — 94640 AIRWAY INHALATION TREATMENT: CPT

## 2019-03-08 PROCEDURE — S0030 INJECTION, METRONIDAZOLE: HCPCS | Performed by: SURGERY

## 2019-03-08 PROCEDURE — 25000003 PHARM REV CODE 250: Performed by: STUDENT IN AN ORGANIZED HEALTH CARE EDUCATION/TRAINING PROGRAM

## 2019-03-08 PROCEDURE — 63600175 PHARM REV CODE 636 W HCPCS: Performed by: STUDENT IN AN ORGANIZED HEALTH CARE EDUCATION/TRAINING PROGRAM

## 2019-03-08 PROCEDURE — 63600175 PHARM REV CODE 636 W HCPCS: Performed by: RADIOLOGY

## 2019-03-08 PROCEDURE — 97530 THERAPEUTIC ACTIVITIES: CPT

## 2019-03-08 PROCEDURE — 94668 MNPJ CHEST WALL SBSQ: CPT

## 2019-03-08 PROCEDURE — 87205 SMEAR GRAM STAIN: CPT

## 2019-03-08 PROCEDURE — 36415 COLL VENOUS BLD VENIPUNCTURE: CPT

## 2019-03-08 PROCEDURE — 87077 CULTURE AEROBIC IDENTIFY: CPT

## 2019-03-08 PROCEDURE — 25500020 PHARM REV CODE 255: Performed by: SURGERY

## 2019-03-08 PROCEDURE — A4216 STERILE WATER/SALINE, 10 ML: HCPCS | Performed by: STUDENT IN AN ORGANIZED HEALTH CARE EDUCATION/TRAINING PROGRAM

## 2019-03-08 PROCEDURE — B4185 PARENTERAL SOL 10 GM LIPIDS: HCPCS | Performed by: STUDENT IN AN ORGANIZED HEALTH CARE EDUCATION/TRAINING PROGRAM

## 2019-03-08 PROCEDURE — 83735 ASSAY OF MAGNESIUM: CPT

## 2019-03-08 PROCEDURE — 94761 N-INVAS EAR/PLS OXIMETRY MLT: CPT

## 2019-03-08 PROCEDURE — 11000001 HC ACUTE MED/SURG PRIVATE ROOM

## 2019-03-08 PROCEDURE — A4217 STERILE WATER/SALINE, 500 ML: HCPCS | Performed by: STUDENT IN AN ORGANIZED HEALTH CARE EDUCATION/TRAINING PROGRAM

## 2019-03-08 PROCEDURE — 25000003 PHARM REV CODE 250: Performed by: SURGERY

## 2019-03-08 PROCEDURE — 63600175 PHARM REV CODE 636 W HCPCS: Performed by: SURGERY

## 2019-03-08 PROCEDURE — 87070 CULTURE OTHR SPECIMN AEROBIC: CPT

## 2019-03-08 PROCEDURE — 97535 SELF CARE MNGMENT TRAINING: CPT

## 2019-03-08 PROCEDURE — 87186 SC STD MICRODIL/AGAR DIL: CPT

## 2019-03-08 PROCEDURE — 85027 COMPLETE CBC AUTOMATED: CPT

## 2019-03-08 PROCEDURE — 63600175 PHARM REV CODE 636 W HCPCS: Mod: JG | Performed by: STUDENT IN AN ORGANIZED HEALTH CARE EDUCATION/TRAINING PROGRAM

## 2019-03-08 PROCEDURE — 94799 UNLISTED PULMONARY SVC/PX: CPT

## 2019-03-08 PROCEDURE — C9113 INJ PANTOPRAZOLE SODIUM, VIA: HCPCS | Performed by: STUDENT IN AN ORGANIZED HEALTH CARE EDUCATION/TRAINING PROGRAM

## 2019-03-08 PROCEDURE — 84100 ASSAY OF PHOSPHORUS: CPT

## 2019-03-08 PROCEDURE — P9045 ALBUMIN (HUMAN), 5%, 250 ML: HCPCS | Mod: JG | Performed by: STUDENT IN AN ORGANIZED HEALTH CARE EDUCATION/TRAINING PROGRAM

## 2019-03-08 PROCEDURE — 87075 CULTR BACTERIA EXCEPT BLOOD: CPT

## 2019-03-08 RX ORDER — FENTANYL CITRATE 50 UG/ML
25 INJECTION, SOLUTION INTRAMUSCULAR; INTRAVENOUS ONCE
Status: COMPLETED | OUTPATIENT
Start: 2019-03-08 | End: 2019-03-08

## 2019-03-08 RX ORDER — INSULIN ASPART 100 [IU]/ML
0-10 INJECTION, SOLUTION INTRAVENOUS; SUBCUTANEOUS EVERY 6 HOURS PRN
Status: DISCONTINUED | OUTPATIENT
Start: 2019-03-08 | End: 2019-03-21 | Stop reason: HOSPADM

## 2019-03-08 RX ORDER — ALBUMIN HUMAN 50 G/1000ML
25 SOLUTION INTRAVENOUS ONCE
Status: COMPLETED | OUTPATIENT
Start: 2019-03-08 | End: 2019-03-08

## 2019-03-08 RX ORDER — ALBUMIN HUMAN 50 G/1000ML
12.5 SOLUTION INTRAVENOUS ONCE
Status: DISCONTINUED | OUTPATIENT
Start: 2019-03-08 | End: 2019-03-21 | Stop reason: HOSPADM

## 2019-03-08 RX ORDER — FUROSEMIDE 10 MG/ML
20 INJECTION INTRAMUSCULAR; INTRAVENOUS ONCE
Status: COMPLETED | OUTPATIENT
Start: 2019-03-08 | End: 2019-03-08

## 2019-03-08 RX ORDER — BISMUTH SUBSALICYLATE 525 MG/30ML
30 LIQUID ORAL 4 TIMES DAILY
Status: DISCONTINUED | OUTPATIENT
Start: 2019-03-08 | End: 2019-03-21 | Stop reason: HOSPADM

## 2019-03-08 RX ADMIN — PANTOPRAZOLE SODIUM 40 MG: 40 INJECTION, POWDER, LYOPHILIZED, FOR SOLUTION INTRAVENOUS at 08:03

## 2019-03-08 RX ADMIN — ESCITALOPRAM OXALATE 10 MG: 10 TABLET ORAL at 08:03

## 2019-03-08 RX ADMIN — BISMUTH SUBSALICYLATE 30 ML: 262 LIQUID ORAL at 04:03

## 2019-03-08 RX ADMIN — FLUCONAZOLE, SODIUM CHLORIDE 200 MG: 2 INJECTION INTRAVENOUS at 11:03

## 2019-03-08 RX ADMIN — ASPIRIN 81 MG: 81 TABLET, COATED ORAL at 08:03

## 2019-03-08 RX ADMIN — KETOROLAC TROMETHAMINE 15 MG: 30 INJECTION, SOLUTION INTRAMUSCULAR at 11:03

## 2019-03-08 RX ADMIN — PIPERACILLIN AND TAZOBACTAM 4.5 G: 4; .5 INJECTION, POWDER, LYOPHILIZED, FOR SOLUTION INTRAVENOUS; PARENTERAL at 08:03

## 2019-03-08 RX ADMIN — GABAPENTIN 100 MG: 100 CAPSULE ORAL at 08:03

## 2019-03-08 RX ADMIN — KETOROLAC TROMETHAMINE 15 MG: 30 INJECTION, SOLUTION INTRAMUSCULAR at 12:03

## 2019-03-08 RX ADMIN — ALBUTEROL SULFATE 2 PUFF: 90 AEROSOL, METERED RESPIRATORY (INHALATION) at 08:03

## 2019-03-08 RX ADMIN — Medication 10 ML: at 11:03

## 2019-03-08 RX ADMIN — METRONIDAZOLE 500 MG: 500 INJECTION, SOLUTION INTRAVENOUS at 12:03

## 2019-03-08 RX ADMIN — METRONIDAZOLE 500 MG: 500 INJECTION, SOLUTION INTRAVENOUS at 08:03

## 2019-03-08 RX ADMIN — BISMUTH SUBSALICYLATE 30 ML: 262 LIQUID ORAL at 08:03

## 2019-03-08 RX ADMIN — PIPERACILLIN AND TAZOBACTAM 4.5 G: 4; .5 INJECTION, POWDER, LYOPHILIZED, FOR SOLUTION INTRAVENOUS; PARENTERAL at 12:03

## 2019-03-08 RX ADMIN — OXYCODONE HYDROCHLORIDE 5 MG: 5 TABLET ORAL at 12:03

## 2019-03-08 RX ADMIN — SOYBEAN OIL 250 ML: 20 INJECTION, SOLUTION INTRAVENOUS at 11:03

## 2019-03-08 RX ADMIN — LEVETIRACETAM 500 MG: 5 INJECTION INTRAVENOUS at 09:03

## 2019-03-08 RX ADMIN — FLUTICASONE FUROATE AND VILANTEROL TRIFENATATE 1 PUFF: 100; 25 POWDER RESPIRATORY (INHALATION) at 07:03

## 2019-03-08 RX ADMIN — Medication 10 ML: at 06:03

## 2019-03-08 RX ADMIN — IOHEXOL 100 ML: 350 INJECTION, SOLUTION INTRAVENOUS at 11:03

## 2019-03-08 RX ADMIN — OCTREOTIDE ACETATE 100 MCG: 100 INJECTION, SOLUTION INTRAVENOUS; SUBCUTANEOUS at 06:03

## 2019-03-08 RX ADMIN — Medication 10 ML: at 12:03

## 2019-03-08 RX ADMIN — OCTREOTIDE ACETATE 100 MCG: 100 INJECTION, SOLUTION INTRAVENOUS; SUBCUTANEOUS at 09:03

## 2019-03-08 RX ADMIN — KETOROLAC TROMETHAMINE 15 MG: 30 INJECTION, SOLUTION INTRAMUSCULAR at 06:03

## 2019-03-08 RX ADMIN — ALBUTEROL SULFATE 2 PUFF: 90 AEROSOL, METERED RESPIRATORY (INHALATION) at 07:03

## 2019-03-08 RX ADMIN — FENTANYL CITRATE 25 MCG: 50 INJECTION INTRAMUSCULAR; INTRAVENOUS at 05:03

## 2019-03-08 RX ADMIN — ALBUMIN HUMAN 25 G: 0.05 INJECTION, SOLUTION INTRAVENOUS at 04:03

## 2019-03-08 RX ADMIN — LEVETIRACETAM 500 MG: 5 INJECTION INTRAVENOUS at 08:03

## 2019-03-08 RX ADMIN — FUROSEMIDE 20 MG: 10 INJECTION, SOLUTION INTRAVENOUS at 04:03

## 2019-03-08 RX ADMIN — INSULIN ASPART 5 UNITS: 100 INJECTION, SOLUTION INTRAVENOUS; SUBCUTANEOUS at 06:03

## 2019-03-08 RX ADMIN — METRONIDAZOLE 500 MG: 500 INJECTION, SOLUTION INTRAVENOUS at 03:03

## 2019-03-08 RX ADMIN — ACETAMINOPHEN 650 MG: 325 TABLET, FILM COATED ORAL at 06:03

## 2019-03-08 RX ADMIN — GABAPENTIN 100 MG: 100 CAPSULE ORAL at 04:03

## 2019-03-08 RX ADMIN — ACETAMINOPHEN 650 MG: 325 TABLET, FILM COATED ORAL at 11:03

## 2019-03-08 RX ADMIN — OXYCODONE HYDROCHLORIDE 5 MG: 5 TABLET ORAL at 03:03

## 2019-03-08 RX ADMIN — SODIUM PHOSPHATE, MONOBASIC, MONOHYDRATE: 276; 142 INJECTION, SOLUTION INTRAVENOUS at 11:03

## 2019-03-08 RX ADMIN — OCTREOTIDE ACETATE 100 MCG: 100 INJECTION, SOLUTION INTRAVENOUS; SUBCUTANEOUS at 01:03

## 2019-03-08 RX ADMIN — INSULIN ASPART 4 UNITS: 100 INJECTION, SOLUTION INTRAVENOUS; SUBCUTANEOUS at 12:03

## 2019-03-08 RX ADMIN — PIPERACILLIN AND TAZOBACTAM 4.5 G: 4; .5 INJECTION, POWDER, LYOPHILIZED, FOR SOLUTION INTRAVENOUS; PARENTERAL at 06:03

## 2019-03-08 RX ADMIN — INSULIN ASPART 3 UNITS: 100 INJECTION, SOLUTION INTRAVENOUS; SUBCUTANEOUS at 06:03

## 2019-03-08 NOTE — PROCEDURES
Radiology Post-Procedure Note    Pre Op Diagnosis: Multiple intra-peritoneal abscesses  Post Op Diagnosis: Same    Procedure: CT guided drainage catheter placement    Procedure performed by: Tony Deluna MD    Written Informed Consent Obtained: Yes  Specimen Removed: YES, 20-cc of feculent material  Estimated Blood Loss: Minimal    Findings:   1. Successful placement of a 14-Fr drainage catheter into a large pelvic abscess. Patient tolerated the procedure well. No immediate post-procedural complications noted.       Tony Deluna MD  Interventional Radiology

## 2019-03-08 NOTE — PROGRESS NOTES
POD #4 Loop ileostomy  Scant light brown liquid in pouch. Nursing reports no effluent from ostomy today, but had rectal bowel movement.   Patient talking today and answering questions. Informed patient of plans to provide ostomy teaching. Receptive to plan.

## 2019-03-08 NOTE — PLAN OF CARE
Problem: Physical Therapy Goal  Goal: Physical Therapy Goal  Goals to be met by: 3/21/2019     Patient will increase functional independence with mobility by performin. Supine to sit with Stand-by Assistance  2. Sit to stand transfer with Stand-by Assistance  3. Gait  x 100 feet with Stand-by Assistance using rollator.   4. Lower extremity exercise program x10 reps per handout, with supervision     Outcome: Ongoing (interventions implemented as appropriate)  Pt with increased mobility and alertness.  SNF recommended

## 2019-03-08 NOTE — CONSULTS
Inpatient Radiology Pre-procedure Note    History of Present Illness:  Justin Adams is a 68 y.o. male s/p recent lap LAR for rectal cancer on 2/22 complicated by  increasing abdominal pain, fevers, tachycardic and sepsis with CT demonstrating development of free air/bowel perforation complicated by multiple intraperitoneal fluid collections concerning for abscesses.     Pt now s/p  ex lap with peritoneal washout and diverting loop ostomy on 3/4/19. However, with persistent abdominal pain and leukocytosis with repeat CT showing interval enlargement of multiple intra-peritoneal fluid collections with similar distribution worrisome for enlarging abscesses. Specifically, there are relatively small left subdiaphragmatic, perihepatic, left para-colic gutter and presacral collections as well as a large anterior pelvic collection.     New IR consult placed to evaluate for potential image-guided placement of a percutaneous drainage catheter into the large anterior pelvic collection.     Admission H&P reviewed.  Past Medical History:   Diagnosis Date    Aphagia     Cancer     prostate & colon    Cardiomegaly     Colon cancer     rectal cancer    Dysphagia     Hypertension     Prostate cancer     Seizures     Subdural hemorrhage      Past Surgical History:   Procedure Laterality Date    brain coiling      COLECTOMY,LAPAROSCOPIC N/A 9/10/2018    Performed by [facility] at [facility] OR    COLON SURGERY      Exam under anesthesia N/A 10/30/2018    Performed by Mat [facility] MD at [facility] OR    [id number]-CATH N/A 11/26/2018    Performed by Mat Glass MD at [facility] OR    LAPAROSCOPY, DIAGNOSTIC N/A 3/4/2019    Performed by Mat Glass MD at [facility] OR    LAPAROTOMY, EXPLORATORY  3/4/2019    Performed by Mat Glass [facility] at [facility] OR    pilonadal cyst      PROSTATE SURGERY      RESECTION, RECTUM, LOW ANTERIOR, LAPAROSCOPIC, WITH SIGMOID COLECTOMY N/A 2/22/2019    Performed by Mat  MATI Glass MD at [facility] OR    TONSILLECTOMY      TRACHEOSTOMY TUBE PLACEMENT         Review of Systems:   As documented in primary team H&P    Home Meds:   Prior to Admission medications    Medication Sig Start Date End Date Taking? Authorizing Provider   alvimopan (ENTEREG) 12 mg capsule Take 1 capsule (12 mg total) by mouth once daily. 2/25/19   Coby Harp MD   aspirin (ECOTRIN) 81 MG EC tablet Take 81 mg by mouth once daily.    Historical Provider, MD   atorvastatin (LIPITOR) 10 MG tablet TAKE 1 TABLET BY MOUTH EVERY BEDTIME 7/10/18   Historical Provider, MD   docusate sodium (COLACE) 100 MG capsule Take 100 mg by mouth 2 (two) times daily.    Historical Provider, MD   escitalopram oxalate (LEXAPRO) 10 MG tablet Take 10 mg by mouth once daily.    Historical Provider, MD   ferrous sulfate 325 mg (65 mg iron) Tab tablet Take 1 tablet by mouth once daily. 7/12/18   Historical Provider, MD   GAVILYTE-G 236-22.74-6.74 -5.86 gram suspension TAKE 4,000 MLS (4 L TOTAL) BY MOUTH ONCE. FOR 1 DOSE 2/7/19   Historical Provider, MD   hydroCHLOROthiazide (HYDRODIURIL) 25 MG tablet Take 25 mg by mouth every morning. 7/10/18   Historical Provider, MD   levetiracetam (KEPPRA) 500 MG Tab Take 500 mg by mouth 2 (two) times daily.    Historical Provider, MD   lisinopril (PRINIVIL,ZESTRIL) 5 MG tablet Take 5 mg by mouth once daily.    Historical Provider, MD   metroNIDAZOLE (FLAGYL) 500 MG tablet Take 2 tabs at 7pm & 11pm night before surgery 2/7/19   Mat Glass MD   multivit-min/FA/lycopen/lutein (CENTRUM SILVER ULTRA MEN'S ORAL) Take 1 tablet by mouth once daily.    Historical Provider, MD   neomycin (MYCIFRADIN) 500 mg Tab Take 2 tabs at 7pm & 11pm night before surgery 2/7/19   Mat Glass MD   omeprazole (PRILOSEC) 20 MG capsule Take by mouth daily as needed. 7/10/18   Historical Provider, MD   ondansetron (ZOFRAN) 8 MG tablet Take 1 tablet (8 mg total) by mouth every 8 (eight) hours as needed for  Nausea. 11/20/18   Vu Adames MD   oxyCODONE (ROXICODONE) 5 MG immediate release tablet Take 1 tablet (5 mg total) by mouth every 4 (four) hours as needed. 2/25/19   Coby Harp MD   oxyCODONE-acetaminophen (PERCOCET)  mg per tablet TK 1 T PO Q 4 H PRN P FOR UP TO 10 DAYS. MDD 6 TS 1/25/19   Historical Provider, MD   potassium chloride (MICRO-K) 10 MEQ CpSR Take 10 mEq by mouth once.    Historical Provider, MD   PROCTOSOL HC 2.5 % rectal cream  1/23/19   Historical Provider, MD   SYMBICORT 160-4.5 mcg/actuation HFAA 2 puffs 2 (two) times daily. 8/8/18   Historical Provider, MD   VENTOLIN HFA 90 mcg/actuation inhaler Inhale 2 puffs into the lungs every 4 (four) hours as needed. 7/18/18   Historical Provider, MD     Scheduled Meds:    acetaminophen  650 mg Oral Q6H    albumin human 5%  12.5 g Intravenous Once    aspirin  81 mg Oral Daily    bismuth subsalicylate  30 mL Oral QID    enoxaparin  40 mg Subcutaneous Daily    escitalopram oxalate  10 mg Oral Daily    fat emulsion 20%  250 mL Intravenous Daily    fluconazole  200 mg Intravenous Q24H    fluticasone-vilanterol  1 puff Inhalation Daily    gabapentin  100 mg Oral TID    ketorolac  15 mg Intravenous Q6H    levetiracetam IVPB  500 mg Intravenous Q12H    metronidazole  500 mg Intravenous Q8H    octreotide  100 mcg Subcutaneous Q8H    pantoprazole  40 mg Intravenous Daily    piperacillin-tazobactam (ZOSYN) IVPB  4.5 g Intravenous Q8H    sodium chloride 0.9%  10 mL Intravenous Q6H     Continuous Infusions:    Amino acid 5% - dextrose 20% (CLINIMIX-E) solution with additives (2L  Provides 100 gm AA, 400 gm CHO (1360 kcal from dextrose), Na 70, K 60, Mg 10, Ca 9, Acetate 160, Cl 78, Phos 30) 100 mL/hr at 03/08/19 0600    TPN ADULT CENTRAL LINE CUSTOM       PRN Meds:albuterol, dextrose 50%, glucagon (human recombinant), insulin aspart U-100, naloxone, ondansetron, ondansetron, oxyCODONE, Flushing PICC Protocol  **AND** sodium chloride 0.9% **AND** sodium chloride 0.9%, sodium chloride 0.9%  Anticoagulants/Antiplatelets: Lovenox, held this PM    Allergies: Review of patient's allergies indicates:  No Known Allergies  Sedation Hx: have not been any systemic reactions    Labs:  No results for input(s): INR in the last 168 hours.    Invalid input(s):  PT,  PTT    Recent Labs   Lab 03/08/19 0215   WBC 17.36*   HGB 9.3*   HCT 27.9*   MCV 86   *      Recent Labs   Lab 03/08/19 0215   *      K 3.9      CO2 30*   BUN 34*   CREATININE 1.0   CALCIUM 8.2*   MG 1.7   ALT 7*   AST 14   ALBUMIN 1.4*   BILITOT 0.2         Vitals:  Temp: 97.8 °F (36.6 °C) (03/08/19 1110)  Pulse: 65 (03/08/19 1547)  Resp: (!) 23 (03/08/19 1547)  BP: 130/61 (03/08/19 1110)  SpO2: (!) 94 % (03/08/19 1547)     Physical Exam:  ASA: III  Mallampati: III    General: mild distress  Mental Status: alert and oriented to person, place and time  HEENT: normocephalic, atraumatic  Chest: mild labored breathing  Heart: regular heart rate  Abdomen: distended. TTP  Extremity: moves all extremities    Plan:   1. Multiple intra-peritoneal complex collections concerning for abscesses - Will attempt CT-guided placement of drainage catheter into the largest and most complex collection in the anterior pelvis. Of note, pt may require repeat peritoneal wash-out given the multiple presumed abscess, many of which are sizable but, too small to accept a drainage catheter.    Risks (including, but not limited to, pain, bleeding, infection, damage to nearby structures, failure to obtain sufficient material for a diagnosis, the need for additional procedures, and death), benefits, and alternatives were discussed with the patient. All questions were answered to the best of my abilities. The patient wishes to proceed with the procedure. Written informed consent was obtained.    Thank you for considering IR for the care of your patient.     Tony Deluna  MD  Interventional Radiology

## 2019-03-08 NOTE — PLAN OF CARE
Problem: Occupational Therapy Goal  Goal: Occupational Therapy Goal  Goals to be met by: 3/28/2019     Patient will increase functional independence with ADLs by performing:    Feeding with Modified [place].  UE Dressing with [place].  LE Dressing with Contact Guard Assistance.  Grooming while standing at sink with Contact Guard Assistance.  Toileting from bedside commode with Stand-by Assistance for hygiene and clothing management.   Sitting at edge of bed x15 minutes with Stand-by Assistance.  Rolling to Bilateral with Stand-by Assistance.   Supine to sit with Stand-by Assistance.  Step transfer with Contact Guard Assistance  Toilet transfer to toilet with Contact Guard Assistance.  Upper extremity exercise program with assistance as needed.     Outcome: Ongoing (interventions implemented as appropriate)  Patient with improved cognition today and able to follow commands better. GRANT Louis, MS

## 2019-03-08 NOTE — PT/OT/SLP PROGRESS
"Physical Therapy Treatment    Patient Name:  Justin Adams   MRN:  [id number]    Recommendations:     Discharge Recommendations:  nursing facility, skilled   Discharge Equipment Recommendations: (ongoing assessment)   Barriers to discharge: Decreased caregiver support    Assessment:     Justin Adams is a 68 y.o. male admitted with a medical diagnosis of Other chest pain.  He presents with the following impairments/functional limitations:  weakness, impaired functional mobilty, decreased safety awareness, impaired cognition, impaired coordination, impaired cardiopulmonary response to activity, pain, decreased coordination, gait instability, impaired endurance, impaired balance, decreased upper extremity function, impaired skin, impaired self care skills, decreased lower extremity function pt with increased mobility and alertness today.    Rehab Prognosis: Good; patient would benefit from acute skilled PT services to address these deficits and reach maximum level of function.    Recent Surgery: Procedure(s) (LRB):  LAPAROSCOPY, DIAGNOSTIC (N/A)  LAPAROTOMY, EXPLORATORY 4 Days Post-Op    Plan:     During this hospitalization, patient to be seen (3-5x/wk) to address the identified rehab impairments via gait training, therapeutic activities, therapeutic exercises and progress toward the following goals:    · Plan of Care Expires:  03/21/19    Subjective     Chief Complaint: "my butt hurts"  Patient/Family Comments/goals: PLOF  Pain/Comfort:  · Pain Rating 1: (not rated, "my butt hurts")  · Pain Addressed 1: Reposition, Distraction, Cessation of Activity, Nurse notified      Objective:     Communicated with nsg prior to session.  Patient found all lines intact, call button in reach and sister present colostomy, chanel catheter, oxygen, peripheral IV(ICU monitoring)  upon PT entry to room.     General Precautions: Standard, fall   Orthopedic Precautions:N/A   Braces: N/A     Functional Mobility:  · Bed " Mobility:     · Rolling Left:  minimum assistance with VC/TC for reaching for BR  · Rolling Right: minimum assistance with VC/TC for reaching for BR  · Scooting: maximal assistance  · Bridging: maximal assistance  · Supine to Sit: maximal assistance  · Sit to Supine: maximal assistance  · Transfers:     · Sit to Stand:  moderate assistance with rollator with VC for hand placment/safety x 3 trials  · Gait: 4 sidesteps at EOb with rollator and Min A with Vc for walker managemetn/safety  · Balance: FAir+ sit, Fair stand      AM-PAC 6 CLICK MOBILITY  Turning over in bed (including adjusting bedclothes, sheets and blankets)?: 2  Sitting down on and standing up from a chair with arms (e.g., wheelchair, bedside commode, etc.): 2  Moving from lying on back to sitting on the side of the bed?: 2  Moving to and from a bed to a chair (including a wheelchair)?: 2  Need to walk in hospital room?: 2  Climbing 3-5 steps with a railing?: 1  Basic Mobility Total Score: 11       Therapeutic Activities and Exercises:   Bed mobility and ambulation as above.  Pt stood with rollator and CGA/Min A approx 1 m x 3 trials for bowel accident and linen adjustment.  Pt performed b Fist pumps and AP x 10 reps and encouraged to continue to perform through out the day for edema reduction.  Pt verbalized/demonstrated understanding.  Patient left HOB elevated with all lines intact, call button in reach, nsg notified and sister present..    GOALS:   Multidisciplinary Problems     Physical Therapy Goals        Problem: Physical Therapy Goal    Goal Priority Disciplines Outcome Goal Variances Interventions   Physical Therapy Goal     PT, PT/OT Ongoing (interventions implemented as appropriate)     Description:  Goals to be met by: 3/21/2019     Patient will increase functional independence with mobility by performin. Supine to sit with Stand-by Assistance  2. Sit to stand transfer with Stand-by Assistance  3. Gait  x 100 feet with Stand-by  Assistance using rollator.   4. Lower extremity exercise program x10 reps per handout, with supervision                      Time Tracking:     PT Received On: 03/08/19  PT Start Time: 1335     PT Stop Time: 1401  PT Total Time (min): 26 min     Billable Minutes: Therapeutic Activity 13 OT present    Treatment Type: Treatment  PT/PTA: PT     PTA Visit Number: 0     Rosio Haque, PT  03/08/2019

## 2019-03-08 NOTE — PLAN OF CARE
Problem: Adult Inpatient Plan of Care  Goal: Plan of Care Review  Outcome: Ongoing (interventions implemented as appropriate)  Patient oriented to self and frequent reoriented to place, time, and situation provided. VSS, NSR/SB on cardiac monitor, afebrile. TPN @ 100cc/hr, lipids @ 20.8 cc/hr. ABx given as ordered. Midline incision dressing intact with small amount of dried drainage. Ileostomy with thin, brown liquid output. UO adequate. Pt turned q2 hr per protocol. SCDs in use. Pt given oxycodone x1 for breakthrough pain of 5/10 with relief obtained. No falls, injury, or skin breakdown this shift. Plan of care reviewed with pt at bedside.

## 2019-03-08 NOTE — PROGRESS NOTES
[facility]  General Surgery  Progress Note    Subjective:     Interval History: tolerated clear, pain controlled, working with PT but frail. Had liquid BM per rectum    Post-Op Info:  Procedure(s) (LRB):  LAPAROSCOPY, DIAGNOSTIC (N/A)  LAPAROTOMY, EXPLORATORY   4 Days Post-Op      Medications:  Continuous Infusions:   Amino acid 5% - dextrose 20% (CLINIMIX-E) solution with additives (2L  Provides 100 gm AA, 400 gm CHO (1360 kcal from dextrose), Na 70, K 60, Mg 10, Ca 9, Acetate 160, Cl 78, Phos 30) 100 mL/hr at 03/08/19 0600    TPN ADULT CENTRAL LINE CUSTOM       Scheduled Meds:   acetaminophen  650 mg Oral Q6H    albumin human 5%  25 g Intravenous Once    aspirin  81 mg Oral Daily    bismuth subsalicylate  30 mL Oral QID    enoxaparin  40 mg Subcutaneous Daily    escitalopram oxalate  10 mg Oral Daily    fat emulsion 20%  250 mL Intravenous Daily    fluconazole  200 mg Intravenous Q24H    fluticasone-vilanterol  1 puff Inhalation Daily    furosemide  20 mg Intravenous Once    gabapentin  100 mg Oral TID    ketorolac  15 mg Intravenous Q6H    levetiracetam IVPB  500 mg Intravenous Q12H    metronidazole  500 mg Intravenous Q8H    octreotide  100 mcg Subcutaneous Q8H    pantoprazole  40 mg Intravenous Daily    piperacillin-tazobactam (ZOSYN) IVPB  4.5 g Intravenous Q8H    sodium chloride 0.9%  10 mL Intravenous Q6H     PRN Meds:albuterol, dextrose 50%, glucagon (human recombinant), insulin aspart U-100, naloxone, ondansetron, ondansetron, oxyCODONE, Flushing PICC Protocol **AND** sodium chloride 0.9% **AND** sodium chloride 0.9%, sodium chloride 0.9%     Objective:     Vital Signs (Most Recent):  Temp: 97.8 °F (36.6 °C) (03/08/19 1110)  Pulse: 65 (03/08/19 1547)  Resp: (!) 23 (03/08/19 1547)  BP: 130/61 (03/08/19 1110)  SpO2: (!) 94 % (03/08/19 1547) Vital Signs (24h Range):  Temp:  [97.4 °F (36.3 °C)-98 °F (36.7 °C)] 97.8 °F (36.6 °C)  Pulse:  [54-66] 65  Resp:  [15-23]  23  SpO2:  [91 %-98 %] 94 %  BP: (130-160)/(61-78) 130/61       Intake/Output Summary (Last 24 hours) at 3/8/2019 1552  Last data filed at 3/8/2019 1110  Gross per 24 hour   Intake 2506.06 ml   Output 1405 ml   Net 1101.06 ml       Physical Exam  Awake, alert  RRR  No increased work of breathing  Abd soft, midline with diane in place, ostomy pink, productive of green liquid stool  Field with yellow urine  Edematous    Significant Labs:  CBC:   Recent Labs   Lab 03/08/19  0215   WBC 17.36*   RBC 3.23*   HGB 9.3*   HCT 27.9*   *   MCV 86   MCH 28.8   MCHC 33.3     CMP:   Recent Labs   Lab 03/08/19 0215   *   CALCIUM 8.2*   ALBUMIN 1.4*   PROT 4.7*      K 3.9   CO2 30*      BUN 34*   CREATININE 1.0   ALKPHOS 59   ALT 7*   AST 14   BILITOT 0.2       Significant Diagnostics:  I have reviewed all pertinent imaging results/findings within the past 24 hours.  CT abd and pelvis 3/8: Postsurgical changes of partial colonic resection with enlarging fluid collection within the lower abdomen and pelvis likely containing previously administered oral contrast material strongly suggestive of bowel perforation.    Multiple additional fluid collections are identified within the abdomen with the largest located near the caudal tip of the right lobe of the liver likely representing abscess cavities.    Small bilateral pleural effusions with bibasilar atelectatic changes.    Interval development of patchy pulmonary consolidation within the right lung base suggesting pneumonia or aspiration.    Assessment/Plan:   68M s/p recent lap LAR for rectal cancer on 2/22 presenting with increasing abdominal pain, CT with intraperitoneal free air in the postoperative setting. Evening of 3/1 patient febrile, tachycardic and subsequently transferred to the ICU. Hemodynamics normalized overnight on 3/1 and he has remained HDS without lactic acidosis or metabolic durrangements. Repeat CT scan with readministration of free  air. NG placed and PICC line obtained. Taken to the OR on 3/4 for ex lap and diverting loop ostomy. Returned to the ICU in stable condition      Neuro:   -prn oxy, scheduled tylenol and gabapentin  -home escitalopram  -toradol q6  -hx of seizures- Keppra IV     CV: HDS.   -Cardiology evaluated- ACS ruled out  -Hold home BP meds      PULM: Improving  -IS hourly while awake  -Home inhalers      FEN/GI: s/p LAR on 2/22, washout and loop ostomy 3/4, ostomy functioning, ostomy RN following  -Diabetic diet  -octreotide and kaopectate to minimize ostomy output  -Continue TPN  -Repeat nutrition labs Monday      : chanel, UOP 1620  -BUN, Cr 31, 0.9  -will dc chanel pending alertness and OOB     Heme/ID: Leukocytosis persists    -blood cultures NG final  -continue Zosyn, fluconazole and flagyl for intraabdominal infection   -CT with abscesses, consult IR for drainage     Endo: -390  -increase ISS, accuchecks  -decrease dextrose in TPN     MSK/Wounds:   -daily betadine [place] of midline  -PT/OT     PPX: Lovenox and Protonix (home med)      Lines: PICC, Chanel, ostomy      Dispo: Step down today with cardiac/O2 monitoring       Active Diagnoses:    Diagnosis Date Noted POA    Hypertension [I10] 03/02/2019 Unknown    Mixed hyperlipidemia [E78.2] 03/02/2019 Yes    Abdominal pain [R10.9] 02/26/2019 Yes    Carcinoma of rectum [C20] 11/12/2018 Yes      Problems Resolved During this Admission:    Diagnosis Date Noted Date Resolved POA    PRINCIPAL PROBLEM:  Other chest pain [R07.89] 03/02/2019 03/08/2019 Yes         Coby Harp MD  General Surgery  [facility]

## 2019-03-08 NOTE — PT/OT/SLP PROGRESS
"Occupational Therapy   Treatment    Name: Justin Adams  MRN: [id number]  Admitting Diagnosis:  Other chest pain  4 Days Post-Op    Recommendations:     Discharge Recommendations: nursing facility, skilled  Discharge Equipment Recommendations:  other (see comments)(TBD)  Barriers to discharge:  Decreased caregiver support    Assessment:     Justin Adams is a 68 y.o. male with a medical diagnosis of Other chest pain.  He presents with  independence for self-care and functional transfers. Performance deficits affecting function are weakness, impaired endurance, impaired functional mobilty, gait instability, impaired balance, impaired cognition, decreased upper extremity function, decreased safety awareness, pain, decreased ROM, impaired coordination, impaired cardiopulmonary response to activity.     Rehab Prognosis:  Good; patient would benefit from acute skilled OT services to address these deficits and reach maximum level of function.       Plan:     Patient to be seen 3 x/week to address the above listed problems via self-care/home management, therapeutic activities, therapeutic exercises  · Plan of Care Expires: 19  · Plan of Care Reviewed with: patient(sister)    Subjective     Pain/Comfort:  · Pain Rating 1: 8/10(Patient stated that his "butt" hurt)  · Location 1: gluteal  · Pain Addressed 1: Pre-medicate for activity, Reposition, Distraction, Cessation of Activity, Nurse notified    Objective:     Communicated with: nurse Cedillo prior to session.  Patient found supine with telemetry, peripheral IV, central line, oxygen, chaenl catheter, pulse ox (continuous) upon OT entry to room.    General Precautions: Standard, fall   Orthopedic Precautions:N/A   Braces: N/A     Occupational Performance:     Bed Mobility:    · Patient completed Rolling/Turning to Left with  maximal assistance  · Patient completed Rolling/Turning to Right with maximal assistance  · Patient completed " Scooting/Bridging with maximal assistance  · Patient completed Supine to Sit with maximal assistance and 2 persons  · Patient completed Sit to Supine with maximal assistance and 2 persons     Functional Mobility/Transfers:  · Patient completed Sit <> Stand Transfer with moderate assistance  with  his personal rollator   · Functional Mobility: x3 attempts    Activities of Daily Living:  · Feeding:  stand by assistance bed level  · Upper Body Dressing: minimum assistance seated EOB  · Toileting: maximal assistance colostomy and anal leakage      AMPAC 6 Click ADL: 10    Treatment & Education:  Therapeutic activity    Patient left supine with all lines intact, call button in reach and nurse Nguyen presentEducation:      GOALS:   Multidisciplinary Problems     Occupational Therapy Goals        Problem: Occupational Therapy Goal    Goal Priority Disciplines Outcome Interventions   Occupational Therapy Goal     OT, PT/OT Ongoing (interventions implemented as appropriate)    Description:  Goals to be met by: 3/28/2019     Patient will increase functional independence with ADLs by performing:    Feeding with Modified [place].  UE Dressing with [place].  LE Dressing with Contact Guard Assistance.  Grooming while standing at sink with Contact Guard Assistance.  Toileting from bedside commode with Stand-by Assistance for hygiene and clothing management.   Sitting at edge of bed x15 minutes with Stand-by Assistance.  Rolling to Bilateral with Stand-by Assistance.   Supine to sit with Stand-by Assistance.  Step transfer with Contact Guard Assistance  Toilet transfer to toilet with Contact Guard Assistance.  Upper extremity exercise program with assistance as needed.                      Time Tracking:     OT Date of Treatment: 03/08/19  OT Start Time: 1335  OT Stop Time: 1405  OT Total Time (min): 30 min    Billable Minutes:Therapeutic Activity 15 minutes with PT    GRANT Louis, MS  3/8/2019

## 2019-03-09 LAB
ALBUMIN SERPL BCP-MCNC: 1.7 G/DL
ALP SERPL-CCNC: 60 U/L
ALT SERPL W/O P-5'-P-CCNC: 8 U/L
ANION GAP SERPL CALC-SCNC: 5 MMOL/L
AST SERPL-CCNC: 10 U/L
BASOPHILS # BLD AUTO: ABNORMAL K/UL
BASOPHILS NFR BLD: 0 %
BILIRUB SERPL-MCNC: 0.3 MG/DL
BUN SERPL-MCNC: 32 MG/DL
CALCIUM SERPL-MCNC: 8.6 MG/DL
CHLORIDE SERPL-SCNC: 103 MMOL/L
CO2 SERPL-SCNC: 33 MMOL/L
CREAT SERPL-MCNC: 0.9 MG/DL
DIFFERENTIAL METHOD: ABNORMAL
EOSINOPHIL # BLD AUTO: ABNORMAL K/UL
EOSINOPHIL NFR BLD: 3 %
ERYTHROCYTE [DISTWIDTH] IN BLOOD BY AUTOMATED COUNT: 18.3 %
EST. GFR  (AFRICAN AMERICAN): >60 ML/MIN/1.73 M^2
EST. GFR  (NON AFRICAN AMERICAN): >60 ML/MIN/1.73 M^2
GLUCOSE SERPL-MCNC: 167 MG/DL
GRAM STN SPEC: NORMAL
HCT VFR BLD AUTO: 26.1 %
HGB BLD-MCNC: 8.6 G/DL
LYMPHOCYTES # BLD AUTO: ABNORMAL K/UL
LYMPHOCYTES NFR BLD: 2 %
MAGNESIUM SERPL-MCNC: 1.8 MG/DL
MCH RBC QN AUTO: 30.1 PG
MCHC RBC AUTO-ENTMCNC: 33 G/DL
MCV RBC AUTO: 91 FL
MONOCYTES # BLD AUTO: ABNORMAL K/UL
MONOCYTES NFR BLD: 3 %
MYELOCYTES NFR BLD MANUAL: 2 %
NEUTROPHILS NFR BLD: 62 %
NEUTS BAND NFR BLD MANUAL: 28 %
PHOSPHATE SERPL-MCNC: 3.5 MG/DL
PLATELET # BLD AUTO: 685 K/UL
PLATELET BLD QL SMEAR: ABNORMAL
PMV BLD AUTO: 10.3 FL
POCT GLUCOSE: 155 MG/DL (ref 70–110)
POCT GLUCOSE: 190 MG/DL (ref 70–110)
POCT GLUCOSE: 210 MG/DL (ref 70–110)
POTASSIUM SERPL-SCNC: 3.8 MMOL/L
PROT SERPL-MCNC: 5.1 G/DL
RBC # BLD AUTO: 2.86 M/UL
SODIUM SERPL-SCNC: 141 MMOL/L
WBC # BLD AUTO: 20.13 K/UL

## 2019-03-09 PROCEDURE — 83735 ASSAY OF MAGNESIUM: CPT

## 2019-03-09 PROCEDURE — 94640 AIRWAY INHALATION TREATMENT: CPT

## 2019-03-09 PROCEDURE — 85007 BL SMEAR W/DIFF WBC COUNT: CPT

## 2019-03-09 PROCEDURE — 25000003 PHARM REV CODE 250: Performed by: STUDENT IN AN ORGANIZED HEALTH CARE EDUCATION/TRAINING PROGRAM

## 2019-03-09 PROCEDURE — B4185 PARENTERAL SOL 10 GM LIPIDS: HCPCS | Performed by: STUDENT IN AN ORGANIZED HEALTH CARE EDUCATION/TRAINING PROGRAM

## 2019-03-09 PROCEDURE — 80053 COMPREHEN METABOLIC PANEL: CPT

## 2019-03-09 PROCEDURE — 25000003 PHARM REV CODE 250: Performed by: SURGERY

## 2019-03-09 PROCEDURE — A4216 STERILE WATER/SALINE, 10 ML: HCPCS | Performed by: STUDENT IN AN ORGANIZED HEALTH CARE EDUCATION/TRAINING PROGRAM

## 2019-03-09 PROCEDURE — 63600175 PHARM REV CODE 636 W HCPCS: Performed by: STUDENT IN AN ORGANIZED HEALTH CARE EDUCATION/TRAINING PROGRAM

## 2019-03-09 PROCEDURE — 85027 COMPLETE CBC AUTOMATED: CPT

## 2019-03-09 PROCEDURE — S0030 INJECTION, METRONIDAZOLE: HCPCS | Performed by: SURGERY

## 2019-03-09 PROCEDURE — 97530 THERAPEUTIC ACTIVITIES: CPT

## 2019-03-09 PROCEDURE — C9113 INJ PANTOPRAZOLE SODIUM, VIA: HCPCS | Performed by: STUDENT IN AN ORGANIZED HEALTH CARE EDUCATION/TRAINING PROGRAM

## 2019-03-09 PROCEDURE — 63600175 PHARM REV CODE 636 W HCPCS: Performed by: SURGERY

## 2019-03-09 PROCEDURE — 27000221 HC OXYGEN, UP TO 24 HOURS

## 2019-03-09 PROCEDURE — 97110 THERAPEUTIC EXERCISES: CPT

## 2019-03-09 PROCEDURE — 94668 MNPJ CHEST WALL SBSQ: CPT

## 2019-03-09 PROCEDURE — 11000001 HC ACUTE MED/SURG PRIVATE ROOM

## 2019-03-09 PROCEDURE — 84100 ASSAY OF PHOSPHORUS: CPT

## 2019-03-09 PROCEDURE — A4217 STERILE WATER/SALINE, 500 ML: HCPCS | Performed by: STUDENT IN AN ORGANIZED HEALTH CARE EDUCATION/TRAINING PROGRAM

## 2019-03-09 RX ORDER — NYSTATIN 100000 [USP'U]/ML
500000 SUSPENSION ORAL DAILY
Status: DISCONTINUED | OUTPATIENT
Start: 2019-03-09 | End: 2019-03-21

## 2019-03-09 RX ADMIN — ASPIRIN 81 MG: 81 TABLET, COATED ORAL at 10:03

## 2019-03-09 RX ADMIN — ACETAMINOPHEN 650 MG: 325 TABLET, FILM COATED ORAL at 11:03

## 2019-03-09 RX ADMIN — LEVETIRACETAM 500 MG: 5 INJECTION INTRAVENOUS at 10:03

## 2019-03-09 RX ADMIN — LEVETIRACETAM 500 MG: 5 INJECTION INTRAVENOUS at 09:03

## 2019-03-09 RX ADMIN — ACETAMINOPHEN 650 MG: 325 TABLET, FILM COATED ORAL at 12:03

## 2019-03-09 RX ADMIN — Medication 10 ML: at 05:03

## 2019-03-09 RX ADMIN — KETOROLAC TROMETHAMINE 15 MG: 30 INJECTION, SOLUTION INTRAMUSCULAR at 06:03

## 2019-03-09 RX ADMIN — KETOROLAC TROMETHAMINE 15 MG: 30 INJECTION, SOLUTION INTRAMUSCULAR at 05:03

## 2019-03-09 RX ADMIN — KETOROLAC TROMETHAMINE 15 MG: 30 INJECTION, SOLUTION INTRAMUSCULAR at 12:03

## 2019-03-09 RX ADMIN — METRONIDAZOLE 500 MG: 500 INJECTION, SOLUTION INTRAVENOUS at 12:03

## 2019-03-09 RX ADMIN — OCTREOTIDE ACETATE 100 MCG: 100 INJECTION, SOLUTION INTRAVENOUS; SUBCUTANEOUS at 05:03

## 2019-03-09 RX ADMIN — KETOROLAC TROMETHAMINE 15 MG: 30 INJECTION, SOLUTION INTRAMUSCULAR at 11:03

## 2019-03-09 RX ADMIN — OXYCODONE HYDROCHLORIDE 5 MG: 5 TABLET ORAL at 08:03

## 2019-03-09 RX ADMIN — NYSTATIN 500000 UNITS: 500000 SUSPENSION ORAL at 01:03

## 2019-03-09 RX ADMIN — PIPERACILLIN AND TAZOBACTAM 4.5 G: 4; .5 INJECTION, POWDER, LYOPHILIZED, FOR SOLUTION INTRAVENOUS; PARENTERAL at 05:03

## 2019-03-09 RX ADMIN — PIPERACILLIN AND TAZOBACTAM 4.5 G: 4; .5 INJECTION, POWDER, LYOPHILIZED, FOR SOLUTION INTRAVENOUS; PARENTERAL at 01:03

## 2019-03-09 RX ADMIN — PIPERACILLIN AND TAZOBACTAM 4.5 G: 4; .5 INJECTION, POWDER, LYOPHILIZED, FOR SOLUTION INTRAVENOUS; PARENTERAL at 11:03

## 2019-03-09 RX ADMIN — ESCITALOPRAM OXALATE 10 MG: 10 TABLET ORAL at 10:03

## 2019-03-09 RX ADMIN — BISMUTH SUBSALICYLATE 30 ML: 262 LIQUID ORAL at 12:03

## 2019-03-09 RX ADMIN — ENOXAPARIN SODIUM 40 MG: 100 INJECTION SUBCUTANEOUS at 05:03

## 2019-03-09 RX ADMIN — METRONIDAZOLE 500 MG: 500 INJECTION, SOLUTION INTRAVENOUS at 03:03

## 2019-03-09 RX ADMIN — GABAPENTIN 100 MG: 100 CAPSULE ORAL at 03:03

## 2019-03-09 RX ADMIN — FLUCONAZOLE, SODIUM CHLORIDE 200 MG: 2 INJECTION INTRAVENOUS at 08:03

## 2019-03-09 RX ADMIN — SODIUM PHOSPHATE, MONOBASIC, MONOHYDRATE: 276; 142 INJECTION, SOLUTION INTRAVENOUS at 09:03

## 2019-03-09 RX ADMIN — SOYBEAN OIL 250 ML: 20 INJECTION, SOLUTION INTRAVENOUS at 09:03

## 2019-03-09 RX ADMIN — BISMUTH SUBSALICYLATE 30 ML: 262 LIQUID ORAL at 05:03

## 2019-03-09 RX ADMIN — OCTREOTIDE ACETATE 100 MCG: 100 INJECTION, SOLUTION INTRAVENOUS; SUBCUTANEOUS at 01:03

## 2019-03-09 RX ADMIN — OCTREOTIDE ACETATE 100 MCG: 100 INJECTION, SOLUTION INTRAVENOUS; SUBCUTANEOUS at 09:03

## 2019-03-09 RX ADMIN — Medication 10 ML: at 06:03

## 2019-03-09 RX ADMIN — PANTOPRAZOLE SODIUM 40 MG: 40 INJECTION, POWDER, LYOPHILIZED, FOR SOLUTION INTRAVENOUS at 10:03

## 2019-03-09 RX ADMIN — ACETAMINOPHEN 650 MG: 325 TABLET, FILM COATED ORAL at 05:03

## 2019-03-09 RX ADMIN — BISMUTH SUBSALICYLATE 30 ML: 262 LIQUID ORAL at 10:03

## 2019-03-09 RX ADMIN — Medication 10 ML: at 12:03

## 2019-03-09 RX ADMIN — FLUTICASONE FUROATE AND VILANTEROL TRIFENATATE 1 PUFF: 100; 25 POWDER RESPIRATORY (INHALATION) at 08:03

## 2019-03-09 RX ADMIN — ACETAMINOPHEN 650 MG: 325 TABLET, FILM COATED ORAL at 06:03

## 2019-03-09 RX ADMIN — METRONIDAZOLE 500 MG: 500 INJECTION, SOLUTION INTRAVENOUS at 09:03

## 2019-03-09 RX ADMIN — GABAPENTIN 100 MG: 100 CAPSULE ORAL at 09:03

## 2019-03-09 RX ADMIN — GABAPENTIN 100 MG: 100 CAPSULE ORAL at 10:03

## 2019-03-09 RX ADMIN — Medication 10 ML: at 11:03

## 2019-03-09 NOTE — PROGRESS NOTES
[facility]  General Surgery  Progress Note    Subjective:     Interval History: no acute events overnight, stepped down from the ICU. Tolerating diet.     Post-Op Info:  Procedure(s) (LRB):  LAPAROSCOPY, DIAGNOSTIC (N/A)  LAPAROTOMY, EXPLORATORY   5 Days Post-Op      Medications:  Continuous Infusions:   TPN ADULT CENTRAL LINE CUSTOM 100 mL/hr at 03/08/19 2323     Scheduled Meds:   acetaminophen  650 mg Oral Q6H    albumin human 5%  12.5 g Intravenous Once    aspirin  81 mg Oral Daily    bismuth subsalicylate  30 mL Oral QID    enoxaparin  40 mg Subcutaneous Daily    escitalopram oxalate  10 mg Oral Daily    fluconazole  200 mg Intravenous Q24H    fluticasone-vilanterol  1 puff Inhalation Daily    gabapentin  100 mg Oral TID    ketorolac  15 mg Intravenous Q6H    levetiracetam IVPB  500 mg Intravenous Q12H    metronidazole  500 mg Intravenous Q8H    nystatin  500,000 Units Mouth/Throat Daily    octreotide  100 mcg Subcutaneous Q8H    pantoprazole  40 mg Intravenous Daily    piperacillin-tazobactam (ZOSYN) IVPB  4.5 g Intravenous Q8H    sodium chloride 0.9%  10 mL Intravenous Q6H     PRN Meds:albuterol, dextrose 50%, glucagon (human recombinant), insulin aspart U-100, naloxone, ondansetron, ondansetron, oxyCODONE, Flushing PICC Protocol **AND** sodium chloride 0.9% **AND** sodium chloride 0.9%, sodium chloride 0.9%     Objective:     Vital Signs (Most Recent):  Temp: 98.4 °F (36.9 °C) (03/09/19 1130)  Pulse: 72 (03/09/19 1130)  Resp: 18 (03/09/19 1130)  BP: 138/68 (03/09/19 1130)  SpO2: 95 % (03/09/19 1130) Vital Signs (24h Range):  Temp:  [97.3 °F (36.3 °C)-98.7 °F (37.1 °C)] 98.4 °F (36.9 °C)  Pulse:  [63-78] 72  Resp:  [18-26] 18  SpO2:  [92 %-96 %] 95 %  BP: (119-148)/(61-69) 138/68       Intake/Output Summary (Last 24 hours) at 3/9/2019 1135  Last data filed at 3/9/2019 0600  Gross per 24 hour   Intake 0 ml   Output 1245 ml   Net -1245 ml       Physical Exam  Awake,  alert  RRR  No increased work of breathing  Abd soft, midline with diane in place, ostomy pink, productive of green liquid stool. IR drain with feculent output  Chanel with yellow urine  Edematous      Significant Labs:  BMP:   Recent Labs   Lab 03/09/19  1012   *      K 3.8      CO2 33*   BUN 32*   CREATININE 0.9   CALCIUM 8.6*   MG 1.8     CBC:   Recent Labs   Lab 03/09/19  0450   WBC 20.13*   RBC 2.86*   HGB 8.6*   HCT 26.1*   *   MCV 91   MCH 30.1   MCHC 33.0       Significant Diagnostics:  None    Assessment/Plan:   68M s/p recent lap LAR for rectal cancer on 2/22 presenting with increasing abdominal pain, CT with intraperitoneal free air in the postoperative setting. Evening of 3/1 patient febrile, tachycardic and subsequently transferred to the ICU. Hemodynamics normalized overnight on 3/1 and he has remained HDS without lactic acidosis or metabolic durrangements. Repeat CT scan with readministration of free air. NG placed and PICC line obtained. Taken to the OR on 3/4 for ex lap and diverting loop ostomy. Returned to the ICU in stable condition      Neuro:   -prn oxy, scheduled tylenol and gabapentin  -home escitalopram  -toradol q6  -hx of seizures- Keppra IV     CV: HDS.   -Cardiology evaluated- ACS ruled out  -Hold home BP meds      PULM: Improving  -IS hourly while awake  -Home inhalers      FEN/GI: s/p LAR on 2/22, washout and loop ostomy 3/4, ostomy functioning, ostomy RN following  -Diabetic diet  -octreotide and kaopectate to minimize ostomy output  -Continue TPN  -Repeat nutrition labs Monday      : chanel,   -BUN, Cr 32, 0.9  -dc Chanel     Heme/ID: Leukocytosis persists    -blood cultures NG final  -continue Zosyn, fluconazole and flagyl for intraabdominal infection   -CT with abscesses, s/p IR drainage of pelvic collection     Endo: -364  -increase ISS, accuchecks  -decrease dextrose in TPN     MSK/Wounds:   -daily betadine [place] of  midline  -PT/OT     PPX: Lovenox and Protonix (home med)         Active Diagnoses:    Diagnosis Date Noted POA    Hypertension [I10] 03/02/2019 Unknown    Mixed hyperlipidemia [E78.2] 03/02/2019 Yes    Abdominal pain [R10.9] 02/26/2019 Yes    Carcinoma of rectum [C20] 11/12/2018 Yes      Problems Resolved During this Admission:    Diagnosis Date Noted Date Resolved POA    PRINCIPAL PROBLEM:  Other chest pain [R07.89] 03/02/2019 03/08/2019 Yes         Coby Harp MD  General Surgery  [facility]

## 2019-03-09 NOTE — PLAN OF CARE
Problem: Infection  Goal: Infection Symptom Resolution  Outcome: Ongoing (interventions implemented as appropriate)  Pt status unchanged.  No c/o pain, fever or discomfort.  Pt follow commands, Marti.  Multiple antibiotics given as scheduled.  Trip to CT for abdominal scan done.

## 2019-03-09 NOTE — PT/OT/SLP PROGRESS
Physical Therapy Treatment    Patient Name:  Justin Adams   MRN:  [id number]    Recommendations:     Discharge Recommendations:  nursing facility, skilled   Discharge Equipment Recommendations: (ongoing assessment)   Barriers to discharge: Decreased caregiver support    Assessment:     Justin Adams is a 68 y.o. male admitted with a medical diagnosis of Other chest pain.  He presents with the following impairments/functional limitations:  weakness, impaired endurance, impaired self care skills, gait instability, impaired balance, decreased upper extremity function, decreased lower extremity function, decreased ROM, edema, pain, decreased safety awareness, impaired functional mobilty, impaired cardiopulmonary response to activity .    Rehab Prognosis: Good; patient would benefit from acute skilled PT services to address these deficits and reach maximum level of function.    Recent Surgery: Procedure(s) (LRB):  LAPAROSCOPY, DIAGNOSTIC (N/A)  LAPAROTOMY, EXPLORATORY 5 Days Post-Op    Plan:     During this hospitalization, patient to be seen (3-5x/wk) to address the identified rehab impairments via gait training, therapeutic activities, therapeutic exercises and progress toward the following goals:    · Plan of Care Expires:  03/21/19    Subjective     Chief Complaint: weakness and abdominal pain   Patient/Family Comments/goals: to get back to PLOF   Pain/Comfort:  · Pain Rating 1: 8/10  · Location 1: abdomen  · Pain Addressed 1: Nurse notified  · Pain Rating Post-Intervention 1: 8/10      Objective:     Communicated with nurse Ness prior to session.  Patient found all lines intact, call button in reach, bed alarm on, nurse notified and sister present bed alarm, telemetry, colostomy, oxygen, PICC line  upon PT entry to room.     General Precautions: Standard, fall, respiratory   Orthopedic Precautions:N/A   Braces: N/A     Functional Mobility:  · Bed Mobility:     · Rolling Right: contact guard  assistance  · Scooting: contact guard assistance  · Supine to Sit: moderate assistance/ max A , HOB elevated, bedside rail   · Transfers:     · Sit to Stand:  2 trials from bed and 2 trials from chair minimum assistance  with rollator. V/T cues for safety technique and rollator management .    · Bed to Chair: minimum assistance with  4 wheeled walker  using  Step Transfer  · Gait: pt ambulated ~ 4 side steps and ~ 3 ft from bed to chair with rollator , min A . Noted with decreased shyam, decreased step length. Pt with decreased safety awareness, required frequent V/T cues for safety and sequence.         AM-PAC 6 CLICK MOBILITY  Turning over in bed (including adjusting bedclothes, sheets and blankets)?: 4  Sitting down on and standing up from a chair with arms (e.g., wheelchair, bedside commode, etc.): 3  Moving from lying on back to sitting on the side of the bed?: 2  Moving to and from a bed to a chair (including a wheelchair)?: 3  Need to walk in hospital room?: 3  Climbing 3-5 steps with a railing?: 2  Basic Mobility Total Score: 17       Therapeutic Activities and Exercises:   pt performed bed mobility, transfer and gait training as above.  Pt performed seated BLE x 15 reps : AP, LAQ, HS, hip flexion and pillow squeezes. V/T cues for proper technique and sequence. Pt required seated rest breaks throughout therapy treatment 2* fatigue and c/o SOB. VC's for pursed lip breathing technique.   Educated pt on safety awareness with all OOB mobility, transfer and gait training.   Encouraged  pt to perform BLE AP while in bed or chair. Pt demonstrated good understanding.      Patient left up in chair on cushion  with all lines intact, call button in reach, nurse notified and sister present..    GOALS:   Multidisciplinary Problems     Physical Therapy Goals        Problem: Physical Therapy Goal    Goal Priority Disciplines Outcome Goal Variances Interventions   Physical Therapy Goal     PT, PT/OT Ongoing (interventions  implemented as appropriate)     Description:  Goals to be met by: 3/21/2019     Patient will increase functional independence with mobility by performin. Supine to sit with Stand-by Assistance  2. Sit to stand transfer with Stand-by Assistance  3. Gait  x 100 feet with Stand-by Assistance using rollator.   4. Lower extremity exercise program x10 reps per handout, with supervision                      Time Tracking:     PT Received On: 19  PT Start Time: 1149     PT Stop Time: 1214  PT Total Time (min): 25 min     Billable Minutes: Therapeutic Activity 15 and Therapeutic Exercise 10    Treatment Type: Treatment  PT/PTA: PTA     PTA Visit Number: 1     Phuong Byrnes, PTA  2019

## 2019-03-09 NOTE — PLAN OF CARE
Problem: Adult Inpatient Plan of Care  Goal: Absence of Hospital-Acquired Illness or Injury    Intervention: Prevent Infection  Patient received on nasal cannula 4 lpm. MDI given. CPT done. sp02 96%. NARN.

## 2019-03-09 NOTE — PLAN OF CARE
Problem: Adult Inpatient Plan of Care  Goal: Plan of Care Review  Outcome: Ongoing (interventions implemented as appropriate)    Pt. AAOx4; calm and cooperative. No falls no injuries. Pt. slept through the night. No distress noted. No complaints of discomfort. Field intact draining clear yellow fluid. Pt. instructed to call if assiatance is needed. Scheduled meds given without difficulty. Bed in lowest position. Call light in reach. Will continue to monitor     03/09/19 2787   Plan of Care Review   Plan of Care Reviewed With patient

## 2019-03-09 NOTE — PLAN OF CARE
Problem: Physical Therapy Goal  Goal: Physical Therapy Goal  Goals to be met by: 3/21/2019     Patient will increase functional independence with mobility by performin. Supine to sit with Stand-by Assistance  2. Sit to stand transfer with Stand-by Assistance  3. Gait  x 100 feet with Stand-by Assistance using rollator.   4. Lower extremity exercise program x10 reps per handout, with supervision     Outcome: Ongoing (interventions implemented as appropriate)  Pt will benefit from further skilled therapy in order to get back to PLOF.

## 2019-03-10 LAB
ALBUMIN SERPL BCP-MCNC: 1.5 G/DL
ALP SERPL-CCNC: 71 U/L
ALT SERPL W/O P-5'-P-CCNC: 6 U/L
ANION GAP SERPL CALC-SCNC: 8 MMOL/L
AST SERPL-CCNC: 10 U/L
BASOPHILS # BLD AUTO: ABNORMAL K/UL
BASOPHILS NFR BLD: 0 %
BILIRUB SERPL-MCNC: 0.3 MG/DL
BUN SERPL-MCNC: 29 MG/DL
CALCIUM SERPL-MCNC: 8.4 MG/DL
CHLORIDE SERPL-SCNC: 102 MMOL/L
CO2 SERPL-SCNC: 30 MMOL/L
CREAT SERPL-MCNC: 0.9 MG/DL
DIFFERENTIAL METHOD: ABNORMAL
EOSINOPHIL # BLD AUTO: ABNORMAL K/UL
EOSINOPHIL NFR BLD: 2 %
ERYTHROCYTE [DISTWIDTH] IN BLOOD BY AUTOMATED COUNT: 16.9 %
EST. GFR  (AFRICAN AMERICAN): >60 ML/MIN/1.73 M^2
EST. GFR  (NON AFRICAN AMERICAN): >60 ML/MIN/1.73 M^2
GLUCOSE SERPL-MCNC: 125 MG/DL
HCT VFR BLD AUTO: 26.2 %
HGB BLD-MCNC: 8.9 G/DL
LYMPHOCYTES # BLD AUTO: ABNORMAL K/UL
LYMPHOCYTES NFR BLD: 3 %
MAGNESIUM SERPL-MCNC: 1.7 MG/DL
MCH RBC QN AUTO: 29.5 PG
MCHC RBC AUTO-ENTMCNC: 34 G/DL
MCV RBC AUTO: 87 FL
MONOCYTES # BLD AUTO: ABNORMAL K/UL
MONOCYTES NFR BLD: 5 %
NEUTROPHILS NFR BLD: 56 %
NEUTS BAND NFR BLD MANUAL: 34 %
PHOSPHATE SERPL-MCNC: 4.1 MG/DL
PLATELET # BLD AUTO: 783 K/UL
PMV BLD AUTO: 10.1 FL
POCT GLUCOSE: 144 MG/DL (ref 70–110)
POCT GLUCOSE: 168 MG/DL (ref 70–110)
POCT GLUCOSE: 176 MG/DL (ref 70–110)
POCT GLUCOSE: 205 MG/DL (ref 70–110)
POTASSIUM SERPL-SCNC: 3.7 MMOL/L
PROT SERPL-MCNC: 4.9 G/DL
RBC # BLD AUTO: 3.02 M/UL
SODIUM SERPL-SCNC: 140 MMOL/L
WBC # BLD AUTO: 20.04 K/UL

## 2019-03-10 PROCEDURE — A4216 STERILE WATER/SALINE, 10 ML: HCPCS | Performed by: STUDENT IN AN ORGANIZED HEALTH CARE EDUCATION/TRAINING PROGRAM

## 2019-03-10 PROCEDURE — 11000001 HC ACUTE MED/SURG PRIVATE ROOM

## 2019-03-10 PROCEDURE — 94761 N-INVAS EAR/PLS OXIMETRY MLT: CPT

## 2019-03-10 PROCEDURE — 27000646 HC AEROBIKA DEVICE

## 2019-03-10 PROCEDURE — 63600175 PHARM REV CODE 636 W HCPCS: Performed by: STUDENT IN AN ORGANIZED HEALTH CARE EDUCATION/TRAINING PROGRAM

## 2019-03-10 PROCEDURE — 85027 COMPLETE CBC AUTOMATED: CPT

## 2019-03-10 PROCEDURE — 25000003 PHARM REV CODE 250: Performed by: SURGERY

## 2019-03-10 PROCEDURE — C9113 INJ PANTOPRAZOLE SODIUM, VIA: HCPCS | Performed by: STUDENT IN AN ORGANIZED HEALTH CARE EDUCATION/TRAINING PROGRAM

## 2019-03-10 PROCEDURE — 25000003 PHARM REV CODE 250: Performed by: STUDENT IN AN ORGANIZED HEALTH CARE EDUCATION/TRAINING PROGRAM

## 2019-03-10 PROCEDURE — 83735 ASSAY OF MAGNESIUM: CPT

## 2019-03-10 PROCEDURE — 80053 COMPREHEN METABOLIC PANEL: CPT

## 2019-03-10 PROCEDURE — S0030 INJECTION, METRONIDAZOLE: HCPCS | Performed by: SURGERY

## 2019-03-10 PROCEDURE — 94664 DEMO&/EVAL PT USE INHALER: CPT

## 2019-03-10 PROCEDURE — 94799 UNLISTED PULMONARY SVC/PX: CPT

## 2019-03-10 PROCEDURE — 94640 AIRWAY INHALATION TREATMENT: CPT

## 2019-03-10 PROCEDURE — 63600175 PHARM REV CODE 636 W HCPCS: Performed by: SURGERY

## 2019-03-10 PROCEDURE — 27000221 HC OXYGEN, UP TO 24 HOURS

## 2019-03-10 PROCEDURE — A4217 STERILE WATER/SALINE, 500 ML: HCPCS | Performed by: STUDENT IN AN ORGANIZED HEALTH CARE EDUCATION/TRAINING PROGRAM

## 2019-03-10 PROCEDURE — 84100 ASSAY OF PHOSPHORUS: CPT

## 2019-03-10 PROCEDURE — B4185 PARENTERAL SOL 10 GM LIPIDS: HCPCS | Performed by: STUDENT IN AN ORGANIZED HEALTH CARE EDUCATION/TRAINING PROGRAM

## 2019-03-10 PROCEDURE — 85007 BL SMEAR W/DIFF WBC COUNT: CPT

## 2019-03-10 PROCEDURE — 99900035 HC TECH TIME PER 15 MIN (STAT)

## 2019-03-10 RX ADMIN — PIPERACILLIN AND TAZOBACTAM 4.5 G: 4; .5 INJECTION, POWDER, LYOPHILIZED, FOR SOLUTION INTRAVENOUS; PARENTERAL at 02:03

## 2019-03-10 RX ADMIN — OCTREOTIDE ACETATE 100 MCG: 100 INJECTION, SOLUTION INTRAVENOUS; SUBCUTANEOUS at 09:03

## 2019-03-10 RX ADMIN — KETOROLAC TROMETHAMINE 15 MG: 30 INJECTION, SOLUTION INTRAMUSCULAR at 05:03

## 2019-03-10 RX ADMIN — GABAPENTIN 100 MG: 100 CAPSULE ORAL at 08:03

## 2019-03-10 RX ADMIN — ALBUTEROL SULFATE 2 PUFF: 90 AEROSOL, METERED RESPIRATORY (INHALATION) at 09:03

## 2019-03-10 RX ADMIN — FLUCONAZOLE, SODIUM CHLORIDE 200 MG: 2 INJECTION INTRAVENOUS at 08:03

## 2019-03-10 RX ADMIN — Medication 10 ML: at 06:03

## 2019-03-10 RX ADMIN — FLUTICASONE FUROATE AND VILANTEROL TRIFENATATE 1 PUFF: 100; 25 POWDER RESPIRATORY (INHALATION) at 09:03

## 2019-03-10 RX ADMIN — ASPIRIN 81 MG: 81 TABLET, COATED ORAL at 09:03

## 2019-03-10 RX ADMIN — PIPERACILLIN AND TAZOBACTAM 4.5 G: 4; .5 INJECTION, POWDER, LYOPHILIZED, FOR SOLUTION INTRAVENOUS; PARENTERAL at 09:03

## 2019-03-10 RX ADMIN — ALBUTEROL SULFATE 2 PUFF: 90 AEROSOL, METERED RESPIRATORY (INHALATION) at 11:03

## 2019-03-10 RX ADMIN — ACETAMINOPHEN 650 MG: 325 TABLET, FILM COATED ORAL at 05:03

## 2019-03-10 RX ADMIN — ESCITALOPRAM OXALATE 10 MG: 10 TABLET ORAL at 09:03

## 2019-03-10 RX ADMIN — GABAPENTIN 100 MG: 100 CAPSULE ORAL at 02:03

## 2019-03-10 RX ADMIN — METRONIDAZOLE 500 MG: 500 INJECTION, SOLUTION INTRAVENOUS at 12:03

## 2019-03-10 RX ADMIN — BISMUTH SUBSALICYLATE 30 ML: 262 LIQUID ORAL at 12:03

## 2019-03-10 RX ADMIN — ACETAMINOPHEN 650 MG: 325 TABLET, FILM COATED ORAL at 06:03

## 2019-03-10 RX ADMIN — GABAPENTIN 100 MG: 100 CAPSULE ORAL at 09:03

## 2019-03-10 RX ADMIN — OXYCODONE HYDROCHLORIDE 5 MG: 5 TABLET ORAL at 06:03

## 2019-03-10 RX ADMIN — OCTREOTIDE ACETATE 100 MCG: 100 INJECTION, SOLUTION INTRAVENOUS; SUBCUTANEOUS at 02:03

## 2019-03-10 RX ADMIN — METRONIDAZOLE 500 MG: 500 INJECTION, SOLUTION INTRAVENOUS at 03:03

## 2019-03-10 RX ADMIN — ACETAMINOPHEN 650 MG: 325 TABLET, FILM COATED ORAL at 12:03

## 2019-03-10 RX ADMIN — BISMUTH SUBSALICYLATE 30 ML: 262 LIQUID ORAL at 08:03

## 2019-03-10 RX ADMIN — ENOXAPARIN SODIUM 40 MG: 100 INJECTION SUBCUTANEOUS at 06:03

## 2019-03-10 RX ADMIN — OXYCODONE HYDROCHLORIDE 5 MG: 5 TABLET ORAL at 12:03

## 2019-03-10 RX ADMIN — LEVETIRACETAM 500 MG: 5 INJECTION INTRAVENOUS at 08:03

## 2019-03-10 RX ADMIN — OCTREOTIDE ACETATE 100 MCG: 100 INJECTION, SOLUTION INTRAVENOUS; SUBCUTANEOUS at 05:03

## 2019-03-10 RX ADMIN — METRONIDAZOLE 500 MG: 500 INJECTION, SOLUTION INTRAVENOUS at 08:03

## 2019-03-10 RX ADMIN — PANTOPRAZOLE SODIUM 40 MG: 40 INJECTION, POWDER, LYOPHILIZED, FOR SOLUTION INTRAVENOUS at 09:03

## 2019-03-10 RX ADMIN — BISMUTH SUBSALICYLATE 30 ML: 262 LIQUID ORAL at 09:03

## 2019-03-10 RX ADMIN — SODIUM PHOSPHATE, MONOBASIC, MONOHYDRATE: 276; 142 INJECTION, SOLUTION INTRAVENOUS at 10:03

## 2019-03-10 RX ADMIN — LEVETIRACETAM 500 MG: 5 INJECTION INTRAVENOUS at 09:03

## 2019-03-10 RX ADMIN — Medication 10 ML: at 12:03

## 2019-03-10 RX ADMIN — INSULIN ASPART 2 UNITS: 100 INJECTION, SOLUTION INTRAVENOUS; SUBCUTANEOUS at 06:03

## 2019-03-10 RX ADMIN — PIPERACILLIN AND TAZOBACTAM 4.5 G: 4; .5 INJECTION, POWDER, LYOPHILIZED, FOR SOLUTION INTRAVENOUS; PARENTERAL at 05:03

## 2019-03-10 RX ADMIN — Medication 10 ML: at 05:03

## 2019-03-10 RX ADMIN — NYSTATIN 500000 UNITS: 500000 SUSPENSION ORAL at 09:03

## 2019-03-10 RX ADMIN — OXYCODONE HYDROCHLORIDE 5 MG: 5 TABLET ORAL at 03:03

## 2019-03-10 RX ADMIN — SOYBEAN OIL 250 ML: 20 INJECTION, SOLUTION INTRAVENOUS at 10:03

## 2019-03-10 NOTE — PLAN OF CARE
Problem: Adult Inpatient Plan of Care  Goal: Plan of Care Review  Outcome: Ongoing (interventions implemented as appropriate)   03/10/19 1323   Plan of Care Review   Plan of Care Reviewed With patient   A+Ox4. Pt free from falls, injury, and trauma. VSS. Afebrile. Scheduled meds and ABX given. TPN given at 100 ml/ hr. Lipids given at 20.8 ml/hr. Pain controled with prn pain medication and scheduled toradol and tylenol. Dressing on pt stomach CDI. Colostomy bag and according drain was drained but both had only a small amount of drainage. Pt resting comfortably. Will continue to monitor.

## 2019-03-10 NOTE — NURSING
Changed PICC line dressing. 1 day past due. Changed hubs. Also threw away  3 port connection because was unable to verify how long it has been on.

## 2019-03-10 NOTE — PROGRESS NOTES
[facility]  General Surgery  Progress Note    Subjective:     Interval History: no acute events, feels about the same as yesterday, continued abdominal pain. Tolerating diet.     Post-Op Info:  Procedure(s) (LRB):  LAPAROSCOPY, DIAGNOSTIC (N/A)  LAPAROTOMY, EXPLORATORY   6 Days Post-Op      Medications:  Continuous Infusions:   TPN ADULT CENTRAL LINE CUSTOM 100 mL/hr at 03/09/19 2149     Scheduled Meds:   acetaminophen  650 mg Oral Q6H    albumin human 5%  12.5 g Intravenous Once    aspirin  81 mg Oral Daily    bismuth subsalicylate  30 mL Oral QID    enoxaparin  40 mg Subcutaneous Daily    escitalopram oxalate  10 mg Oral Daily    fat emulsion  250 mL Intravenous Daily    fluconazole  200 mg Intravenous Q24H    fluticasone-vilanterol  1 puff Inhalation Daily    gabapentin  100 mg Oral TID    levetiracetam IVPB  500 mg Intravenous Q12H    metronidazole  500 mg Intravenous Q8H    nystatin  500,000 Units Mouth/Throat Daily    octreotide  100 mcg Subcutaneous Q8H    pantoprazole  40 mg Intravenous Daily    piperacillin-tazobactam (ZOSYN) IVPB  4.5 g Intravenous Q8H    sodium chloride 0.9%  10 mL Intravenous Q6H     PRN Meds:albuterol, dextrose 50%, glucagon (human recombinant), insulin aspart U-100, naloxone, ondansetron, ondansetron, oxyCODONE, Flushing PICC Protocol **AND** sodium chloride 0.9% **AND** sodium chloride 0.9%, sodium chloride 0.9%     Objective:     Vital Signs (Most Recent):  Temp: 98.2 °F (36.8 °C) (03/10/19 0723)  Pulse: 60 (03/10/19 0903)  Resp: 20 (03/10/19 0903)  BP: 132/68 (03/10/19 0723)  SpO2: 97 % (03/10/19 0902) Vital Signs (24h Range):  Temp:  [98.1 °F (36.7 °C)-98.9 °F (37.2 °C)] 98.2 °F (36.8 °C)  Pulse:  [60-72] 60  Resp:  [18-20] 20  SpO2:  [92 %-97 %] 97 %  BP: (127-153)/(65-76) 132/68       Intake/Output Summary (Last 24 hours) at 3/10/2019 0958  Last data filed at 3/9/2019 1714  Gross per 24 hour   Intake 240 ml   Output --   Net 240 ml        Physical Exam  Awake, alert  RRR  No increased work of breathing  Abd soft, midline with diane in place, ostomy pink, productive of green liquid stool. IR drain with feculent output      Significant Labs:  BMP:   Recent Labs   Lab 03/10/19  0537   *      K 3.7      CO2 30*   BUN 29*   CREATININE 0.9   CALCIUM 8.4*   MG 1.7     CBC:   Recent Labs   Lab 03/10/19  0537   WBC 20.04*   RBC 3.02*   HGB 8.9*   HCT 26.2*   *   MCV 87   MCH 29.5   MCHC 34.0       Significant Diagnostics:  None    Assessment/Plan:   68M s/p recent lap LAR for rectal cancer on 2/22 presenting with increasing abdominal pain, CT with intraperitoneal free air in the postoperative setting. Evening of 3/1 patient febrile, tachycardic and subsequently transferred to the ICU. Hemodynamics normalized overnight on 3/1 and he has remained HDS without lactic acidosis or metabolic durrangements. Repeat CT scan with readministration of free air. NG placed and PICC line obtained. Taken to the OR on 3/4 for ex lap and diverting loop ostomy. Returned to the ICU in stable condition, stepped down to the floor.      Neuro:   -prn oxy, scheduled tylenol and gabapentin  -home escitalopram  -toradol q6  -hx of seizures- Keppra IV     CV: HDS.   -Cardiology evaluated- ACS ruled out  -Hold home ACEI and statin     PULM: Improving  -IS hourly while awake  -Home inhalers      FEN/GI: s/p LAR on 2/22, washout and loop ostomy 3/4, ostomy functioning, ostomy RN following  -Diabetic diet  -octreotide and kaopectate to minimize ostomy output  -Continue TPN  -Repeat nutrition labs Monday      :   -BUN, Cr 29, 0.9  -Strict I/Os     Heme/ID: Leukocytosis persists    -blood cultures NG final  -continue Zosyn, fluconazole and flagyl for intraabdominal infection   -CT with abscesses, s/p IR drainage of pelvic collection  -Flush drain BID  -Repeat CT scan with PO contrast tomorrow     Endo: CBG 144-210  -ISS, accuchecks  -decrease dextrose in  TPN     MSK/Wounds:   -dc diane in midline tomorrow  -ostomy teaching  -PT/OT     PPX: Lovenox and Protonix (home med)       Active Diagnoses:    Diagnosis Date Noted POA    Hypertension [I10] 03/02/2019 Unknown    Mixed hyperlipidemia [E78.2] 03/02/2019 Yes    Abdominal pain [R10.9] 02/26/2019 Yes    Carcinoma of rectum [C20] 11/12/2018 Yes      Problems Resolved During this Admission:    Diagnosis Date Noted Date Resolved POA    PRINCIPAL PROBLEM:  Other chest pain [R07.89] 03/02/2019 03/08/2019 Yes         Coby Harp MD  General Surgery  [facility]

## 2019-03-10 NOTE — NURSING
Accordion drain flushed with 10 mL sterile saline. Large amount, 350 mL, of brown, malodorous exudate removed after flushing.

## 2019-03-10 NOTE — NURSING
wound care complete per order. Patient oriented to room. Blue folder and white board explained. Questions encouraged and answered. Patient verbalized understanding. Bed in low position and locked. Call light in reach. Bed alarm set. No distress noted. Will continue to monitor, will continue with plan of care.

## 2019-03-10 NOTE — PLAN OF CARE
Problem: Adult Inpatient Plan of Care  Goal: Plan of Care Review  Outcome: Ongoing (interventions implemented as appropriate)   03/10/19 7980   Plan of Care Review   Plan of Care Reviewed With patient   Progress improving       Comments: TPN infusing as ordered. Complaints of pain this shift 8/10. Somewhat relieved with oral medication.   Pt needs frequent encouragement to turn and is incontinent of bowel and bladder.

## 2019-03-11 LAB
ALBUMIN SERPL BCP-MCNC: 1.5 G/DL
ALP SERPL-CCNC: 128 U/L
ALT SERPL W/O P-5'-P-CCNC: 9 U/L
ANION GAP SERPL CALC-SCNC: 6 MMOL/L
ANISOCYTOSIS BLD QL SMEAR: SLIGHT
APPEARANCE FLD: NORMAL
AST SERPL-CCNC: 26 U/L
BACTERIA SPEC AEROBE CULT: NORMAL
BACTERIA SPEC AEROBE CULT: NORMAL
BASOPHILS NFR BLD: 0 %
BILIRUB SERPL-MCNC: 0.4 MG/DL
BODY FLD TYPE: NORMAL
BODY FLUID COMMENTS: NORMAL
BUN SERPL-MCNC: 26 MG/DL
CALCIUM SERPL-MCNC: 8.5 MG/DL
CHLORIDE SERPL-SCNC: 101 MMOL/L
CO2 SERPL-SCNC: 32 MMOL/L
COLOR FLD: NORMAL
CREAT SERPL-MCNC: 0.8 MG/DL
CRP SERPL-MCNC: 242.6 MG/L
DIFFERENTIAL METHOD: ABNORMAL
EOSINOPHIL NFR BLD: 2 %
ERYTHROCYTE [DISTWIDTH] IN BLOOD BY AUTOMATED COUNT: 16.9 %
EST. GFR  (AFRICAN AMERICAN): >60 ML/MIN/1.73 M^2
EST. GFR  (NON AFRICAN AMERICAN): >60 ML/MIN/1.73 M^2
GIANT PLATELETS BLD QL SMEAR: PRESENT
GLUCOSE SERPL-MCNC: 100 MG/DL
GRAM STN SPEC: NORMAL
HCT VFR BLD AUTO: 26.3 %
HGB BLD-MCNC: 8.7 G/DL
LYMPHOCYTES NFR BLD: 7 %
MAGNESIUM SERPL-MCNC: 2 MG/DL
MCH RBC QN AUTO: 28.3 PG
MCHC RBC AUTO-ENTMCNC: 33.1 G/DL
MCV RBC AUTO: 86 FL
MONOCYTES NFR BLD: 4 %
MONOS+MACROS NFR FLD MANUAL: 2 %
NEUTROPHILS NFR BLD: 80 %
NEUTROPHILS NFR FLD MANUAL: 98 %
NEUTS BAND NFR BLD MANUAL: 7 %
PHOSPHATE SERPL-MCNC: 3.9 MG/DL
PLATELET # BLD AUTO: 865 K/UL
PLATELET BLD QL SMEAR: ABNORMAL
PMV BLD AUTO: 10.1 FL
POCT GLUCOSE: 129 MG/DL (ref 70–110)
POCT GLUCOSE: 146 MG/DL (ref 70–110)
POCT GLUCOSE: 167 MG/DL (ref 70–110)
POIKILOCYTOSIS BLD QL SMEAR: SLIGHT
POLYCHROMASIA BLD QL SMEAR: ABNORMAL
POTASSIUM SERPL-SCNC: 4.3 MMOL/L
PREALB SERPL-MCNC: 9 MG/DL
PROT SERPL-MCNC: 5.2 G/DL
RBC # BLD AUTO: 3.07 M/UL
SODIUM SERPL-SCNC: 139 MMOL/L
TOXIC GRANULES BLD QL SMEAR: PRESENT
WBC # BLD AUTO: 20.29 K/UL
WBC # FLD: NORMAL /CU MM

## 2019-03-11 PROCEDURE — 94799 UNLISTED PULMONARY SVC/PX: CPT

## 2019-03-11 PROCEDURE — 94640 AIRWAY INHALATION TREATMENT: CPT

## 2019-03-11 PROCEDURE — 25000003 PHARM REV CODE 250: Performed by: STUDENT IN AN ORGANIZED HEALTH CARE EDUCATION/TRAINING PROGRAM

## 2019-03-11 PROCEDURE — 63600175 PHARM REV CODE 636 W HCPCS: Performed by: RADIOLOGY

## 2019-03-11 PROCEDURE — 11000001 HC ACUTE MED/SURG PRIVATE ROOM

## 2019-03-11 PROCEDURE — 83735 ASSAY OF MAGNESIUM: CPT

## 2019-03-11 PROCEDURE — 87075 CULTR BACTERIA EXCEPT BLOOD: CPT

## 2019-03-11 PROCEDURE — 89051 BODY FLUID CELL COUNT: CPT

## 2019-03-11 PROCEDURE — 63600175 PHARM REV CODE 636 W HCPCS: Performed by: STUDENT IN AN ORGANIZED HEALTH CARE EDUCATION/TRAINING PROGRAM

## 2019-03-11 PROCEDURE — 25000003 PHARM REV CODE 250: Performed by: SURGERY

## 2019-03-11 PROCEDURE — 99900035 HC TECH TIME PER 15 MIN (STAT)

## 2019-03-11 PROCEDURE — 25500020 PHARM REV CODE 255: Performed by: SURGERY

## 2019-03-11 PROCEDURE — 85027 COMPLETE CBC AUTOMATED: CPT

## 2019-03-11 PROCEDURE — 87205 SMEAR GRAM STAIN: CPT

## 2019-03-11 PROCEDURE — C9113 INJ PANTOPRAZOLE SODIUM, VIA: HCPCS | Performed by: STUDENT IN AN ORGANIZED HEALTH CARE EDUCATION/TRAINING PROGRAM

## 2019-03-11 PROCEDURE — 27000221 HC OXYGEN, UP TO 24 HOURS

## 2019-03-11 PROCEDURE — 85007 BL SMEAR W/DIFF WBC COUNT: CPT

## 2019-03-11 PROCEDURE — 84134 ASSAY OF PREALBUMIN: CPT

## 2019-03-11 PROCEDURE — 94761 N-INVAS EAR/PLS OXIMETRY MLT: CPT

## 2019-03-11 PROCEDURE — 94664 DEMO&/EVAL PT USE INHALER: CPT

## 2019-03-11 PROCEDURE — 84100 ASSAY OF PHOSPHORUS: CPT

## 2019-03-11 PROCEDURE — 86140 C-REACTIVE PROTEIN: CPT

## 2019-03-11 PROCEDURE — 27000646 HC AEROBIKA DEVICE

## 2019-03-11 PROCEDURE — 87070 CULTURE OTHR SPECIMN AEROBIC: CPT

## 2019-03-11 PROCEDURE — B4185 PARENTERAL SOL 10 GM LIPIDS: HCPCS | Performed by: STUDENT IN AN ORGANIZED HEALTH CARE EDUCATION/TRAINING PROGRAM

## 2019-03-11 PROCEDURE — 63600175 PHARM REV CODE 636 W HCPCS: Performed by: SURGERY

## 2019-03-11 PROCEDURE — A4217 STERILE WATER/SALINE, 500 ML: HCPCS | Performed by: STUDENT IN AN ORGANIZED HEALTH CARE EDUCATION/TRAINING PROGRAM

## 2019-03-11 PROCEDURE — 80053 COMPREHEN METABOLIC PANEL: CPT

## 2019-03-11 PROCEDURE — S0030 INJECTION, METRONIDAZOLE: HCPCS | Performed by: SURGERY

## 2019-03-11 PROCEDURE — A4216 STERILE WATER/SALINE, 10 ML: HCPCS | Performed by: STUDENT IN AN ORGANIZED HEALTH CARE EDUCATION/TRAINING PROGRAM

## 2019-03-11 RX ORDER — FENTANYL CITRATE 50 UG/ML
INJECTION, SOLUTION INTRAMUSCULAR; INTRAVENOUS CODE/TRAUMA/SEDATION MEDICATION
Status: COMPLETED | OUTPATIENT
Start: 2019-03-11 | End: 2019-03-11

## 2019-03-11 RX ORDER — MIDAZOLAM HYDROCHLORIDE 1 MG/ML
INJECTION INTRAMUSCULAR; INTRAVENOUS CODE/TRAUMA/SEDATION MEDICATION
Status: COMPLETED | OUTPATIENT
Start: 2019-03-11 | End: 2019-03-11

## 2019-03-11 RX ADMIN — PIPERACILLIN AND TAZOBACTAM 4.5 G: 4; .5 INJECTION, POWDER, LYOPHILIZED, FOR SOLUTION INTRAVENOUS; PARENTERAL at 05:03

## 2019-03-11 RX ADMIN — BISMUTH SUBSALICYLATE 30 ML: 262 LIQUID ORAL at 05:03

## 2019-03-11 RX ADMIN — ENOXAPARIN SODIUM 40 MG: 100 INJECTION SUBCUTANEOUS at 04:03

## 2019-03-11 RX ADMIN — ACETAMINOPHEN 650 MG: 325 TABLET, FILM COATED ORAL at 12:03

## 2019-03-11 RX ADMIN — Medication 10 ML: at 12:03

## 2019-03-11 RX ADMIN — PIPERACILLIN AND TAZOBACTAM 4.5 G: 4; .5 INJECTION, POWDER, LYOPHILIZED, FOR SOLUTION INTRAVENOUS; PARENTERAL at 03:03

## 2019-03-11 RX ADMIN — METRONIDAZOLE 500 MG: 500 INJECTION, SOLUTION INTRAVENOUS at 10:03

## 2019-03-11 RX ADMIN — ALBUTEROL SULFATE 2 PUFF: 90 AEROSOL, METERED RESPIRATORY (INHALATION) at 12:03

## 2019-03-11 RX ADMIN — METRONIDAZOLE 500 MG: 500 INJECTION, SOLUTION INTRAVENOUS at 02:03

## 2019-03-11 RX ADMIN — GABAPENTIN 100 MG: 100 CAPSULE ORAL at 08:03

## 2019-03-11 RX ADMIN — LEVETIRACETAM 500 MG: 5 INJECTION INTRAVENOUS at 08:03

## 2019-03-11 RX ADMIN — MIDAZOLAM HYDROCHLORIDE 1 MG: 1 INJECTION, SOLUTION INTRAMUSCULAR; INTRAVENOUS at 11:03

## 2019-03-11 RX ADMIN — ALBUTEROL SULFATE 2 PUFF: 90 AEROSOL, METERED RESPIRATORY (INHALATION) at 08:03

## 2019-03-11 RX ADMIN — ACETAMINOPHEN 650 MG: 325 TABLET, FILM COATED ORAL at 05:03

## 2019-03-11 RX ADMIN — Medication 10 ML: at 05:03

## 2019-03-11 RX ADMIN — OCTREOTIDE ACETATE 100 MCG: 100 INJECTION, SOLUTION INTRAVENOUS; SUBCUTANEOUS at 10:03

## 2019-03-11 RX ADMIN — SODIUM PHOSPHATE, MONOBASIC, MONOHYDRATE: 276; 142 INJECTION, SOLUTION INTRAVENOUS at 10:03

## 2019-03-11 RX ADMIN — FLUTICASONE FUROATE AND VILANTEROL TRIFENATATE 1 PUFF: 100; 25 POWDER RESPIRATORY (INHALATION) at 08:03

## 2019-03-11 RX ADMIN — OXYCODONE HYDROCHLORIDE 5 MG: 5 TABLET ORAL at 01:03

## 2019-03-11 RX ADMIN — METRONIDAZOLE 500 MG: 500 INJECTION, SOLUTION INTRAVENOUS at 03:03

## 2019-03-11 RX ADMIN — OXYCODONE HYDROCHLORIDE 5 MG: 5 TABLET ORAL at 04:03

## 2019-03-11 RX ADMIN — OCTREOTIDE ACETATE 100 MCG: 100 INJECTION, SOLUTION INTRAVENOUS; SUBCUTANEOUS at 02:03

## 2019-03-11 RX ADMIN — PANTOPRAZOLE SODIUM 40 MG: 40 INJECTION, POWDER, LYOPHILIZED, FOR SOLUTION INTRAVENOUS at 08:03

## 2019-03-11 RX ADMIN — FLUCONAZOLE, SODIUM CHLORIDE 200 MG: 2 INJECTION INTRAVENOUS at 08:03

## 2019-03-11 RX ADMIN — GABAPENTIN 100 MG: 100 CAPSULE ORAL at 02:03

## 2019-03-11 RX ADMIN — LEVETIRACETAM 500 MG: 5 INJECTION INTRAVENOUS at 11:03

## 2019-03-11 RX ADMIN — FENTANYL CITRATE 50 MCG: 50 INJECTION, SOLUTION INTRAMUSCULAR; INTRAVENOUS at 11:03

## 2019-03-11 RX ADMIN — OCTREOTIDE ACETATE 100 MCG: 100 INJECTION, SOLUTION INTRAVENOUS; SUBCUTANEOUS at 05:03

## 2019-03-11 RX ADMIN — IOHEXOL 15 ML: 300 INJECTION, SOLUTION INTRAVENOUS at 09:03

## 2019-03-11 RX ADMIN — SOYBEAN OIL 250 ML: 20 INJECTION, SOLUTION INTRAVENOUS at 10:03

## 2019-03-11 RX ADMIN — IOHEXOL 85 ML: 350 INJECTION, SOLUTION INTRAVENOUS at 09:03

## 2019-03-11 NOTE — PT/OT/SLP PROGRESS
Physical Therapy      Patient Name:  Justin Adams   MRN:  [id number]    Patient not seen today secondary to Unavailable (pt is off the unit for testing ) . Will follow-up as able later hour/day .  Second attempt , pt c/o abdominal pain 9/10 and not feeling well.  Nurse   Rose notified .   Phuong Byrnes, PTA

## 2019-03-11 NOTE — PROCEDURES
Radiology Post-Procedure Note    Pre Op Diagnosis: Multiple intra-peritoneal abscesses  Post Op Diagnosis: Same    Procedure:  CT guided drainage catheter placement    Procedure performed by: Tony Deluna MD    Written Informed Consent Obtained: Yes  Specimen Removed: YES, 50-cc of serosanguinous fluid  Estimated Blood Loss: none    Findings:   1. Successful CT-guided placement of a 12-Fr percutaneous drainage catheter into a yomaira-hepatic abscess with 50-cc of serosanguinous fluid obtained. Patient tolerated the procedure well. No immediate post-procedural complications noted.       Tony Deluna MD  Interventional Radiology

## 2019-03-11 NOTE — PT/OT/SLP PROGRESS
Occupational Therapy      Patient Name:  Justin Adams   MRN:  [id number]    Patient not seen today secondary to off unit for CT Scan and IR procedure. Will follow-up as able.    16:30 : Patient not seen secondary to c/o increased pain and not feeling well. Patient rated pain 9/10 in abdomen. Patient's nurse, Rose notified pt would like pain medication. Will follow-up as able.    CINDY Hurst  3/11/2019

## 2019-03-11 NOTE — CONSULTS
Inpatient Radiology Pre-procedure Note    History of Present Illness:  Justin Adams is a 68 y.o. male s/p recent lap LAR for rectal cancer on 2/22 complicated by  increasing abdominal pain, fevers, tachycardic and sepsis with CT demonstrating development of free air/bowel perforation complicated by multiple intraperitoneal fluid collections concerning for abscesses.      Pt now s/p  ex lap with peritoneal washout and diverting loop ostomy on 3/4/19. However, with persistent abdominal pain and leukocytosis with repeat CT showing interval enlargement of multiple intra-peritoneal fluid collections with similar distribution worrisome for enlarging abscesses. Specifically, there are relatively small left subdiaphragmatic, perihepatic, left para-colic gutter and presacral collections as well as a large anterior pelvic collection.      Pt now s/p placement of drainage catheter into the large anterior pelvic collection with repeat CT showing interval decrease in size and complexity of this collection however, with persistence of the other previously described smaller collections.     New IR consult placed to evaluate for potential image-guided placement of a percutaneous drainage catheter into the sizable perihepatic collection.     Admission H&P reviewed.  Past Medical History:   Diagnosis Date    Aphagia     Cancer     prostate & colon    Cardiomegaly     Colon cancer     rectal cancer    Dysphagia     Hypertension     Prostate cancer     Seizures     Subdural hemorrhage      Past Surgical History:   Procedure Laterality Date    brain coiling      COLECTOMY,LAPAROSCOPIC N/A 9/10/2018    [facility] at [facility] OR    COLON SURGERY      Exam under anesthesia N/A 10/30/2018    [facility]Mt [facility] at [facility] OR    EMEXSBYGH-REXD-F-CATH N/A 11/26/2018    Per[facility] by [facility] at [facility] OR    LAPAROSCOPY, DIAGNOSTIC N/A 3/4/2019    Per[facility] by [facility]  at [facility] OR    LAPAROTOMY, EXPLORATORY  3/4/2019    Per[facility] by [facility] at [facility] OR    pilonadal cyst      PROSTATE SURGERY      RESECTION, RECTUM, LOW ANTERIOR, LAPAROSCOPIC, WITH SIGMOID COLECTOMY N/A 2/22/2019    Performed by [facility] at [facility] OR    TONSILLECTOMY      TRACHEOSTOMY TUBE PLACEMENT         Review of Systems:   As documented in primary team H&P    Home Meds:   Prior to Admission medications    Medication Sig Start Date End Date Taking? Authorizing Provider   alvimopan (ENTEREG) 12 mg capsule Take 1 capsule (12 mg total) by mouth once daily. 2/25/19   Coby Harp MD   aspirin (ECOTRIN) 81 MG EC tablet Take 81 mg by mouth once daily.    Historical Provider, MD   atorvastatin (LIPITOR) 10 MG tablet TAKE 1 TABLET BY MOUTH EVERY BEDTIME 7/10/18   Historical Provider, MD   docusate sodium (COLACE) 100 MG capsule Take 100 mg by mouth 2 (two) times daily.    Historical Provider, MD   escitalopram oxalate (LEXAPRO) 10 MG tablet Take 10 mg by mouth once daily.    Historical Provider, MD   ferrous sulfate 325 mg (65 mg iron) Tab tablet Take 1 tablet by mouth once daily. 7/12/18   Historical Provider, MD   GAVILYTE-G 236-22.74-6.74 -5.86 gram suspension TAKE 4,000 MLS (4 L TOTAL) BY MOUTH ONCE. FOR 1 DOSE 2/7/19   Historical Provider, MD   hydroCHLOROthiazide (HYDRODIURIL) 25 MG tablet Take 25 mg by mouth every morning. 7/10/18   Historical Provider, MD   levetiracetam (KEPPRA) 500 MG Tab Take 500 mg by mouth 2 (two) times daily.    Historical Provider, MD   lisinopril (PRINIVIL,ZESTRIL) 5 MG tablet Take 5 mg by mouth once daily.    Historical Provider, MD   metroNIDAZOLE (FLAGYL) 500 MG tablet Take 2 tabs at 7pm & 11pm night before surgery 2/7/19   Mat Glass MD   multivit-min/FA/lycopen/lutein (CENTRUM SILVER ULTRA MEN'S ORAL) Take 1 tablet by mouth once daily.    Historical Provider, MD   neomycin (MYCIFRADIN) 500 mg Tab Take 2 tabs at 7pm & 11pm night before  surgery 2/7/19   Mat Glass MD   omeprazole (PRILOSEC) 20 MG capsule Take by mouth daily as needed. 7/10/18   Historical Provider, MD   ondansetron (ZOFRAN) 8 MG tablet Take 1 tablet (8 mg total) by mouth every 8 (eight) hours as needed for Nausea. 11/20/18   Vu Adames MD   oxyCODONE (ROXICODONE) 5 MG immediate release tablet Take 1 tablet (5 mg total) by mouth every 4 (four) hours as needed. 2/25/19   Coby Harp MD   oxyCODONE-acetaminophen (PERCOCET)  mg per tablet TK 1 T PO Q 4 H PRN P FOR UP TO 10 DAYS. MDD 6 TS 1/25/19   Historical Provider, MD   potassium chloride (MICRO-K) 10 MEQ CpSR Take 10 mEq by mouth once.    Historical Provider, MD   PROCTOSOL HC 2.5 % rectal cream  1/23/19   Historical Provider, MD   SYMBICORT 160-4.5 mcg/actuation HFAA 2 puffs 2 (two) times daily. 8/8/18   Historical Provider, MD   VENTOLIN HFA 90 mcg/actuation inhaler Inhale 2 puffs into the lungs every 4 (four) hours as needed. 7/18/18   Historical Provider, MD     Scheduled Meds:    acetaminophen  650 mg Oral Q6H    albumin human 5%  12.5 g Intravenous Once    aspirin  81 mg Oral Daily    bismuth subsalicylate  30 mL Oral QID    enoxaparin  40 mg Subcutaneous Daily    escitalopram oxalate  10 mg Oral Daily    fluconazole  200 mg Intravenous Q24H    fluticasone-vilanterol  1 puff Inhalation Daily    gabapentin  100 mg Oral TID    levetiracetam IVPB  500 mg Intravenous Q12H    metronidazole  500 mg Intravenous Q8H    nystatin  500,000 Units Mouth/Throat Daily    octreotide  100 mcg Subcutaneous Q8H    pantoprazole  40 mg Intravenous Daily    piperacillin-tazobactam (ZOSYN) IVPB  4.5 g Intravenous Q8H    sodium chloride 0.9%  10 mL Intravenous Q6H     Continuous Infusions:    TPN ADULT CENTRAL LINE CUSTOM 100 mL/hr at 03/10/19 2201     PRN Meds:albuterol, dextrose 50%, glucagon (human recombinant), insulin aspart U-100, naloxone, ondansetron, ondansetron, oxyCODONE, Flushing  PICC Protocol **AND** sodium chloride 0.9% **AND** sodium chloride 0.9%, sodium chloride 0.9%  Anticoagulants/Antiplatelets: no anticoagulation    Allergies: Review of patient's allergies indicates:  No Known Allergies  Sedation Hx: have not been any systemic reactions    Labs:  No results for input(s): INR in the last 168 hours.    Invalid input(s):  PT,  PTT    Recent Labs   Lab 03/11/19 0529   WBC 20.29*   HGB 8.7*   HCT 26.3*   MCV 86   *      Recent Labs   Lab 03/11/19  0530         K 4.3      CO2 32*   BUN 26*   CREATININE 0.8   CALCIUM 8.5*   MG 2.0   ALT 9*   AST 26   ALBUMIN 1.5*   BILITOT 0.4         Vitals:  Temp: 98.8 °F (37.1 °C) (03/11/19 0807)  Pulse: 76 (03/11/19 0813)  Resp: 17 (03/11/19 0813)  BP: (!) 142/69 (03/11/19 0807)  SpO2: (!) 94 % (03/11/19 0813)     Physical Exam:  ASA: III  Mallampati: III     General: mild distress  Mental Status: alert and oriented to person, place and time  HEENT: normocephalic, atraumatic  Chest: mild labored breathing  Heart: regular heart rate  Abdomen: distended. TTP  Extremity: moves all extremities     Plan:   1. Multiple intra-peritoneal complex collections concerning for abscesses - Will attempt CT-guided placement of drainage catheter into the yomaira-hepatic collection in the anterior pelvis. Again, pt may require repeat peritoneal wash-out given the multiple presumed abscess, the remainder of which are too small to accept a drainage catheter.     Risks (including, but not limited to, pain, bleeding, infection, damage to nearby structures, failure to obtain sufficient material for a diagnosis, the need for additional procedures, and death), benefits, and alternatives were discussed with the patient. All questions were answered to the best of my abilities. The patient wishes to proceed with the procedure. Written informed consent was obtained.     Thank you for considering IR for the care of your patient.      Tony Deluna  MD  Interventional Radiology

## 2019-03-11 NOTE — PROGRESS NOTES
"[facility] [place]  Adult Nutrition  Progress Note    SUMMARY       Recommendations    1. Current TPN adequate to meet estimated needs   2. Rec add GI soft restriction to diet order   3. RD to monitor    Goals: TPN to meet % or EEN/EPN  Nutrition Goal Status: goal met  Communication of RD Recs: (sign and hold)    Reason for Assessment    Reason For Assessment: RD follow-up  Diagnosis: surgery/postoperative complications  Relevant Medical History: HTN, cardiomegaly, rectal ca s/p LAR 19     General Information Comments: Pt remains on PN support. Diet advanced to  ADA; lunch untouched. Pt reports pain; no desire to try food at this time. s/p ex lap w/ loop ileostomy & washout due to anastomic dehis with feculent peritonitis. IR placed second drain for concerns of increasing abcess. NFPE on 3/5; see malnutrition section for details.     Nutrition Discharge Planning: Unable to determine @ this time    Nutrition Risk Screen    Nutrition Risk Screen: no indicators present    Nutrition/Diet History    Spiritual, Cultural Beliefs, Shinto Practices, Values that Affect Care: no  Food Allergies: NKFA  Factors Affecting Nutritional Intake: altered gastrointestinal function    Anthropometrics    Temp: 99.3 °F (37.4 °C)  Height Method: Stated  Height: 5' 4" (162.6 cm)  Height (inches): 64 in  Weight Method: Bed Scale  Weight: 101.4 kg (223 lb 8.7 oz)  Weight (lb): 223.55 lb  Ideal Body Weight (IBW), Male: 130 lb  % Ideal Body Weight, Male (lb): 171.96 lb  BMI (Calculated): 38.5  BMI Grade: 35 - 39.9 - obesity - grade II  Usual Body Weight (UBW), k kg  % Usual Body Weight: 103.69  % Weight Change From Usual Weight: 3.47 %       Lab/Procedures/Meds    Pertinent Labs Reviewed: reviewed  Pertinent Labs Comments: ; prealb 9; B-204  Pertinent Medications Reviewed: reviewed  Pertinent Medications Comments: abx, KCl, Mg, octreotide, pantoprazole    Estimated/Assessed Needs    Weight Used For " Calorie Calculations: 101.4 kg (223 lb 8.7 oz)  Energy Calorie Requirements (kcal): 2119  Energy Need Method: [place]-St Jeor(x 1.25 (PAL))  Protein Requirements: 101-122 g (1-1.2 g/kg)  Weight Used For Protein Calculations: 101.4 kg (223 lb 8.7 oz)     Estimated Fluid Requirement Method: RDA Method  RDA Method (mL): 2119         Nutrition Prescription Ordered    Current Diet Order: NPO  Current Nutrition Support Formula Ordered: Clinimix E 5/15  Current Nutrition Support Rate Ordered: 100 (ml)  Current Nutrition Support Frequency Ordered: mL/hr x 24 hrs daily    Evaluation of Received Nutrient/Fluid Intake    Parenteral Calories (kcal): 1704  Parenteral Protein (gm): 120  Parenteral Fluid (mL): 2400  Lipid Calories (kcals): 500 kcals  GIR (Glucose Infusion Rate) (mg/kg/min): 2.5 mg/kg/min  Total Calories (kcal): 2204  % Kcal Needs: 105%  % Protein Needs: 100%  Energy Calories Required: meeting needs  Protein Required: meeting needs  Fluid Required: meeting needs  Comments: 350 ml output via drains; 200 ml via ostomy  Tolerance: (not tolerating solid food)    % Meal Intake: 0 - 25 %    Nutrition Risk    Level of Risk/Frequency of Follow-up: (F/u 2 x weekly)     Assessment and Plan    Nutrition Problem  Altered GI Function     Related to (etiology):   Post-op sx complications     Signs and Symptoms (as evidenced by):   NPO w/ need for gut rest/PN     Interventions:  Collaboration w/ other providers     Nutrition Diagnosis Status:   Improving     Monitor and Evaluation    Food and Nutrient Intake: energy intake, parenteral nutrition intake  Food and Nutrient Adminstration: diet order, enteral and parenteral nutrition administration  Physical Activity and Function: nutrition-related ADLs and IADLs  Anthropometric Measurements: weight change, weight  Biochemical Data, Medical Tests and Procedures: electrolyte and renal panel, glucose/endocrine profile, inflammatory profile, lipid profile  Nutrition-Focused Physical  Findings: overall appearance     Malnutrition Assessment           Energy Intake (Malnutrition): less than or equal to 50% for greater than or equal to 5 days           Subcutaneous Fat Loss (Final Summary): well nourished  Muscle Loss Evaluation (Final Summary): well nourished         Nutrition Follow-Up    RD Follow-up?: Yes

## 2019-03-11 NOTE — NURSING
Patient to IR for drain placement.   Patient remains NPO.   Patient sent to IR via hospital bed with transporter.   Patient remains NPO.

## 2019-03-11 NOTE — PROGRESS NOTES
[facility]  General Surgery  Progress Note    Subjective:     Interval History: feels a little better this am, denies nausea, tolerating PO, reports getting out of bed    Post-Op Info:  Procedure(s) (LRB):  LAPAROSCOPY, DIAGNOSTIC (N/A)  LAPAROTOMY, EXPLORATORY   7 Days Post-Op      Medications:  Continuous Infusions:   TPN ADULT CENTRAL LINE CUSTOM 100 mL/hr at 03/10/19 2201     Scheduled Meds:   acetaminophen  650 mg Oral Q6H    albumin human 5%  12.5 g Intravenous Once    aspirin  81 mg Oral Daily    bismuth subsalicylate  30 mL Oral QID    enoxaparin  40 mg Subcutaneous Daily    escitalopram oxalate  10 mg Oral Daily    fat emulsion  250 mL Intravenous Once    fluconazole  200 mg Intravenous Q24H    fluticasone-vilanterol  1 puff Inhalation Daily    gabapentin  100 mg Oral TID    levetiracetam IVPB  500 mg Intravenous Q12H    metronidazole  500 mg Intravenous Q8H    nystatin  500,000 Units Mouth/Throat Daily    octreotide  100 mcg Subcutaneous Q8H    pantoprazole  40 mg Intravenous Daily    piperacillin-tazobactam (ZOSYN) IVPB  4.5 g Intravenous Q8H    sodium chloride 0.9%  10 mL Intravenous Q6H     PRN Meds:albuterol, dextrose 50%, glucagon (human recombinant), insulin aspart U-100, naloxone, ondansetron, ondansetron, oxyCODONE, Flushing PICC Protocol **AND** sodium chloride 0.9% **AND** sodium chloride 0.9%, sodium chloride 0.9%     Objective:     Vital Signs (Most Recent):  Temp: 98.1 °F (36.7 °C) (03/11/19 0411)  Pulse: 78 (03/11/19 0411)  Resp: 18 (03/11/19 0411)  BP: (!) 157/75 (03/11/19 0411)  SpO2: (!) 93 % (03/11/19 0411) Vital Signs (24h Range):  Temp:  [97.9 °F (36.6 °C)-98.7 °F (37.1 °C)] 98.1 °F (36.7 °C)  Pulse:  [60-78] 78  Resp:  [18-20] 18  SpO2:  [93 %-98 %] 93 %  BP: (124-157)/(68-75) 157/03       Intake/Output Summary (Last 24 hours) at 3/11/2019 0723  Last data filed at 3/10/2019 2000  Gross per 24 hour   Intake 540 ml   Output 550 ml   Net -10 ml   Drain  350    Physical Exam  Awake, alert  RRR  No increased work of breathing  Abd soft, midline with diane in place, ostomy pink, productive of thicked stool. IR drain with feculent output      Significant Labs:  BMP:   Recent Labs   Lab 03/11/19  0530         K 4.3      CO2 32*   BUN 26*   CREATININE 0.8   CALCIUM 8.5*   MG 2.0     CBC:   Recent Labs   Lab 03/11/19  0529   WBC 20.29*   RBC 3.07*   HGB 8.7*   HCT 26.3*   *   MCV 86   MCH 28.3   MCHC 33.1       Significant Diagnostics:  None    Assessment/Plan:   68M s/p recent lap LAR for rectal cancer on 2/22 presenting with increasing abdominal pain, CT with intraperitoneal free air in the postoperative setting. Evening of 3/1 patient febrile, tachycardic and subsequently transferred to the ICU. Hemodynamics normalized overnight on 3/1 and he has remained HDS without lactic acidosis or metabolic durrangements. Repeat CT scan with readministration of free air. NG placed and PICC line obtained. Taken to the OR on 3/4 for ex lap and diverting loop ostomy. Returned to the ICU in stable condition, stepped down to the floor.      Neuro:   -prn oxy, scheduled tylenol and gabapentin  -home escitalopram  -toradol q6  -hx of seizures- Keppra IV     CV: HDS.   -Cardiology evaluated- ACS ruled out  -Hold home ACEI and statin     PULM: Improving  -IS hourly while awake  -Home inhalers      FEN/GI: s/p LAR on 2/22, washout and loop ostomy 3/4, ostomy functioning, ostomy RN following  -Diabetic diet  -octreotide and kaopectate to minimize ostomy output  -Continue TPN  -Prealbumin 9 from 6    :   -BUN, Cr 26, 0.8  -Strict I/Os     Heme/ID: Leukocytosis persists at 20, CRP elevated to 243, bandemia improving. Platelets uptrending.  -blood cultures NG final  -continue Zosyn, fluconazole and flagyl for intraabdominal infection   -CT with abscesses, s/p IR drainage of pelvic collection  -Flush drain BID  -Repeat CT scan with PO contrast  today     Endo: CBG 144-205  -ISS, accuchecks  -decreased dextrose in TPN     MSK/Wounds:   -ostomy teaching  -PT/OT     PPX: Lovenox and Protonix (home med)       Active Diagnoses:    Diagnosis Date Noted POA    Hypertension [I10] 03/02/2019 Unknown    Mixed hyperlipidemia [E78.2] 03/02/2019 Yes    Abdominal pain [R10.9] 02/26/2019 Yes    Carcinoma of rectum [C20] 11/12/2018 Yes      Problems Resolved During this Admission:    Diagnosis Date Noted Date Resolved POA    PRINCIPAL PROBLEM:  Other chest pain [R07.89] 03/02/2019 03/08/2019 Yes         Coby Harp MD  General Surgery  [facility]

## 2019-03-12 LAB
ALBUMIN SERPL BCP-MCNC: 1.4 G/DL
ALP SERPL-CCNC: 99 U/L
ALT SERPL W/O P-5'-P-CCNC: <5 U/L
ANION GAP SERPL CALC-SCNC: 5 MMOL/L
AST SERPL-CCNC: 11 U/L
BACTERIA SPEC ANAEROBE CULT: NORMAL
BASOPHILS NFR BLD: 0 %
BILIRUB SERPL-MCNC: 0.3 MG/DL
BUN SERPL-MCNC: 23 MG/DL
CALCIUM SERPL-MCNC: 8.4 MG/DL
CHLORIDE SERPL-SCNC: 101 MMOL/L
CO2 SERPL-SCNC: 32 MMOL/L
CREAT SERPL-MCNC: 0.8 MG/DL
DIFFERENTIAL METHOD: ABNORMAL
EOSINOPHIL NFR BLD: 6 %
ERYTHROCYTE [DISTWIDTH] IN BLOOD BY AUTOMATED COUNT: 16.6 %
EST. GFR  (AFRICAN AMERICAN): >60 ML/MIN/1.73 M^2
EST. GFR  (NON AFRICAN AMERICAN): >60 ML/MIN/1.73 M^2
GIANT PLATELETS BLD QL SMEAR: PRESENT
GLUCOSE SERPL-MCNC: 159 MG/DL
HCT VFR BLD AUTO: 25.4 %
HGB BLD-MCNC: 8.5 G/DL
LYMPHOCYTES NFR BLD: 5 %
MAGNESIUM SERPL-MCNC: 1.8 MG/DL
MCH RBC QN AUTO: 29 PG
MCHC RBC AUTO-ENTMCNC: 33.5 G/DL
MCV RBC AUTO: 87 FL
METAMYELOCYTES NFR BLD MANUAL: 1 %
MONOCYTES NFR BLD: 5 %
MYELOCYTES NFR BLD MANUAL: 1 %
NEUTROPHILS NFR BLD: 77 %
NEUTS BAND NFR BLD MANUAL: 5 %
PHOSPHATE SERPL-MCNC: 3.8 MG/DL
PLATELET # BLD AUTO: 909 K/UL
PLATELET BLD QL SMEAR: ABNORMAL
PMV BLD AUTO: 9.8 FL
POCT GLUCOSE: 154 MG/DL (ref 70–110)
POCT GLUCOSE: 177 MG/DL (ref 70–110)
POCT GLUCOSE: 178 MG/DL (ref 70–110)
POCT GLUCOSE: 193 MG/DL (ref 70–110)
POTASSIUM SERPL-SCNC: 4.3 MMOL/L
PROT SERPL-MCNC: 5 G/DL
RBC # BLD AUTO: 2.93 M/UL
SODIUM SERPL-SCNC: 138 MMOL/L
TOXIC GRANULES BLD QL SMEAR: PRESENT
WBC # BLD AUTO: 15.48 K/UL
WBC TOXIC VACUOLES BLD QL SMEAR: PRESENT

## 2019-03-12 PROCEDURE — 83735 ASSAY OF MAGNESIUM: CPT

## 2019-03-12 PROCEDURE — 27000221 HC OXYGEN, UP TO 24 HOURS

## 2019-03-12 PROCEDURE — B4185 PARENTERAL SOL 10 GM LIPIDS: HCPCS | Performed by: STUDENT IN AN ORGANIZED HEALTH CARE EDUCATION/TRAINING PROGRAM

## 2019-03-12 PROCEDURE — 63600175 PHARM REV CODE 636 W HCPCS: Performed by: STUDENT IN AN ORGANIZED HEALTH CARE EDUCATION/TRAINING PROGRAM

## 2019-03-12 PROCEDURE — 85027 COMPLETE CBC AUTOMATED: CPT

## 2019-03-12 PROCEDURE — 94761 N-INVAS EAR/PLS OXIMETRY MLT: CPT

## 2019-03-12 PROCEDURE — 25000003 PHARM REV CODE 250: Performed by: STUDENT IN AN ORGANIZED HEALTH CARE EDUCATION/TRAINING PROGRAM

## 2019-03-12 PROCEDURE — 94664 DEMO&/EVAL PT USE INHALER: CPT

## 2019-03-12 PROCEDURE — 94799 UNLISTED PULMONARY SVC/PX: CPT

## 2019-03-12 PROCEDURE — 97535 SELF CARE MNGMENT TRAINING: CPT

## 2019-03-12 PROCEDURE — 97530 THERAPEUTIC ACTIVITIES: CPT

## 2019-03-12 PROCEDURE — C9113 INJ PANTOPRAZOLE SODIUM, VIA: HCPCS | Performed by: STUDENT IN AN ORGANIZED HEALTH CARE EDUCATION/TRAINING PROGRAM

## 2019-03-12 PROCEDURE — 85007 BL SMEAR W/DIFF WBC COUNT: CPT

## 2019-03-12 PROCEDURE — 63600175 PHARM REV CODE 636 W HCPCS: Performed by: SURGERY

## 2019-03-12 PROCEDURE — A4217 STERILE WATER/SALINE, 500 ML: HCPCS | Performed by: SURGERY

## 2019-03-12 PROCEDURE — 84100 ASSAY OF PHOSPHORUS: CPT

## 2019-03-12 PROCEDURE — S0030 INJECTION, METRONIDAZOLE: HCPCS | Performed by: SURGERY

## 2019-03-12 PROCEDURE — A4216 STERILE WATER/SALINE, 10 ML: HCPCS | Performed by: STUDENT IN AN ORGANIZED HEALTH CARE EDUCATION/TRAINING PROGRAM

## 2019-03-12 PROCEDURE — 97116 GAIT TRAINING THERAPY: CPT

## 2019-03-12 PROCEDURE — 25000003 PHARM REV CODE 250: Performed by: SURGERY

## 2019-03-12 PROCEDURE — 27000646 HC AEROBIKA DEVICE

## 2019-03-12 PROCEDURE — 80053 COMPREHEN METABOLIC PANEL: CPT

## 2019-03-12 PROCEDURE — 94640 AIRWAY INHALATION TREATMENT: CPT

## 2019-03-12 PROCEDURE — 11000001 HC ACUTE MED/SURG PRIVATE ROOM

## 2019-03-12 PROCEDURE — 99900035 HC TECH TIME PER 15 MIN (STAT)

## 2019-03-12 PROCEDURE — 36415 COLL VENOUS BLD VENIPUNCTURE: CPT

## 2019-03-12 RX ADMIN — OCTREOTIDE ACETATE 100 MCG: 100 INJECTION, SOLUTION INTRAVENOUS; SUBCUTANEOUS at 01:03

## 2019-03-12 RX ADMIN — ASPIRIN 81 MG: 81 TABLET, COATED ORAL at 09:03

## 2019-03-12 RX ADMIN — LEVETIRACETAM 500 MG: 5 INJECTION INTRAVENOUS at 09:03

## 2019-03-12 RX ADMIN — ALBUTEROL SULFATE 2 PUFF: 90 AEROSOL, METERED RESPIRATORY (INHALATION) at 09:03

## 2019-03-12 RX ADMIN — OXYCODONE HYDROCHLORIDE 5 MG: 5 TABLET ORAL at 11:03

## 2019-03-12 RX ADMIN — FLUTICASONE FUROATE AND VILANTEROL TRIFENATATE 1 PUFF: 100; 25 POWDER RESPIRATORY (INHALATION) at 09:03

## 2019-03-12 RX ADMIN — GABAPENTIN 100 MG: 100 CAPSULE ORAL at 10:03

## 2019-03-12 RX ADMIN — ACETAMINOPHEN 650 MG: 325 TABLET, FILM COATED ORAL at 06:03

## 2019-03-12 RX ADMIN — LEVETIRACETAM 500 MG: 5 INJECTION INTRAVENOUS at 10:03

## 2019-03-12 RX ADMIN — PIPERACILLIN AND TAZOBACTAM 4.5 G: 4; .5 INJECTION, POWDER, LYOPHILIZED, FOR SOLUTION INTRAVENOUS; PARENTERAL at 09:03

## 2019-03-12 RX ADMIN — ENOXAPARIN SODIUM 40 MG: 100 INJECTION SUBCUTANEOUS at 06:03

## 2019-03-12 RX ADMIN — OCTREOTIDE ACETATE 100 MCG: 100 INJECTION, SOLUTION INTRAVENOUS; SUBCUTANEOUS at 10:03

## 2019-03-12 RX ADMIN — ACETAMINOPHEN 650 MG: 325 TABLET, FILM COATED ORAL at 12:03

## 2019-03-12 RX ADMIN — OCTREOTIDE ACETATE 100 MCG: 100 INJECTION, SOLUTION INTRAVENOUS; SUBCUTANEOUS at 06:03

## 2019-03-12 RX ADMIN — METRONIDAZOLE 500 MG: 500 INJECTION, SOLUTION INTRAVENOUS at 12:03

## 2019-03-12 RX ADMIN — ESCITALOPRAM OXALATE 10 MG: 10 TABLET ORAL at 09:03

## 2019-03-12 RX ADMIN — PIPERACILLIN AND TAZOBACTAM 4.5 G: 4; .5 INJECTION, POWDER, LYOPHILIZED, FOR SOLUTION INTRAVENOUS; PARENTERAL at 11:03

## 2019-03-12 RX ADMIN — FLUCONAZOLE, SODIUM CHLORIDE 200 MG: 2 INJECTION INTRAVENOUS at 08:03

## 2019-03-12 RX ADMIN — Medication 10 ML: at 06:03

## 2019-03-12 RX ADMIN — BISMUTH SUBSALICYLATE 30 ML: 262 LIQUID ORAL at 09:03

## 2019-03-12 RX ADMIN — SODIUM PHOSPHATE, MONOBASIC, MONOHYDRATE: 276; 142 INJECTION, SOLUTION INTRAVENOUS at 10:03

## 2019-03-12 RX ADMIN — GABAPENTIN 100 MG: 100 CAPSULE ORAL at 09:03

## 2019-03-12 RX ADMIN — METRONIDAZOLE 500 MG: 500 INJECTION, SOLUTION INTRAVENOUS at 04:03

## 2019-03-12 RX ADMIN — PANTOPRAZOLE SODIUM 40 MG: 40 INJECTION, POWDER, LYOPHILIZED, FOR SOLUTION INTRAVENOUS at 09:03

## 2019-03-12 RX ADMIN — METRONIDAZOLE 500 MG: 500 INJECTION, SOLUTION INTRAVENOUS at 08:03

## 2019-03-12 RX ADMIN — PIPERACILLIN AND TAZOBACTAM 4.5 G: 4; .5 INJECTION, POWDER, LYOPHILIZED, FOR SOLUTION INTRAVENOUS; PARENTERAL at 03:03

## 2019-03-12 RX ADMIN — GABAPENTIN 100 MG: 100 CAPSULE ORAL at 03:03

## 2019-03-12 RX ADMIN — PIPERACILLIN AND TAZOBACTAM 4.5 G: 4; .5 INJECTION, POWDER, LYOPHILIZED, FOR SOLUTION INTRAVENOUS; PARENTERAL at 12:03

## 2019-03-12 RX ADMIN — SOYBEAN OIL 250 ML: 20 INJECTION, SOLUTION INTRAVENOUS at 10:03

## 2019-03-12 RX ADMIN — NYSTATIN 500000 UNITS: 500000 SUSPENSION ORAL at 09:03

## 2019-03-12 NOTE — PT/OT/SLP PROGRESS
Physical Therapy Treatment    Patient Name:  Justin Adams    MRN:  [id number]    Recommendations:     Discharge Recommendations:  nursing facility, skilled   Discharge Equipment Recommendations: (ongoing assessment)   Barriers to discharge: Decreased caregiver support and pt with decreased mobility     Assessment:     Justin Adams is a 68 y.o. male admitted with a medical diagnosis of Other chest pain.  He presents with the following impairments/functional limitations:  weakness, impaired endurance, impaired self care skills, gait instability, impaired balance, decreased upper extremity function, decreased lower extremity function, decreased ROM, edema, pain, decreased safety awareness, impaired functional mobilty, impaired cardiopulmonary response to activity . Pt required extra time to complete tasks and frequent rest breaks  2* pt c/o pain and fatigue  . Pt will benefit from further skilled therapy in order to get back to PLOF.      Rehab Prognosis: Good; patient would benefit from acute skilled PT services to address these deficits and reach maximum level of function.    Recent Surgery: Procedure(s) (LRB):  LAPAROSCOPY, DIAGNOSTIC (N/A)  LAPAROTOMY, EXPLORATORY 8 Days Post-Op    Plan:     During this hospitalization, patient to be seen (6 / wk per PT ) to address the identified rehab impairments via gait training, therapeutic activities, therapeutic exercises and progress toward the following goals:    · Plan of Care Expires:  03/21/19    Subjective     Chief Complaint: abdominal pain   Patient/Family Comments/goals: pt agreeable to therapy with encouragements.   Pain/Comfort:  · Pain Rating 1: 8/10  · Location 1: abdomen  · Pain Addressed 1: Nurse notified, pt premedicated for activity   · Pain Rating Post-Intervention 1: 8/10         Objective:     Communicated with nurse Rose prior to session.  Patient found all lines intact, call button in reach, bed alarm on and nurse notified bed alarm,  telemetry, colostomy, oxygen, PICC line  upon PT entry to room.     General Precautions: Standard, fall, respiratory   Orthopedic Precautions:N/A   Braces:  n/a      Functional Mobility:  · Bed Mobility:   pt found in bed, soiled .   · Rolling L : MAX A   · Scooting: contact guard assistance to scoot EOB , MAX A x 2 to HOB   · Supine to Sit: moderate assistance x 2  , HOB elevated, bedside rail   · Sit >supine : mod X 2   · Transfers:     · Sit to Stand:  2 trials from bed  minimum assistance  with rollator and 1 trials from bedside chair with mod A x 2 BHHA . V/T cues for safety technique and rollator management .    · Bed <>  Chair: minimum assistance with  4 wheeled walker  using  Step Transfer  · Gait: pt ambulated ~  4 -5 stepsfrom bed to chair with rollator , min A and ~ 4-5 steps from chair> bed with mod A x 2 , BHHA  . Noted with decreased shyam, decreased step length. Pt with decreased safety awareness, required frequent V/T cues for safety and sequence.           AM-PAC 6 CLICK MOBILITY    Turning over in bed (including adjusting bedclothes, sheets and blankets)?: 2  Sitting down on and standing up from a chair with arms (e.g., wheelchair, bedside commode, etc.): 3  Moving from lying on back to sitting on the side of the bed?: 2  Moving to and from a bed to a chair (including a wheelchair)?: 3  Need to walk in hospital room?: 3  Climbing 3-5 steps with a railing?: 2  Basic Mobility Total Score: 15       Therapeutic Activities and Exercises:   Pt performed bed mobility, transfer and gait training as above.  Pt tolerated standing balance using rollator with CGA/ MIN A for balance while getting clean up and diaper changed.   Encouraged pt to perform BLE AP while in bed     Patient left HOB elevated with all lines intact, call button in reach, bed alarm on and nurse notified..    GOALS:   Multidisciplinary Problems     Physical Therapy Goals        Problem: Physical Therapy Goal    Goal Priority Disciplines  Outcome Goal Variances Interventions   Physical Therapy Goal     PT, PT/OT Ongoing (interventions implemented as appropriate)     Description:  Goals to be met by: 3/21/2019     Patient will increase functional independence with mobility by performin. Supine to sit with Stand-by Assistance  2. Sit to stand transfer with Stand-by Assistance  3. Gait  x 100 feet with Stand-by Assistance using rollator.   4. Lower extremity exercise program x10 reps per handout, with supervision                      Time Tracking:     PT Received On: 19  PT Start Time: 1133     PT Stop Time: 1236  PT Total Time (min): 63 min     Billable Minutes: Gait Training  12 and Therapeutic Activity 20 total 63 min co-treat with OT     Treatment Type: Treatment  PT/PTA: PTA     PTA Visit Number: 1     Phuong Byrnes PTA  2019

## 2019-03-12 NOTE — PROGRESS NOTES
POD #8  Coloplast pouch intact over loop ileostomy. Functioning a dark green liquid effluent. According to I&O, ileostomy output 3/11- 250 cc/24 hours. Dressing in place over midline abdominal wound with scant amount dark red drainage.

## 2019-03-12 NOTE — PHYSICIAN QUERY
PT Name: Justin Adams  MR #: [id number]    Physician Query Form -Present on Admission (POA) Diagnosis Clarification     CDS/: Hailey Maza               Contact information: [email or web address]    This form is a permanent document in the medical record.     Query Date: March 12, 2019    By submitting this query, we are merely seeking further clarification of documentation. Please utilize your independent clinical judgment when addressing the question(s) below.       The Medical record contains the following:    Diagnosis      Supporting Clinical Information   Location in Medical Record   Abdominal Pain Laparoscopic LAR on 2/22/19  Presented to the [place] ED on 2/26/19 complaining of worsening abdominal pain.     S/p recent lap LAR for rectal cancer presenting with increasing abdominal pain, CT with intraperitoneal free air.   Expect intraperitoneal air at this point in the post op setting, no free fluid, and labs and vitals are reassuring   H&P    Abdominal exam improving, WBC uptrending, without fever.   Restart ABX   Surgery PN 02/28    Evening of 3/1 patient febrile, tachycardic and subsequently transferred to the ICU   Bolused, and broad spectrum ABX initiated     Surgery PN 03/02    Hemodynamics normalized overnight on 3/1 and he has remained HDS without lactic acidosis or metabolic durrangements.  Repeat CT scan with readministration of free air. NG placed and PICC line obtained.     As remains HDS, will discuss return to OR with primary surgeon.   Surgery PN 03/03     OR today for diagnostic laparoscopy     Surgery PN 03/04, filed 03/06   Distal anastomotic dehiscence that was walled off with feculent peritonitis     Upon opening his abdomen and extending the incision there was what appeared in his abdominal cavity towards his distal anastomosis a significant amount of feculent fluid.  The abdomen was then irrigated copiously with the at least 2-3 L of saline     Op Note 03/04             Status post partial colonic resection with mildly worsened degree pneumoperitoneum and abdominopelvic free fluid.  Again, possible perforation not excluded though is not definitely seen.    Long segment small bowel wall edema and thickening within the right mid to lower abdomen.  Findings potentially infectious versus inflammatory.    CT Abdomen and Pelvis 03/02         Doctor, Please specify Present On Admission (POA) status of __Distal anastomotic dehiscence that was walled off with feculent peritonitis__.    [  ] Present on Admission    [  ] Not Present on Admission   [ x ] Clinically Undetermined

## 2019-03-12 NOTE — PHYSICIAN QUERY
PT Name: Justin Adams  MR #: [id number]    Physician Query Form - Nutrition Clarification     CDS/: Hailey Maza               Contact information: [email or web address]  This form is a permanent document in the medical record.     Query Date: 2019    By submitting this query, we are merely seeking further clarification of documentation.. Please utilize your independent clinical judgment when addressing the question(s) below.    The Medical record contains the following:   Indicators  Supporting Clinical Findings Location in Medical Record   x % of Estimated Energy Intake over a time frame from p.o., TF, or TPN He reported he had been eating regular food pta; diet had been advanced during this hospital stay as well until being made NPO on 3/1    Energy Intake (Malnutrition): less than or equal to 50% for greater than or equal to 5 days     Pt remains on PN support. Diet advanced to  ADA; lunch untouched. Pt reports pain; no desire to try food at this time.  Tolerance: (not tolerating solid food)  % Meal Intake: 0 - 25 %   Dietitian PN               Dietitian PN    x Weight Status over a time frame Usual Body Weight (UBW), k kg  % Usual Body Weight: 103.69  % Weight Change From Usual Weight: 3.47 %     Dietitian PN /   x Subcutaneous Fat and/or Muscle Loss Subcutaneous Fat Loss (Final Summary): well nourished  Muscle Loss Evaluation (Final Summary): well nourished      Dietitian PN /   x Fluid Accumulation or Edema Remain edematous +3 generalized edema with +4 scrotal edema noted.    Presents with the following impairments/functional limitations:  weakness, impaired endurance, impaired self care skills, gait instability, impaired balance, decreased upper extremity function, decreased lower extremity function, decreased ROM, edema, pain, decreased safety awareness, impaired functional mobilty, impaired cardiopulmonary response to activity .     Nurse's Plan of  Care 03/07      Physical Therapist Note 03/09    Reduced  Strength     x Wt / BMI / Usual Body Weight Height (inches): 64 in  Weight Method: Bed Scale  Weight: 101.4 kg (223 lb 8.7 oz)  BMI (Calculated): 38.5     Dietitian PN 03/05    Delayed Wound Healing / Failure to Thrive     x Acute or Chronic Illness Patient is very fatigued and malnourished. Appropriately on TPN.     LAR on 2/22/19  Presented to the [place] ED on 2/26/19 complaining of worsening abdominal pain  S/p recent lap LAR for rectal cancer presenting with increasing abdominal pain    Distal anastomotic dehiscence that was walled off with feculent peritonitis  Exploratory Laparotomy, Loop Ileostomy     Bandemia, presumed intraabdominal infection 2/2 feculent peritonitis    Intraabdominal infection   CT with abscesses, consult IR for drainage   Surgery PN Attestation 03/05      H&P             Op Note        Surgery PN 03/07, filed 03/08      Surgery PN 03/08, filed 03/12    Medication     x Treatment Nutrition labs, start TPN     Surgery PN 03/04   x Other Low prealb noted but CRP is very elevated.     Total Protein= 4.7  Albumin= 1.4  Prealbumin= 3  CRP= 394.7 Dietitian PN 03/05    Labs, Chem Profile 03/04     AND / ASPEN Clinical Characteristics (October 2011)  A minimum of two characteristics is recommended for diagnosing either moderate or severe malnutrition   Mild Malnutrition Moderate Malnutrition Severe Malnutrition   Energy Intake from p.o., TF or TPN. < 75% intake of estimated energy needs for less than 7 days < 75% intake of estimated energy needs for greater than 7 days < 50% intake of estimated energy needs for > 5 days   Weight Loss 1-2% in 1 month  5% in 3 months  7.5% in 6 months  10% in 1 year 1-2 % in 1 week  5% in 1 month  7.5% in 3 months  10% in 6 months  20% in 1 year > 2% in 1 week  > 5% in 1 month  > 7.5% in 3 months  > 10% in 6 months  > 20% in 1 year   Physical Findings     None *Mild subcutaneous fat and/or muscle  loss  *Mild fluid accumulation  *Stage II decubitus  *Surgical wound or non-healing wound *Mod/severe subcutaneous fat and/or muscle loss  *Mod/severe fluid accumulation  *Stage III or IV decubitus  *Non-healing surgical wound     Provider, please specify diagnosis or diagnoses associated with above clinical findings.    [  ] Mild Protein-Calorie Malnutrition   [x  ] Moderate Protein-Calorie Malnutrition   [  ] Anorexia   [  ] Other Nutritional Diagnosis (please specify):    [  ] Other:    [  ] Clinically Undetermined       Please document in your progress notes daily for the duration of treatment until resolved and include in your discharge summary.

## 2019-03-12 NOTE — PT/OT/SLP PROGRESS
Occupational Therapy   Treatment    Name: Justin Adams  MRN: [id number]  Admitting Diagnosis:  Other chest pain  8 Days Post-Op    Recommendations:     Discharge Recommendations: nursing facility, skilled  Discharge Equipment Recommendations:  other (see comments)(TBD)  Barriers to discharge:       Assessment:     Justin Adams is a 68 y.o. male with a medical diagnosis of Other chest pain.  He presents with the following performance deficits affecting function: weakness, impaired endurance, impaired self care skills, impaired balance, impaired functional mobilty, gait instability, decreased upper extremity function, decreased lower extremity function, decreased coordination, decreased safety awareness, pain, decreased ROM, impaired skin, impaired cardiopulmonary response to activity, edema, impaired fine motor. Patient minimally confused today, however, able to reorient with time. Patient tolerated OT session well, required education, max increased time to perform all tasks, and frequent rest breaks in between tasks secondary to pain.     Rehab Prognosis:  Fair+; patient would benefit from acute skilled OT services to address these deficits and reach maximum level of function.       Plan:     Patient to be seen 3 x/week to address the above listed problems via self-care/home management, therapeutic activities, therapeutic exercises  · Plan of Care Expires: 03/28/19  · Plan of Care Reviewed with: patient(sister)    Subjective   Patient agreeable to therapy.   Pain/Comfort:  · Pain Rating 1: 10/10  · Location - Orientation 1: generalized  · Location 1: abdomen(and back)  · Pain Addressed 1: Pre-medicate for activity, Reposition, Distraction, Cessation of Activity, Nurse notified    Objective:     Communicated with: nurse Rose prior to session.  Patient found HOB elevated with 2 abdominal accordion drains, bed alarm, telemetry, PICC line, oxygen upon OT entry to room.    General Precautions: Standard,  respiratory   Orthopedic Precautions:N/A   Braces: N/A     Occupational Performance:     Bed Mobility:    · Patient completed Rolling/Turning to Left with maximal assistance  · Patient completed Supine to Sit with moderate assistance x 2 persons  · Patient completed Sit to Supine with moderate assistance  X 2 persons    Functional Mobility/Transfers:  · Patient completed Sit <> Stand Transfer with minimum assistance  with  rollator   · Patient completed Bed > Chair Transfer using Step Transfer technique with minimum assistance with rolling walker  · Patient completed Chair>Bed transfer with Mod A x2, B HHA  · Functional Mobility: Min A with rollator for transfers, patient with decreased safety awareness.    Activities of Daily Living: Patient noted with increased hand tremors today.   · Feeding: contact guard assistance to drink from cup, patient declining to eat lunch despite encouragement and education  · Grooming: set up assistance and contact guard assistance seated in chair    · Upper Body Dressing: moderate assistance    · Lower Body Dressing: dependence    · Toileting: total assistance at bed level. Patient with saturated bed and brief. Able to roll to L only for pericare and tolerated standing balance with Min A to don clean brief.  Patient educated on need to call for nursing assistance for toileting needs and pericare.    [facility] 6 Click ADL: 11    Treatment & Education:  Patient performed bed mobility, functional transfers, and ADL's as above.   Patient performed B fist pumps and ankle pumps x 10 reps, encouraged to perform throughout day. Patient verbalizes understanding.   Patient educated on importance and benefits of OOB mobility. Patient verbalizes understanding, needs reinforcement.   Patient tolerated sitting EOB with SBA and up in chair during treatment session. Patient limited by abdominal pain.     Patient left HOB elevated with BUE elevated on pillows, B heels offloaded, all lines intact, call  button in reach, bed alarm on and nurse notifiedEducation:      GOALS:   Multidisciplinary Problems     Occupational Therapy Goals        Problem: Occupational Therapy Goal    Goal Priority Disciplines Outcome Interventions   Occupational Therapy Goal     OT, PT/OT Ongoing (interventions implemented as appropriate)    Description:  Goals to be met by: 3/28/2019     Patient will increase functional independence with ADLs by performing:    Feeding with Modified [place].  UE Dressing with [place].  LE Dressing with Contact Guard Assistance.  Grooming while standing at sink with Contact Guard Assistance.  Toileting from bedside commode with Stand-by Assistance for hygiene and clothing management.   Sitting at edge of bed x15 minutes with Stand-by Assistance.  Rolling to Bilateral with Stand-by Assistance.   Supine to sit with Stand-by Assistance.  Step transfer with Contact Guard Assistance  Toilet transfer to toilet with Contact Guard Assistance.  Upper extremity exercise program with assistance as needed.                      Time Tracking:     OT Date of Treatment: 03/12/19  OT Start Time: 1133  OT Stop Time: 1236  OT Total Time (min): 63 min    Billable Minutes:Self Care/Home Management 31 (co-tx with PTA)    CINDY Hurst  3/12/2019

## 2019-03-12 NOTE — PLAN OF CARE
SW completed a discharge planning reassessment to further evaluate how the patient will manage health care at home and their discharge preferences. Assessment completed by review of medical record as patient was not available. The patient remains inpatient. Case Management SW or TN will continue to plan for patients discharge throughout their inpatient stay.       03/12/19 1137   Discharge Reassessment   Assessment Type Discharge Planning Reassessment   Do you have any problems affording any of your prescribed medications? No   Discharge Plan A Skilled Nursing Facility   Discharge Plan B Home Health   DME Needed Upon Discharge  other (see comments)  (TBD, pt marquez has some equipment)   Patient choice form signed by patient/caregiver N/A   Anticipated Discharge Disposition Home-Health   Post-Acute Status   Discharge Delays (not medically cleared for discharge)

## 2019-03-12 NOTE — PROGRESS NOTES
Attempted to perform scheduled Aerobika tx however pt. Refused tx at this time.  He states that it makes his mouth dry.  Pt. Was educated on the importance of tx however he still declined.  Pts. RN notified.

## 2019-03-12 NOTE — PHYSICIAN QUERY
February 22, 2018      Re:   Austin Bolaños  [address] [facility] [address]         To Whom It May Concern:        Please excuse Austin ZAMBRANO Bolaños from jury duty due to medical conditions which include being to sit for long periods of time due to chronic back pain.       Sincerely,           Sheila Lay MD  [address] [facility] [phone]   PT Name: Justin Adams  MR #: [id number]     PHYSICIAN QUERY -  ELECTROLYTE CLARIFICATION      CDS/: Hailey aMza               Contact information: [email or web address]  This form is a permanent document in the medical record.     Query Date: March 12, 2019    By submitting this query, we are merely seeking further clarification of documentation to reflect the severity of illness of your patient. Please utilize your independent clinical judgment when addressing the question(s) below.    The Medical record reflects the following:     Indicators   Supporting Clinical Findings Location in Medical Record   x Lab Value(s) Sodium= 150  Potassium= 3.2    Phosphorus= 2.6   Labs, Chem Profile 03/06 0348am    Labs, Chem Profile 03/07       x Treatment                                 Medication Replace  K, plan to alter TPN due to uptrending Na levels    NS @ 50 mL/h continuous IV d/c'd 03/06  Potassium chloride 20 meq IV given x 2 doses on 03/06  Potassium phosphate 15 mmol IV given on 03/07   Surgery PN 03/06      MAR    Other       Provider, please specify the diagnosis or diagnoses that correspond(s) to the above indicators. Tanner all that apply:    [   ] Hypokalemia   [   ] Hypernatremia   [   ] Hypophosphatemia   [   ] Other electrolyte disturbance (please specify): _______   [ x  ]  Clinically Undetermined       Please document in your progress notes daily for the duration of treatment until resolved, and include in your discharge summary.

## 2019-03-12 NOTE — PROGRESS NOTES
[facility]  General Surgery  Progress Note    Subjective:     Interval History: underwent CT scan and IR drainage of abscess yesterday. Notes mild abdominal pain and nausea.     Post-Op Info:  Procedure(s) (LRB):  LAPAROSCOPY, DIAGNOSTIC (N/A)  LAPAROTOMY, EXPLORATORY   8 Days Post-Op      Medications:  Continuous Infusions:   TPN ADULT CENTRAL LINE CUSTOM 100 mL/hr at 03/11/19 2226     Scheduled Meds:   acetaminophen  650 mg Oral Q6H    albumin human 5%  12.5 g Intravenous Once    aspirin  81 mg Oral Daily    bismuth subsalicylate  30 mL Oral QID    enoxaparin  40 mg Subcutaneous Daily    escitalopram oxalate  10 mg Oral Daily    fat emulsion  250 mL Intravenous Once    fluconazole  200 mg Intravenous Q24H    fluticasone-vilanterol  1 puff Inhalation Daily    gabapentin  100 mg Oral TID    levetiracetam IVPB  500 mg Intravenous Q12H    metronidazole  500 mg Intravenous Q8H    nystatin  500,000 Units Mouth/Throat Daily    octreotide  100 mcg Subcutaneous Q8H    pantoprazole  40 mg Intravenous Daily    piperacillin-tazobactam (ZOSYN) IVPB  4.5 g Intravenous Q8H    sodium chloride 0.9%  10 mL Intravenous Q6H     PRN Meds:albuterol, dextrose 50%, glucagon (human recombinant), insulin aspart U-100, naloxone, ondansetron, ondansetron, oxyCODONE, Flushing PICC Protocol **AND** sodium chloride 0.9% **AND** sodium chloride 0.9%, sodium chloride 0.9%     Objective:     Vital Signs (Most Recent):  Temp: 98.5 °F (36.9 °C) (03/12/19 0355)  Pulse: 74 (03/12/19 0355)  Resp: 18 (03/12/19 0355)  BP: (!) 148/72 (03/12/19 0355)  SpO2: (!) 94 % (03/12/19 0355) Vital Signs (24h Range):  Temp:  [98.2 °F (36.8 °C)-99.3 °F (37.4 °C)] 98.5 °F (36.9 °C)  Pulse:  [71-78] 74  Resp:  [17-18] 18  SpO2:  [93 %-98 %] 94 %  BP: (122-149)/(57-80) 148/72       Intake/Output Summary (Last 24 hours) at 3/12/2019 0803  Last data filed at 3/12/2019 0600  Gross per 24 hour   Intake 0 ml   Output 625 ml   Net -625 ml    Drain    Physical Exam  Awake, alert  RRR  No increased work of breathing  Abd soft, midline intact with staples in place, ostomy pink, productive of thickened stool. Pelvic IR drain with feculent output. RUQ drain with serous output      Significant Labs:  CBC:   Recent Labs   Lab 03/12/19  0350   WBC 15.48*   RBC 2.93*   HGB 8.5*   HCT 25.4*   *   MCV 87   MCH 29.0   MCHC 33.5     CMP:   Recent Labs   Lab 03/12/19  0350   *   CALCIUM 8.4*   ALBUMIN 1.4*   PROT 5.0*      K 4.3   CO2 32*      BUN 23   CREATININE 0.8   ALKPHOS 99   ALT <5*   AST 11   BILITOT 0.3       Significant Diagnostics:  I have reviewed all pertinent imaging results/findings within the past 24 hours.    Assessment/Plan:   68M s/p recent lap LAR for rectal cancer on 2/22 presenting with increasing abdominal pain, CT with intraperitoneal free air in the postoperative setting. Evening of 3/1 patient febrile, tachycardic and subsequently transferred to the ICU. Hemodynamics normalized overnight on 3/1 and he has remained HDS without lactic acidosis or metabolic durrangements. Repeat CT scan with readministration of free air. NG placed and PICC line obtained. Taken to the OR on 3/4 for ex lap and diverting loop ostomy. Returned to the ICU in stable condition, stepped down to the floor.      Neuro:   -prn oxy, scheduled tylenol and gabapentin  -home escitalopram  -toradol q6  -hx of seizures- Keppra IV     CV:   -Hold home ACEI and statin     PULM: NC  -IS hourly while awake  -Home inhalers      FEN/GI: s/p LAR on 2/22, washout and loop ostomy 3/4, ostomy functioning, ostomy RN following  -Diabetic diet  -octreotide and kaopectate to minimize ostomy output  -Continue TPN  -Prealbumin 9 from 6     :   -BUN, Cr 28, 0.8  -Strict I/Os     Heme/ID: Leukocytosis decreased to 15 from 20, CRP elevated to 243, bandemia improving. Platelets uptrending.  -blood cultures NG final  -continue Zosyn, fluconazole and flagyl for  intraabdominal infection   -CT with abscesses, s/p IR drainage of pelvic and perihepatic collections  -Flush drain BID     Endo: CBG 129-178  -ISS, accuchecks  -decreased dextrose in TPN     MSK/Wounds:   -ostomy teaching  -PT/OT     PPX: Lovenox and Protonix (home med)       Active Diagnoses:    Diagnosis Date Noted POA    Hypertension [I10] 03/02/2019 Unknown    Mixed hyperlipidemia [E78.2] 03/02/2019 Yes    Abdominal pain [R10.9] 02/26/2019 Yes    Carcinoma of rectum [C20] 11/12/2018 Yes      Problems Resolved During this Admission:    Diagnosis Date Noted Date Resolved POA    PRINCIPAL PROBLEM:  Other chest pain [R07.89] 03/02/2019 03/08/2019 Yes         Coby Harp MD  General Surgery  [facility]

## 2019-03-12 NOTE — PLAN OF CARE
Problem: Occupational Therapy Goal  Goal: Occupational Therapy Goal  Goals to be met by: 3/28/2019     Patient will increase functional independence with ADLs by performing:    Feeding with Modified [place].  UE Dressing with [place].  LE Dressing with Contact Guard Assistance.  Grooming while standing at sink with Contact Guard Assistance.  Toileting from bedside commode with Stand-by Assistance for hygiene and clothing management.   Sitting at edge of bed x15 minutes with Stand-by Assistance.  Rolling to Bilateral with Stand-by Assistance.   Supine to sit with Stand-by Assistance.  Step transfer with Contact Guard Assistance  Toilet transfer to toilet with Contact Guard Assistance.  Upper extremity exercise program with assistance as needed.     Outcome: Ongoing (interventions implemented as appropriate)  Patient tolerated OT session well, required increased time to perform all therapy tasks. Patient minimally confused today. Patient will benefit from continued OT to address functional deficits.

## 2019-03-12 NOTE — PLAN OF CARE
Problem: Adult Inpatient Plan of Care  Goal: Plan of Care Review  Outcome: Ongoing (interventions implemented as appropriate)   03/12/19 0571   Plan of Care Review   Plan of Care Reviewed With patient   Patient remains free from fall or injury. TPN infusing as ordred. IV antibiotics administered as ordered. Patient encouraged to repositioned frequently. Accordian drains remain in place to the LLQ and abdomen. Drains flushed with 10 ml of saline as ordered. Bed in low locked postion, bed alarm armed and audible with call light in reach. Will continue to monitor.

## 2019-03-12 NOTE — PHYSICIAN QUERY
"PT Name: Justin Adams  MR #: [id number]     Physician Query Form - Documentation Clarification      CDS/: Hailey Maza               Contact information: [email or web address]    This form is a permanent document in the medical record.     Query Date: March 12, 2019    By submitting this query, we are merely seeking further clarification of documentation. Please utilize your independent clinical judgment when addressing the question(s) below.    The Medical record reflects the following:    Supporting Clinical Findings Location in Medical Record   S/p recent lap LAR for rectal cancer presenting with increasing abdominal pain, CT with intraperitoneal free air. Expect intraperitoneal air at this point in the post op setting, no free fluid, and labs and vitals are reassuring.    H&P   Vital Signs (24h Range):  Temp:   97.6 °F -99.1 °F    Pulse:   60-80   Resp:   16-18   SpO2:   90 %-95 %   BP: (119-141)/(59-69)      Abdominal exam improving, WBC uptrending, without fever.   Restart ABX  Trend WBC, serial exams. Repeat CT scan may be warranted   Surgery PN 02/28   Tachycardia and increase in NC oxygen needs  On arrival patient febrile and tachycardic to the 120's while sating 95% on 3L NC. Blood pressure remains stable.    Bolus additional liter of LR for a total of 2 L,  continue IV antibiotics    Surgery PN 03/01   Low O2 SAT - in low 80's. Pt on 1 liter O2 per NC. Pt with increases respirations and pursed-lip breathing. Pt stating "I hurt all over". Pt hot to touch. Pt's oxygen increased to 3L on NC with SATS increasing to 90-91%. Temp elevated. IV NS running at 50ml/hr.  Pt currently qualifies as severe sepsis with elevated WBC from this a.m. Dr. Crow Gutierrez notified with new orders received.    Nurse's Note 03/01   Transferred to the ICU, labs obtained, bolused, and broad spectrum ABX initiated     Leukocytosis of 14 now at 10.   Blood cultures w/o growth.  Previous zosyn and flagyl, " vancomycin added on 3/1.    Vital Signs (24h Range):  Temp:   97.8 °F  -99.8 °F   Pulse:      Resp:   16-28   SpO2:   88 %-95 %   BP: ()/(50-93)     Surgery PN 03/02   Evening of 3/1 patient febrile, tachycardic and subsequently transferred to the ICU. Hemodynamics normalized overnight on 3/1 and he has remained HDS without lactic acidosis or metabolic durrangements. Repeat CT scan with readministration of free air. NG placed and PICC line obtained.     Surgery PN 03/03   Upon opening his abdomen and extending the incision there was what appeared in his abdominal cavity towards his distal anastomosis a significant amount of feculent fluid.     Description of the Findings of the Procedure:  Distal anastomotic dehiscence that was walled off with feculent peritonitis   Op Note   Taken to the OR on 3/4 for ex lap and diverting loop ostomy. Returned to the ICU in stable condition   S/p LAR on 2/22, washout and loop ostomy 3/4   WBC persists at 18, increase in PLT but CRP and bandemia improving    Blood cultures NG final  Continue Zosyn, fluconazole and flagyl for presumed intraabdominal infection 2/2 feculent peritonitis   Surgery PN 03/07   S/p recent lap LAR for rectal cancer on 2/22 complicated by  increasing abdominal pain, fevers, tachycardic and sepsis with CT demonstrating development of free air/bowel perforation complicated by multiple intraperitoneal fluid collections concerning for abscesses.    IR Consult 03/08   Continue Zosyn, fluconazole and flagyl for intraabdominal infection   CT with abscesses, s/p IR drainage of pelvic collection   Surgery PN 03/09, filed 03/12   Status post partial colonic resection with mildly worsened degree pneumoperitoneum and abdominopelvic free fluid.  Again, possible perforation not excluded though is not definitely seen.    Long segment small bowel wall edema and thickening within the right mid to lower abdomen.  Findings potentially infectious versus inflammatory.   CT Abdomen and Pelvis 03/02   WBC= 11.80 - 14.70 - 10.36 - 20.29 Labs, CBC 02/27 - 02/28 - 03/02 0411 - 03/11   No growth after 5 days      Escherichia coli  Many    Enterococcus faecalis  Many Blood Cultures 03/01      Aerobic Bacterial Culture, Abdomen, Abscess 03/08                                                                            Doctor, Please specify diagnosis or diagnoses associated with above clinical findings.    Provider Use Only      [  ] Sepsis ruled in, present on admission    [  ] Sepsis ruled in, not present on admission    [X  ] Sepsis ruled out    [  ] Other diagnosis, please specify:  __________________                                                                                                           [  ] Clinically Undetermined

## 2019-03-13 LAB
ALBUMIN SERPL BCP-MCNC: 1.4 G/DL
ALP SERPL-CCNC: 105 U/L
ALT SERPL W/O P-5'-P-CCNC: 5 U/L
ANION GAP SERPL CALC-SCNC: 3 MMOL/L
AST SERPL-CCNC: 8 U/L
BASOPHILS # BLD AUTO: 0.04 K/UL
BASOPHILS NFR BLD: 0.2 %
BILIRUB SERPL-MCNC: 0.3 MG/DL
BUN SERPL-MCNC: 22 MG/DL
CALCIUM SERPL-MCNC: 8.5 MG/DL
CHLORIDE SERPL-SCNC: 101 MMOL/L
CO2 SERPL-SCNC: 35 MMOL/L
CREAT SERPL-MCNC: 0.8 MG/DL
DIFFERENTIAL METHOD: ABNORMAL
EOSINOPHIL # BLD AUTO: 0.6 K/UL
EOSINOPHIL NFR BLD: 3.5 %
ERYTHROCYTE [DISTWIDTH] IN BLOOD BY AUTOMATED COUNT: 16.6 %
EST. GFR  (AFRICAN AMERICAN): >60 ML/MIN/1.73 M^2
EST. GFR  (NON AFRICAN AMERICAN): >60 ML/MIN/1.73 M^2
GLUCOSE SERPL-MCNC: 123 MG/DL
HCT VFR BLD AUTO: 25.5 %
HGB BLD-MCNC: 8.5 G/DL
LYMPHOCYTES # BLD AUTO: 0.7 K/UL
LYMPHOCYTES NFR BLD: 4.5 %
MAGNESIUM SERPL-MCNC: 1.8 MG/DL
MCH RBC QN AUTO: 29 PG
MCHC RBC AUTO-ENTMCNC: 33.3 G/DL
MCV RBC AUTO: 87 FL
MONOCYTES # BLD AUTO: 1.7 K/UL
MONOCYTES NFR BLD: 10.5 %
NEUTROPHILS # BLD AUTO: 13.1 K/UL
NEUTROPHILS NFR BLD: 81.3 %
PHOSPHATE SERPL-MCNC: 4.2 MG/DL
PLATELET # BLD AUTO: 971 K/UL
PMV BLD AUTO: 9.7 FL
POCT GLUCOSE: 158 MG/DL (ref 70–110)
POCT GLUCOSE: 161 MG/DL (ref 70–110)
POCT GLUCOSE: 162 MG/DL (ref 70–110)
POCT GLUCOSE: 168 MG/DL (ref 70–110)
POTASSIUM SERPL-SCNC: 4.4 MMOL/L
PROT SERPL-MCNC: 5.1 G/DL
RBC # BLD AUTO: 2.93 M/UL
SODIUM SERPL-SCNC: 139 MMOL/L
WBC # BLD AUTO: 16.08 K/UL

## 2019-03-13 PROCEDURE — 25000003 PHARM REV CODE 250: Performed by: STUDENT IN AN ORGANIZED HEALTH CARE EDUCATION/TRAINING PROGRAM

## 2019-03-13 PROCEDURE — 97530 THERAPEUTIC ACTIVITIES: CPT

## 2019-03-13 PROCEDURE — 36415 COLL VENOUS BLD VENIPUNCTURE: CPT

## 2019-03-13 PROCEDURE — 63600175 PHARM REV CODE 636 W HCPCS: Performed by: SURGERY

## 2019-03-13 PROCEDURE — A4217 STERILE WATER/SALINE, 500 ML: HCPCS | Performed by: STUDENT IN AN ORGANIZED HEALTH CARE EDUCATION/TRAINING PROGRAM

## 2019-03-13 PROCEDURE — 83735 ASSAY OF MAGNESIUM: CPT

## 2019-03-13 PROCEDURE — S0030 INJECTION, METRONIDAZOLE: HCPCS | Performed by: SURGERY

## 2019-03-13 PROCEDURE — 97535 SELF CARE MNGMENT TRAINING: CPT

## 2019-03-13 PROCEDURE — 63600175 PHARM REV CODE 636 W HCPCS: Performed by: STUDENT IN AN ORGANIZED HEALTH CARE EDUCATION/TRAINING PROGRAM

## 2019-03-13 PROCEDURE — 85025 COMPLETE CBC W/AUTO DIFF WBC: CPT

## 2019-03-13 PROCEDURE — 97116 GAIT TRAINING THERAPY: CPT

## 2019-03-13 PROCEDURE — 84100 ASSAY OF PHOSPHORUS: CPT

## 2019-03-13 PROCEDURE — 97110 THERAPEUTIC EXERCISES: CPT

## 2019-03-13 PROCEDURE — 80053 COMPREHEN METABOLIC PANEL: CPT

## 2019-03-13 PROCEDURE — A4216 STERILE WATER/SALINE, 10 ML: HCPCS | Performed by: STUDENT IN AN ORGANIZED HEALTH CARE EDUCATION/TRAINING PROGRAM

## 2019-03-13 PROCEDURE — C9113 INJ PANTOPRAZOLE SODIUM, VIA: HCPCS | Performed by: STUDENT IN AN ORGANIZED HEALTH CARE EDUCATION/TRAINING PROGRAM

## 2019-03-13 PROCEDURE — B4185 PARENTERAL SOL 10 GM LIPIDS: HCPCS | Performed by: STUDENT IN AN ORGANIZED HEALTH CARE EDUCATION/TRAINING PROGRAM

## 2019-03-13 PROCEDURE — 25000003 PHARM REV CODE 250: Performed by: SURGERY

## 2019-03-13 PROCEDURE — 11000001 HC ACUTE MED/SURG PRIVATE ROOM

## 2019-03-13 RX ORDER — OXYCODONE HYDROCHLORIDE 5 MG/1
10 TABLET ORAL EVERY 6 HOURS PRN
Status: DISCONTINUED | OUTPATIENT
Start: 2019-03-13 | End: 2019-03-13

## 2019-03-13 RX ORDER — GABAPENTIN 300 MG/1
300 CAPSULE ORAL 3 TIMES DAILY
Status: DISCONTINUED | OUTPATIENT
Start: 2019-03-13 | End: 2019-03-18

## 2019-03-13 RX ORDER — MEGESTROL ACETATE 20 MG/1
40 TABLET ORAL DAILY
Status: DISCONTINUED | OUTPATIENT
Start: 2019-03-13 | End: 2019-03-21 | Stop reason: HOSPADM

## 2019-03-13 RX ORDER — OXYCODONE HYDROCHLORIDE 5 MG/1
10 TABLET ORAL EVERY 4 HOURS PRN
Status: DISCONTINUED | OUTPATIENT
Start: 2019-03-13 | End: 2019-03-18

## 2019-03-13 RX ORDER — KETOROLAC TROMETHAMINE 30 MG/ML
15 INJECTION, SOLUTION INTRAMUSCULAR; INTRAVENOUS EVERY 6 HOURS
Status: COMPLETED | OUTPATIENT
Start: 2019-03-13 | End: 2019-03-16

## 2019-03-13 RX ADMIN — GABAPENTIN 300 MG: 300 CAPSULE ORAL at 09:03

## 2019-03-13 RX ADMIN — MEGESTROL ACETATE 40 MG: 20 TABLET ORAL at 02:03

## 2019-03-13 RX ADMIN — FLUCONAZOLE, SODIUM CHLORIDE 200 MG: 2 INJECTION INTRAVENOUS at 08:03

## 2019-03-13 RX ADMIN — BISMUTH SUBSALICYLATE 30 ML: 262 LIQUID ORAL at 10:03

## 2019-03-13 RX ADMIN — ASPIRIN 81 MG: 81 TABLET, COATED ORAL at 10:03

## 2019-03-13 RX ADMIN — Medication 10 ML: at 05:03

## 2019-03-13 RX ADMIN — BISMUTH SUBSALICYLATE 30 ML: 262 LIQUID ORAL at 09:03

## 2019-03-13 RX ADMIN — LEVETIRACETAM 500 MG: 5 INJECTION INTRAVENOUS at 10:03

## 2019-03-13 RX ADMIN — Medication 10 ML: at 11:03

## 2019-03-13 RX ADMIN — Medication 10 ML: at 06:03

## 2019-03-13 RX ADMIN — BISMUTH SUBSALICYLATE 30 ML: 262 LIQUID ORAL at 05:03

## 2019-03-13 RX ADMIN — OCTREOTIDE ACETATE 100 MCG: 100 INJECTION, SOLUTION INTRAVENOUS; SUBCUTANEOUS at 09:03

## 2019-03-13 RX ADMIN — PANTOPRAZOLE SODIUM 40 MG: 40 INJECTION, POWDER, LYOPHILIZED, FOR SOLUTION INTRAVENOUS at 10:03

## 2019-03-13 RX ADMIN — KETOROLAC TROMETHAMINE 15 MG: 30 INJECTION, SOLUTION INTRAMUSCULAR; INTRAVENOUS at 12:03

## 2019-03-13 RX ADMIN — PIPERACILLIN AND TAZOBACTAM 4.5 G: 4; .5 INJECTION, POWDER, LYOPHILIZED, FOR SOLUTION INTRAVENOUS; PARENTERAL at 10:03

## 2019-03-13 RX ADMIN — METRONIDAZOLE 500 MG: 500 INJECTION, SOLUTION INTRAVENOUS at 10:03

## 2019-03-13 RX ADMIN — Medication 10 ML: at 12:03

## 2019-03-13 RX ADMIN — GABAPENTIN 300 MG: 300 CAPSULE ORAL at 02:03

## 2019-03-13 RX ADMIN — OCTREOTIDE ACETATE 100 MCG: 100 INJECTION, SOLUTION INTRAVENOUS; SUBCUTANEOUS at 05:03

## 2019-03-13 RX ADMIN — KETOROLAC TROMETHAMINE 15 MG: 30 INJECTION, SOLUTION INTRAMUSCULAR; INTRAVENOUS at 05:03

## 2019-03-13 RX ADMIN — ENOXAPARIN SODIUM 40 MG: 100 INJECTION SUBCUTANEOUS at 05:03

## 2019-03-13 RX ADMIN — METRONIDAZOLE 500 MG: 500 INJECTION, SOLUTION INTRAVENOUS at 12:03

## 2019-03-13 RX ADMIN — SODIUM PHOSPHATE, MONOBASIC, MONOHYDRATE: 276; 142 INJECTION, SOLUTION INTRAVENOUS at 10:03

## 2019-03-13 RX ADMIN — KETOROLAC TROMETHAMINE 15 MG: 30 INJECTION, SOLUTION INTRAMUSCULAR; INTRAVENOUS at 11:03

## 2019-03-13 RX ADMIN — ESCITALOPRAM OXALATE 10 MG: 10 TABLET ORAL at 10:03

## 2019-03-13 RX ADMIN — NYSTATIN 500000 UNITS: 500000 SUSPENSION ORAL at 10:03

## 2019-03-13 RX ADMIN — ACETAMINOPHEN 650 MG: 325 TABLET, FILM COATED ORAL at 05:03

## 2019-03-13 RX ADMIN — PIPERACILLIN AND TAZOBACTAM 4.5 G: 4; .5 INJECTION, POWDER, LYOPHILIZED, FOR SOLUTION INTRAVENOUS; PARENTERAL at 05:03

## 2019-03-13 RX ADMIN — OXYCODONE HYDROCHLORIDE 10 MG: 5 TABLET ORAL at 02:03

## 2019-03-13 RX ADMIN — METRONIDAZOLE 500 MG: 500 INJECTION, SOLUTION INTRAVENOUS at 04:03

## 2019-03-13 RX ADMIN — SOYBEAN OIL 250 ML: 20 INJECTION, SOLUTION INTRAVENOUS at 10:03

## 2019-03-13 RX ADMIN — OCTREOTIDE ACETATE 100 MCG: 100 INJECTION, SOLUTION INTRAVENOUS; SUBCUTANEOUS at 02:03

## 2019-03-13 RX ADMIN — OXYCODONE HYDROCHLORIDE 5 MG: 5 TABLET ORAL at 12:03

## 2019-03-13 RX ADMIN — OXYCODONE HYDROCHLORIDE 10 MG: 5 TABLET ORAL at 10:03

## 2019-03-13 NOTE — PT/OT/SLP PROGRESS
Physical Therapy Treatment    Patient Name:  Justin Adams   MRN:  [id number]    Recommendations:     Discharge Recommendations:  nursing facility, skilled   Discharge Equipment Recommendations: (ongoing assessment)   Barriers to discharge: Decreased caregiver support and pt with decreased mobility     Assessment:     Justin Adams is a 68 y.o. male admitted with a medical diagnosis of Other chest pain.  He presents with the following impairments/functional limitations:  weakness, impaired endurance, impaired self care skills, impaired functional mobilty, gait instability, impaired balance, decreased lower extremity function, decreased upper extremity function, decreased safety awareness, pain, decreased ROM, edema, impaired cardiopulmonary response to activity .Pt required encouragement and education to participate in therapy activity. pt required extra time to complete tasks .  Pt ambulated ~ 25 ft x 2 trials with rollator , min A ( 3L O2NC and chair followed) . Pt required 1 standing rest break during gait training 2* fatigue and weakness. Pt will benefit from further skilled therapy in order to get back to PLOF.       Rehab Prognosis: Good; patient would benefit from acute skilled PT services to address these deficits and reach maximum level of function.    Recent Surgery: Procedure(s) (LRB):  LAPAROSCOPY, DIAGNOSTIC (N/A)  LAPAROTOMY, EXPLORATORY 9 Days Post-Op    Plan:     During this hospitalization, patient to be seen (3-5x/wk) to address the identified rehab impairments via gait training, therapeutic activities, therapeutic exercises and progress toward the following goals:    · Plan of Care Expires:  03/21/19    Subjective     Chief Complaint: Abdominal pain and weakness.   Patient/Family Comments/goals: to get back to PLOF   Pain/Comfort:  · Pain Rating 1: 8/10  · Location 1: abdomen  · Pain Addressed 1: Pre-medicate for activity, Nurse notified  · Pain Rating Post-Intervention 1:  8/10      Objective:     Communicated with Nurse Ordaz  prior to session.  Patient found all lines intact, call button in reach, bed alarm on and nurse notified bed alarm, telemetry, oxygen, PICC line, colostomy, GREG drain  upon PT entry to room.     General Precautions: Standard, fall, diabetic, respiratory   Orthopedic Precautions:N/A   Braces: N/A     Functional Mobility:  · Bed Mobility:     · Rolling Left:  contact guard assistance  · Scooting: contact guard assistance  · Supine to Sit: moderate assistance and maximal assistance  · Transfers:     · Sit to Stand: 3 trials from bed minimum assistance with rollator . VC's for safety technique and sequence.   · Gait: Pt required encouragement and education to participate in therapy activity. Pt ambulated ~ 25 ft x 2 trials with rollator , min A ( 3L O2NC and chair followed) . Pt required 1 standing rest break during gait training 2* fatigue and weakness. Pt with demonstarting a  3-point gait with decreased shyam, increased time in double stance, decreased velocity of limb motion, decreased step length, increased stride width and decreased swing-to-stance ratio. Impairments contributing to gait deviations include impaired balance, decreased flexibility, pain, decreased ROM and decreased strength. V/T cues for safety technique and sequence.       AM-PAC 6 CLICK MOBILITY  Turning over in bed (including adjusting bedclothes, sheets and blankets)?: 3  Sitting down on and standing up from a chair with arms (e.g., wheelchair, bedside commode, etc.): 3  Moving from lying on back to sitting on the side of the bed?: 2  Moving to and from a bed to a chair (including a wheelchair)?: 3  Need to walk in hospital room?: 3  Climbing 3-5 steps with a railing?: 2  Basic Mobility Total Score: 16       Therapeutic Activities and Exercises:   pt performed bed mobility , transfer and gait training as above.  Pt performed seated BLE x 10 reps : AP, LAQ, HS, hip flexion and hip ABD/ADD.  V/T cues for proper and sequence. Pt tolerated well.     Patient left up in RECLINED CHAIR  On cushion  with all lines intact, call button in reach, nurse notified and nurse present..    GOALS:   Multidisciplinary Problems     Physical Therapy Goals        Problem: Physical Therapy Goal    Goal Priority Disciplines Outcome Goal Variances Interventions   Physical Therapy Goal     PT, PT/OT Ongoing (interventions implemented as appropriate)     Description:  Goals to be met by: 3/21/2019     Patient will increase functional independence with mobility by performin. Supine to sit with Stand-by Assistance  2. Sit to stand transfer with Stand-by Assistance  3. Gait  x 100 feet with Stand-by Assistance using rollator.   4. Lower extremity exercise program x10 reps per handout, with supervision                      Time Tracking:     PT Received On: 19  PT Start Time: 1434     PT Stop Time: 1530  PT Total Time (min): 56 min     Billable Minutes: Gait Training 13, Therapeutic Exercise 10 and Total Time 56 co-treat with OT     Treatment Type: Treatment  PT/PTA: PTA     PTA Visit Number: 3     Phuong Byrnes PTA  2019

## 2019-03-13 NOTE — PLAN OF CARE
Problem: Occupational Therapy Goal  Goal: Occupational Therapy Goal  Goals to be met by: 3/28/2019     Patient will increase functional independence with ADLs by performing:    Feeding with Modified [place].  UE Dressing with [place].  LE Dressing with Contact Guard Assistance.  Grooming while standing at sink with Contact Guard Assistance.  Toileting from bedside commode with Stand-by Assistance for hygiene and clothing management.   Sitting at edge of bed x15 minutes with Stand-by Assistance.  Rolling to Bilateral with Stand-by Assistance.   Supine to sit with Stand-by Assistance.  Step transfer with Contact Guard Assistance  Toilet transfer to toilet with Contact Guard Assistance.  Upper extremity exercise program with assistance as needed.     Outcome: Ongoing (interventions implemented as appropriate)  Patient requires max education, increased time, and frequent rest breaks with all therapy tasks. Patient with abdominal pain and SOB with all mobility. Patient is progressing slowly toward OT goals.

## 2019-03-13 NOTE — PLAN OF CARE
Problem: Physical Therapy Goal  Goal: Physical Therapy Goal  Goals to be met by: 3/21/2019     Patient will increase functional independence with mobility by performin. Supine to sit with Stand-by Assistance  2. Sit to stand transfer with Stand-by Assistance  3. Gait  x 100 feet with Stand-by Assistance using rollator.   4. Lower extremity exercise program x10 reps per handout, with supervision     Outcome: Ongoing (interventions implemented as appropriate)  Pt required encouragement and education to participate in therapy activity. Pt ambulated ~ 25 ft x 2 trials with rollator , min A ( 3L O2NC and chair followed) . Pt required 1 standing rest break during gait training 2* fatigue and weakness. Pt will benefit from further skilled therapy in order to get back to PLOF.

## 2019-03-13 NOTE — PHYSICIAN QUERY
PT Name: Justin Adams  MR #: [id number]    Physician Query Form - Relationship to Procedure Clarification     CDS/: Hailey Maza               Contact information: [email or web address]    This form is a permanent document in the medical record.     Query Date: March 13, 2019      By submitting this query, we are merely seeking further clarification of documentation. Please utilize your independent clinical judgment when addressing the question(s) below.    The Medical record contains the following:  Supporting Clinical Findings Location in Medical Record   Work up in the ED noted normal vitals and labs, with Ct scan demonstrating intraperitoneal free air in the post operative setting, without free fluid.     S/p recent lap LAR for rectal cancer presenting with increasing abdominal pain, CT with intraperitoneal free air.   Expect intraperitoneal air at this point in the post op setting, no free fluid, and labs and vitals are reassuring.      H&P   CT with intraperitoneal free air in the postoperative setting. Evening of 3/1 patient febrile, tachycardic and subsequently transferred to the ICU. Hemodynamics normalized overnight on 3/1 and he has remained HDS without lactic acidosis or metabolic durrangements. Repeat CT scan with readministration of free air.     Surgery PN 03/04, filed 03/06   Post-Operative Diagnosis:  Abdominal pain, unspecified abdominal location    Description of the Findings of the Procedure:  Distal anastomotic dehiscence that was walled off with feculent peritonitis   Op Note   Status post partial colonic resection with mildly worsened degree pneumoperitoneum and abdominopelvic free fluid.  Again, possible perforation not excluded though is not definitely seen.    Long segment small bowel wall edema and thickening within the right mid to lower abdomen.  Findings potentially infectious versus inflammatory.    CT Abdomen and Pelvis 03/02       Please clarify if  _intraperitoneal free air__ is      [  ] Inherent/Integral to procedure   [  ] Present, but not a complication of the procedure   [  ] Incidental finding, not clinically significant   [X  ] Complication of procedure   [  ] Other (please specify): __________________   [  ] Clinically Undetermined

## 2019-03-13 NOTE — PLAN OF CARE
Problem: Adult Inpatient Plan of Care  Goal: Plan of Care Review  Outcome: Ongoing (interventions implemented as appropriate)  Patient is A/Ox4, remains free from falls, is afebrile, states pain is being better managed with new PRN and scheduled pain medications.  Patient tolerating TPN well, blood glucose being monitored, no need for insulin coverage this shift.  Oral intake remains poor.  Patient incontinent of urine, frequent incontinence care given.  Stoma patent, ostomy draining dark green thick liquid.  Midline abdominal incision dressing C/D/I, reinforced.  Patient up in chair today, tolerated well.  Accordian drains in place, patent, no output to L drain, small output to R drain.

## 2019-03-13 NOTE — PT/OT/SLP PROGRESS
Occupational Therapy   Treatment    Name: Justin Adams  MRN: [id number]  Admitting Diagnosis:  Other chest pain  9 Days Post-Op    Recommendations:     Discharge Recommendations: nursing facility, skilled  Discharge Equipment Recommendations:  other (see comments)(TBD)  Barriers to discharge:       Assessment:     Justin Adams is a 68 y.o. male with a medical diagnosis of Other chest pain.  He presents with the following performance deficits affecting function: weakness, impaired endurance, impaired self care skills, impaired functional mobilty, gait instability, impaired balance, decreased coordination, decreased upper extremity function, decreased lower extremity function, decreased safety awareness, pain, decreased ROM, impaired skin, edema, impaired cardiopulmonary response to activity, impaired coordination. Patient requires max education, increased time, and frequent rest breaks with all therapy tasks. Patient with abdominal pain and SOB with all mobility. Patient is progressing slowly toward OT goals.     Rehab Prognosis:  Fair+; patient would benefit from acute skilled OT services to address these deficits and reach maximum level of function.       Plan:     Patient to be seen 3 x/week to address the above listed problems via self-care/home management, therapeutic activities, therapeutic exercises  · Plan of Care Expires: 03/28/19  · Plan of Care Reviewed with: patient(sister)    Subjective   Patient agreeable to therapy.   Pain/Comfort:  · Pain Rating 1: (did not rate with number)  · Location 1: abdomen  · Pain Addressed 1: Pre-medicate for activity, Reposition, Distraction, Cessation of Activity, Nurse notified    Objective:     Communicated with: Nurse Ordaz prior to session.  Patient found HOB elevated with bed alarm, telemetry, PICC line, oxygen, 2 abdominal, accordion drains, and colostomy upon OT entry to room.    General Precautions: Standard, fall, respiratory   Orthopedic  Precautions:N/A   Braces: N/A     Occupational Performance:     Bed Mobility:    · Patient completed Scooting/Bridging with minimum assistance  · Patient completed Supine to Sit with moderate assistance x 2 persons     Functional Mobility/Transfers:  · Patient completed Sit <> Stand Transfer with minimum assistance  with  rollator   · Functional Mobility: Patient able to ambulate with PT in hallway.     Activities of Daily Living:  · Upper Body Dressing: moderate assistance    · Lower Body Dressing: total assistance to don socks and slip on shoes  · Toileting: total assistance; Patient continues to have urinary incontinence with bladder accident x 2 during session.  Patient tolerated standing balance with CGA-MIN and rollator for pericare and brief change.    [facility] 6 Click ADL: 11    Treatment & Education:  Patient performed bed mobility, functional transfers, and ADL's as above.   Patient requires max education, increased time, and frequent rest breaks with all therapy tasks. Patient with pain and SOB with all mobility, cues for pursed lip breathing techniques for recovery.  Patient educated on safety awareness with all OOB mobility, pt needs reinforcement.  Patient educated on importance/benefits of OOB mobility.  Patient able to perform BUE ROM therex x 10 reps shoulder flex/ext, elbow flex/ext, and finger flex/ext. Patient encouraged to perform throughout day.     Patient left reclined up in chair on pressure relief cushion with all lines intact, call button in reach, nurse notified and nurse presentEducation:      GOALS:   Multidisciplinary Problems     Occupational Therapy Goals        Problem: Occupational Therapy Goal    Goal Priority Disciplines Outcome Interventions   Occupational Therapy Goal     OT, PT/OT Ongoing (interventions implemented as appropriate)    Description:  Goals to be met by: 3/28/2019     Patient will increase functional independence with ADLs by performing:    Feeding with Modified  [place].  UE Dressing with [place].  LE Dressing with Contact Guard Assistance.  Grooming while standing at sink with Contact Guard Assistance.  Toileting from bedside commode with Stand-by Assistance for hygiene and clothing management.   Sitting at edge of bed x15 minutes with Stand-by Assistance.  Rolling to Bilateral with Stand-by Assistance.   Supine to sit with Stand-by Assistance.  Step transfer with Contact Guard Assistance  Toilet transfer to toilet with Contact Guard Assistance.  Upper extremity exercise program with assistance as needed.                      Time Tracking:     OT Date of Treatment: 03/13/19  OT Start Time: 1434  OT Stop Time: 1530  OT Total Time (min): 56 min    Billable Minutes:Self Care/Home Management 12  Therapeutic Activity 11 (co-tx with PTA)    CINDY Hurst  3/13/2019

## 2019-03-13 NOTE — NURSING
Patient remains free from fall or injury. TPN infusing as ordered. IV antibiotics administered as ordered. Patient encouraged to repositioned frequently. Accordian drains remain in place to the LLQ and abdomen. Drains flushed with 10 ml of saline as ordered. Colostomy emptied. Incision to abdomen with staples intact covered with ABD pad. Bed in low locked postion, bed alarm armed and audible with call light in reach. Will continue to monitor.

## 2019-03-13 NOTE — PROGRESS NOTES
[facility]  General Surgery  Progress Note    Subjective:     Interval History: unchanged, continues to have abdominal pain, poor appetite, minimal nausea.     Post-Op Info:  Procedure(s) (LRB):  LAPAROSCOPY, DIAGNOSTIC (N/A)  LAPAROTOMY, EXPLORATORY   9 Days Post-Op      Medications:  Continuous Infusions:   TPN ADULT CENTRAL LINE CUSTOM 100 mL/hr at 03/12/19 2223     Scheduled Meds:   acetaminophen  650 mg Oral Q6H    albumin human 5%  12.5 g Intravenous Once    aspirin  81 mg Oral Daily    bismuth subsalicylate  30 mL Oral QID    enoxaparin  40 mg Subcutaneous Daily    escitalopram oxalate  10 mg Oral Daily    fat emulsion  250 mL Intravenous Once    fluconazole  200 mg Intravenous Q24H    fluticasone-vilanterol  1 puff Inhalation Daily    ketorolac  15 mg Intravenous Q6H    levetiracetam IVPB  500 mg Intravenous Q12H    metronidazole  500 mg Intravenous Q8H    nystatin  500,000 Units Mouth/Throat Daily    octreotide  100 mcg Subcutaneous Q8H    pantoprazole  40 mg Intravenous Daily    piperacillin-tazobactam (ZOSYN) IVPB  4.5 g Intravenous Q8H    sodium chloride 0.9%  10 mL Intravenous Q6H     PRN Meds:albuterol, dextrose 50%, glucagon (human recombinant), insulin aspart U-100, naloxone, ondansetron, ondansetron, oxyCODONE, Flushing PICC Protocol **AND** sodium chloride 0.9% **AND** sodium chloride 0.9%, sodium chloride 0.9%     Objective:     Vital Signs (Most Recent):  Temp: 98.8 °F (37.1 °C) (03/13/19 0737)  Pulse: 68 (03/13/19 0737)  Resp: 19 (03/13/19 0737)  BP: 127/72 (03/13/19 0737)  SpO2: 95 % (03/13/19 0737) Vital Signs (24h Range):  Temp:  [98.2 °F (36.8 °C)-98.8 °F (37.1 °C)] 98.8 °F (37.1 °C)  Pulse:  [68-73] 68  Resp:  [17-19] 19  SpO2:  [94 %-97 %] 95 %  BP: (127-145)/(63-72) 127/72       Intake/Output Summary (Last 24 hours) at 3/13/2019 1010  Last data filed at 3/13/2019 0800  Gross per 24 hour   Intake 0 ml   Output 250 ml   Net -250 ml       Physical  Exam  Awake, alert  RRR  No increased work of breathing  Abd soft, midline intact with staples in place, ostomy pink, productive of thickened stool. Pelvic IR drain with feculent output. RUQ drain with scant serous output      Significant Labs:  BMP:   Recent Labs   Lab 03/13/19  0426   *      K 4.4      CO2 35*   BUN 22   CREATININE 0.8   CALCIUM 8.5*   MG 1.8     CBC:   Recent Labs   Lab 03/13/19  0626   WBC 16.08*   RBC 2.93*   HGB 8.5*   HCT 25.5*   *   MCV 87   MCH 29.0   MCHC 33.3       Significant Diagnostics:  I have reviewed all pertinent imaging results/findings within the past 24 hours.    Assessment/Plan:   68M s/p recent lap LAR for rectal cancer on 2/22 presenting with increasing abdominal pain, CT with intraperitoneal free air in the postoperative setting. Evening of 3/1 patient febrile, tachycardic and subsequently transferred to the ICU. Hemodynamics normalized overnight on 3/1 and he has remained HDS without lactic acidosis or metabolic durrangements. Repeat CT scan with readministration of free air. NG placed and PICC line obtained. Taken to the OR on 3/4 for ex lap and diverting loop ostomy. Returned to the ICU in stable condition, stepped down to the floor.      Neuro:   -scheduled tylenol, increase oxycodone to 10, add scheduled toradol  -home escitalopram  -toradol q6  -hx of seizures- Keppra IV     CV:   -Hold home ACEI and statin     PULM: NC  -IS hourly while awake  -Home inhalers      FEN/GI: s/p LAR on 2/22, washout and loop ostomy 3/4, ostomy functioning, ostomy RN following  -Diabetic diet  -octreotide and kaopectate to minimize ostomy output  -Continue TPN  -Prealbumin 9 from 6, repeat nutrition labs tomorrow     :   -Strict I/Os     Heme/ID: Leukocytosis 16, Platelets uptrending.  -blood cultures NG final  -continue Zosyn, fluconazole and flagyl for intraabdominal infection   -CT with abscesses, s/p IR drainage of pelvic and perihepatic collections. Will  repeat CT scan later this week, likely  -Flush drain BID, will remove perihepatic drain today     Endo: CBG 161-193  -ISS, accuchecks  -decreased dextrose in TPN     MSK/Wounds:   -ostomy teaching  -PT/OT     PPX: Lovenox and Protonix (home med)      Active Diagnoses:    Diagnosis Date Noted POA    Hypertension [I10] 03/02/2019 Unknown    Mixed hyperlipidemia [E78.2] 03/02/2019 Yes    Abdominal pain [R10.9] 02/26/2019 Yes    Carcinoma of rectum [C20] 11/12/2018 Yes      Problems Resolved During this Admission:    Diagnosis Date Noted Date Resolved POA    PRINCIPAL PROBLEM:  Other chest pain [R07.89] 03/02/2019 03/08/2019 Yes         Coby Harp MD  General Surgery  [facility]

## 2019-03-13 NOTE — PHYSICIAN QUERY
PT Name: Justin Adams  MR #: [id number]     Physician Query Form - Etiology of Condition Clarification      CDS/: Hailey Maza               Contact information: [email or web address]  This form is a permanent document in the medical record.     Query Date: March 13, 2019    By submitting this query, we are merely seeking further clarification of documentation.  Please utilize your independent clinical judgment when addressing the question(s) below.     The medical record contains the following:    Findings Supporting Clinical Information Location in Medical Record   Abdominal pain Presented to the [place] ED on 2/26/19 complaining of worsening abdominal pain.     S/p recent lap LAR for rectal cancer presenting with increasing abdominal pain, CT with intraperitoneal free air.   Expect intraperitoneal air at this point in the post op setting, no free fluid, and labs and vitals are reassuring. Onset of pain coinciding with duration of action of long acting local anesthetic used in the OR   H&P 02/27, filed 02/28    Abdominal exam improving, WBC uptrending, without fever.     Trend WBC, serial exams. Repeat CT scan may be warranted, pending the above.   Surgery PN 02/28   Other chest pain- Atypical symptoms mostly radiating from his ongoing abdominal issues   Ruled out for ACS and echocardiogram shows preserved function.     Cardiology PN 03/03   Post-Operative Diagnosis:  Abdominal pain, unspecified abdominal location    Description of the Findings of the Procedure:  Distal anastomotic dehiscence that was walled off with feculent peritonitis    Op Note 03/04    S/p recent lap LAR for rectal cancer on 2/22 complicated by  increasing abdominal pain, fevers, tachycardic and sepsis with CT demonstrating development of free air/bowel perforation complicated by multiple intraperitoneal fluid collections concerning for abscesses.   IR Consult 03/11    Limited evaluation for free air with KUB.    Persistent  pneumoperitoneum, correlating with the 02/26/2019 CT exam.    Nonobstructive bowel gas pattern.    Status post partial colonic resection with mildly worsened degree pneumoperitoneum and abdominopelvic free fluid.  Again, possible perforation not excluded though is not definitely seen.    Long segment small bowel wall edema and thickening within the right mid to lower abdomen.  Findings potentially infectious versus inflammatory. X Ray KUB 02/27    X Ray Abdomen Flat and Erect 03/01      CT Abdomen and Pelvis 03/02     Please document your best medical opinion regarding the etiology of _Abdominal Pain_ for which treatment is/was directed.     Provider use only  Please review the H&P and progress notes its explained in detail in those documents                 [  ] Clinically Undetermined     Please document in your progress notes daily for the duration of treatment, until resolved, and include in your discharge summary.

## 2019-03-13 NOTE — CONSULTS
RD consulted for malnutrition, poor po intake. RD already following. TPN running at RDs recommendations. Will f/u on 3/15.

## 2019-03-14 LAB
ABO + RH BLD: NORMAL
ALBUMIN SERPL BCP-MCNC: 1.4 G/DL
ALP SERPL-CCNC: 112 U/L
ALT SERPL W/O P-5'-P-CCNC: 6 U/L
ANION GAP SERPL CALC-SCNC: 6 MMOL/L
AST SERPL-CCNC: 12 U/L
BASOPHILS # BLD AUTO: 0.04 K/UL
BASOPHILS NFR BLD: 0.3 %
BILIRUB SERPL-MCNC: 0.3 MG/DL
BLD GP AB SCN CELLS X3 SERPL QL: NORMAL
BLD PROD TYP BPU: NORMAL
BLD PROD TYP BPU: NORMAL
BLOOD UNIT EXPIRATION DATE: NORMAL
BLOOD UNIT EXPIRATION DATE: NORMAL
BLOOD UNIT TYPE CODE: 5100
BLOOD UNIT TYPE CODE: 5100
BLOOD UNIT TYPE: NORMAL
BLOOD UNIT TYPE: NORMAL
BUN SERPL-MCNC: 26 MG/DL
CALCIUM SERPL-MCNC: 8.9 MG/DL
CHLORIDE SERPL-SCNC: 102 MMOL/L
CO2 SERPL-SCNC: 33 MMOL/L
CODING SYSTEM: NORMAL
CODING SYSTEM: NORMAL
CREAT SERPL-MCNC: 0.8 MG/DL
CRP SERPL-MCNC: 191.1 MG/L
DIFFERENTIAL METHOD: ABNORMAL
DISPENSE STATUS: NORMAL
DISPENSE STATUS: NORMAL
EOSINOPHIL # BLD AUTO: 0.6 K/UL
EOSINOPHIL NFR BLD: 3.9 %
ERYTHROCYTE [DISTWIDTH] IN BLOOD BY AUTOMATED COUNT: 16.7 %
EST. GFR  (AFRICAN AMERICAN): >60 ML/MIN/1.73 M^2
EST. GFR  (NON AFRICAN AMERICAN): >60 ML/MIN/1.73 M^2
GLUCOSE SERPL-MCNC: 82 MG/DL
HCT VFR BLD AUTO: 25 %
HGB BLD-MCNC: 8.2 G/DL
LYMPHOCYTES # BLD AUTO: 1.1 K/UL
LYMPHOCYTES NFR BLD: 7.5 %
MAGNESIUM SERPL-MCNC: 1.9 MG/DL
MCH RBC QN AUTO: 28.7 PG
MCHC RBC AUTO-ENTMCNC: 32.8 G/DL
MCV RBC AUTO: 87 FL
MONOCYTES # BLD AUTO: 1.5 K/UL
MONOCYTES NFR BLD: 9.9 %
NEUTROPHILS # BLD AUTO: 11.8 K/UL
NEUTROPHILS NFR BLD: 78.4 %
NUM UNITS TRANS PACKED RBC: NORMAL
PHOSPHATE SERPL-MCNC: 4.7 MG/DL
PLATELET # BLD AUTO: 988 K/UL
PMV BLD AUTO: 9.7 FL
POCT GLUCOSE: 132 MG/DL (ref 70–110)
POCT GLUCOSE: 137 MG/DL (ref 70–110)
POCT GLUCOSE: 141 MG/DL (ref 70–110)
POCT GLUCOSE: 147 MG/DL (ref 70–110)
POTASSIUM SERPL-SCNC: 4.4 MMOL/L
PREALB SERPL-MCNC: 11 MG/DL
PROT SERPL-MCNC: 5.3 G/DL
RBC # BLD AUTO: 2.86 M/UL
SODIUM SERPL-SCNC: 141 MMOL/L
TRANS ERYTHROCYTES VOL PATIENT: NORMAL ML
WBC # BLD AUTO: 14.99 K/UL

## 2019-03-14 PROCEDURE — 84100 ASSAY OF PHOSPHORUS: CPT

## 2019-03-14 PROCEDURE — 63600175 PHARM REV CODE 636 W HCPCS: Performed by: STUDENT IN AN ORGANIZED HEALTH CARE EDUCATION/TRAINING PROGRAM

## 2019-03-14 PROCEDURE — B4185 PARENTERAL SOL 10 GM LIPIDS: HCPCS | Performed by: SURGERY

## 2019-03-14 PROCEDURE — 25000003 PHARM REV CODE 250: Performed by: STUDENT IN AN ORGANIZED HEALTH CARE EDUCATION/TRAINING PROGRAM

## 2019-03-14 PROCEDURE — 97116 GAIT TRAINING THERAPY: CPT

## 2019-03-14 PROCEDURE — 36415 COLL VENOUS BLD VENIPUNCTURE: CPT

## 2019-03-14 PROCEDURE — 25500020 PHARM REV CODE 255: Performed by: SURGERY

## 2019-03-14 PROCEDURE — 27000221 HC OXYGEN, UP TO 24 HOURS

## 2019-03-14 PROCEDURE — 86580 TB INTRADERMAL TEST: CPT | Performed by: SURGERY

## 2019-03-14 PROCEDURE — 99900035 HC TECH TIME PER 15 MIN (STAT)

## 2019-03-14 PROCEDURE — 86920 COMPATIBILITY TEST SPIN: CPT

## 2019-03-14 PROCEDURE — S0030 INJECTION, METRONIDAZOLE: HCPCS | Performed by: SURGERY

## 2019-03-14 PROCEDURE — 25000003 PHARM REV CODE 250: Performed by: SURGERY

## 2019-03-14 PROCEDURE — 86140 C-REACTIVE PROTEIN: CPT

## 2019-03-14 PROCEDURE — 97535 SELF CARE MNGMENT TRAINING: CPT

## 2019-03-14 PROCEDURE — 85025 COMPLETE CBC W/AUTO DIFF WBC: CPT

## 2019-03-14 PROCEDURE — 83735 ASSAY OF MAGNESIUM: CPT

## 2019-03-14 PROCEDURE — 97530 THERAPEUTIC ACTIVITIES: CPT

## 2019-03-14 PROCEDURE — C9113 INJ PANTOPRAZOLE SODIUM, VIA: HCPCS | Performed by: STUDENT IN AN ORGANIZED HEALTH CARE EDUCATION/TRAINING PROGRAM

## 2019-03-14 PROCEDURE — 36430 TRANSFUSION BLD/BLD COMPNT: CPT

## 2019-03-14 PROCEDURE — P9016 RBC LEUKOCYTES REDUCED: HCPCS

## 2019-03-14 PROCEDURE — P9021 RED BLOOD CELLS UNIT: HCPCS

## 2019-03-14 PROCEDURE — A4217 STERILE WATER/SALINE, 500 ML: HCPCS | Performed by: STUDENT IN AN ORGANIZED HEALTH CARE EDUCATION/TRAINING PROGRAM

## 2019-03-14 PROCEDURE — 86850 RBC ANTIBODY SCREEN: CPT

## 2019-03-14 PROCEDURE — 63600175 PHARM REV CODE 636 W HCPCS: Performed by: SURGERY

## 2019-03-14 PROCEDURE — 80053 COMPREHEN METABOLIC PANEL: CPT

## 2019-03-14 PROCEDURE — 11000001 HC ACUTE MED/SURG PRIVATE ROOM

## 2019-03-14 PROCEDURE — A4216 STERILE WATER/SALINE, 10 ML: HCPCS | Performed by: STUDENT IN AN ORGANIZED HEALTH CARE EDUCATION/TRAINING PROGRAM

## 2019-03-14 PROCEDURE — 94761 N-INVAS EAR/PLS OXIMETRY MLT: CPT

## 2019-03-14 PROCEDURE — 84134 ASSAY OF PREALBUMIN: CPT

## 2019-03-14 RX ORDER — HYDROCODONE BITARTRATE AND ACETAMINOPHEN 500; 5 MG/1; MG/1
TABLET ORAL
Status: DISCONTINUED | OUTPATIENT
Start: 2019-03-14 | End: 2019-03-21 | Stop reason: HOSPADM

## 2019-03-14 RX ORDER — FUROSEMIDE 10 MG/ML
40 INJECTION INTRAMUSCULAR; INTRAVENOUS ONCE
Status: COMPLETED | OUTPATIENT
Start: 2019-03-14 | End: 2019-03-14

## 2019-03-14 RX ADMIN — OXYCODONE HYDROCHLORIDE 10 MG: 5 TABLET ORAL at 04:03

## 2019-03-14 RX ADMIN — GABAPENTIN 300 MG: 300 CAPSULE ORAL at 09:03

## 2019-03-14 RX ADMIN — ACETAMINOPHEN 650 MG: 325 TABLET, FILM COATED ORAL at 01:03

## 2019-03-14 RX ADMIN — Medication 5 UNITS: at 11:03

## 2019-03-14 RX ADMIN — GABAPENTIN 300 MG: 300 CAPSULE ORAL at 10:03

## 2019-03-14 RX ADMIN — OCTREOTIDE ACETATE 100 MCG: 100 INJECTION, SOLUTION INTRAVENOUS; SUBCUTANEOUS at 09:03

## 2019-03-14 RX ADMIN — LEVETIRACETAM 500 MG: 5 INJECTION INTRAVENOUS at 09:03

## 2019-03-14 RX ADMIN — BISMUTH SUBSALICYLATE 30 ML: 262 LIQUID ORAL at 09:03

## 2019-03-14 RX ADMIN — Medication 10 ML: at 05:03

## 2019-03-14 RX ADMIN — PIPERACILLIN AND TAZOBACTAM 4.5 G: 4; .5 INJECTION, POWDER, LYOPHILIZED, FOR SOLUTION INTRAVENOUS; PARENTERAL at 09:03

## 2019-03-14 RX ADMIN — ACETAMINOPHEN 650 MG: 325 TABLET, FILM COATED ORAL at 12:03

## 2019-03-14 RX ADMIN — PANTOPRAZOLE SODIUM 40 MG: 40 INJECTION, POWDER, LYOPHILIZED, FOR SOLUTION INTRAVENOUS at 10:03

## 2019-03-14 RX ADMIN — ACETAMINOPHEN 650 MG: 325 TABLET, FILM COATED ORAL at 06:03

## 2019-03-14 RX ADMIN — PIPERACILLIN AND TAZOBACTAM 4.5 G: 4; .5 INJECTION, POWDER, LYOPHILIZED, FOR SOLUTION INTRAVENOUS; PARENTERAL at 12:03

## 2019-03-14 RX ADMIN — OCTREOTIDE ACETATE 100 MCG: 100 INJECTION, SOLUTION INTRAVENOUS; SUBCUTANEOUS at 06:03

## 2019-03-14 RX ADMIN — GABAPENTIN 300 MG: 300 CAPSULE ORAL at 03:03

## 2019-03-14 RX ADMIN — FUROSEMIDE 40 MG: 10 INJECTION, SOLUTION INTRAMUSCULAR; INTRAVENOUS at 09:03

## 2019-03-14 RX ADMIN — METRONIDAZOLE 500 MG: 500 INJECTION, SOLUTION INTRAVENOUS at 05:03

## 2019-03-14 RX ADMIN — SOYBEAN OIL 250 ML: 20 INJECTION, SOLUTION INTRAVENOUS at 11:03

## 2019-03-14 RX ADMIN — OXYCODONE HYDROCHLORIDE 10 MG: 5 TABLET ORAL at 06:03

## 2019-03-14 RX ADMIN — KETOROLAC TROMETHAMINE 15 MG: 30 INJECTION, SOLUTION INTRAMUSCULAR; INTRAVENOUS at 01:03

## 2019-03-14 RX ADMIN — IOHEXOL 15 ML: 300 INJECTION, SOLUTION INTRAVENOUS at 09:03

## 2019-03-14 RX ADMIN — PIPERACILLIN AND TAZOBACTAM 4.5 G: 4; .5 INJECTION, POWDER, LYOPHILIZED, FOR SOLUTION INTRAVENOUS; PARENTERAL at 04:03

## 2019-03-14 RX ADMIN — KETOROLAC TROMETHAMINE 15 MG: 30 INJECTION, SOLUTION INTRAMUSCULAR; INTRAVENOUS at 05:03

## 2019-03-14 RX ADMIN — KETOROLAC TROMETHAMINE 15 MG: 30 INJECTION, SOLUTION INTRAMUSCULAR; INTRAVENOUS at 06:03

## 2019-03-14 RX ADMIN — Medication 10 ML: at 01:03

## 2019-03-14 RX ADMIN — ESCITALOPRAM OXALATE 10 MG: 10 TABLET ORAL at 10:03

## 2019-03-14 RX ADMIN — LEVETIRACETAM 500 MG: 5 INJECTION INTRAVENOUS at 10:03

## 2019-03-14 RX ADMIN — SODIUM PHOSPHATE, MONOBASIC, MONOHYDRATE: 276; 142 INJECTION, SOLUTION INTRAVENOUS at 10:03

## 2019-03-14 RX ADMIN — MEGESTROL ACETATE 40 MG: 20 TABLET ORAL at 10:03

## 2019-03-14 RX ADMIN — OCTREOTIDE ACETATE 100 MCG: 100 INJECTION, SOLUTION INTRAVENOUS; SUBCUTANEOUS at 01:03

## 2019-03-14 RX ADMIN — METRONIDAZOLE 500 MG: 500 INJECTION, SOLUTION INTRAVENOUS at 01:03

## 2019-03-14 RX ADMIN — FLUCONAZOLE, SODIUM CHLORIDE 200 MG: 2 INJECTION INTRAVENOUS at 08:03

## 2019-03-14 RX ADMIN — IOHEXOL 100 ML: 350 INJECTION, SOLUTION INTRAVENOUS at 09:03

## 2019-03-14 RX ADMIN — ASPIRIN 81 MG: 81 TABLET, COATED ORAL at 10:03

## 2019-03-14 RX ADMIN — NYSTATIN 500000 UNITS: 500000 SUSPENSION ORAL at 10:03

## 2019-03-14 RX ADMIN — ENOXAPARIN SODIUM 40 MG: 100 INJECTION SUBCUTANEOUS at 06:03

## 2019-03-14 RX ADMIN — METRONIDAZOLE 500 MG: 500 INJECTION, SOLUTION INTRAVENOUS at 10:03

## 2019-03-14 NOTE — PROGRESS NOTES
[facility]  General Surgery  Progress Note    Subjective:     Interval History: no acute events overnight. Continues to have same abdominal pain, minimal PO intake/appetite. Working with PT    Post-Op Info:  Procedure(s) (LRB):  LAPAROSCOPY, DIAGNOSTIC (N/A)  LAPAROTOMY, EXPLORATORY   10 Days Post-Op      Medications:  Continuous Infusions:   TPN ADULT CENTRAL LINE CUSTOM 100 mL/hr at 03/13/19 2217     Scheduled Meds:   acetaminophen  650 mg Oral Q6H    albumin human 5%  12.5 g Intravenous Once    aspirin  81 mg Oral Daily    bismuth subsalicylate  30 mL Oral QID    enoxaparin  40 mg Subcutaneous Daily    escitalopram oxalate  10 mg Oral Daily    fat emulsion  250 mL Intravenous Once    fluconazole  200 mg Intravenous Q24H    fluticasone-vilanterol  1 puff Inhalation Daily    furosemide  40 mg Intravenous Once    gabapentin  300 mg Oral TID    ketorolac  15 mg Intravenous Q6H    levetiracetam IVPB  500 mg Intravenous Q12H    megestrol  40 mg Oral Daily    metronidazole  500 mg Intravenous Q8H    nystatin  500,000 Units Mouth/Throat Daily    octreotide  100 mcg Subcutaneous Q8H    pantoprazole  40 mg Intravenous Daily    piperacillin-tazobactam (ZOSYN) IVPB  4.5 g Intravenous Q8H    sodium chloride 0.9%  10 mL Intravenous Q6H     PRN Meds:sodium chloride, albuterol, dextrose 50%, glucagon (human recombinant), insulin aspart U-100, naloxone, ondansetron, ondansetron, oxyCODONE, Flushing PICC Protocol **AND** sodium chloride 0.9% **AND** sodium chloride 0.9%, sodium chloride 0.9%     Objective:     Vital Signs (Most Recent):  Temp: 97.9 °F (36.6 °C) (03/14/19 0359)  Pulse: 70 (03/14/19 0359)  Resp: 18 (03/14/19 0359)  BP: 122/65 (03/14/19 0359)  SpO2: 96 % (03/14/19 0359) Vital Signs (24h Range):  Temp:  [97.9 °F (36.6 °C)-99 °F (37.2 °C)] 97.9 °F (36.6 °C)  Pulse:  [64-73] 70  Resp:  [18-20] 18  SpO2:  [92 %-96 %] 96 %  BP: (122-140)/(65-70) 122/65       Intake/Output Summary  (Last 24 hours) at 3/14/2019 0833  Last data filed at 3/14/2019 0600  Gross per 24 hour   Intake 2050.53 ml   Output 140 ml   Net 1910.53 ml   Ostomy 100  Drain 40    Physical Exam  Awake, alert  RRR  No increased work of breathing  Abd soft, midline intact with staples in place, serous drainage from inferior aspect, ostomy pink, productive of thickened stool. Pelvic IR drain with thinner brown output. RUQ drain with scant serous output      Significant Labs:  CBC:   Recent Labs   Lab 03/14/19  0431   WBC 14.99*   RBC 2.86*   HGB 8.2*   HCT 25.0*   *   MCV 87   MCH 28.7   MCHC 32.8     CMP:   Recent Labs   Lab 03/14/19  0430   GLU 82   CALCIUM 8.9   ALBUMIN 1.4*   PROT 5.3*      K 4.4   CO2 33*      BUN 26*   CREATININE 0.8   ALKPHOS 112   ALT 6*   AST 12   BILITOT 0.3       Significant Diagnostics:  None    Assessment/Plan:   68M s/p recent lap LAR for rectal cancer on 2/22 presenting with increasing abdominal pain, CT with intraperitoneal free air in the postoperative setting. Evening of 3/1 patient febrile, tachycardic and subsequently transferred to the ICU. Hemodynamics normalized overnight on 3/1 and he has remained HDS without lactic acidosis or metabolic durrangements. Repeat CT scan with readministration of free air. NG placed and PICC line obtained. Taken to the OR on 3/4 for ex lap and diverting loop ostomy. Returned to the ICU in stable condition, stepped down to the floor.      Neuro:   -scheduled tylenol, oxycodone 10, scheduled toradol  -home escitalopram  -toradol q6  -hx of seizures- Keppra IV     CV:   -Hold home ACEI and statin     PULM: NC  -IS hourly while awake  -Home inhalers      FEN/GI: s/p LAR on 2/22, washout and loop ostomy 3/4, ostomy functioning, ostomy RN following  -Diabetic diet, calorie count  -octreotide and kaopectate to minimize ostomy output; will decrease likely  -Continue TPN  -Prealbumin 11      :   -Strict I/Os     Heme/ID: Leukocytosis 15, Platelets  uptrending.  -blood cultures NG final  -continue Zosyn, fluconazole and flagyl for intraabdominal infection   -CT with abscesses, s/p IR drainage of pelvic and perihepatic collections. Will repeat CT scan today  -Flush drain BID, perihepatic drain removed     Endo: CBG 161-193  -ISS, accuchecks  -decreased dextrose in TPN     MSK/Wounds:   -ostomy teaching  -PT/OT     PPX: Lovenox and Protonix (home med)      Active Diagnoses:    Diagnosis Date Noted POA    Hypertension [I10] 03/02/2019 Unknown    Mixed hyperlipidemia [E78.2] 03/02/2019 Yes    Abdominal pain [R10.9] 02/26/2019 Yes    Carcinoma of rectum [C20] 11/12/2018 Yes      Problems Resolved During this Admission:    Diagnosis Date Noted Date Resolved POA    PRINCIPAL PROBLEM:  Other chest pain [R07.89] 03/02/2019 03/08/2019 Yes         Coby Harp MD  General Surgery  [facility]

## 2019-03-14 NOTE — NURSING
Patient remains free from falls and injury. No distress noted. VSS. Questions encouraged and answered. Patient verbalized understanding. Will continue to monitor, will continue with plan of care.

## 2019-03-14 NOTE — PLAN OF CARE
Problem: Adult Inpatient Plan of Care  Goal: Plan of Care Review  Outcome: Ongoing (interventions implemented as appropriate)   03/14/19 8605   Plan of Care Review   Plan of Care Reviewed With patient   Progress improving       Assumed care. AAOx3. IVF, ABX, and TPN all infusing as ordered.  Strict I&O. Drain output is significantly less on the right side. Midline dressing is intact and has dry drainage noted Pt remains free from falls and skin integrity was maintained throughout shift.  Patient tolerating TPN well, blood glucose being monitored, no need for insulin coverage this shift. Pt is incontinent and had to be changed throughout the night. Stoma is pink and intact. Ostomy output is thin, dark green. Accordion drains are intact with sutures. Patient only had one administration of pain meds this AM. JOYCE care well. Slept good last night. Will continue to monitor.

## 2019-03-14 NOTE — PT/OT/SLP PROGRESS
Physical Therapy Treatment     Patient Name:  Justin Adams   MRN:  [id number]     Recommendations:      Discharge Recommendations:  nursing facility, skilled   Discharge Equipment Recommendations: (ongoing assessment)   Barriers to discharge: Decreased caregiver support and pt with decreased mobility      Assessment:      Justin Adams is a 68 y.o. male admitted with a medical diagnosis of Other chest pain.  He presents with the following impairments/functional limitations:  weakness, impaired endurance, impaired self care skills, impaired functional mobilty, gait instability, impaired balance, decreased lower extremity function, decreased upper extremity function, decreased safety awareness, pain, decreased ROM, edema, impaired cardiopulmonary response to activity .Pt required encouragement and education to participate in therapy activity. pt required extra time to complete tasks .  Pt ambulated ~ 80 ft with rollator , min A/ CGA  ( 2L O2NC and chair followed) . Pt will benefit from further skilled therapy in order to get back to PLOF.         Rehab Prognosis: Good; patient would benefit from acute skilled PT services to address these deficits and reach maximum level of function.    Recent Surgery: Procedure(s) (LRB):  LAPAROSCOPY, DIAGNOSTIC (N/A)  LAPAROTOMY, EXPLORATORY 9 Days Post-Op     Plan:      During this hospitalization, patient to be seen (6 /wk per PT ) to address the identified rehab impairments via gait training, therapeutic activities, therapeutic exercises and progress toward the following goals:     § Plan of Care Expires:  03/21/19     Subjective      Chief Complaint: Abdominal pain and weakness.   Patient/Family Comments/goals: to get back to PLOF   Pain/Comfort:  · Pain Rating : pt c/o pain however unable to rate   · Location 1: abdomen  · Pain Addressed 1: Pre-medicate for activity, Nurse notified  · Pain Rating Post-Intervention 1: 8/10        Objective:      Communicated with  Nurse Ness   prior to session.  Patient found all lines intact, call button in reach, bed alarm on and nurse notified bed alarm, telemetry, oxygen, PICC line, colostomy, GREG drain  upon PT entry to room.      General Precautions: Standard, fall, diabetic, respiratory   Orthopedic Precautions:N/A   Braces: N/A      Functional Mobility: pt found supine in bed with PCT present. Pt found soiled in bed with urine however refused to roll/turn in bed to get clean up and diaper change 2* pain.   · Bed Mobility:     · Rolling Left:  contact guard assistance  · Scooting: contact guard assistance  · Supine to Sit: moderate assistance and maximal assistance, HOB elevated   · Transfers:     · Sit to Stand: 3 trials from bed minimum assistance with rollator . VC's for safety technique and sequence.   · Gait:  Pt ambulated ~ 80 ft with rollator , min A ( 2L O2NC and chair followed) . Pt with demonstarting a reciprocal  Gait pattern  with decreased shyam, decreased velocity of limb motion, decreased step length, increased stride width and decreased swing-to-stance ratio. Impairments contributing to gait deviations include impaired balance, decreased flexibility, pain, decreased ROM and decreased strength. V/T cues for safety technique and sequence. Pt O2 SATS decreased = 87% on 2L with ambulation , 3 L O2 SATS : 91% . VC's for pursed lip breathing technique.          AM-PAC 6 CLICK MOBILITY  Turning over in bed (including adjusting bedclothes, sheets and blankets)?: 3  Sitting down on and standing up from a chair with arms (e.g., wheelchair, bedside commode, etc.): 3  Moving from lying on back to sitting on the side of the bed?: 2  Moving to and from a bed to a chair (including a wheelchair)?: 3  Need to walk in hospital room?: 3  Climbing 3-5 steps with a railing?: 2  Basic Mobility Total Score: 16                    Therapeutic Activities and Exercises:   pt performed bed mobility , transfer and gait training as above.  Pt  tolerated standing balance with Rollator , CGA for pericare done and don/doff diaper.   Educated  pt on the benefit of calling nursing staff for diaper change when urinate or having BM to prevent skins break down. Pt needs reinforcements .    Patient left up HOB elevated , B heels offload   with all lines intact, call button in reach, nurse Epifanio notified ..     GOALS:       Multidisciplinary Problems           Physical Therapy Goals                 Problem: Physical Therapy Goal      Goal Priority Disciplines Outcome Goal Variances Interventions   Physical Therapy Goal      PT, PT/OT Ongoing (interventions implemented as appropriate)       Description:  Goals to be met by: 3/21/2019      Patient will increase functional independence with mobility by performin. Supine to sit with Stand-by Assistance  2. Sit to stand transfer with Stand-by Assistance  3. Gait  x 100 feet with Stand-by Assistance using rollator.   4. Lower extremity exercise program x10 reps per handout, with supervision                           Time Tracking:      PT Received On: 19  PT Start Time: 1425    PT Stop Time: 1522   PT Total Time (min): 57 min      Billable Minutes: Gait Training 13, Therapeutic Activity 10 and Total Time 57 co-treat with OT      Treatment Type: Treatment  PT/PTA: PTA     PTA Visit Number: 4      Phuong Byrnes, PTA  2019

## 2019-03-14 NOTE — UM SECONDARY REVIEW
[facility]    IP Extended Stay > 10    LOS: approved w/o recommendations due to severity of clinical picture   15 day LOS Continued stay approved by Dr. Sol, due to IV Abx, TPN and overall condition.

## 2019-03-14 NOTE — PT/OT/SLP PROGRESS
Occupational Therapy   Treatment    Name: Justin Adams  MRN: [id number]  Admitting Diagnosis:  Other chest pain  10 Days Post-Op    Recommendations:     Discharge Recommendations: nursing facility, skilled  Discharge Equipment Recommendations:  other (see comments)(TBD)  Barriers to discharge:       Assessment:     Justin Adams is a 68 y.o. male with a medical diagnosis of Other chest pain.  He presents with the following performance deficits affecting function: weakness, impaired endurance, impaired self care skills, impaired functional mobilty, gait instability, impaired balance, decreased coordination, decreased upper extremity function, decreased lower extremity function, decreased safety awareness, pain, decreased ROM, impaired skin, edema, impaired cardiopulmonary response to activity. Patient continues to require education , encouragement, and increased time to perform all therapy tasks.     Rehab Prognosis:  Fair+; patient would benefit from acute skilled OT services to address these deficits and reach maximum level of function.       Plan:     Patient to be seen 3 x/week to address the above listed problems via self-care/home management, therapeutic activities, therapeutic exercises  · Plan of Care Expires: 03/28/19  · Plan of Care Reviewed with: patient(sister)    Subjective   Patient agreeable to therapy.   Pain/Comfort:  · Pain Rating 1: (did not rate)  · Location 1: abdomen  · Pain Addressed 1: Pre-medicate for activity    Objective:     Communicated with: Nurse [place] prior to session.  Patient found supine with bed alarm, telemetry, oxygen, PICC line, GREG drain, colostomy upon OT entry to room.    General Precautions: Standard, fall, diabetic, respiratory   Orthopedic Precautions:N/A   Braces: N/A     Occupational Performance:     Bed Mobility:    · Patient completed Rolling/Turning to Left with  contact guard assistance  · Patient completed Scooting/Bridging with contact guard  assistance  · Patient completed Supine to Sit with moderate assistance and maximal assistance  · Patient completed Sit to Supine with moderate assistance     Functional Mobility/Transfers:  · Patient completed Sit <> Stand Transfer with minimum assistance  with  rollator x 3 from EOB, x 2 from chair  · Patient completed Bed <> Chair Transfer using Step Transfer technique with  minimum assistance with rollator  · Functional Mobility: Patient able to ambulate in room and [place] with PT Min A/rolaltor. Patient with decreased safety awareness, SOB, increased fatigue and weakness with OOB mobility. Patient SPO2 on 2 L O2 NC with ambulation ~87% , able to recover to 91% 3 L O2 with increased time and max cues for pursed lip breathing technique.    Activities of Daily Living:  · Upper Body Dressing: moderate assistance    · Lower Body Dressing: total assistance to don/doff socks (x3)  · Toileting: Patient continues to have urinary incontinence with 2 bladder accidents during session requiring total assistance for pericare and clothing management. Patient able to maintain standing balance with CGA/rollator for brief and linen change (2 trials). Patient continues to be educated on the need and benefit of calling for nursing assistance when saturated or soiled. Patient refuses to roll for pericare per PCT. Patient needs reinforcement.       [facility] 6 Click ADL: 11    Treatment & Education:  Patient performed bed mobility, functional transfers, and ADL's as above.   Patient educated on safety awareness with all OOB mobility.  Patient performed BUE ROM therex x 10 reps elbow and hand between rest.     Patient left HOB elevated with all lines intact, call button in reach, bed alarm on and nurse notifiedEducation:   and present    GOALS:   Multidisciplinary Problems     Occupational Therapy Goals        Problem: Occupational Therapy Goal    Goal Priority Disciplines Outcome Interventions   Occupational Therapy Goal     OT,  PT/OT Ongoing (interventions implemented as appropriate)    Description:  Goals to be met by: 3/28/2019     Patient will increase functional independence with ADLs by performing:    Feeding with Modified [place].  UE Dressing with [place].  LE Dressing with Contact Guard Assistance.  Grooming while standing at sink with Contact Guard Assistance.  Toileting from bedside commode with Stand-by Assistance for hygiene and clothing management.   Sitting at edge of bed x15 minutes with Stand-by Assistance.  Rolling to Bilateral with Stand-by Assistance.   Supine to sit with Stand-by Assistance.  Step transfer with Contact Guard Assistance  Toilet transfer to toilet with Contact Guard Assistance.  Upper extremity exercise program with assistance as needed.                      Time Tracking:     OT Date of Treatment: 03/14/19  OT Start Time: 1425  OT Stop Time: 1522  OT Total Time (min): 57 min    Billable Minutes:Self Care/Home Management 23 (co-tx with PTA)  CINDY Hurst  3/14/2019

## 2019-03-14 NOTE — PLAN OF CARE
Problem: Occupational Therapy Goal  Goal: Occupational Therapy Goal  Goals to be met by: 3/28/2019     Patient will increase functional independence with ADLs by performing:    Feeding with Modified [place].  UE Dressing with [place].  LE Dressing with Contact Guard Assistance.  Grooming while standing at sink with Contact Guard Assistance.  Toileting from bedside commode with Stand-by Assistance for hygiene and clothing management.   Sitting at edge of bed x15 minutes with Stand-by Assistance.  Rolling to Bilateral with Stand-by Assistance.   Supine to sit with Stand-by Assistance.  Step transfer with Contact Guard Assistance  Toilet transfer to toilet with Contact Guard Assistance.  Upper extremity exercise program with assistance as needed.     Outcome: Ongoing (interventions implemented as appropriate)  Patient will benefit from continued OT to address functional deficits.

## 2019-03-14 NOTE — PLAN OF CARE
03/14/19 1737   Discharge Reassessment   Assessment Type Discharge Planning Reassessment   Provided patient/caregiver education on the expected discharge date and the discharge plan No   Do you have any problems affording any of your prescribed medications? No   Discharge Plan A Skilled Nursing Facility   Discharge Plan B Long-term acute care facility (LTAC)   Anticipated Discharge Disposition SNF   Can the patient answer the patient profile reliably? Yes, cognitively intact   How does the patient rate their overall health at the present time? Fair   How often would a person be available to care for the patient? Occasionally   Number of comorbid conditions (as recorded on the chart) Five or more   During the past month, has the patient often been bothered by feeling down, depressed or hopeless? No   During the past month, has the patient often been bothered by little interest or pleasure in doing things? No   Post-Acute Status   Post-Acute Authorization Placement   Post-Acute Placement Status Pending Payor Medical Review  (will need humana authorization and medical clearance)   Discharge Delays (!) Other  (TPN, med clearance)   Tentatively accepted at [facility]. Dr. Glass says possible discharge on Monday.  Need humana authorization.  Awaiting 142..Cee Miller RN, BSN, STN CCM  3/14/2019

## 2019-03-15 LAB
ALBUMIN SERPL BCP-MCNC: 1.4 G/DL
ALP SERPL-CCNC: 117 U/L
ALT SERPL W/O P-5'-P-CCNC: 7 U/L
ANION GAP SERPL CALC-SCNC: 6 MMOL/L
AST SERPL-CCNC: 16 U/L
BACTERIA SPEC AEROBE CULT: NO GROWTH
BACTERIA SPEC ANAEROBE CULT: NORMAL
BASOPHILS # BLD AUTO: 0.03 K/UL
BASOPHILS NFR BLD: 0.2 %
BILIRUB SERPL-MCNC: 0.4 MG/DL
BUN SERPL-MCNC: 28 MG/DL
CALCIUM SERPL-MCNC: 8.7 MG/DL
CHLORIDE SERPL-SCNC: 101 MMOL/L
CO2 SERPL-SCNC: 33 MMOL/L
CREAT SERPL-MCNC: 0.8 MG/DL
DIFFERENTIAL METHOD: ABNORMAL
EOSINOPHIL # BLD AUTO: 0.5 K/UL
EOSINOPHIL NFR BLD: 3.6 %
ERYTHROCYTE [DISTWIDTH] IN BLOOD BY AUTOMATED COUNT: 16.9 %
EST. GFR  (AFRICAN AMERICAN): >60 ML/MIN/1.73 M^2
EST. GFR  (NON AFRICAN AMERICAN): >60 ML/MIN/1.73 M^2
GLUCOSE SERPL-MCNC: 82 MG/DL
HCT VFR BLD AUTO: 28.3 %
HGB BLD-MCNC: 9.5 G/DL
LYMPHOCYTES # BLD AUTO: 1.1 K/UL
LYMPHOCYTES NFR BLD: 7.4 %
MAGNESIUM SERPL-MCNC: 1.9 MG/DL
MCH RBC QN AUTO: 29.5 PG
MCHC RBC AUTO-ENTMCNC: 33.6 G/DL
MCV RBC AUTO: 88 FL
MONOCYTES # BLD AUTO: 0.6 K/UL
MONOCYTES NFR BLD: 3.7 %
NEUTROPHILS # BLD AUTO: 12.9 K/UL
NEUTROPHILS NFR BLD: 85.1 %
PHOSPHATE SERPL-MCNC: 4.6 MG/DL
PLATELET # BLD AUTO: 887 K/UL
PMV BLD AUTO: 9.4 FL
POCT GLUCOSE: 105 MG/DL (ref 70–110)
POCT GLUCOSE: 109 MG/DL (ref 70–110)
POCT GLUCOSE: 112 MG/DL (ref 70–110)
POCT GLUCOSE: 118 MG/DL (ref 70–110)
POTASSIUM SERPL-SCNC: 4.5 MMOL/L
PROT SERPL-MCNC: 5.3 G/DL
RBC # BLD AUTO: 3.22 M/UL
SODIUM SERPL-SCNC: 140 MMOL/L
WBC # BLD AUTO: 15.21 K/UL

## 2019-03-15 PROCEDURE — 94761 N-INVAS EAR/PLS OXIMETRY MLT: CPT

## 2019-03-15 PROCEDURE — 83735 ASSAY OF MAGNESIUM: CPT

## 2019-03-15 PROCEDURE — 84100 ASSAY OF PHOSPHORUS: CPT

## 2019-03-15 PROCEDURE — S0030 INJECTION, METRONIDAZOLE: HCPCS | Performed by: SURGERY

## 2019-03-15 PROCEDURE — 11000001 HC ACUTE MED/SURG PRIVATE ROOM

## 2019-03-15 PROCEDURE — 85025 COMPLETE CBC W/AUTO DIFF WBC: CPT

## 2019-03-15 PROCEDURE — 63600175 PHARM REV CODE 636 W HCPCS: Performed by: SURGERY

## 2019-03-15 PROCEDURE — 80053 COMPREHEN METABOLIC PANEL: CPT

## 2019-03-15 PROCEDURE — 63600175 PHARM REV CODE 636 W HCPCS: Performed by: STUDENT IN AN ORGANIZED HEALTH CARE EDUCATION/TRAINING PROGRAM

## 2019-03-15 PROCEDURE — 97110 THERAPEUTIC EXERCISES: CPT

## 2019-03-15 PROCEDURE — 25000003 PHARM REV CODE 250: Performed by: SURGERY

## 2019-03-15 PROCEDURE — 25000003 PHARM REV CODE 250: Performed by: STUDENT IN AN ORGANIZED HEALTH CARE EDUCATION/TRAINING PROGRAM

## 2019-03-15 PROCEDURE — A4216 STERILE WATER/SALINE, 10 ML: HCPCS | Performed by: STUDENT IN AN ORGANIZED HEALTH CARE EDUCATION/TRAINING PROGRAM

## 2019-03-15 PROCEDURE — 99900035 HC TECH TIME PER 15 MIN (STAT)

## 2019-03-15 PROCEDURE — C9113 INJ PANTOPRAZOLE SODIUM, VIA: HCPCS | Performed by: STUDENT IN AN ORGANIZED HEALTH CARE EDUCATION/TRAINING PROGRAM

## 2019-03-15 PROCEDURE — 27000221 HC OXYGEN, UP TO 24 HOURS

## 2019-03-15 RX ADMIN — ASPIRIN 81 MG: 81 TABLET, COATED ORAL at 10:03

## 2019-03-15 RX ADMIN — LEVETIRACETAM 500 MG: 5 INJECTION INTRAVENOUS at 10:03

## 2019-03-15 RX ADMIN — ACETAMINOPHEN 650 MG: 325 TABLET, FILM COATED ORAL at 11:03

## 2019-03-15 RX ADMIN — METRONIDAZOLE 500 MG: 500 INJECTION, SOLUTION INTRAVENOUS at 08:03

## 2019-03-15 RX ADMIN — Medication 10 ML: at 01:03

## 2019-03-15 RX ADMIN — METRONIDAZOLE 500 MG: 500 INJECTION, SOLUTION INTRAVENOUS at 01:03

## 2019-03-15 RX ADMIN — BISMUTH SUBSALICYLATE 30 ML: 262 LIQUID ORAL at 09:03

## 2019-03-15 RX ADMIN — Medication 10 ML: at 12:03

## 2019-03-15 RX ADMIN — KETOROLAC TROMETHAMINE 15 MG: 30 INJECTION, SOLUTION INTRAMUSCULAR; INTRAVENOUS at 01:03

## 2019-03-15 RX ADMIN — KETOROLAC TROMETHAMINE 15 MG: 30 INJECTION, SOLUTION INTRAMUSCULAR; INTRAVENOUS at 05:03

## 2019-03-15 RX ADMIN — PIPERACILLIN AND TAZOBACTAM 4.5 G: 4; .5 INJECTION, POWDER, LYOPHILIZED, FOR SOLUTION INTRAVENOUS; PARENTERAL at 12:03

## 2019-03-15 RX ADMIN — KETOROLAC TROMETHAMINE 15 MG: 30 INJECTION, SOLUTION INTRAMUSCULAR; INTRAVENOUS at 12:03

## 2019-03-15 RX ADMIN — KETOROLAC TROMETHAMINE 15 MG: 30 INJECTION, SOLUTION INTRAMUSCULAR; INTRAVENOUS at 11:03

## 2019-03-15 RX ADMIN — OXYCODONE HYDROCHLORIDE 10 MG: 5 TABLET ORAL at 10:03

## 2019-03-15 RX ADMIN — PIPERACILLIN AND TAZOBACTAM 4.5 G: 4; .5 INJECTION, POWDER, LYOPHILIZED, FOR SOLUTION INTRAVENOUS; PARENTERAL at 11:03

## 2019-03-15 RX ADMIN — Medication 10 ML: at 06:03

## 2019-03-15 RX ADMIN — PIPERACILLIN AND TAZOBACTAM 4.5 G: 4; .5 INJECTION, POWDER, LYOPHILIZED, FOR SOLUTION INTRAVENOUS; PARENTERAL at 05:03

## 2019-03-15 RX ADMIN — PANTOPRAZOLE SODIUM 40 MG: 40 INJECTION, POWDER, LYOPHILIZED, FOR SOLUTION INTRAVENOUS at 10:03

## 2019-03-15 RX ADMIN — OXYCODONE HYDROCHLORIDE 10 MG: 5 TABLET ORAL at 05:03

## 2019-03-15 RX ADMIN — ACETAMINOPHEN 650 MG: 325 TABLET, FILM COATED ORAL at 01:03

## 2019-03-15 RX ADMIN — ACETAMINOPHEN 650 MG: 325 TABLET, FILM COATED ORAL at 05:03

## 2019-03-15 RX ADMIN — MEGESTROL ACETATE 40 MG: 20 TABLET ORAL at 10:03

## 2019-03-15 RX ADMIN — ENOXAPARIN SODIUM 40 MG: 100 INJECTION SUBCUTANEOUS at 05:03

## 2019-03-15 RX ADMIN — GABAPENTIN 300 MG: 300 CAPSULE ORAL at 05:03

## 2019-03-15 RX ADMIN — NYSTATIN 500000 UNITS: 500000 SUSPENSION ORAL at 10:03

## 2019-03-15 RX ADMIN — LEVETIRACETAM 500 MG: 5 INJECTION INTRAVENOUS at 09:03

## 2019-03-15 RX ADMIN — ESCITALOPRAM OXALATE 10 MG: 10 TABLET ORAL at 10:03

## 2019-03-15 RX ADMIN — METRONIDAZOLE 500 MG: 500 INJECTION, SOLUTION INTRAVENOUS at 04:03

## 2019-03-15 RX ADMIN — GABAPENTIN 300 MG: 300 CAPSULE ORAL at 10:03

## 2019-03-15 RX ADMIN — PIPERACILLIN AND TAZOBACTAM 4.5 G: 4; .5 INJECTION, POWDER, LYOPHILIZED, FOR SOLUTION INTRAVENOUS; PARENTERAL at 10:03

## 2019-03-15 RX ADMIN — OCTREOTIDE ACETATE 100 MCG: 100 INJECTION, SOLUTION INTRAVENOUS; SUBCUTANEOUS at 01:03

## 2019-03-15 RX ADMIN — OCTREOTIDE ACETATE 100 MCG: 100 INJECTION, SOLUTION INTRAVENOUS; SUBCUTANEOUS at 11:03

## 2019-03-15 RX ADMIN — FLUCONAZOLE, SODIUM CHLORIDE 200 MG: 2 INJECTION INTRAVENOUS at 08:03

## 2019-03-15 NOTE — PROGRESS NOTES
[facility]  General Surgery  Progress Note    Subjective:     Interval History: feeling pretty well, denies pain. Tolerating diet    Post-Op Info:  Procedure(s) (LRB):  LAPAROSCOPY, DIAGNOSTIC (N/A)  LAPAROTOMY, EXPLORATORY   11 Days Post-Op      Medications:  Continuous Infusions:   TPN ADULT CENTRAL LINE CUSTOM 40 mL/hr at 03/15/19 1245     Scheduled Meds:   acetaminophen  650 mg Oral Q6H    albumin human 5%  12.5 g Intravenous Once    aspirin  81 mg Oral Daily    bismuth subsalicylate  30 mL Oral QID    enoxaparin  40 mg Subcutaneous Daily    escitalopram oxalate  10 mg Oral Daily    fluconazole  200 mg Intravenous Q24H    fluticasone-vilanterol  1 puff Inhalation Daily    gabapentin  300 mg Oral TID    ketorolac  15 mg Intravenous Q6H    levetiracetam IVPB  500 mg Intravenous Q12H    megestrol  40 mg Oral Daily    metronidazole  500 mg Intravenous Q8H    nystatin  500,000 Units Mouth/Throat Daily    octreotide  100 mcg Subcutaneous Q8H    pantoprazole  40 mg Intravenous Daily    piperacillin-tazobactam (ZOSYN) IVPB  4.5 g Intravenous Q8H    sodium chloride 0.9%  10 mL Intravenous Q6H     PRN Meds:sodium chloride, albuterol, dextrose 50%, glucagon (human recombinant), insulin aspart U-100, naloxone, ondansetron, ondansetron, oxyCODONE, Flushing PICC Protocol **AND** sodium chloride 0.9% **AND** sodium chloride 0.9%, sodium chloride 0.9%     Objective:     Vital Signs (Most Recent):  Temp: 98.5 °F (36.9 °C) (03/15/19 1138)  Pulse: 73 (03/15/19 1138)  Resp: 16 (03/15/19 1138)  BP: 126/65 (03/15/19 1138)  SpO2: (!) 93 % (03/15/19 1138) Vital Signs (24h Range):  Temp:  [98 °F (36.7 °C)-99.2 °F (37.3 °C)] 98.5 °F (36.9 °C)  Pulse:  [] 73  Resp:  [16-21] 16  SpO2:  [92 %-98 %] 93 %  BP: (117-134)/(59-70) 126/65       Intake/Output Summary (Last 24 hours) at 3/15/2019 1516  Last data filed at 3/15/2019 0700  Gross per 24 hour   Intake 1033.33 ml   Output 665 ml   Net 368.33 ml        Physical Exam  Awake, alert  RRR  No increased work of breathing  Abd soft, midline intact with staples in place, serous drainage from inferior aspect, ostomy pink, productive of liquid stool. Pelvic IR drain with thinner  output.     Significant Labs:  BMP:   Recent Labs   Lab 03/15/19  0554   GLU 82      K 4.5      CO2 33*   BUN 28*   CREATININE 0.8   CALCIUM 8.7   MG 1.9     CBC:   Recent Labs   Lab 03/15/19  0554   WBC 15.21*   RBC 3.22*   HGB 9.5*   HCT 28.3*   *   MCV 88   MCH 29.5   MCHC 33.6       Significant Diagnostics:  None    Assessment/Plan:   68M s/p recent lap LAR for rectal cancer on 2/22 presenting with increasing abdominal pain, CT with intraperitoneal free air in the postoperative setting. Evening of 3/1 patient febrile, tachycardic and subsequently transferred to the ICU. Hemodynamics normalized overnight on 3/1 and he has remained HDS without lactic acidosis or metabolic durrangements. Repeat CT scan with readministration of free air. NG placed and PICC line obtained. Taken to the OR on 3/4 for ex lap and diverting loop ostomy. Returned to the ICU in stable condition, stepped down to the floor.      Neuro:   -scheduled tylenol, oxycodone 10, scheduled toradol  -home escitalopram  -toradol q6  -hx of seizures- Keppra IV     CV:   -Hold home ACEI and statin     PULM: NC  -IS hourly while awake  -Home inhalers      FEN/GI: s/p LAR on 2/22, washout and loop ostomy 3/4, ostomy functioning, ostomy RN following  -Diabetic diet, calorie count  -octreotide and kaopectate to minimize ostomy output; will decrease likely  -Wean off TPN  -Prealbumin 11. Repeat nutrition labs Monday     :   -Strict I/Os     Heme/ID: Leukocytosis 15  -blood cultures NG final  -continue Zosyn, fluconazole and flagyl for intraabdominal infection   -CT with abscesses, s/p IR drainage of pelvic and perihepatic collections.  -Flush drain BID     Endo: CBG 109-137  -ISS, accuchecks  -decreased dextrose in  TPN     MSK/Wounds:   -ostomy teaching  -PT/OT     PPX: Lovenox and Protonix (home med)    Dispo: dc to post acute care facility in process, for Monday, likely      Active Diagnoses:    Diagnosis Date Noted POA    Hypertension [I10] 03/02/2019 Unknown    Mixed hyperlipidemia [E78.2] 03/02/2019 Yes    Abdominal pain [R10.9] 02/26/2019 Yes    Carcinoma of rectum [C20] 11/12/2018 Yes      Problems Resolved During this Admission:    Diagnosis Date Noted Date Resolved POA    PRINCIPAL PROBLEM:  Other chest pain [R07.89] 03/02/2019 03/08/2019 Yes         Coby Harp MD  General Surgery  [facility]

## 2019-03-15 NOTE — PROGRESS NOTES
"[facility]  Adult Nutrition  Progress Note    SUMMARY       Recommendations    1. Boost pudding with Lunch/dinner   2. Honor preferences as able   3. Wean TPN with po improvement   4. Noted kcal count ordered on 3/13- placed in room today, informed nsg.  5. RD to monitor    Goals: Meet > 85% EEN daily  Nutrition Goal Status: new  Communication of RD Recs: reviewed with RN    Reason for Assessment    Reason For Assessment: RD follow-up  Diagnosis: surgery/postoperative complications  Relevant Medical History: HTN, cardiomegaly, rectal ca s/p LAR 19     General Information Comments: Pt tolerating optisource supplements (3xday). Pt continues with poor intake of meals. Attempted to obtain preferences; pt somewhat willing. Discussed need to improve intake of solid food.. TPN support weaning. Pt willing to try boost pudding bid. NFPE on 3/5.     Nutrition Discharge Planning: Unable to determine @ this time    Nutrition Risk Screen    Nutrition Risk Screen: no indicators present    Nutrition/Diet History    Spiritual, Cultural Beliefs, Sikh Practices, Values that Affect Care: no  Food Allergies: NKFA  Factors Affecting Nutritional Intake: altered gastrointestinal function    Anthropometrics    Temp: 98.5 °F (36.9 °C)  Height Method: Stated  Height: 5' 4" (162.6 cm)  Height (inches): 64 in  Weight Method: Bed Scale  Weight: 102.3 kg (225 lb 8.5 oz)  Weight (lb): 225.53 lb  Ideal Body Weight (IBW), Male: 130 lb  % Ideal Body Weight, Male (lb): 171.96 lb  BMI (Calculated): 38.5  BMI Grade: 35 - 39.9 - obesity - grade II  Usual Body Weight (UBW), k kg  % Usual Body Weight: 103.69  % Weight Change From Usual Weight: 3.47 %       Lab/Procedures/Meds    Pertinent Labs Reviewed: reviewed  Pertinent Labs Comments: Phos 4.6; alb 1.4  Pertinent Medications Reviewed: reviewed  Pertinent Medications Comments: abx, megestrol    Estimated/Assessed Needs    Weight Used For Calorie Calculations: 101.4 kg " (223 lb 8.7 oz)  Energy Calorie Requirements (kcal): 2119  Energy Need Method: [place]-St Jeor(x 1.25 (PAL))  Protein Requirements: 101-122 g (1-1.2 g/kg)  Weight Used For Protein Calculations: 101.4 kg (223 lb 8.7 oz)     Estimated Fluid Requirement Method: RDA Method  RDA Method (mL): 2119    Nutrition Prescription Ordered    Current Diet Order: NPO  Current Nutrition Support Formula Ordered: Clinimix E 5/15  Current Nutrition Support Rate Ordered: 40 (ml)  Current Nutrition Support Frequency Ordered: mL/hr x 24 hrs daily    Evaluation of Received Nutrient/Fluid Intake    Parenteral Calories (kcal): 682  Parenteral Protein (gm): 48  Parenteral Fluid (mL): 980  Lipid Calories (kcals): 500 kcals  GIR (Glucose Infusion Rate) (mg/kg/min): 1 mg/kg/min  Total Calories (kcal): 2204  % Kcal Needs: 50%  % Protein Needs: 50%  Energy Calories Required: meeting needs  Protein Required: meeting needs  Fluid Required: meeting needs  Comments: 550 ml out via stool; 115 ml out via drains  Tolerance: (tolerating supplements tid)  % Intake of Estimated Energy Needs: 75 - 100 %  % Meal Intake: 0 - 25 % supplements tid    Nutrition Risk    Level of Risk/Frequency of Follow-up: (F/u 2 x weekly)     Assessment and Plan    Nutrition Problem  Altered GI Function     Related to (etiology):   Post-op sx complications     Signs and Symptoms (as evidenced by):   NPO w/ need for gut rest/PN     Interventions:  Collaboration w/ other providers     Nutrition Diagnosis Status:   Improving            Monitor and Evaluation    Food and Nutrient Intake: energy intake, parenteral nutrition intake  Food and Nutrient Adminstration: diet order, enteral and parenteral nutrition administration  Physical Activity and Function: nutrition-related ADLs and IADLs  Anthropometric Measurements: weight change, weight  Biochemical Data, Medical Tests and Procedures: electrolyte and renal panel, glucose/endocrine profile, inflammatory profile, lipid  profile  Nutrition-Focused Physical Findings: overall appearance     Malnutrition Assessment             Energy Intake (Malnutrition): less than or equal to 50% for greater than or equal to 5 days               Subcutaneous Fat Loss (Final Summary): well nourished  Muscle Loss Evaluation (Final Summary): well nourished         Nutrition Follow-Up    RD Follow-up?: Yes

## 2019-03-15 NOTE — PLAN OF CARE
Problem: Adult Inpatient Plan of Care  Goal: Plan of Care Review  Outcome: Ongoing (interventions implemented as appropriate)   03/15/19 0724   Plan of Care Review   Plan of Care Reviewed With patient   Progress improving     Assumed care. AAOx3. IVF, ABX, and TPN all infusing as ordered.  Strict I&O. Drain removed on the right Left output was 60ml end of shift. Midline dressing is intact and dry. Pt remains free from falls and skin integrity was maintained throughout shift.  Patient tolerating TPN well, blood glucose being monitored, no need for insulin coverage this shift. Pt is incontinent and had to be changed throughout the night. Stoma is pink and intact. Ostomy change done 3/15/19 at 2318. Ostomy output is thin, dark green. Patient only had one administration of pain meds this AM. JOYCE care well. Will continue to monitor.

## 2019-03-15 NOTE — PT/OT/SLP PROGRESS
Physical Therapy Treatment    Patient Name:  Justin Adams   MRN:  [id number]    Recommendations:     Discharge Recommendations:  nursing facility, skilled   Discharge Equipment Recommendations: (ongoing assessment)   Barriers to discharge: Decreased caregiver support    Assessment:     Justin Adams is a 68 y.o. male admitted with a medical diagnosis of Other chest pain.  He presents with the following impairments/functional limitations:  weakness, impaired functional mobilty, decreased safety awareness, impaired coordination, impaired cardiopulmonary response to activity, impaired endurance, gait instability, decreased coordination, pain, impaired balance, decreased upper extremity function, impaired self care skills, decreased lower extremity function, edema pt self limiting today.    Rehab Prognosis: Good; patient would benefit from acute skilled PT services to address these deficits and reach maximum level of function.    Recent Surgery: Procedure(s) (LRB):  LAPAROSCOPY, DIAGNOSTIC (N/A)  LAPAROTOMY, EXPLORATORY 11 Days Post-Op    Plan:     During this hospitalization, patient to be seen 6 x/week to address the identified rehab impairments via gait training, therapeutic activities, therapeutic exercises and progress toward the following goals:    · Plan of Care Expires:  03/21/19    Subjective     Chief Complaint: pain, adamantly refusing OOB activity, easily agitated   Patient/Family Comments/goals: none stated  Pain/Comfort:  · Pain Rating 1: (not rated)  · Location 1: abdomen  · Pain Addressed 1: Pre-medicate for activity, Reposition, Distraction, Cessation of Activity      Objective:     Communicated with nsg prior to session.  Patient found all lines intact, call button in reach and bed alarm on bed alarm, telemetry, oxygen, PICC line, colostomy  upon PT entry to room.     General Precautions: Standard, fall, diabetic, respiratory   Orthopedic Precautions:N/A   Braces: N/A     Functional  Mobility:  · Bed Mobility:     · Scooting: contact guard assistance with VC for bridging with bed in trendelenberg using BR's   · Bridging: contact guard assistance and VC  ·   · AMPAC 6 CLICK MOBILITY  Turning over in bed (including adjusting bedclothes, sheets and blankets)?: 3  Sitting down on and standing up from a chair with arms (e.g., wheelchair, bedside commode, etc.): 3  Moving from lying on back to sitting on the side of the bed?: 3  Moving to and from a bed to a chair (including a wheelchair)?: 3  Need to walk in hospital room?: 3  Climbing 3-5 steps with a railing?: 2  Basic Mobility Total Score: 17       Therapeutic Activities and Exercises:   Pt performed B AP's, HS's, TKE's, Hip IR/ER x 10 reps    Patient left HOB elevated with all lines intact, call button in reach, bed alarm on and nsg notified..    GOALS:   Multidisciplinary Problems     Physical Therapy Goals        Problem: Physical Therapy Goal    Goal Priority Disciplines Outcome Goal Variances Interventions   Physical Therapy Goal     PT, PT/OT Ongoing (interventions implemented as appropriate)     Description:  Goals to be met by: 3/21/2019     Patient will increase functional independence with mobility by performin. Supine to sit with Stand-by Assistance  2. Sit to stand transfer with Stand-by Assistance  3. Gait  x 100 feet with Stand-by Assistance using rollator.   4. Lower extremity exercise program x10 reps per handout, with supervision                      Time Tracking:     PT Received On: 03/15/19  PT Start Time: 1147     PT Stop Time: 1205  PT Total Time (min): 18 min     Billable Minutes: Therapeutic Exercise 18    Treatment Type: Treatment  PT/PTA: PT     PTA Visit Number: 0     Rosio Haque, PT  03/15/2019

## 2019-03-15 NOTE — PLAN OF CARE
Problem: Physical Therapy Goal  Goal: Physical Therapy Goal  Goals to be met by: 3/21/2019     Patient will increase functional independence with mobility by performin. Supine to sit with Stand-by Assistance  2. Sit to stand transfer with Stand-by Assistance  3. Gait  x 100 feet with Stand-by Assistance using rollator.   4. Lower extremity exercise program x10 reps per handout, with supervision     Outcome: Ongoing (interventions implemented as appropriate)  Pt self limiting today.  Performed Bed level exercise only.  Continue with POC as able.

## 2019-03-16 LAB
ALBUMIN SERPL BCP-MCNC: 1.5 G/DL
ALP SERPL-CCNC: 154 U/L
ALT SERPL W/O P-5'-P-CCNC: 8 U/L
ANION GAP SERPL CALC-SCNC: 4 MMOL/L
AST SERPL-CCNC: 11 U/L
BASOPHILS # BLD AUTO: 0.06 K/UL
BASOPHILS NFR BLD: 0.4 %
BILIRUB SERPL-MCNC: 0.4 MG/DL
BUN SERPL-MCNC: 26 MG/DL
CALCIUM SERPL-MCNC: 8.9 MG/DL
CHLORIDE SERPL-SCNC: 102 MMOL/L
CO2 SERPL-SCNC: 33 MMOL/L
CREAT SERPL-MCNC: 0.8 MG/DL
DIFFERENTIAL METHOD: ABNORMAL
EOSINOPHIL # BLD AUTO: 0.6 K/UL
EOSINOPHIL NFR BLD: 3.4 %
ERYTHROCYTE [DISTWIDTH] IN BLOOD BY AUTOMATED COUNT: 17.1 %
EST. GFR  (AFRICAN AMERICAN): >60 ML/MIN/1.73 M^2
EST. GFR  (NON AFRICAN AMERICAN): >60 ML/MIN/1.73 M^2
GLUCOSE SERPL-MCNC: 79 MG/DL
HCT VFR BLD AUTO: 28.5 %
HGB BLD-MCNC: 9.4 G/DL
LYMPHOCYTES # BLD AUTO: 0.9 K/UL
LYMPHOCYTES NFR BLD: 5.3 %
MAGNESIUM SERPL-MCNC: 1.6 MG/DL
MCH RBC QN AUTO: 29.2 PG
MCHC RBC AUTO-ENTMCNC: 33 G/DL
MCV RBC AUTO: 89 FL
MONOCYTES # BLD AUTO: 1.5 K/UL
MONOCYTES NFR BLD: 9.4 %
NEUTROPHILS # BLD AUTO: 13.3 K/UL
NEUTROPHILS NFR BLD: 81.5 %
PHOSPHATE SERPL-MCNC: 3.6 MG/DL
PLATELET # BLD AUTO: 962 K/UL
PMV BLD AUTO: 9.5 FL
POCT GLUCOSE: 102 MG/DL (ref 70–110)
POCT GLUCOSE: 116 MG/DL (ref 70–110)
POCT GLUCOSE: 75 MG/DL (ref 70–110)
POCT GLUCOSE: 75 MG/DL (ref 70–110)
POTASSIUM SERPL-SCNC: 4.6 MMOL/L
PROT SERPL-MCNC: 5.6 G/DL
RBC # BLD AUTO: 3.22 M/UL
SODIUM SERPL-SCNC: 139 MMOL/L
TB INDURATION 48 - 72 HR READ: 0 MM
WBC # BLD AUTO: 16.32 K/UL

## 2019-03-16 PROCEDURE — C9113 INJ PANTOPRAZOLE SODIUM, VIA: HCPCS | Performed by: STUDENT IN AN ORGANIZED HEALTH CARE EDUCATION/TRAINING PROGRAM

## 2019-03-16 PROCEDURE — 11000001 HC ACUTE MED/SURG PRIVATE ROOM

## 2019-03-16 PROCEDURE — 25000003 PHARM REV CODE 250: Performed by: STUDENT IN AN ORGANIZED HEALTH CARE EDUCATION/TRAINING PROGRAM

## 2019-03-16 PROCEDURE — 25000003 PHARM REV CODE 250: Performed by: SURGERY

## 2019-03-16 PROCEDURE — 84100 ASSAY OF PHOSPHORUS: CPT

## 2019-03-16 PROCEDURE — S0030 INJECTION, METRONIDAZOLE: HCPCS | Performed by: SURGERY

## 2019-03-16 PROCEDURE — A4216 STERILE WATER/SALINE, 10 ML: HCPCS | Performed by: STUDENT IN AN ORGANIZED HEALTH CARE EDUCATION/TRAINING PROGRAM

## 2019-03-16 PROCEDURE — 80053 COMPREHEN METABOLIC PANEL: CPT

## 2019-03-16 PROCEDURE — 63600175 PHARM REV CODE 636 W HCPCS: Performed by: SURGERY

## 2019-03-16 PROCEDURE — 63600175 PHARM REV CODE 636 W HCPCS: Performed by: STUDENT IN AN ORGANIZED HEALTH CARE EDUCATION/TRAINING PROGRAM

## 2019-03-16 PROCEDURE — 85025 COMPLETE CBC W/AUTO DIFF WBC: CPT

## 2019-03-16 PROCEDURE — 27000221 HC OXYGEN, UP TO 24 HOURS

## 2019-03-16 PROCEDURE — 83735 ASSAY OF MAGNESIUM: CPT

## 2019-03-16 PROCEDURE — 94761 N-INVAS EAR/PLS OXIMETRY MLT: CPT

## 2019-03-16 RX ADMIN — METRONIDAZOLE 500 MG: 500 INJECTION, SOLUTION INTRAVENOUS at 10:03

## 2019-03-16 RX ADMIN — PIPERACILLIN AND TAZOBACTAM 4.5 G: 4; .5 INJECTION, POWDER, LYOPHILIZED, FOR SOLUTION INTRAVENOUS; PARENTERAL at 08:03

## 2019-03-16 RX ADMIN — Medication 10 ML: at 12:03

## 2019-03-16 RX ADMIN — METRONIDAZOLE 500 MG: 500 INJECTION, SOLUTION INTRAVENOUS at 12:03

## 2019-03-16 RX ADMIN — OCTREOTIDE ACETATE 100 MCG: 100 INJECTION, SOLUTION INTRAVENOUS; SUBCUTANEOUS at 06:03

## 2019-03-16 RX ADMIN — METRONIDAZOLE 500 MG: 500 INJECTION, SOLUTION INTRAVENOUS at 04:03

## 2019-03-16 RX ADMIN — OXYCODONE HYDROCHLORIDE 10 MG: 5 TABLET ORAL at 12:03

## 2019-03-16 RX ADMIN — NYSTATIN 500000 UNITS: 500000 SUSPENSION ORAL at 08:03

## 2019-03-16 RX ADMIN — ENOXAPARIN SODIUM 40 MG: 100 INJECTION SUBCUTANEOUS at 05:03

## 2019-03-16 RX ADMIN — GABAPENTIN 300 MG: 300 CAPSULE ORAL at 09:03

## 2019-03-16 RX ADMIN — LEVETIRACETAM 500 MG: 5 INJECTION INTRAVENOUS at 09:03

## 2019-03-16 RX ADMIN — MEGESTROL ACETATE 40 MG: 20 TABLET ORAL at 08:03

## 2019-03-16 RX ADMIN — OCTREOTIDE ACETATE 100 MCG: 100 INJECTION, SOLUTION INTRAVENOUS; SUBCUTANEOUS at 09:03

## 2019-03-16 RX ADMIN — ACETAMINOPHEN 650 MG: 325 TABLET, FILM COATED ORAL at 06:03

## 2019-03-16 RX ADMIN — ACETAMINOPHEN 650 MG: 325 TABLET, FILM COATED ORAL at 11:03

## 2019-03-16 RX ADMIN — OCTREOTIDE ACETATE 100 MCG: 100 INJECTION, SOLUTION INTRAVENOUS; SUBCUTANEOUS at 03:03

## 2019-03-16 RX ADMIN — PANTOPRAZOLE SODIUM 40 MG: 40 INJECTION, POWDER, LYOPHILIZED, FOR SOLUTION INTRAVENOUS at 08:03

## 2019-03-16 RX ADMIN — PIPERACILLIN AND TAZOBACTAM 4.5 G: 4; .5 INJECTION, POWDER, LYOPHILIZED, FOR SOLUTION INTRAVENOUS; PARENTERAL at 11:03

## 2019-03-16 RX ADMIN — GABAPENTIN 300 MG: 300 CAPSULE ORAL at 03:03

## 2019-03-16 RX ADMIN — OXYCODONE HYDROCHLORIDE 10 MG: 5 TABLET ORAL at 08:03

## 2019-03-16 RX ADMIN — PIPERACILLIN AND TAZOBACTAM 4.5 G: 4; .5 INJECTION, POWDER, LYOPHILIZED, FOR SOLUTION INTRAVENOUS; PARENTERAL at 03:03

## 2019-03-16 RX ADMIN — LEVETIRACETAM 500 MG: 5 INJECTION INTRAVENOUS at 08:03

## 2019-03-16 RX ADMIN — Medication 10 ML: at 06:03

## 2019-03-16 RX ADMIN — GABAPENTIN 300 MG: 300 CAPSULE ORAL at 08:03

## 2019-03-16 RX ADMIN — ACETAMINOPHEN 650 MG: 325 TABLET, FILM COATED ORAL at 12:03

## 2019-03-16 RX ADMIN — ASPIRIN 81 MG: 81 TABLET, COATED ORAL at 08:03

## 2019-03-16 RX ADMIN — Medication 10 ML: at 05:03

## 2019-03-16 RX ADMIN — KETOROLAC TROMETHAMINE 15 MG: 30 INJECTION, SOLUTION INTRAMUSCULAR; INTRAVENOUS at 05:03

## 2019-03-16 RX ADMIN — ACETAMINOPHEN 650 MG: 325 TABLET, FILM COATED ORAL at 05:03

## 2019-03-16 RX ADMIN — ESCITALOPRAM OXALATE 10 MG: 10 TABLET ORAL at 08:03

## 2019-03-16 RX ADMIN — FLUCONAZOLE, SODIUM CHLORIDE 200 MG: 2 INJECTION INTRAVENOUS at 09:03

## 2019-03-16 NOTE — NURSING
Pt refuses to be turned every 2 hrs stating it hurts to turn even if medicated. Encourage pt to use IS every 4hrs.

## 2019-03-16 NOTE — NURSING
Pt aaox3 free of falls or injuries. Refuses to be turned. incontinence x2. Midline abdominal incision C/D/I. Ileostomy putting out dark green stool, flatulence heard while emptying ostomy bag. Pain med given twice. No insulin given during am shift. Accordian drain to LLQ flushed with 10ml of NS once, total output during am shift 40ml. Calorie count maintained.Pt has decrease appetite ate about 25% of all his meals. POCT skin test negative, no redness or induration noted. .. Oxygen titrated down to 1lpm NC with sats @ 93%. Call light w/i reach, bed in lowest position. Bed alarm on

## 2019-03-16 NOTE — PROGRESS NOTES
[facility]  General Surgery  Progress Note    Subjective:     Interval History:   TPN weaned  Calorie count started  Denies nausea or vomiting  Minimal ambulation     Post-Op Info:  Procedure(s) (LRB):  LAPAROSCOPY, DIAGNOSTIC (N/A)  LAPAROTOMY, EXPLORATORY   12 Days Post-Op      Medications:  Continuous Infusions:    Scheduled Meds:   acetaminophen  650 mg Oral Q6H    albumin human 5%  12.5 g Intravenous Once    aspirin  81 mg Oral Daily    bismuth subsalicylate  30 mL Oral QID    enoxaparin  40 mg Subcutaneous Daily    escitalopram oxalate  10 mg Oral Daily    fluconazole  200 mg Intravenous Q24H    fluticasone-vilanterol  1 puff Inhalation Daily    gabapentin  300 mg Oral TID    levetiracetam IVPB  500 mg Intravenous Q12H    megestrol  40 mg Oral Daily    metronidazole  500 mg Intravenous Q8H    nystatin  500,000 Units Mouth/Throat Daily    octreotide  100 mcg Subcutaneous Q8H    pantoprazole  40 mg Intravenous Daily    piperacillin-tazobactam (ZOSYN) IVPB  4.5 g Intravenous Q8H    sodium chloride 0.9%  10 mL Intravenous Q6H     PRN Meds:sodium chloride, albuterol, dextrose 50%, glucagon (human recombinant), insulin aspart U-100, naloxone, ondansetron, ondansetron, oxyCODONE, Flushing PICC Protocol **AND** sodium chloride 0.9% **AND** sodium chloride 0.9%, sodium chloride 0.9%     Objective:     Vital Signs (Most Recent):  Temp: 98.2 °F (36.8 °C) (03/16/19 0811)  Pulse: 73 (03/16/19 0811)  Resp: 18 (03/16/19 0811)  BP: (!) 146/70 (03/16/19 0811)  SpO2: 97 % (03/16/19 0841) Vital Signs (24h Range):  Temp:  [98.1 °F (36.7 °C)-98.5 °F (36.9 °C)] 98.2 °F (36.8 °C)  Pulse:  [66-73] 73  Resp:  [16-18] 18  SpO2:  [93 %-97 %] 97 %  BP: (122-146)/(61-71) 146/70       Intake/Output Summary (Last 24 hours) at 3/16/2019 1131  Last data filed at 3/16/2019 0826  Gross per 24 hour   Intake 1220 ml   Output 280 ml   Net 940 ml       Physical Exam  Awake, alert  RRR  No increased work of  breathing  Abd soft, midline intact with staples in place, serous drainage from inferior aspect, ostomy pink, productive of liquid stool. Pelvic IR drain with thin output.     Significant Labs:  BMP:   Recent Labs   Lab 03/16/19  0539   GLU 79      K 4.6      CO2 33*   BUN 26*   CREATININE 0.8   CALCIUM 8.9   MG 1.6     CBC:   Recent Labs   Lab 03/16/19  0539   WBC 16.32*   RBC 3.22*   HGB 9.4*   HCT 28.5*   *   MCV 89   MCH 29.2   MCHC 33.0       Significant Diagnostics:  None    Assessment/Plan:   68M s/p recent lap LAR for rectal cancer on 2/22 presenting with increasing abdominal pain, CT with intraperitoneal free air in the postoperative setting. Evening of 3/1 patient febrile, tachycardic and subsequently transferred to the ICU. Hemodynamics normalized overnight on 3/1 and he has remained HDS without lactic acidosis or metabolic durrangements. Repeat CT scan with readministration of free air. NG placed and PICC line obtained. Taken to the OR on 3/4 for ex lap and diverting loop ostomy. Returned to the ICU in stable condition, stepped down to the floor. s/p IR drainage of pelvic and perihepatic collections. TPN weaned on 3/15 and calorie count started      -scheduled tylenol, oxycodone 10, s/p toradol   -home escitalopram, inhalers and Keppra   -Hold home ACEI and statin.   -octreotide and kaopectate  -Wean off TPN, calorie count   -continue Zosyn, fluconazole and flagyl for intraabdominal infection, Flush drain BID   -ostomy teaching  -PT/OT     PPX: Lovenox and Protonix (home med)  Dispo: dc to post acute care facility in process,      Active Diagnoses:    Diagnosis Date Noted POA    Hypertension [I10] 03/02/2019 Unknown    Mixed hyperlipidemia [E78.2] 03/02/2019 Yes    Abdominal pain [R10.9] 02/26/2019 Yes    Carcinoma of rectum [C20] 11/12/2018 Yes      Problems Resolved During this Admission:    Diagnosis Date Noted Date Resolved POA    PRINCIPAL PROBLEM:  Other chest pain [R07.89]  03/02/2019 03/08/2019 Yes         Crow Randall MD  General Surgery  [facility]

## 2019-03-16 NOTE — PT/OT/SLP PROGRESS
Physical Therapy      Patient Name:  Justin Adams   MRN:  [id number]    Patient not seen today secondary to Patient unwilling to participate after multiple attempts to initiate PT treatment. Will follow-up when able.    Mukund Benavides, KELLY

## 2019-03-16 NOTE — PLAN OF CARE
Problem: Adult Inpatient Plan of Care  Goal: Optimal Comfort and Wellbeing  Outcome: Ongoing (interventions implemented as appropriate)  Monitored. No change in status. Refuses to turn.

## 2019-03-17 LAB
ALBUMIN SERPL BCP-MCNC: 1.5 G/DL
ALP SERPL-CCNC: 180 U/L
ALT SERPL W/O P-5'-P-CCNC: 7 U/L
ANION GAP SERPL CALC-SCNC: 7 MMOL/L
AST SERPL-CCNC: 22 U/L
BASOPHILS # BLD AUTO: 0.04 K/UL
BASOPHILS NFR BLD: 0.2 %
BILIRUB SERPL-MCNC: 0.5 MG/DL
BILIRUB SERPL-MCNC: NORMAL MG/DL
BLOOD URINE, POC: NORMAL
BUN SERPL-MCNC: 22 MG/DL
CALCIUM SERPL-MCNC: 9.1 MG/DL
CHLORIDE SERPL-SCNC: 103 MMOL/L
CO2 SERPL-SCNC: 30 MMOL/L
COLOR, POC UA: NORMAL
CREAT SERPL-MCNC: 0.8 MG/DL
DIFFERENTIAL METHOD: ABNORMAL
EOSINOPHIL # BLD AUTO: 0.3 K/UL
EOSINOPHIL NFR BLD: 1.6 %
ERYTHROCYTE [DISTWIDTH] IN BLOOD BY AUTOMATED COUNT: 17.4 %
EST. GFR  (AFRICAN AMERICAN): >60 ML/MIN/1.73 M^2
EST. GFR  (NON AFRICAN AMERICAN): >60 ML/MIN/1.73 M^2
GIANT PLATELETS BLD QL SMEAR: PRESENT
GLUCOSE SERPL-MCNC: 66 MG/DL
GLUCOSE UR QL STRIP: NORMAL
HCT VFR BLD AUTO: 29 %
HGB BLD-MCNC: 9.3 G/DL
KETONES UR QL STRIP: NORMAL
LEUKOCYTE ESTERASE URINE, POC: NORMAL
LYMPHOCYTES # BLD AUTO: 1 K/UL
LYMPHOCYTES NFR BLD: 5.9 %
MAGNESIUM SERPL-MCNC: 1.9 MG/DL
MCH RBC QN AUTO: 28.4 PG
MCHC RBC AUTO-ENTMCNC: 32.1 G/DL
MCV RBC AUTO: 88 FL
MONOCYTES # BLD AUTO: 1.5 K/UL
MONOCYTES NFR BLD: 8.9 %
NEUTROPHILS # BLD AUTO: 13.7 K/UL
NEUTROPHILS NFR BLD: 83.4 %
NITRITE, POC UA: NORMAL
PH, POC UA: NORMAL
PHOSPHATE SERPL-MCNC: 3.5 MG/DL
PLATELET # BLD AUTO: 944 K/UL
PLATELET BLD QL SMEAR: ABNORMAL
PMV BLD AUTO: 9.6 FL
POCT GLUCOSE: 114 MG/DL (ref 70–110)
POCT GLUCOSE: 137 MG/DL (ref 70–110)
POCT GLUCOSE: 82 MG/DL (ref 70–110)
POCT GLUCOSE: 88 MG/DL (ref 70–110)
POTASSIUM SERPL-SCNC: 4.7 MMOL/L
PROT SERPL-MCNC: 5.9 G/DL
PROTEIN, POC: NORMAL
RBC # BLD AUTO: 3.28 M/UL
SODIUM SERPL-SCNC: 140 MMOL/L
SPECIFIC GRAVITY, POC UA: NORMAL
TOXIC GRANULES BLD QL SMEAR: PRESENT
UROBILINOGEN, POC UA: NORMAL
WBC # BLD AUTO: 16.41 K/UL
WBC TOXIC VACUOLES BLD QL SMEAR: PRESENT

## 2019-03-17 PROCEDURE — 80053 COMPREHEN METABOLIC PANEL: CPT

## 2019-03-17 PROCEDURE — S0030 INJECTION, METRONIDAZOLE: HCPCS | Performed by: SURGERY

## 2019-03-17 PROCEDURE — 63600175 PHARM REV CODE 636 W HCPCS: Performed by: STUDENT IN AN ORGANIZED HEALTH CARE EDUCATION/TRAINING PROGRAM

## 2019-03-17 PROCEDURE — 11000001 HC ACUTE MED/SURG PRIVATE ROOM

## 2019-03-17 PROCEDURE — C9113 INJ PANTOPRAZOLE SODIUM, VIA: HCPCS | Performed by: STUDENT IN AN ORGANIZED HEALTH CARE EDUCATION/TRAINING PROGRAM

## 2019-03-17 PROCEDURE — 84100 ASSAY OF PHOSPHORUS: CPT

## 2019-03-17 PROCEDURE — 63600175 PHARM REV CODE 636 W HCPCS: Performed by: SURGERY

## 2019-03-17 PROCEDURE — 25000003 PHARM REV CODE 250: Performed by: STUDENT IN AN ORGANIZED HEALTH CARE EDUCATION/TRAINING PROGRAM

## 2019-03-17 PROCEDURE — A4216 STERILE WATER/SALINE, 10 ML: HCPCS | Performed by: STUDENT IN AN ORGANIZED HEALTH CARE EDUCATION/TRAINING PROGRAM

## 2019-03-17 PROCEDURE — 25000003 PHARM REV CODE 250: Performed by: SURGERY

## 2019-03-17 PROCEDURE — 85025 COMPLETE CBC W/AUTO DIFF WBC: CPT

## 2019-03-17 PROCEDURE — 83735 ASSAY OF MAGNESIUM: CPT

## 2019-03-17 RX ADMIN — ACETAMINOPHEN 650 MG: 325 TABLET, FILM COATED ORAL at 05:03

## 2019-03-17 RX ADMIN — GABAPENTIN 300 MG: 300 CAPSULE ORAL at 08:03

## 2019-03-17 RX ADMIN — BISMUTH SUBSALICYLATE 30 ML: 262 LIQUID ORAL at 08:03

## 2019-03-17 RX ADMIN — OCTREOTIDE ACETATE 100 MCG: 100 INJECTION, SOLUTION INTRAVENOUS; SUBCUTANEOUS at 01:03

## 2019-03-17 RX ADMIN — PANTOPRAZOLE SODIUM 40 MG: 40 INJECTION, POWDER, LYOPHILIZED, FOR SOLUTION INTRAVENOUS at 10:03

## 2019-03-17 RX ADMIN — PIPERACILLIN AND TAZOBACTAM 4.5 G: 4; .5 INJECTION, POWDER, LYOPHILIZED, FOR SOLUTION INTRAVENOUS; PARENTERAL at 04:03

## 2019-03-17 RX ADMIN — GABAPENTIN 300 MG: 300 CAPSULE ORAL at 02:03

## 2019-03-17 RX ADMIN — GABAPENTIN 300 MG: 300 CAPSULE ORAL at 10:03

## 2019-03-17 RX ADMIN — OXYCODONE HYDROCHLORIDE 10 MG: 5 TABLET ORAL at 05:03

## 2019-03-17 RX ADMIN — METRONIDAZOLE 500 MG: 500 INJECTION, SOLUTION INTRAVENOUS at 08:03

## 2019-03-17 RX ADMIN — ESCITALOPRAM OXALATE 10 MG: 10 TABLET ORAL at 10:03

## 2019-03-17 RX ADMIN — Medication 10 ML: at 07:03

## 2019-03-17 RX ADMIN — OXYCODONE HYDROCHLORIDE 10 MG: 5 TABLET ORAL at 02:03

## 2019-03-17 RX ADMIN — Medication 10 ML: at 06:03

## 2019-03-17 RX ADMIN — ENOXAPARIN SODIUM 40 MG: 100 INJECTION SUBCUTANEOUS at 05:03

## 2019-03-17 RX ADMIN — METRONIDAZOLE 500 MG: 500 INJECTION, SOLUTION INTRAVENOUS at 12:03

## 2019-03-17 RX ADMIN — FLUCONAZOLE, SODIUM CHLORIDE 200 MG: 2 INJECTION INTRAVENOUS at 08:03

## 2019-03-17 RX ADMIN — ASPIRIN 81 MG: 81 TABLET, COATED ORAL at 10:03

## 2019-03-17 RX ADMIN — OCTREOTIDE ACETATE 100 MCG: 100 INJECTION, SOLUTION INTRAVENOUS; SUBCUTANEOUS at 08:03

## 2019-03-17 RX ADMIN — ACETAMINOPHEN 650 MG: 325 TABLET, FILM COATED ORAL at 12:03

## 2019-03-17 RX ADMIN — LEVETIRACETAM 500 MG: 5 INJECTION INTRAVENOUS at 10:03

## 2019-03-17 RX ADMIN — Medication 10 ML: at 11:03

## 2019-03-17 RX ADMIN — NYSTATIN 500000 UNITS: 500000 SUSPENSION ORAL at 10:03

## 2019-03-17 RX ADMIN — OCTREOTIDE ACETATE 100 MCG: 100 INJECTION, SOLUTION INTRAVENOUS; SUBCUTANEOUS at 05:03

## 2019-03-17 RX ADMIN — PIPERACILLIN AND TAZOBACTAM 4.5 G: 4; .5 INJECTION, POWDER, LYOPHILIZED, FOR SOLUTION INTRAVENOUS; PARENTERAL at 10:03

## 2019-03-17 RX ADMIN — MEGESTROL ACETATE 40 MG: 20 TABLET ORAL at 10:03

## 2019-03-17 RX ADMIN — LEVETIRACETAM 500 MG: 5 INJECTION INTRAVENOUS at 08:03

## 2019-03-17 RX ADMIN — METRONIDAZOLE 500 MG: 500 INJECTION, SOLUTION INTRAVENOUS at 03:03

## 2019-03-17 NOTE — PROGRESS NOTES
[facility]  General Surgery  Progress Note    Subjective:     Interval History:   Tolerating some PO, poor appetite  Often refusing PT  Denies nausea or vomiting  States pain is controlled     Post-Op Info:  Procedure(s) (LRB):  LAPAROSCOPY, DIAGNOSTIC (N/A)  LAPAROTOMY, EXPLORATORY   13 Days Post-Op      Medications:  Continuous Infusions:    Scheduled Meds:   acetaminophen  650 mg Oral Q6H    albumin human 5%  12.5 g Intravenous Once    aspirin  81 mg Oral Daily    bismuth subsalicylate  30 mL Oral QID    enoxaparin  40 mg Subcutaneous Daily    escitalopram oxalate  10 mg Oral Daily    fluconazole  200 mg Intravenous Q24H    fluticasone-vilanterol  1 puff Inhalation Daily    gabapentin  300 mg Oral TID    levetiracetam IVPB  500 mg Intravenous Q12H    megestrol  40 mg Oral Daily    metronidazole  500 mg Intravenous Q8H    nystatin  500,000 Units Mouth/Throat Daily    octreotide  100 mcg Subcutaneous Q8H    pantoprazole  40 mg Intravenous Daily    piperacillin-tazobactam (ZOSYN) IVPB  4.5 g Intravenous Q8H    sodium chloride 0.9%  10 mL Intravenous Q6H     PRN Meds:sodium chloride, albuterol, dextrose 50%, glucagon (human recombinant), insulin aspart U-100, naloxone, ondansetron, ondansetron, oxyCODONE, Flushing PICC Protocol **AND** sodium chloride 0.9% **AND** sodium chloride 0.9%, sodium chloride 0.9%     Objective:     Vital Signs (Most Recent):  Temp: 98.3 °F (36.8 °C) (03/17/19 0750)  Pulse: 69 (03/17/19 0750)  Resp: 16 (03/17/19 0750)  BP: (!) 140/72 (03/17/19 0750)  SpO2: (!) 94 % (03/17/19 0750) Vital Signs (24h Range):  Temp:  [98.3 °F (36.8 °C)-99.6 °F (37.6 °C)] 98.3 °F (36.8 °C)  Pulse:  [66-79] 69  Resp:  [16-18] 16  SpO2:  [90 %-94 %] 94 %  BP: (131-162)/(72-94) 140/72       Intake/Output Summary (Last 24 hours) at 3/17/2019 1048  Last data filed at 3/17/2019 1016  Gross per 24 hour   Intake 1217 ml   Output 427 ml   Net 790 ml       Physical Exam  Awake, alert,  chronically ill appearing   RRR  No increased work of breathing  Abd soft, midline intact with staples in place, serous drainage from inferior aspect, ostomy pink, productive of liquid stool. Pelvic IR drain with thin output.     Significant Labs:  BMP:   Recent Labs   Lab 03/17/19  0451   GLU 66*      K 4.7      CO2 30*   BUN 22   CREATININE 0.8   CALCIUM 9.1   MG 1.9     CBC:   Recent Labs   Lab 03/17/19  0451   WBC 16.41*   RBC 3.28*   HGB 9.3*   HCT 29.0*   *   MCV 88   MCH 28.4   MCHC 32.1       Significant Diagnostics:  None    Assessment/Plan:   68M s/p recent lap LAR for rectal cancer on 2/22 presenting with increasing abdominal pain, CT with intraperitoneal free air in the postoperative setting. Evening of 3/1 patient febrile, tachycardic and subsequently transferred to the ICU. Hemodynamics normalized overnight on 3/1 and he has remained HDS without lactic acidosis or metabolic durrangements. Repeat CT scan with readministration of free air. NG placed and PICC line obtained. Taken to the OR on 3/4 for ex lap and diverting loop ostomy. Returned to the ICU in stable condition, stepped down to the floor. s/p IR drainage of pelvic and perihepatic collections. TPN weaned on 3/15 and calorie count started      -scheduled tylenol, oxycodone 10, s/p toradol   -home escitalopram, inhalers and Keppra   -Hold home ACEI and statin.   -octreotide and kaopectate  -Continue calorie count   -continue Zosyn, fluconazole and flagyl for intraabdominal infection, Flush drain BID  -ostomy teaching  -PT/OT, reiterated importance      PPX: Lovenox and Protonix (home med)  Dispo: dc to post acute care facility in process,      Active Diagnoses:    Diagnosis Date Noted POA    Hypertension [I10] 03/02/2019 Unknown    Mixed hyperlipidemia [E78.2] 03/02/2019 Yes    Abdominal pain [R10.9] 02/26/2019 Yes    Carcinoma of rectum [C20] 11/12/2018 Yes      Problems Resolved During this Admission:    Diagnosis Date  Noted Date Resolved POA    PRINCIPAL PROBLEM:  Other chest pain [R07.89] 03/02/2019 03/08/2019 Yes         Crow Randall MD  General Surgery  [facility]

## 2019-03-17 NOTE — NURSING
Pt willing to be turn, wedge applied to left side as well as foam dressing to sacrum. No skin breakdown noted. Will cont to encourage pt to turn every 2hrs and use IS

## 2019-03-17 NOTE — PROGRESS NOTES
Rounded on patient this shift offering to turn patient. Patient refused to be turned; educated patient needing to him turning to prevent skin breakdown. Will continue to monitor.

## 2019-03-17 NOTE — NURSING
Pt aaox3 no falls or injuries during shift. Pt accepted to be turn frequently. Foam dressing applied to sacrum, no skin breakdown noted. Ileostomy draining loose greenish output. accordian flushed once with 10cc of ns, output during am shift 20ml. Midline incision C/D/I. BS within normal limits. Incontinence x2. Pain med given once, pt denies pain at the moment. Call light w/i reach, bed in lowest position.

## 2019-03-18 LAB
ALBUMIN SERPL BCP-MCNC: 1.6 G/DL
ALLENS TEST: ABNORMAL
ALP SERPL-CCNC: 181 U/L
ALT SERPL W/O P-5'-P-CCNC: 6 U/L
ANION GAP SERPL CALC-SCNC: 8 MMOL/L
AST SERPL-CCNC: 14 U/L
BASOPHILS # BLD AUTO: 0.05 K/UL
BASOPHILS NFR BLD: 0.3 %
BILIRUB SERPL-MCNC: 0.5 MG/DL
BILIRUB UR QL STRIP: NEGATIVE
BUN SERPL-MCNC: 19 MG/DL
CALCIUM SERPL-MCNC: 9.1 MG/DL
CHLORIDE SERPL-SCNC: 103 MMOL/L
CLARITY UR: CLEAR
CO2 SERPL-SCNC: 30 MMOL/L
COLOR UR: YELLOW
CREAT SERPL-MCNC: 0.8 MG/DL
DELSYS: ABNORMAL
DIFFERENTIAL METHOD: ABNORMAL
EOSINOPHIL # BLD AUTO: 0.3 K/UL
EOSINOPHIL NFR BLD: 2.3 %
ERYTHROCYTE [DISTWIDTH] IN BLOOD BY AUTOMATED COUNT: 17.1 %
EST. GFR  (AFRICAN AMERICAN): >60 ML/MIN/1.73 M^2
EST. GFR  (NON AFRICAN AMERICAN): >60 ML/MIN/1.73 M^2
FLOW: 2
GLUCOSE SERPL-MCNC: 86 MG/DL
GLUCOSE UR QL STRIP: NEGATIVE
HCO3 UR-SCNC: 28.1 MMOL/L (ref 24–28)
HCT VFR BLD AUTO: 30.2 %
HGB BLD-MCNC: 9.6 G/DL
HGB UR QL STRIP: NEGATIVE
KETONES UR QL STRIP: ABNORMAL
LEUKOCYTE ESTERASE UR QL STRIP: NEGATIVE
LYMPHOCYTES # BLD AUTO: 1 K/UL
LYMPHOCYTES NFR BLD: 6.7 %
MAGNESIUM SERPL-MCNC: 1.7 MG/DL
MCH RBC QN AUTO: 28.3 PG
MCHC RBC AUTO-ENTMCNC: 31.8 G/DL
MCV RBC AUTO: 89 FL
MODE: ABNORMAL
MONOCYTES # BLD AUTO: 1.5 K/UL
MONOCYTES NFR BLD: 10.3 %
NEUTROPHILS # BLD AUTO: 11.6 K/UL
NEUTROPHILS NFR BLD: 80.4 %
NITRITE UR QL STRIP: NEGATIVE
PCO2 BLDA: 42.7 MMHG (ref 35–45)
PH SMN: 7.43 [PH] (ref 7.35–7.45)
PH UR STRIP: 7 [PH] (ref 5–8)
PHOSPHATE SERPL-MCNC: 3.3 MG/DL
PLATELET # BLD AUTO: 920 K/UL
PMV BLD AUTO: 9.5 FL
PO2 BLDA: 62 MMHG (ref 80–100)
POC BE: 3 MMOL/L
POC SATURATED O2: 92 % (ref 95–100)
POC TCO2: 29 MMOL/L (ref 23–27)
POCT GLUCOSE: 152 MG/DL (ref 70–110)
POCT GLUCOSE: 82 MG/DL (ref 70–110)
POCT GLUCOSE: 93 MG/DL (ref 70–110)
POCT GLUCOSE: 95 MG/DL (ref 70–110)
POCT GLUCOSE: 95 MG/DL (ref 70–110)
POTASSIUM SERPL-SCNC: 4.5 MMOL/L
PROT SERPL-MCNC: 6.1 G/DL
PROT UR QL STRIP: NEGATIVE
RBC # BLD AUTO: 3.39 M/UL
SAMPLE: ABNORMAL
SITE: ABNORMAL
SODIUM SERPL-SCNC: 141 MMOL/L
SP GR UR STRIP: 1.01 (ref 1–1.03)
SP02: 92
URN SPEC COLLECT METH UR: ABNORMAL
UROBILINOGEN UR STRIP-ACNC: NEGATIVE EU/DL
WBC # BLD AUTO: 14.42 K/UL

## 2019-03-18 PROCEDURE — 87070 CULTURE OTHR SPECIMN AEROBIC: CPT

## 2019-03-18 PROCEDURE — 87205 SMEAR GRAM STAIN: CPT

## 2019-03-18 PROCEDURE — 81003 URINALYSIS AUTO W/O SCOPE: CPT

## 2019-03-18 PROCEDURE — 94640 AIRWAY INHALATION TREATMENT: CPT

## 2019-03-18 PROCEDURE — 84100 ASSAY OF PHOSPHORUS: CPT

## 2019-03-18 PROCEDURE — 99900035 HC TECH TIME PER 15 MIN (STAT)

## 2019-03-18 PROCEDURE — 85025 COMPLETE CBC W/AUTO DIFF WBC: CPT

## 2019-03-18 PROCEDURE — 11000001 HC ACUTE MED/SURG PRIVATE ROOM

## 2019-03-18 PROCEDURE — 25000003 PHARM REV CODE 250: Performed by: STUDENT IN AN ORGANIZED HEALTH CARE EDUCATION/TRAINING PROGRAM

## 2019-03-18 PROCEDURE — 83735 ASSAY OF MAGNESIUM: CPT

## 2019-03-18 PROCEDURE — 27000221 HC OXYGEN, UP TO 24 HOURS

## 2019-03-18 PROCEDURE — S0030 INJECTION, METRONIDAZOLE: HCPCS | Performed by: SURGERY

## 2019-03-18 PROCEDURE — 63600175 PHARM REV CODE 636 W HCPCS: Performed by: SURGERY

## 2019-03-18 PROCEDURE — 82803 BLOOD GASES ANY COMBINATION: CPT

## 2019-03-18 PROCEDURE — 94761 N-INVAS EAR/PLS OXIMETRY MLT: CPT

## 2019-03-18 PROCEDURE — 25000242 PHARM REV CODE 250 ALT 637 W/ HCPCS: Performed by: STUDENT IN AN ORGANIZED HEALTH CARE EDUCATION/TRAINING PROGRAM

## 2019-03-18 PROCEDURE — 36600 WITHDRAWAL OF ARTERIAL BLOOD: CPT

## 2019-03-18 PROCEDURE — 63600175 PHARM REV CODE 636 W HCPCS: Performed by: STUDENT IN AN ORGANIZED HEALTH CARE EDUCATION/TRAINING PROGRAM

## 2019-03-18 PROCEDURE — 36415 COLL VENOUS BLD VENIPUNCTURE: CPT

## 2019-03-18 PROCEDURE — A4216 STERILE WATER/SALINE, 10 ML: HCPCS | Performed by: STUDENT IN AN ORGANIZED HEALTH CARE EDUCATION/TRAINING PROGRAM

## 2019-03-18 PROCEDURE — 80053 COMPREHEN METABOLIC PANEL: CPT

## 2019-03-18 PROCEDURE — 25000003 PHARM REV CODE 250: Performed by: SURGERY

## 2019-03-18 PROCEDURE — C9113 INJ PANTOPRAZOLE SODIUM, VIA: HCPCS | Performed by: STUDENT IN AN ORGANIZED HEALTH CARE EDUCATION/TRAINING PROGRAM

## 2019-03-18 RX ORDER — FUROSEMIDE 10 MG/ML
20 INJECTION INTRAMUSCULAR; INTRAVENOUS ONCE
Status: COMPLETED | OUTPATIENT
Start: 2019-03-18 | End: 2019-03-18

## 2019-03-18 RX ORDER — IPRATROPIUM BROMIDE AND ALBUTEROL SULFATE 2.5; .5 MG/3ML; MG/3ML
3 SOLUTION RESPIRATORY (INHALATION) EVERY 4 HOURS
Status: DISCONTINUED | OUTPATIENT
Start: 2019-03-18 | End: 2019-03-21

## 2019-03-18 RX ADMIN — ENOXAPARIN SODIUM 40 MG: 100 INJECTION SUBCUTANEOUS at 05:03

## 2019-03-18 RX ADMIN — METRONIDAZOLE 500 MG: 500 INJECTION, SOLUTION INTRAVENOUS at 08:03

## 2019-03-18 RX ADMIN — ACETAMINOPHEN 650 MG: 325 TABLET, FILM COATED ORAL at 12:03

## 2019-03-18 RX ADMIN — PIPERACILLIN AND TAZOBACTAM 4.5 G: 4; .5 INJECTION, POWDER, LYOPHILIZED, FOR SOLUTION INTRAVENOUS; PARENTERAL at 11:03

## 2019-03-18 RX ADMIN — ACETAMINOPHEN 650 MG: 325 TABLET, FILM COATED ORAL at 11:03

## 2019-03-18 RX ADMIN — LEVETIRACETAM 500 MG: 5 INJECTION INTRAVENOUS at 09:03

## 2019-03-18 RX ADMIN — ACETAMINOPHEN 650 MG: 325 TABLET, FILM COATED ORAL at 06:03

## 2019-03-18 RX ADMIN — MEGESTROL ACETATE 40 MG: 20 TABLET ORAL at 08:03

## 2019-03-18 RX ADMIN — Medication 10 ML: at 06:03

## 2019-03-18 RX ADMIN — ASPIRIN 81 MG: 81 TABLET, COATED ORAL at 08:03

## 2019-03-18 RX ADMIN — FLUCONAZOLE, SODIUM CHLORIDE 200 MG: 2 INJECTION INTRAVENOUS at 08:03

## 2019-03-18 RX ADMIN — IPRATROPIUM BROMIDE AND ALBUTEROL SULFATE 3 ML: .5; 3 SOLUTION RESPIRATORY (INHALATION) at 11:03

## 2019-03-18 RX ADMIN — IPRATROPIUM BROMIDE AND ALBUTEROL SULFATE 3 ML: .5; 3 SOLUTION RESPIRATORY (INHALATION) at 08:03

## 2019-03-18 RX ADMIN — PIPERACILLIN AND TAZOBACTAM 4.5 G: 4; .5 INJECTION, POWDER, LYOPHILIZED, FOR SOLUTION INTRAVENOUS; PARENTERAL at 12:03

## 2019-03-18 RX ADMIN — FLUTICASONE FUROATE AND VILANTEROL TRIFENATATE 1 PUFF: 100; 25 POWDER RESPIRATORY (INHALATION) at 07:03

## 2019-03-18 RX ADMIN — OCTREOTIDE ACETATE 100 MCG: 100 INJECTION, SOLUTION INTRAVENOUS; SUBCUTANEOUS at 09:03

## 2019-03-18 RX ADMIN — PIPERACILLIN AND TAZOBACTAM 4.5 G: 4; .5 INJECTION, POWDER, LYOPHILIZED, FOR SOLUTION INTRAVENOUS; PARENTERAL at 09:03

## 2019-03-18 RX ADMIN — BISMUTH SUBSALICYLATE 30 ML: 262 LIQUID ORAL at 09:03

## 2019-03-18 RX ADMIN — FUROSEMIDE 20 MG: 10 INJECTION, SOLUTION INTRAMUSCULAR; INTRAVENOUS at 12:03

## 2019-03-18 RX ADMIN — PIPERACILLIN AND TAZOBACTAM 4.5 G: 4; .5 INJECTION, POWDER, LYOPHILIZED, FOR SOLUTION INTRAVENOUS; PARENTERAL at 03:03

## 2019-03-18 RX ADMIN — Medication 10 ML: at 12:03

## 2019-03-18 RX ADMIN — METRONIDAZOLE 500 MG: 500 INJECTION, SOLUTION INTRAVENOUS at 04:03

## 2019-03-18 RX ADMIN — Medication 10 ML: at 05:03

## 2019-03-18 RX ADMIN — ESCITALOPRAM OXALATE 10 MG: 10 TABLET ORAL at 08:03

## 2019-03-18 RX ADMIN — PANTOPRAZOLE SODIUM 40 MG: 40 INJECTION, POWDER, LYOPHILIZED, FOR SOLUTION INTRAVENOUS at 08:03

## 2019-03-18 RX ADMIN — IPRATROPIUM BROMIDE AND ALBUTEROL SULFATE 3 ML: .5; 3 SOLUTION RESPIRATORY (INHALATION) at 03:03

## 2019-03-18 RX ADMIN — METRONIDAZOLE 500 MG: 500 INJECTION, SOLUTION INTRAVENOUS at 12:03

## 2019-03-18 RX ADMIN — OCTREOTIDE ACETATE 100 MCG: 100 INJECTION, SOLUTION INTRAVENOUS; SUBCUTANEOUS at 03:03

## 2019-03-18 RX ADMIN — ACETAMINOPHEN 650 MG: 325 TABLET, FILM COATED ORAL at 05:03

## 2019-03-18 RX ADMIN — OCTREOTIDE ACETATE 100 MCG: 100 INJECTION, SOLUTION INTRAVENOUS; SUBCUTANEOUS at 06:03

## 2019-03-18 NOTE — PLAN OF CARE
Problem: Adult Inpatient Plan of Care  Goal: Plan of Care Review  Outcome: Ongoing (interventions implemented as appropriate)  Patient is alert and oriented to self/place, is still confused about situation and time.  Patient remains afebrile, states pain is moderate during repositioning, scheduled tylenol given.  Patient has poor appetite, encouraged to eat and tray set up provided.  Turned Q2, foam wedge and sundance boots in place.  Stoma patent, draining green liquid.  Accordian drain to LLQ intact, draining small amount of yellow drainage.  Patient on 2L O2 per NC, tolerating well.  Tolerating IV antibiotics well.

## 2019-03-18 NOTE — PLAN OF CARE
03/18/19 1355   Discharge Reassessment   Assessment Type Discharge Planning Reassessment   Provided patient/caregiver education on the expected discharge date and the discharge plan No   Do you have any problems affording any of your prescribed medications? No   Discharge Plan A Skilled Nursing Facility   Discharge Plan B Long-term acute care facility (LTAC)   DME Needed Upon Discharge  (TBD )   Patient choice form signed by patient/caregiver N/A   Anticipated Discharge Disposition SNF   Can the patient answer the patient profile reliably? Yes, cognitively intact   How does the patient rate their overall health at the present time? Fair   Describe the patient's ability to walk at the present time. Does not walk or unable to take any steps at all   How often would a person be available to care for the patient? Occasionally   Post-Acute Status   Post-Acute Authorization Placement  (SNF)   Post-Acute Placement Status Pending Payor Review     Patient tentatively accepted to [facility] for SNF, pending authorization from [facility].

## 2019-03-18 NOTE — NURSING
MD ordered patient to be placed on non-rebreather mask.  NRB mask placed, 02 at 15 L, patient tolerating well.  O2 saturation 95%.

## 2019-03-18 NOTE — NURSING
Patient on telemetry box #[id number], HR 77, NSR.  Patient found to be confused and unable to answer questions appropriately at change of shift.  MD in room, informed of patient's change in mentation overnight.  MD aware, awaiting new orders.

## 2019-03-18 NOTE — PROGRESS NOTES
Pasrr and 142 placed in travel packet in [facility] at nursing station..Cee Miller, RN, BSN, STN [facility]  3/18/2019

## 2019-03-18 NOTE — NURSING
Respiratory therapy called to check on ABG status, therapist is aware and will be getting lab soon.      Urine culture needs to be collected.  Pt incontinent of urine, will place external catheter and obtain sample when available.      BG 95

## 2019-03-18 NOTE — PROGRESS NOTES
[facility]  General Surgery  Progress Note    Subjective:     Interval History: altered overnight and this am. Non focal, but not answering questions appropriately    Post-Op Info:  Procedure(s) (LRB):  LAPAROSCOPY, DIAGNOSTIC (N/A)  LAPAROTOMY, EXPLORATORY   14 Days Post-Op      Medications:  Continuous Infusions:  Scheduled Meds:   acetaminophen  650 mg Oral Q6H    albumin human 5%  12.5 g Intravenous Once    aspirin  81 mg Oral Daily    bismuth subsalicylate  30 mL Oral QID    enoxaparin  40 mg Subcutaneous Daily    escitalopram oxalate  10 mg Oral Daily    fluconazole  200 mg Intravenous Q24H    fluticasone-vilanterol  1 puff Inhalation Daily    levetiracetam IVPB  500 mg Intravenous Q12H    megestrol  40 mg Oral Daily    metronidazole  500 mg Intravenous Q8H    nystatin  500,000 Units Mouth/Throat Daily    octreotide  100 mcg Subcutaneous Q8H    pantoprazole  40 mg Intravenous Daily    piperacillin-tazobactam (ZOSYN) IVPB  4.5 g Intravenous Q8H    sodium chloride 0.9%  10 mL Intravenous Q6H     PRN Meds:sodium chloride, albuterol, dextrose 50%, glucagon (human recombinant), insulin aspart U-100, naloxone, ondansetron, ondansetron, Flushing PICC Protocol **AND** sodium chloride 0.9% **AND** sodium chloride 0.9%, sodium chloride 0.9%     Objective:     Vital Signs (Most Recent):  Temp: 98.6 °F (37 °C) (03/18/19 0337)  Pulse: 78 (03/18/19 0732)  Resp: 18 (03/18/19 0732)  BP: (!) 157/79 (03/18/19 0337)  SpO2: (!) 85 % (03/18/19 0732) Vital Signs (24h Range):  Temp:  [97.9 °F (36.6 °C)-99.4 °F (37.4 °C)] 98.6 °F (37 °C)  Pulse:  [72-83] 78  Resp:  [16-18] 18  SpO2:  [85 %-92 %] 85 %  BP: (146-157)/(72-79) 157/79       Intake/Output Summary (Last 24 hours) at 3/18/2019 0835  Last data filed at 3/18/2019 0600  Gross per 24 hour   Intake 742 ml   Output 745 ml   Net -3 ml       Physical Exam  Altered mental status, not following commands  RRR  No increased work of breathing  Abd soft,  ostomy pink and productive of stool, midline intact with staples    Significant Labs:  BMP:   Recent Labs   Lab 03/18/19  0437   GLU 86      K 4.5      CO2 30*   BUN 19   CREATININE 0.8   CALCIUM 9.1   MG 1.7     CBC:   Recent Labs   Lab 03/18/19  0437   WBC 14.42*   RBC 3.39*   HGB 9.6*   HCT 30.2*   *   MCV 89   MCH 28.3   MCHC 31.8*       Significant Diagnostics:  None    Assessment/Plan:   68M s/p recent lap LAR for rectal cancer on 2/22 presenting with increasing abdominal pain, CT with intraperitoneal free air in the postoperative setting. Evening of 3/1 patient febrile, tachycardic and subsequently transferred to the ICU. Hemodynamics normalized overnight on 3/1 and he has remained HDS without lactic acidosis or metabolic durrangements. Repeat CT scan with readministration of free air. NG placed and PICC line obtained. Taken to the OR on 3/4 for ex lap and diverting loop ostomy. Returned to the ICU in stable condition, stepped down to the floor. s/p IR drainage of pelvic and perihepatic collections. TPN weaned on 3/15 and calorie count started    -Altered mental status this am; stat ABG, accucheck and UA ordered. Dc all narcotics.   -octreotide and kaopectate  -Continue calorie count   -continue Zosyn, fluconazole and flagyl for intraabdominal infection, Flush drain BID  -ostomy teaching  -PT/OT         Active Diagnoses:    Diagnosis Date Noted POA    Hypertension [I10] 03/02/2019 Unknown    Mixed hyperlipidemia [E78.2] 03/02/2019 Yes    Abdominal pain [R10.9] 02/26/2019 Yes    Carcinoma of rectum [C20] 11/12/2018 Yes      Problems Resolved During this Admission:    Diagnosis Date Noted Date Resolved POA    PRINCIPAL PROBLEM:  Other chest pain [R07.89] 03/02/2019 03/08/2019 Yes         Coby Harp MD  General Surgery  [facility]

## 2019-03-19 LAB
ANION GAP SERPL CALC-SCNC: 6 MMOL/L
BASOPHILS # BLD AUTO: 0.03 K/UL
BASOPHILS NFR BLD: 0.2 %
BUN SERPL-MCNC: 14 MG/DL
CALCIUM SERPL-MCNC: 8.9 MG/DL
CHLORIDE SERPL-SCNC: 101 MMOL/L
CO2 SERPL-SCNC: 31 MMOL/L
CREAT SERPL-MCNC: 0.8 MG/DL
DIFFERENTIAL METHOD: ABNORMAL
EOSINOPHIL # BLD AUTO: 0.2 K/UL
EOSINOPHIL NFR BLD: 1.2 %
ERYTHROCYTE [DISTWIDTH] IN BLOOD BY AUTOMATED COUNT: 16.7 %
EST. GFR  (AFRICAN AMERICAN): >60 ML/MIN/1.73 M^2
EST. GFR  (NON AFRICAN AMERICAN): >60 ML/MIN/1.73 M^2
GLUCOSE SERPL-MCNC: 114 MG/DL
HCT VFR BLD AUTO: 28.8 %
HGB BLD-MCNC: 9.3 G/DL
LYMPHOCYTES # BLD AUTO: 0.7 K/UL
LYMPHOCYTES NFR BLD: 4.8 %
MCH RBC QN AUTO: 28.2 PG
MCHC RBC AUTO-ENTMCNC: 32.3 G/DL
MCV RBC AUTO: 87 FL
MONOCYTES # BLD AUTO: 1.7 K/UL
MONOCYTES NFR BLD: 11.1 %
NEUTROPHILS # BLD AUTO: 12.5 K/UL
NEUTROPHILS NFR BLD: 81.7 %
PLATELET # BLD AUTO: 784 K/UL
PMV BLD AUTO: 9 FL
POCT GLUCOSE: 115 MG/DL (ref 70–110)
POCT GLUCOSE: 116 MG/DL (ref 70–110)
POCT GLUCOSE: 125 MG/DL (ref 70–110)
POCT GLUCOSE: 126 MG/DL (ref 70–110)
POTASSIUM SERPL-SCNC: 4 MMOL/L
RBC # BLD AUTO: 3.3 M/UL
SODIUM SERPL-SCNC: 138 MMOL/L
WBC # BLD AUTO: 15.29 K/UL

## 2019-03-19 PROCEDURE — A4216 STERILE WATER/SALINE, 10 ML: HCPCS | Performed by: STUDENT IN AN ORGANIZED HEALTH CARE EDUCATION/TRAINING PROGRAM

## 2019-03-19 PROCEDURE — 63600175 PHARM REV CODE 636 W HCPCS: Performed by: SURGERY

## 2019-03-19 PROCEDURE — 25000003 PHARM REV CODE 250: Performed by: STUDENT IN AN ORGANIZED HEALTH CARE EDUCATION/TRAINING PROGRAM

## 2019-03-19 PROCEDURE — 11000001 HC ACUTE MED/SURG PRIVATE ROOM

## 2019-03-19 PROCEDURE — 80048 BASIC METABOLIC PNL TOTAL CA: CPT

## 2019-03-19 PROCEDURE — 63600175 PHARM REV CODE 636 W HCPCS: Performed by: STUDENT IN AN ORGANIZED HEALTH CARE EDUCATION/TRAINING PROGRAM

## 2019-03-19 PROCEDURE — 97530 THERAPEUTIC ACTIVITIES: CPT

## 2019-03-19 PROCEDURE — C9113 INJ PANTOPRAZOLE SODIUM, VIA: HCPCS | Performed by: STUDENT IN AN ORGANIZED HEALTH CARE EDUCATION/TRAINING PROGRAM

## 2019-03-19 PROCEDURE — 36415 COLL VENOUS BLD VENIPUNCTURE: CPT

## 2019-03-19 PROCEDURE — 97535 SELF CARE MNGMENT TRAINING: CPT

## 2019-03-19 PROCEDURE — 27000221 HC OXYGEN, UP TO 24 HOURS

## 2019-03-19 PROCEDURE — 25000003 PHARM REV CODE 250: Performed by: SURGERY

## 2019-03-19 PROCEDURE — 94640 AIRWAY INHALATION TREATMENT: CPT

## 2019-03-19 PROCEDURE — 85025 COMPLETE CBC W/AUTO DIFF WBC: CPT

## 2019-03-19 PROCEDURE — 25000242 PHARM REV CODE 250 ALT 637 W/ HCPCS: Performed by: STUDENT IN AN ORGANIZED HEALTH CARE EDUCATION/TRAINING PROGRAM

## 2019-03-19 PROCEDURE — S0030 INJECTION, METRONIDAZOLE: HCPCS | Performed by: SURGERY

## 2019-03-19 PROCEDURE — 97116 GAIT TRAINING THERAPY: CPT

## 2019-03-19 PROCEDURE — 25500020 PHARM REV CODE 255: Performed by: SURGERY

## 2019-03-19 PROCEDURE — 94761 N-INVAS EAR/PLS OXIMETRY MLT: CPT

## 2019-03-19 RX ADMIN — BISMUTH SUBSALICYLATE 30 ML: 262 LIQUID ORAL at 09:03

## 2019-03-19 RX ADMIN — ESCITALOPRAM OXALATE 10 MG: 10 TABLET ORAL at 10:03

## 2019-03-19 RX ADMIN — OCTREOTIDE ACETATE 100 MCG: 100 INJECTION, SOLUTION INTRAVENOUS; SUBCUTANEOUS at 09:03

## 2019-03-19 RX ADMIN — ACETAMINOPHEN 650 MG: 325 TABLET, FILM COATED ORAL at 11:03

## 2019-03-19 RX ADMIN — MEGESTROL ACETATE 40 MG: 20 TABLET ORAL at 10:03

## 2019-03-19 RX ADMIN — LEVETIRACETAM 500 MG: 5 INJECTION INTRAVENOUS at 10:03

## 2019-03-19 RX ADMIN — METRONIDAZOLE 500 MG: 500 INJECTION, SOLUTION INTRAVENOUS at 08:03

## 2019-03-19 RX ADMIN — FLUCONAZOLE, SODIUM CHLORIDE 200 MG: 2 INJECTION INTRAVENOUS at 11:03

## 2019-03-19 RX ADMIN — ACETAMINOPHEN 650 MG: 325 TABLET, FILM COATED ORAL at 05:03

## 2019-03-19 RX ADMIN — Medication 10 ML: at 06:03

## 2019-03-19 RX ADMIN — IPRATROPIUM BROMIDE AND ALBUTEROL SULFATE 3 ML: .5; 3 SOLUTION RESPIRATORY (INHALATION) at 08:03

## 2019-03-19 RX ADMIN — ACETAMINOPHEN 650 MG: 325 TABLET, FILM COATED ORAL at 12:03

## 2019-03-19 RX ADMIN — PANTOPRAZOLE SODIUM 40 MG: 40 INJECTION, POWDER, LYOPHILIZED, FOR SOLUTION INTRAVENOUS at 10:03

## 2019-03-19 RX ADMIN — Medication 10 ML: at 05:03

## 2019-03-19 RX ADMIN — IPRATROPIUM BROMIDE AND ALBUTEROL SULFATE 3 ML: .5; 3 SOLUTION RESPIRATORY (INHALATION) at 07:03

## 2019-03-19 RX ADMIN — IOHEXOL 75 ML: 350 INJECTION, SOLUTION INTRAVENOUS at 02:03

## 2019-03-19 RX ADMIN — LEVETIRACETAM 500 MG: 5 INJECTION INTRAVENOUS at 09:03

## 2019-03-19 RX ADMIN — OCTREOTIDE ACETATE 100 MCG: 100 INJECTION, SOLUTION INTRAVENOUS; SUBCUTANEOUS at 06:03

## 2019-03-19 RX ADMIN — IPRATROPIUM BROMIDE AND ALBUTEROL SULFATE 3 ML: .5; 3 SOLUTION RESPIRATORY (INHALATION) at 01:03

## 2019-03-19 RX ADMIN — METRONIDAZOLE 500 MG: 500 INJECTION, SOLUTION INTRAVENOUS at 04:03

## 2019-03-19 RX ADMIN — PIPERACILLIN AND TAZOBACTAM 4.5 G: 4; .5 INJECTION, POWDER, LYOPHILIZED, FOR SOLUTION INTRAVENOUS; PARENTERAL at 11:03

## 2019-03-19 RX ADMIN — PIPERACILLIN AND TAZOBACTAM 4.5 G: 4; .5 INJECTION, POWDER, LYOPHILIZED, FOR SOLUTION INTRAVENOUS; PARENTERAL at 05:03

## 2019-03-19 RX ADMIN — ASPIRIN 81 MG: 81 TABLET, COATED ORAL at 10:03

## 2019-03-19 RX ADMIN — METRONIDAZOLE 500 MG: 500 INJECTION, SOLUTION INTRAVENOUS at 12:03

## 2019-03-19 RX ADMIN — FLUTICASONE FUROATE AND VILANTEROL TRIFENATATE 1 PUFF: 100; 25 POWDER RESPIRATORY (INHALATION) at 07:03

## 2019-03-19 RX ADMIN — ACETAMINOPHEN 650 MG: 325 TABLET, FILM COATED ORAL at 06:03

## 2019-03-19 RX ADMIN — PIPERACILLIN AND TAZOBACTAM 4.5 G: 4; .5 INJECTION, POWDER, LYOPHILIZED, FOR SOLUTION INTRAVENOUS; PARENTERAL at 10:03

## 2019-03-19 RX ADMIN — OCTREOTIDE ACETATE 100 MCG: 100 INJECTION, SOLUTION INTRAVENOUS; SUBCUTANEOUS at 02:03

## 2019-03-19 RX ADMIN — IPRATROPIUM BROMIDE AND ALBUTEROL SULFATE 3 ML: .5; 3 SOLUTION RESPIRATORY (INHALATION) at 03:03

## 2019-03-19 RX ADMIN — IPRATROPIUM BROMIDE AND ALBUTEROL SULFATE 3 ML: .5; 3 SOLUTION RESPIRATORY (INHALATION) at 05:03

## 2019-03-19 RX ADMIN — ENOXAPARIN SODIUM 40 MG: 100 INJECTION SUBCUTANEOUS at 05:03

## 2019-03-19 RX ADMIN — Medication 10 ML: at 12:03

## 2019-03-19 NOTE — NURSING
Patient transported off unit to [facility].  No apparent distress, pain or SOB.  3L per NC on pt for transfer.  Incontinence care given prior to transfer.  Tele monitor notified of transfer.

## 2019-03-19 NOTE — PLAN OF CARE
Problem: Occupational Therapy Goal  Goal: Occupational Therapy Goal  Goals to be met by: 3/28/2019     Patient will increase functional independence with ADLs by performing:    Feeding with Modified [place].  UE Dressing with [place].  LE Dressing with Contact Guard Assistance.  Grooming while standing at sink with Contact Guard Assistance.  Toileting from bedside commode with Stand-by Assistance for hygiene and clothing management.   Sitting at edge of bed x15 minutes with Stand-by Assistance.  Rolling to Bilateral with Stand-by Assistance.   Supine to sit with Stand-by Assistance.  Step transfer with Contact Guard Assistance  Toilet transfer to toilet with Contact Guard Assistance.  Upper extremity exercise program with assistance as needed.     Outcome: Ongoing (interventions implemented as appropriate)   Patient tolerated OOB activity today with increased time to perform all activity. Patient will benefit from continued OT to address functional deficits.

## 2019-03-19 NOTE — NURSING
pts accordion drain is not holding suction. I have checked it as well as the charge nurse. It looks as though it has lost suction on the pt side. We tried to seal the drain at the insertion site with tegaderms but it did not keep suction. Pt was still a little confused this shift but not as much so as last night. Pt will not keep condom catheter on.

## 2019-03-19 NOTE — PLAN OF CARE
Problem: Adult Inpatient Plan of Care  Goal: Plan of Care Review  Outcome: Ongoing (interventions implemented as appropriate)  Patient is alert and oriented, confused about situation.  Patient is afebrile, remains free from falls, states pain is mild during repositioning.  Patient more alert today, appropriate when answering questions, and mobility improving.  Patient able to assist in incontinence care, is feeding self and appetite increasing.  Stoma is round and moist, patent, draining green loose stool.  L accordian drain remains non-functioning, no drainage this shift.  Drain not flushed per MD due to non working status.  Patient  BG monitored, no need for insulin coverage this shift.

## 2019-03-19 NOTE — PROGRESS NOTES
"[facility]  Adult Nutrition  Progress Note    SUMMARY       Recommendations    1. Continue w/ current diet, oral nutr supplements & Megace    2. Calorie count results (~700 cals daily/~40 g protein daily) are likely not accurate as not all of pt's intake of oral nutr supplements was recorded; consider discontinung calorie count due to inaccuracy. 3. Consider rechecking prealb & CRP.    Goals: Meet > 85% EEN daily  Nutrition Goal Status: progressing towards goal  Communication of RD Recs: reviewed with physician    Reason for Assessment    Reason For Assessment: RD follow-up  Diagnosis: surgery/postoperative complications  Relevant Medical History: HTN, cardiomegaly, rectal ca s/p LAR 19   General Information Comments: POD# 15 s/p ex lap w/ diverting loop ileostomy. Pt seen yesterday & today. Calorie count sheet in room completed. He reports a somewhat improved appetite; also states he is taking in oral nutr supplements (pudding BID & Optisource at least BID). TPN discontinued 3/15; ostomy functioning. NFPE performed 3/5 & again on 3/18; no changes to exam; see malnutrition section of note for details.     Nutrition Discharge Planning: Adequate intake of meals/oral nutr supplements    Nutrition Risk Screen    Nutrition Risk Screen: no indicators present    Nutrition/Diet History    Spiritual, Cultural Beliefs, Sabianist Practices, Values that Affect Care: no  Food Allergies: NKFA  Factors Affecting Nutritional Intake: decreased appetite    Anthropometrics    Temp: 98.3 °F (36.8 °C)  Height Method: Stated  Height: 5' 4" (162.6 cm)  Height (inches): 64 in  Weight Method: Bed Scale  Weight: 102.3 kg (225 lb 8.5 oz)  Weight (lb): 225.53 lb  Ideal Body Weight (IBW), Male: 130 lb  % Ideal Body Weight, Male (lb): 171.96 lb  BMI (Calculated): 38.5  BMI Grade: 35 - 39.9 - obesity - grade II  Usual Body Weight (UBW), k kg  % Usual Body Weight: 103.69  % Weight Change From Usual Weight: 3.47 " %  Weight Loss Since Admission: (none)       Lab/Procedures/Meds    Pertinent Labs Reviewed: reviewed  Pertinent Labs Comments: POCT glu 115-152  Pertinent Medications Reviewed: reviewed  Pertinent Medications Comments: megestrol, abx, octreotide    Estimated/Assessed Needs    Weight Used For Calorie Calculations: 101.4 kg (223 lb 8.7 oz)  Energy Calorie Requirements (kcal): 2119  Energy Need Method: [place]-St Jeor(x 1.25 (PAL))  Protein Requirements: 101-122 g (1-1.2 g/kg)  Weight Used For Protein Calculations: 101.4 kg (223 lb 8.7 oz)     Estimated Fluid Requirement Method: RDA Method  RDA Method (mL): 2119       Nutrition Prescription Ordered    Current Diet Order: 2000 narinder diabetic  Current Nutrition Support Formula Ordered: (Discontinued)  Current Nutrition Support Rate Ordered: 40 (ml)  Current Nutrition Support Frequency Ordered: mL/hr x 24 hrs daily  Oral Nutrition Supplement: Boost Pudding BID; Optisource TID    Evaluation of Received Nutrient/Fluid Intake    Energy Calories Required: not meeting needs  Protein Required: not meeting needs  Fluid Required: meeting needs  Comments: 750 out vis ostomy; good uop  Tolerance: tolerating  % Intake of Estimated Energy Needs: 50 - 75 %  % Meal Intake: 25 - 50 %    Nutrition Risk    Level of Risk/Frequency of Follow-up: (F/u 2 x weekly)     Assessment and Plan    Nutrition Problem  Altered GI Function     Related to (etiology):   Post-op sx complications     Signs and Symptoms (as evidenced by):   NPO w/ need for gut rest/PN     Interventions:  Collaboration w/ other providers     Nutrition Diagnosis Status:   Improving      Monitor and Evaluation    Food and Nutrient Intake: energy intake, food and beverage intake  Food and Nutrient Adminstration: diet order  Physical Activity and Function: nutrition-related ADLs and IADLs  Anthropometric Measurements: weight change, weight  Biochemical Data, Medical Tests and Procedures: electrolyte and renal panel,  glucose/endocrine profile, inflammatory profile, lipid profile  Nutrition-Focused Physical Findings: overall appearance     Malnutrition Assessment             Energy Intake (Malnutrition): less than or equal to 50% for greater than or equal to 5 days               Subcutaneous Fat Loss (Final Summary): well nourished  Muscle Loss Evaluation (Final Summary): well nourished         Nutrition Follow-Up    RD Follow-up?: Yes

## 2019-03-19 NOTE — PLAN OF CARE
Problem: Adult Inpatient Plan of Care  Goal: Plan of Care Review  Recommendations     1. Continue w/ current diet, oral nutr supplements & Megace    2. Calorie count results (~700 cals daily/~40 g protein daily) are likely not accurate as not all of pt's intake of oral nutr supplements was recorded; consider discontinung calorie count due to inaccuracy. 3. Consider rechecking prealb & CRP.     Goals: Meet > 85% EEN daily  Nutrition Goal Status: progressing towards goal

## 2019-03-19 NOTE — SUBJECTIVE & OBJECTIVE
Interval History: CT scan yesterday, ambulated with PT with walker. No complaints this am, sleepy.    Medications:  Continuous Infusions:  Scheduled Meds:   acetaminophen  650 mg Oral Q6H    albumin human 5%  12.5 g Intravenous Once    albuterol-ipratropium  3 mL Nebulization Q4H    aspirin  81 mg Oral Daily    bismuth subsalicylate  30 mL Oral QID    enoxaparin  40 mg Subcutaneous Daily    escitalopram oxalate  10 mg Oral Daily    fluconazole  200 mg Intravenous Q24H    fluticasone-vilanterol  1 puff Inhalation Daily    levetiracetam IVPB  500 mg Intravenous Q12H    megestrol  40 mg Oral Daily    metronidazole  500 mg Intravenous Q8H    nystatin  500,000 Units Mouth/Throat Daily    octreotide  100 mcg Subcutaneous Q8H    pantoprazole  40 mg Intravenous Daily    piperacillin-tazobactam (ZOSYN) IVPB  4.5 g Intravenous Q8H    sodium chloride 0.9%  10 mL Intravenous Q6H     PRN Meds:sodium chloride, albuterol, dextrose 50%, glucagon (human recombinant), insulin aspart U-100, naloxone, ondansetron, ondansetron, Flushing PICC Protocol **AND** sodium chloride 0.9% **AND** sodium chloride 0.9%, sodium chloride 0.9%     Review of patient's allergies indicates:  No Known Allergies  Objective:     Vital Signs (Most Recent):  Temp: 99 °F (37.2 °C) (03/20/19 0721)  Pulse: 71 (03/20/19 0721)  Resp: (!) 28 (03/20/19 0721)  BP: (!) 165/80 (03/20/19 0721)  SpO2: 95 % (03/20/19 0721) Vital Signs (24h Range):  Temp:  [97.5 °F (36.4 °C)-98.9 °F (37.2 °C)] 98.8 °F (37.1 °C)  Pulse:  [58-74] 67  Resp:  [16-20] 18  SpO2:  [94 %-98 %] 97 %  BP: (154-169)/(74-83) 159/82     Weight: 102.3 kg (225 lb 8.5 oz)  Body mass index is 38.71 kg/m².    Intake/Output - Last 3 Shifts       03/18 0700 - 03/19 0659 03/19 0700 - 03/20 0659 03/20 0700 - 03/21 0659    P.O. 240 417     IV Piggyback 400 500     Total Intake(mL/kg) 640 (6.3) 917 (9)     Urine (mL/kg/hr)       Drains       Stool 150 350     Total Output 150 350     Net +490  +567            Urine Occurrence 4 x 3 x     Stool Occurrence  0 x           Physical Exam  Somnolent, answering questions though  RRR  No increased work of breathing  Abd soft, ostomy pink and productive of stool, midline intact with staples. Drain in place, serous fluid in bag      Significant Labs:  CBC:   Recent Labs   Lab 03/20/19  0448   WBC 14.77*   RBC 3.31*   HGB 9.4*   HCT 28.7*   *   MCV 87   MCH 28.4   MCHC 32.8     BMP:   Recent Labs   Lab 03/20/19  0448   GLU 95      K 3.9      CO2 29   BUN 12   CREATININE 0.8   CALCIUM 8.9   MG 1.5*       Significant Diagnostics:  none     Assessment and Plan:  68M s/p recent lap LAR for rectal cancer on 2/22 presenting with increasing abdominal pain, CT with intraperitoneal free air in the postoperative setting. Evening of 3/1 patient febrile, tachycardic and subsequently transferred to the ICU. Hemodynamics normalized overnight on 3/1 and he has remained HDS without lactic acidosis or metabolic durrangements. Repeat CT scan with readministration of free air. NG placed and PICC line obtained. Taken to the OR on 3/4 for ex lap and diverting loop ostomy. Returned to the ICU in stable condition, stepped down to the floor. s/p IR drainage of pelvic and perihepatic collections. TPN weaned on 3/15 and calorie count started.  Repeat CT scan yesterday with no new fluid collections, and resolved fluid collection around drain.  -Dc drain  -octreotide and kaopectate  -Continue calorie count   -continue Zosyn, fluconazole and flagyl for intraabdominal infection  -ostomy teaching  -PT/OT  -Dispo pending    Coby Harp MD PGY5  Surgery

## 2019-03-19 NOTE — PLAN OF CARE
Problem: Physical Therapy Goal  Goal: Physical Therapy Goal  Goals to be met by: 3/21/2019     Patient will increase functional independence with mobility by performin. Supine to sit with Stand-by Assistance  2. Sit to stand transfer with Stand-by Assistance  3. Gait  x 100 feet with Stand-by Assistance using rollator.   4. Lower extremity exercise program x10 reps per handout, with supervision     Outcome: Ongoing (interventions implemented as appropriate)  Pt ambulated ~ 24 ft with rollator , CGA/MIN A (chair followed , 3 L O2NC ). Pt will benefit from further skilled therapy in order to get back to PLOF.

## 2019-03-19 NOTE — PT/OT/SLP PROGRESS
Physical Therapy Treatment    Patient Name:  Justin Adams   MRN:  [id number]    Recommendations:     Discharge Recommendations:  nursing facility, skilled   Discharge Equipment Recommendations: (ongoing assessment)   Barriers to discharge: Decreased caregiver support    Assessment:     Justin Adams is a 68 y.o. male admitted with a medical diagnosis of Other chest pain.  He presents with the following impairments/functional limitations:  weakness, impaired endurance, impaired self care skills, gait instability, impaired balance, decreased upper extremity function, decreased ROM, decreased lower extremity function, pain, impaired functional mobilty, decreased safety awareness, impaired skin, impaired cardiopulmonary response to activity . Pt will benefit from further skilled therapy in order to get back to PLOF.     Rehab Prognosis: Good; patient would benefit from acute skilled PT services to address these deficits and reach maximum level of function.    Recent Surgery: Procedure(s) (LRB):  LAPAROSCOPY, DIAGNOSTIC (N/A)  LAPAROTOMY, EXPLORATORY 15 Days Post-Op    Plan:     During this hospitalization, patient to be seen 6 x/week to address the identified rehab impairments via gait training, therapeutic activities, therapeutic exercises and progress toward the following goals:    · Plan of Care Expires:  03/21/19    Subjective     Chief Complaint: Pain and weakness   Patient/Family Comments/goals: pt agreeable to therapy.   Pain/Comfort:  · Location 1: abdomen  · Pain Addressed 1: Pre-medicate for activity, Nurse notified      Objective:     Communicated with nurse Ordaz  prior to session.  Patient found all lines intact, call button in reach, bed alarm on, nurse notified and sister present bed alarm, telemetry, PICC line, colostomy, oxygen  upon PT entry to room.     General Precautions: Standard, fall, respiratory   Orthopedic Precautions:N/A   Braces: N/A     Functional Mobility:  · Bed Mobility:      · Rolling Left:  stand by assistance  · Scooting: contact guard assistance to EOB and min A x 2 trials to scoot HOB   · Supine to Sit: moderate assistance, HOB elevated, bedside rail .   · Sit to Supine: contact guard assistance and minimum assistance  · Transfers:     · Sit to Stand: x 2 trials from bed  contact guard assistance with 4 wheeled walker  · Gait:  Pt ambulated ~ 24 ft with rollator , CGA/MIN A (chair followed , 3 L O2NC ). Noted with BUE tremors,  decreased shyam , decreased step length, decreased swing to stance phase . V/T cues for safety technique and rollator management.       AM-PAC 6 CLICK MOBILITY  Turning over in bed (including adjusting bedclothes, sheets and blankets)?: 4  Sitting down on and standing up from a chair with arms (e.g., wheelchair, bedside commode, etc.): 3  Moving from lying on back to sitting on the side of the bed?: 2  Moving to and from a bed to a chair (including a wheelchair)?: 3  Need to walk in hospital room?: 3  Climbing 3-5 steps with a railing?: 2  Basic Mobility Total Score: 17       Therapeutic Activities and Exercises:   pt performed bed mobility, transfer and gait training as above.   Pt tolerated standing balance with rollator CGA for yomaira-care done and don/doff diaper.     Patient left HOB elevated with all lines intact, call button in reach and transport and sister present..    GOALS:   Multidisciplinary Problems     Physical Therapy Goals        Problem: Physical Therapy Goal    Goal Priority Disciplines Outcome Goal Variances Interventions   Physical Therapy Goal     PT, PT/OT Ongoing (interventions implemented as appropriate)     Description:  Goals to be met by: 3/21/2019     Patient will increase functional independence with mobility by performin. Supine to sit with Stand-by Assistance  2. Sit to stand transfer with Stand-by Assistance  3. Gait  x 100 feet with Stand-by Assistance using rollator.   4. Lower extremity exercise program x10 reps  per handout, with supervision                      Time Tracking:     PT Received On: 03/19/19  PT Start Time: 1341     PT Stop Time: 1419  PT Total Time (min): 38 min     Billable Minutes: Gait Training 15 and Total Time 38 min co-treat with OT     Treatment Type: Treatment  PT/PTA: PTA     PTA Visit Number: 1     Tram T Soniya, PTA  03/19/2019

## 2019-03-19 NOTE — PROGRESS NOTES
[facility]  General Surgery  Progress Note    Subjective:     Interval History: more awake after diuresis and breathing treatments, has not been out of bed    Post-Op Info:  Procedure(s) (LRB):  LAPAROSCOPY, DIAGNOSTIC (N/A)  LAPAROTOMY, EXPLORATORY   15 Days Post-Op      Medications:  Continuous Infusions:  Scheduled Meds:   acetaminophen  650 mg Oral Q6H    albumin human 5%  12.5 g Intravenous Once    albuterol-ipratropium  3 mL Nebulization Q4H    aspirin  81 mg Oral Daily    bismuth subsalicylate  30 mL Oral QID    enoxaparin  40 mg Subcutaneous Daily    escitalopram oxalate  10 mg Oral Daily    fluconazole  200 mg Intravenous Q24H    fluticasone-vilanterol  1 puff Inhalation Daily    levetiracetam IVPB  500 mg Intravenous Q12H    megestrol  40 mg Oral Daily    metronidazole  500 mg Intravenous Q8H    nystatin  500,000 Units Mouth/Throat Daily    octreotide  100 mcg Subcutaneous Q8H    pantoprazole  40 mg Intravenous Daily    piperacillin-tazobactam (ZOSYN) IVPB  4.5 g Intravenous Q8H    sodium chloride 0.9%  10 mL Intravenous Q6H     PRN Meds:sodium chloride, albuterol, dextrose 50%, glucagon (human recombinant), insulin aspart U-100, naloxone, ondansetron, ondansetron, Flushing PICC Protocol **AND** sodium chloride 0.9% **AND** sodium chloride 0.9%, sodium chloride 0.9%     Objective:     Vital Signs (Most Recent):  Temp: 98.8 °F (37.1 °C) (03/19/19 0748)  Pulse: 67 (03/19/19 0748)  Resp: 18 (03/19/19 0748)  BP: (!) 159/82 (03/19/19 0748)  SpO2: 97 % (03/19/19 0748) Vital Signs (24h Range):  Temp:  [97.5 °F (36.4 °C)-98.9 °F (37.2 °C)] 98.8 °F (37.1 °C)  Pulse:  [58-76] 67  Resp:  [16-20] 18  SpO2:  [92 %-98 %] 97 %  BP: (137-169)/(70-83) 159/82       Intake/Output Summary (Last 24 hours) at 3/19/2019 0904  Last data filed at 3/18/2019 1930  Gross per 24 hour   Intake 440 ml   Output 150 ml   Net 290 ml       Physical Exam  Awake, more alert than yesterday  RRR  No increased  work of breathing  Abd soft, ostomy pink and productive of stool, midline intact with staples      Significant Labs:  BMP:   Recent Labs   Lab 03/18/19  0437   GLU 86      K 4.5      CO2 30*   BUN 19   CREATININE 0.8   CALCIUM 9.1   MG 1.7     CBC:   Recent Labs   Lab 03/18/19  0437   WBC 14.42*   RBC 3.39*   HGB 9.6*   HCT 30.2*   *   MCV 89   MCH 28.3   MCHC 31.8*       Significant Diagnostics:  I have reviewed all pertinent imaging results/findings within the past 24 hours.    Assessment/Plan:   68M s/p recent lap LAR for rectal cancer on 2/22 presenting with increasing abdominal pain, CT with intraperitoneal free air in the postoperative setting. Evening of 3/1 patient febrile, tachycardic and subsequently transferred to the ICU. Hemodynamics normalized overnight on 3/1 and he has remained HDS without lactic acidosis or metabolic durrangements. Repeat CT scan with readministration of free air. NG placed and PICC line obtained. Taken to the OR on 3/4 for ex lap and diverting loop ostomy. Returned to the ICU in stable condition, stepped down to the floor. s/p IR drainage of pelvic and perihepatic collections. TPN weaned on 3/15 and calorie count started    -Altered mental status yesterday 2/2 hypoxia, improved with diuresis and breathing treatment. RLL consolidation on CXR, will repeat this am  -octreotide and kaopectate  -Continue calorie count   -continue Zosyn, fluconazole and flagyl for intraabdominal infection, Flush drain BID, may need to replace accordian portion of drain due to reported malfunction  -ostomy teaching  -PT/OT      Active Diagnoses:    Diagnosis Date Noted POA    Hypertension [I10] 03/02/2019 Unknown    Mixed hyperlipidemia [E78.2] 03/02/2019 Yes    Abdominal pain [R10.9] 02/26/2019 Yes    Carcinoma of rectum [C20] 11/12/2018 Yes      Problems Resolved During this Admission:    Diagnosis Date Noted Date Resolved POA    PRINCIPAL PROBLEM:  Other chest pain [R07.89]  03/02/2019 03/08/2019 Yes         Coby Harp MD  General Surgery  [facility]

## 2019-03-19 NOTE — NURSING
Patient on tele box #[id number], confirmed with Nan.  Tele monitor on, HR 62, NSR.    Accordian drain to LLQ not working properly, unable to stay compressed.  MD aware.

## 2019-03-20 LAB
ALBUMIN SERPL BCP-MCNC: 1.7 G/DL
ALP SERPL-CCNC: 211 U/L
ALT SERPL W/O P-5'-P-CCNC: 8 U/L
ANION GAP SERPL CALC-SCNC: 8 MMOL/L
AST SERPL-CCNC: 16 U/L
BACTERIA SPEC AEROBE CULT: NORMAL
BASOPHILS # BLD AUTO: 0.05 K/UL
BASOPHILS NFR BLD: 0.3 %
BILIRUB SERPL-MCNC: 0.5 MG/DL
BUN SERPL-MCNC: 12 MG/DL
CALCIUM SERPL-MCNC: 8.9 MG/DL
CHLORIDE SERPL-SCNC: 102 MMOL/L
CO2 SERPL-SCNC: 29 MMOL/L
CREAT SERPL-MCNC: 0.8 MG/DL
DIFFERENTIAL METHOD: ABNORMAL
EOSINOPHIL # BLD AUTO: 0.4 K/UL
EOSINOPHIL NFR BLD: 2.6 %
ERYTHROCYTE [DISTWIDTH] IN BLOOD BY AUTOMATED COUNT: 16.9 %
EST. GFR  (AFRICAN AMERICAN): >60 ML/MIN/1.73 M^2
EST. GFR  (NON AFRICAN AMERICAN): >60 ML/MIN/1.73 M^2
GLUCOSE SERPL-MCNC: 95 MG/DL
GRAM STN SPEC: NORMAL
HCT VFR BLD AUTO: 28.7 %
HGB BLD-MCNC: 9.4 G/DL
LYMPHOCYTES # BLD AUTO: 1.2 K/UL
LYMPHOCYTES NFR BLD: 8.2 %
MAGNESIUM SERPL-MCNC: 1.5 MG/DL
MCH RBC QN AUTO: 28.4 PG
MCHC RBC AUTO-ENTMCNC: 32.8 G/DL
MCV RBC AUTO: 87 FL
MONOCYTES # BLD AUTO: 1.3 K/UL
MONOCYTES NFR BLD: 8.9 %
NEUTROPHILS # BLD AUTO: 11.8 K/UL
NEUTROPHILS NFR BLD: 80 %
PHOSPHATE SERPL-MCNC: 3.6 MG/DL
PLATELET # BLD AUTO: 720 K/UL
PMV BLD AUTO: 9 FL
POCT GLUCOSE: 110 MG/DL (ref 70–110)
POCT GLUCOSE: 118 MG/DL (ref 70–110)
POCT GLUCOSE: 118 MG/DL (ref 70–110)
POTASSIUM SERPL-SCNC: 3.9 MMOL/L
PROT SERPL-MCNC: 6.3 G/DL
RBC # BLD AUTO: 3.31 M/UL
SODIUM SERPL-SCNC: 139 MMOL/L
WBC # BLD AUTO: 14.77 K/UL

## 2019-03-20 PROCEDURE — 94640 AIRWAY INHALATION TREATMENT: CPT

## 2019-03-20 PROCEDURE — 80053 COMPREHEN METABOLIC PANEL: CPT

## 2019-03-20 PROCEDURE — 63600175 PHARM REV CODE 636 W HCPCS: Performed by: STUDENT IN AN ORGANIZED HEALTH CARE EDUCATION/TRAINING PROGRAM

## 2019-03-20 PROCEDURE — 63600175 PHARM REV CODE 636 W HCPCS: Performed by: SURGERY

## 2019-03-20 PROCEDURE — 36415 COLL VENOUS BLD VENIPUNCTURE: CPT

## 2019-03-20 PROCEDURE — 25000003 PHARM REV CODE 250: Performed by: STUDENT IN AN ORGANIZED HEALTH CARE EDUCATION/TRAINING PROGRAM

## 2019-03-20 PROCEDURE — 27000221 HC OXYGEN, UP TO 24 HOURS

## 2019-03-20 PROCEDURE — A4216 STERILE WATER/SALINE, 10 ML: HCPCS | Performed by: STUDENT IN AN ORGANIZED HEALTH CARE EDUCATION/TRAINING PROGRAM

## 2019-03-20 PROCEDURE — 85025 COMPLETE CBC W/AUTO DIFF WBC: CPT

## 2019-03-20 PROCEDURE — C9113 INJ PANTOPRAZOLE SODIUM, VIA: HCPCS | Performed by: STUDENT IN AN ORGANIZED HEALTH CARE EDUCATION/TRAINING PROGRAM

## 2019-03-20 PROCEDURE — 97530 THERAPEUTIC ACTIVITIES: CPT

## 2019-03-20 PROCEDURE — 97535 SELF CARE MNGMENT TRAINING: CPT

## 2019-03-20 PROCEDURE — 25000003 PHARM REV CODE 250: Performed by: SURGERY

## 2019-03-20 PROCEDURE — 84100 ASSAY OF PHOSPHORUS: CPT

## 2019-03-20 PROCEDURE — 83735 ASSAY OF MAGNESIUM: CPT

## 2019-03-20 PROCEDURE — S0030 INJECTION, METRONIDAZOLE: HCPCS | Performed by: SURGERY

## 2019-03-20 PROCEDURE — 97116 GAIT TRAINING THERAPY: CPT

## 2019-03-20 PROCEDURE — 25000242 PHARM REV CODE 250 ALT 637 W/ HCPCS: Performed by: STUDENT IN AN ORGANIZED HEALTH CARE EDUCATION/TRAINING PROGRAM

## 2019-03-20 PROCEDURE — 11000001 HC ACUTE MED/SURG PRIVATE ROOM

## 2019-03-20 PROCEDURE — 94761 N-INVAS EAR/PLS OXIMETRY MLT: CPT

## 2019-03-20 RX ORDER — CIPROFLOXACIN 500 MG/1
500 TABLET ORAL EVERY 12 HOURS
Status: DISCONTINUED | OUTPATIENT
Start: 2019-03-20 | End: 2019-03-21 | Stop reason: HOSPADM

## 2019-03-20 RX ORDER — LEVETIRACETAM 500 MG/1
500 TABLET, EXTENDED RELEASE ORAL 2 TIMES DAILY
Status: DISCONTINUED | OUTPATIENT
Start: 2019-03-20 | End: 2019-03-21 | Stop reason: HOSPADM

## 2019-03-20 RX ORDER — LISINOPRIL 5 MG/1
5 TABLET ORAL DAILY
Status: DISCONTINUED | OUTPATIENT
Start: 2019-03-20 | End: 2019-03-21 | Stop reason: HOSPADM

## 2019-03-20 RX ORDER — ATORVASTATIN CALCIUM 10 MG/1
10 TABLET, FILM COATED ORAL DAILY
Status: DISCONTINUED | OUTPATIENT
Start: 2019-03-20 | End: 2019-03-21 | Stop reason: HOSPADM

## 2019-03-20 RX ADMIN — Medication 10 ML: at 06:03

## 2019-03-20 RX ADMIN — IPRATROPIUM BROMIDE AND ALBUTEROL SULFATE 3 ML: .5; 3 SOLUTION RESPIRATORY (INHALATION) at 11:03

## 2019-03-20 RX ADMIN — ATORVASTATIN CALCIUM 10 MG: 10 TABLET, FILM COATED ORAL at 11:03

## 2019-03-20 RX ADMIN — ENOXAPARIN SODIUM 40 MG: 100 INJECTION SUBCUTANEOUS at 05:03

## 2019-03-20 RX ADMIN — IPRATROPIUM BROMIDE AND ALBUTEROL SULFATE 3 ML: .5; 3 SOLUTION RESPIRATORY (INHALATION) at 04:03

## 2019-03-20 RX ADMIN — CIPROFLOXACIN HYDROCHLORIDE 500 MG: 500 TABLET, FILM COATED ORAL at 11:03

## 2019-03-20 RX ADMIN — Medication 10 ML: at 12:03

## 2019-03-20 RX ADMIN — LEVETIRACETAM 500 MG: 5 INJECTION INTRAVENOUS at 08:03

## 2019-03-20 RX ADMIN — OCTREOTIDE ACETATE 100 MCG: 100 INJECTION, SOLUTION INTRAVENOUS; SUBCUTANEOUS at 09:03

## 2019-03-20 RX ADMIN — NYSTATIN 500000 UNITS: 500000 SUSPENSION ORAL at 08:03

## 2019-03-20 RX ADMIN — LEVETIRACETAM 500 MG: 500 TABLET, EXTENDED RELEASE ORAL at 09:03

## 2019-03-20 RX ADMIN — PIPERACILLIN AND TAZOBACTAM 4.5 G: 4; .5 INJECTION, POWDER, LYOPHILIZED, FOR SOLUTION INTRAVENOUS; PARENTERAL at 08:03

## 2019-03-20 RX ADMIN — MEGESTROL ACETATE 40 MG: 20 TABLET ORAL at 08:03

## 2019-03-20 RX ADMIN — LISINOPRIL 5 MG: 5 TABLET ORAL at 11:03

## 2019-03-20 RX ADMIN — ESCITALOPRAM OXALATE 10 MG: 10 TABLET ORAL at 08:03

## 2019-03-20 RX ADMIN — IPRATROPIUM BROMIDE AND ALBUTEROL SULFATE 3 ML: .5; 3 SOLUTION RESPIRATORY (INHALATION) at 07:03

## 2019-03-20 RX ADMIN — FLUTICASONE FUROATE AND VILANTEROL TRIFENATATE 1 PUFF: 100; 25 POWDER RESPIRATORY (INHALATION) at 07:03

## 2019-03-20 RX ADMIN — PANTOPRAZOLE SODIUM 40 MG: 40 INJECTION, POWDER, LYOPHILIZED, FOR SOLUTION INTRAVENOUS at 08:03

## 2019-03-20 RX ADMIN — IPRATROPIUM BROMIDE AND ALBUTEROL SULFATE 3 ML: .5; 3 SOLUTION RESPIRATORY (INHALATION) at 03:03

## 2019-03-20 RX ADMIN — CIPROFLOXACIN HYDROCHLORIDE 500 MG: 500 TABLET, FILM COATED ORAL at 09:03

## 2019-03-20 RX ADMIN — ACETAMINOPHEN 650 MG: 325 TABLET, FILM COATED ORAL at 06:03

## 2019-03-20 RX ADMIN — OCTREOTIDE ACETATE 100 MCG: 100 INJECTION, SOLUTION INTRAVENOUS; SUBCUTANEOUS at 01:03

## 2019-03-20 RX ADMIN — OCTREOTIDE ACETATE 100 MCG: 100 INJECTION, SOLUTION INTRAVENOUS; SUBCUTANEOUS at 06:03

## 2019-03-20 RX ADMIN — ASPIRIN 81 MG: 81 TABLET, COATED ORAL at 08:03

## 2019-03-20 RX ADMIN — IPRATROPIUM BROMIDE AND ALBUTEROL SULFATE 3 ML: .5; 3 SOLUTION RESPIRATORY (INHALATION) at 12:03

## 2019-03-20 RX ADMIN — METRONIDAZOLE 500 MG: 500 INJECTION, SOLUTION INTRAVENOUS at 04:03

## 2019-03-20 NOTE — PT/OT/SLP PROGRESS
Occupational Therapy   Treatment    Name: Justin Adams  MRN: [id number]  Admitting Diagnosis:  Other chest pain  16 Days Post-Op    Recommendations:     Discharge Recommendations: nursing facility, skilled  Discharge Equipment Recommendations:  other (see comments)(TBD)  Barriers to discharge:  Decreased caregiver support    Assessment:     Justin Adams is a 68 y.o. male with a medical diagnosis of Other chest pain.  He presents with the following performance deficits affecting function: weakness, impaired endurance, impaired self care skills, impaired functional mobilty, gait instability, impaired balance, impaired cognition, decreased upper extremity function, decreased lower extremity function, decreased coordination, decreased safety awareness, pain, decreased ROM, impaired skin, impaired cardiopulmonary response to activity, edema. Patient requires MAX increased time and frequent rest breaks secondary to fatigue and slow mobility. Patient with increased BUE tremors today. Patient is progressing slowly toward OT goals and will benefit from continued therapy to return to PLOF.    Rehab Prognosis:  Fair+; patient would benefit from acute skilled OT services to address these deficits and reach maximum level of function.       Plan:     Patient to be seen 3 x/week to address the above listed problems via self-care/home management, therapeutic activities, therapeutic exercises  · Plan of Care Expires: 03/28/19  · Plan of Care Reviewed with: patient(sister)    Subjective   Patient agreeable to therapy with encouragement and increased time.  Pain/Comfort:  Pain Rating 1: 8/10  Location 1: abdomen  Pain Addressed 1: Reposition, Nurse notified    Objective:     Communicated with: Nurse Hogue prior to session.  Patient found supine with bed alarm, telemetry, PICC line, colostomy, oxygen, upon OT entry to room.    General Precautions: Standard, fall, respiratory   Orthopedic Precautions:N/A   Braces:N/A        Occupational Performance:     Bed Mobility:    · Patient completed Rolling/Turning to Left with  contact guard assistance  · Patient completed Scooting/Bridging with contact guard assistance  · Patient completed Supine to Sit with moderate assistance     Functional Mobility/Transfers:  · Patient completed Sit <> Stand Transfer with minimum assistance  with  rollator x 3 from EOB  · Functional Mobility: Patient able to ambulate with PT CGA/MIN A with rollator. Patient with decreased safety awareness    Activities of Daily Living:  · Feeding:  Patient educated on importance of nutritional intake.    · Upper Body Dressing: minimum assistance to doff/don gows and jacket  · Lower Body Dressing: total assistance    · Toileting: total assistance ; pateint tolerated standing balance with CGA/rollator for pericare and don/doff brief secondary to increased bladder accident upon standing.      [facility] 6 Click ADL: 11    Treatment & Education:  Patient performed bed mobility, functional transfers, and ADL's as above.  Patient educated on benefits/importance of OOB mobility.    Patient left up in chair on pressure relief cushion with all lines intact, call button in reach, chair alarm on, nurse notified and sister presentEducation:      GOALS:   Multidisciplinary Problems     Occupational Therapy Goals        Problem: Occupational Therapy Goal    Goal Priority Disciplines Outcome Interventions   Occupational Therapy Goal     OT, PT/OT Ongoing (interventions implemented as appropriate)    Description:  Goals to be met by: 3/28/2019     Patient will increase functional independence with ADLs by performing:    Feeding with Modified [place].  UE Dressing with [place].  LE Dressing with Contact Guard Assistance.  Grooming while standing at sink with Contact Guard Assistance.  Toileting from bedside commode with Stand-by Assistance for hygiene and clothing management.   Sitting at edge of bed x15 minutes with Stand-by  Assistance.  Rolling to Bilateral with Stand-by Assistance.   Supine to sit with Stand-by Assistance.  Step transfer with Contact Guard Assistance  Toilet transfer to toilet with Contact Guard Assistance.  Upper extremity exercise program with assistance as needed.                      Time Tracking:     OT Date of Treatment: 03/20/19  OT Start Time: 1539  OT Stop Time: 1633  OT Total Time (min): 54 min    Billable Minutes:Self Care/Home Management 15  Therapeutic Activity 12 (co-tx with PTA)    CINDY Hurst  3/20/2019

## 2019-03-20 NOTE — PROGRESS NOTES
[facility]  General Surgery  Progress Note    Subjective:     Interval History: CT scan yesterday, ambulated with PT with walker. No complaints this am, sleepy.    Post-Op Info:  Procedure(s) (LRB):  LAPAROSCOPY, DIAGNOSTIC (N/A)  LAPAROTOMY, EXPLORATORY   16 Days Post-Op      Medications:  Continuous Infusions:  Scheduled Meds:   albumin human 5%  12.5 g Intravenous Once    albuterol-ipratropium  3 mL Nebulization Q4H    aspirin  81 mg Oral Daily    atorvastatin  10 mg Oral Daily    bismuth subsalicylate  30 mL Oral QID    ciprofloxacin HCl  500 mg Oral Q12H    enoxaparin  40 mg Subcutaneous Daily    escitalopram oxalate  10 mg Oral Daily    fluticasone-vilanterol  1 puff Inhalation Daily    levetiracetam XR  500 mg Oral BID    lisinopril  5 mg Oral Daily    megestrol  40 mg Oral Daily    nystatin  500,000 Units Mouth/Throat Daily    octreotide  100 mcg Subcutaneous Q8H    sodium chloride 0.9%  10 mL Intravenous Q6H     PRN Meds:sodium chloride, albuterol, dextrose 50%, glucagon (human recombinant), insulin aspart U-100, naloxone, ondansetron, Flushing PICC Protocol **AND** sodium chloride 0.9% **AND** sodium chloride 0.9%, sodium chloride 0.9%     Objective:     Vital Signs (Most Recent):  Temp: 99 °F (37.2 °C) (03/20/19 0721)  Pulse: 70 (03/20/19 1101)  Resp: 20 (03/20/19 1101)  BP: (!) 165/80 (03/20/19 0721)  SpO2: 96 % (03/20/19 1101) Vital Signs (24h Range):  Temp:  [98 °F (36.7 °C)-99 °F (37.2 °C)] 99 °F (37.2 °C)  Pulse:  [57-75] 70  Resp:  [16-28] 20  SpO2:  [89 %-97 %] 96 %  BP: (153-167)/(78-85) 165/80       Intake/Output Summary (Last 24 hours) at 3/20/2019 1112  Last data filed at 3/20/2019 0800  Gross per 24 hour   Intake 497 ml   Output 350 ml   Net 147 ml       Physical Exam  Somnolent, answering questions though  RRR  No increased work of breathing  Abd soft, ostomy pink and productive of stool, midline intact with staples. Drain in place, serous fluid in  bag    Significant Labs:  CBC:   Recent Labs   Lab 03/20/19  0448   WBC 14.77*   RBC 3.31*   HGB 9.4*   HCT 28.7*   *   MCV 87   MCH 28.4   MCHC 32.8     CMP:   Recent Labs   Lab 03/20/19  0448   GLU 95   CALCIUM 8.9   ALBUMIN 1.7*   PROT 6.3      K 3.9   CO2 29      BUN 12   CREATININE 0.8   ALKPHOS 211*   ALT 8*   AST 16   BILITOT 0.5       Significant Diagnostics:  None    Assessment/Plan:   68M s/p recent lap LAR for rectal cancer on 2/22 presenting with increasing abdominal pain, CT with intraperitoneal free air in the postoperative setting. Evening of 3/1 patient febrile, tachycardic and subsequently transferred to the ICU. Hemodynamics normalized overnight on 3/1 and he has remained HDS without lactic acidosis or metabolic durrangements. Repeat CT scan with readministration of free air. NG placed and PICC line obtained. Taken to the OR on 3/4 for ex lap and diverting loop ostomy. Returned to the ICU in stable condition, stepped down to the floor. s/p IR drainage of pelvic and perihepatic collections. TPN weaned on 3/15 and calorie count started.  Repeat CT scan yesterday with no new fluid collections, and resolved fluid collection around drain.  -Dc drain  -octreotide and kaopectate  -Continue calorie count   -continue Zosyn, fluconazole and flagyl for intraabdominal infection  -ostomy teaching  -PT/OT  -Dispo pending; hopefully to SNF today         Active Diagnoses:    Diagnosis Date Noted POA    Hypertension [I10] 03/02/2019 Unknown    Mixed hyperlipidemia [E78.2] 03/02/2019 Yes    Abdominal pain [R10.9] 02/26/2019 Yes    Carcinoma of rectum [C20] 11/12/2018 Yes      Problems Resolved During this Admission:    Diagnosis Date Noted Date Resolved POA    PRINCIPAL PROBLEM:  Other chest pain [R07.89] 03/02/2019 03/08/2019 Yes         Coby Harp MD  General Surgery  [facility]

## 2019-03-20 NOTE — PLAN OF CARE
Problem: Adult Inpatient Plan of Care  Goal: Plan of Care Review  Outcome: Ongoing (interventions implemented as appropriate)   03/20/19 1142   Plan of Care Review   Plan of Care Reviewed With patient   Progress no change       Comments: Plan of care reviewed with patient. He is intermittently confused, but is easily reoriented, he is able to make needs known, remains free of injury during shift. Patient able to position self, however requires some encouragement. Tele monitor on, electrodes changed, alarms audible. Vssaf. picc infusing iv fluids as ordered. accucheck done as ordered, insulin given per sliding scale. Patient appetite is low, is encouraged to drink protein shakes. Ileostomy draining freely. o2 at 2l nc. Bed in low locked position, call light in reach. Will continue to monitor for safety.

## 2019-03-20 NOTE — PLAN OF CARE
Problem: Physical Therapy Goal  Goal: Physical Therapy Goal  Goals to be met by: 3/21/2019     Patient will increase functional independence with mobility by performin. Supine to sit with Stand-by Assistance  2. Sit to stand transfer with Stand-by Assistance  3. Gait  x 100 feet with Stand-by Assistance using rollator.   4. Lower extremity exercise program x10 reps per handout, with supervision     Outcome: Ongoing (interventions implemented as appropriate)  Pt ambulated ~ 80 ft with rollator , CGA/MIN A ( chair followed and 2L o2nc). Noted with increase BUE tremors today, nurse Hogue notified. Pt will benefit from further skilled therapy in order to get back to PLOF.

## 2019-03-20 NOTE — PLAN OF CARE
Problem: Occupational Therapy Goal  Goal: Occupational Therapy Goal  Goals to be met by: 3/28/2019     Patient will increase functional independence with ADLs by performing:    Feeding with Modified [place].  UE Dressing with [place].  LE Dressing with Contact Guard Assistance.  Grooming while standing at sink with Contact Guard Assistance.  Toileting from bedside commode with Stand-by Assistance for hygiene and clothing management.   Sitting at edge of bed x15 minutes with Stand-by Assistance.  Rolling to Bilateral with Stand-by Assistance.   Supine to sit with Stand-by Assistance.  Step transfer with Contact Guard Assistance  Toilet transfer to toilet with Contact Guard Assistance.  Upper extremity exercise program with assistance as needed.     Outcome: Ongoing (interventions implemented as appropriate)  Patient requires MAX increased time and frequent rest breaks secondary to fatigue and slow mobility. Patient with increased BUE tremors today. Patient is progressing slowly toward OT goals and will benefit from continued therapy to return to PLOF.

## 2019-03-20 NOTE — PROGRESS NOTES
"POD # 16 Loop ileostomy  Red catheter removed from under loop ileostomy per MD.   Applied Coloplast drainable pouch #[id number] over red 1 7/8" loop stoma. Parastomal skin clear. Functioning a green liquid effluent.   Small amount red drainage from openings between staples of lower midline abdominal incision. Cleansed with water and dressed with dry gauze.   Encouraged diet- patient refusing to eat.   Attempted to teach patient about ostomy, but unable to engage patient. Hx Subdural hematoma.   "

## 2019-03-20 NOTE — UM SECONDARY REVIEW
[facility]    IP Extended Stay > 10    LOS: approved w/o recommendations due to severity of clinical picture   23 Day LOS, POST OP COMPLICATIONS, POOR PO INTAKE DCP FOR SNF, DR. COON APPROVED CONTINUED STAY.

## 2019-03-21 VITALS
OXYGEN SATURATION: 89 % | HEART RATE: 76 BPM | RESPIRATION RATE: 18 BRPM | DIASTOLIC BLOOD PRESSURE: 77 MMHG | BODY MASS INDEX: 38.5 KG/M2 | TEMPERATURE: 98 F | HEIGHT: 64 IN | SYSTOLIC BLOOD PRESSURE: 152 MMHG | WEIGHT: 225.5 LBS

## 2019-03-21 PROBLEM — K65.9 PERITONITIS: Status: RESOLVED | Noted: 2019-03-21 | Resolved: 2019-03-21

## 2019-03-21 PROBLEM — K65.9 PERITONITIS: Status: ACTIVE | Noted: 2019-03-21

## 2019-03-21 LAB
ALBUMIN SERPL BCP-MCNC: 1.8 G/DL
ALP SERPL-CCNC: 191 U/L
ALT SERPL W/O P-5'-P-CCNC: 10 U/L
ANION GAP SERPL CALC-SCNC: 11 MMOL/L
ANISOCYTOSIS BLD QL SMEAR: ABNORMAL
AST SERPL-CCNC: 19 U/L
BASOPHILS # BLD AUTO: 0.04 K/UL
BASOPHILS NFR BLD: 0.3 %
BILIRUB SERPL-MCNC: 0.4 MG/DL
BUN SERPL-MCNC: 12 MG/DL
CALCIUM SERPL-MCNC: 9 MG/DL
CHLORIDE SERPL-SCNC: 103 MMOL/L
CO2 SERPL-SCNC: 25 MMOL/L
CREAT SERPL-MCNC: 0.7 MG/DL
CRP SERPL-MCNC: 180.6 MG/L
DIFFERENTIAL METHOD: ABNORMAL
EOSINOPHIL # BLD AUTO: 0.4 K/UL
EOSINOPHIL NFR BLD: 2.7 %
ERYTHROCYTE [DISTWIDTH] IN BLOOD BY AUTOMATED COUNT: 17 %
EST. GFR  (AFRICAN AMERICAN): >60 ML/MIN/1.73 M^2
EST. GFR  (NON AFRICAN AMERICAN): >60 ML/MIN/1.73 M^2
GIANT PLATELETS BLD QL SMEAR: PRESENT
GLUCOSE SERPL-MCNC: 96 MG/DL
HCT VFR BLD AUTO: 28.5 %
HGB BLD-MCNC: 9.5 G/DL
HYPOCHROMIA BLD QL SMEAR: ABNORMAL
LYMPHOCYTES # BLD AUTO: 1.5 K/UL
LYMPHOCYTES NFR BLD: 10.5 %
MCH RBC QN AUTO: 28.9 PG
MCHC RBC AUTO-ENTMCNC: 33.3 G/DL
MCV RBC AUTO: 87 FL
MONOCYTES # BLD AUTO: 0.8 K/UL
MONOCYTES NFR BLD: 5.5 %
NEUTROPHILS # BLD AUTO: 11.6 K/UL
NEUTROPHILS NFR BLD: 82 %
PLATELET # BLD AUTO: 655 K/UL
PLATELET BLD QL SMEAR: ABNORMAL
PMV BLD AUTO: 8.9 FL
POCT GLUCOSE: 105 MG/DL (ref 70–110)
POCT GLUCOSE: 109 MG/DL (ref 70–110)
POCT GLUCOSE: 114 MG/DL (ref 70–110)
POCT GLUCOSE: 94 MG/DL (ref 70–110)
POIKILOCYTOSIS BLD QL SMEAR: SLIGHT
POLYCHROMASIA BLD QL SMEAR: ABNORMAL
POTASSIUM SERPL-SCNC: 3.8 MMOL/L
PREALB SERPL-MCNC: 11 MG/DL
PROT SERPL-MCNC: 6.4 G/DL
RBC # BLD AUTO: 3.29 M/UL
SODIUM SERPL-SCNC: 139 MMOL/L
TOXIC GRANULES BLD QL SMEAR: SLIGHT
WBC # BLD AUTO: 14.26 K/UL

## 2019-03-21 PROCEDURE — 25000003 PHARM REV CODE 250: Performed by: SURGERY

## 2019-03-21 PROCEDURE — 36415 COLL VENOUS BLD VENIPUNCTURE: CPT

## 2019-03-21 PROCEDURE — 63600175 PHARM REV CODE 636 W HCPCS: Performed by: STUDENT IN AN ORGANIZED HEALTH CARE EDUCATION/TRAINING PROGRAM

## 2019-03-21 PROCEDURE — 84134 ASSAY OF PREALBUMIN: CPT

## 2019-03-21 PROCEDURE — 94761 N-INVAS EAR/PLS OXIMETRY MLT: CPT

## 2019-03-21 PROCEDURE — A4216 STERILE WATER/SALINE, 10 ML: HCPCS | Performed by: STUDENT IN AN ORGANIZED HEALTH CARE EDUCATION/TRAINING PROGRAM

## 2019-03-21 PROCEDURE — 25000242 PHARM REV CODE 250 ALT 637 W/ HCPCS: Performed by: STUDENT IN AN ORGANIZED HEALTH CARE EDUCATION/TRAINING PROGRAM

## 2019-03-21 PROCEDURE — 25000003 PHARM REV CODE 250: Performed by: STUDENT IN AN ORGANIZED HEALTH CARE EDUCATION/TRAINING PROGRAM

## 2019-03-21 PROCEDURE — 27000221 HC OXYGEN, UP TO 24 HOURS

## 2019-03-21 PROCEDURE — 94640 AIRWAY INHALATION TREATMENT: CPT

## 2019-03-21 PROCEDURE — 80053 COMPREHEN METABOLIC PANEL: CPT

## 2019-03-21 PROCEDURE — 85025 COMPLETE CBC W/AUTO DIFF WBC: CPT

## 2019-03-21 PROCEDURE — 86140 C-REACTIVE PROTEIN: CPT

## 2019-03-21 RX ORDER — ACETAMINOPHEN 325 MG/1
650 TABLET ORAL EVERY 6 HOURS PRN
Status: DISCONTINUED | OUTPATIENT
Start: 2019-03-21 | End: 2019-03-21 | Stop reason: HOSPADM

## 2019-03-21 RX ORDER — ENOXAPARIN SODIUM 100 MG/ML
40 INJECTION SUBCUTANEOUS DAILY
Start: 2019-03-21

## 2019-03-21 RX ORDER — OXYCODONE AND ACETAMINOPHEN 5; 325 MG/1; MG/1
1 TABLET ORAL EVERY 4 HOURS PRN
Qty: 30 TABLET | Refills: 0 | Status: SHIPPED | OUTPATIENT
Start: 2019-03-21

## 2019-03-21 RX ORDER — KETOROLAC TROMETHAMINE 30 MG/ML
15 INJECTION, SOLUTION INTRAMUSCULAR; INTRAVENOUS EVERY 6 HOURS PRN
Status: DISCONTINUED | OUTPATIENT
Start: 2019-03-21 | End: 2019-03-21 | Stop reason: HOSPADM

## 2019-03-21 RX ORDER — CIPROFLOXACIN 500 MG/1
500 TABLET ORAL EVERY 12 HOURS
Start: 2019-03-21

## 2019-03-21 RX ORDER — OXYCODONE AND ACETAMINOPHEN 5; 325 MG/1; MG/1
1 TABLET ORAL EVERY 4 HOURS PRN
Status: DISCONTINUED | OUTPATIENT
Start: 2019-03-21 | End: 2019-03-21 | Stop reason: HOSPADM

## 2019-03-21 RX ORDER — BISMUTH SUBSALICYLATE 525 MG/30ML
30 LIQUID ORAL 4 TIMES DAILY
Refills: 0 | COMMUNITY
Start: 2019-03-21 | End: 2019-03-31

## 2019-03-21 RX ORDER — INSULIN ASPART 100 [IU]/ML
0-10 INJECTION, SOLUTION INTRAVENOUS; SUBCUTANEOUS EVERY 6 HOURS PRN
Refills: 0
Start: 2019-03-21 | End: 2020-03-20

## 2019-03-21 RX ORDER — OCTREOTIDE ACETATE 100 UG/ML
100 INJECTION, SOLUTION INTRAVENOUS; SUBCUTANEOUS EVERY 8 HOURS
Qty: 90 ML | Refills: 11
Start: 2019-03-21 | End: 2020-03-20

## 2019-03-21 RX ORDER — MEGESTROL ACETATE 40 MG/1
40 TABLET ORAL DAILY
Qty: 30 TABLET | Refills: 11
Start: 2019-03-22 | End: 2020-03-21

## 2019-03-21 RX ORDER — KETOROLAC TROMETHAMINE 30 MG/ML
15 INJECTION, SOLUTION INTRAMUSCULAR; INTRAVENOUS EVERY 6 HOURS PRN
Start: 2019-03-21

## 2019-03-21 RX ORDER — OXYCODONE AND ACETAMINOPHEN 5; 325 MG/1; MG/1
1 TABLET ORAL EVERY 4 HOURS PRN
Refills: 0
Start: 2019-03-21 | End: 2019-03-21

## 2019-03-21 RX ADMIN — Medication 10 ML: at 06:03

## 2019-03-21 RX ADMIN — FLUTICASONE FUROATE AND VILANTEROL TRIFENATATE 1 PUFF: 100; 25 POWDER RESPIRATORY (INHALATION) at 07:03

## 2019-03-21 RX ADMIN — LEVETIRACETAM 500 MG: 500 TABLET, EXTENDED RELEASE ORAL at 08:03

## 2019-03-21 RX ADMIN — OXYCODONE AND ACETAMINOPHEN 1 TABLET: 5; 325 TABLET ORAL at 09:03

## 2019-03-21 RX ADMIN — CIPROFLOXACIN HYDROCHLORIDE 500 MG: 500 TABLET, FILM COATED ORAL at 08:03

## 2019-03-21 RX ADMIN — LISINOPRIL 5 MG: 5 TABLET ORAL at 08:03

## 2019-03-21 RX ADMIN — IPRATROPIUM BROMIDE AND ALBUTEROL SULFATE 3 ML: .5; 3 SOLUTION RESPIRATORY (INHALATION) at 07:03

## 2019-03-21 RX ADMIN — Medication 10 ML: at 12:03

## 2019-03-21 RX ADMIN — IPRATROPIUM BROMIDE AND ALBUTEROL SULFATE 3 ML: .5; 3 SOLUTION RESPIRATORY (INHALATION) at 11:03

## 2019-03-21 RX ADMIN — ESCITALOPRAM OXALATE 10 MG: 10 TABLET ORAL at 08:03

## 2019-03-21 RX ADMIN — MEGESTROL ACETATE 40 MG: 20 TABLET ORAL at 08:03

## 2019-03-21 RX ADMIN — OCTREOTIDE ACETATE 100 MCG: 100 INJECTION, SOLUTION INTRAVENOUS; SUBCUTANEOUS at 05:03

## 2019-03-21 RX ADMIN — IPRATROPIUM BROMIDE AND ALBUTEROL SULFATE 3 ML: .5; 3 SOLUTION RESPIRATORY (INHALATION) at 04:03

## 2019-03-21 RX ADMIN — OCTREOTIDE ACETATE 100 MCG: 100 INJECTION, SOLUTION INTRAVENOUS; SUBCUTANEOUS at 01:03

## 2019-03-21 RX ADMIN — NYSTATIN 500000 UNITS: 500000 SUSPENSION ORAL at 08:03

## 2019-03-21 RX ADMIN — ATORVASTATIN CALCIUM 10 MG: 10 TABLET, FILM COATED ORAL at 08:03

## 2019-03-21 RX ADMIN — ASPIRIN 81 MG: 81 TABLET, COATED ORAL at 08:03

## 2019-03-21 RX ADMIN — IPRATROPIUM BROMIDE AND ALBUTEROL SULFATE 3 ML: .5; 3 SOLUTION RESPIRATORY (INHALATION) at 12:03

## 2019-03-21 NOTE — PROGRESS NOTES
[facility]  General Surgery  Progress Note    Subjective:     Interval History: no acute events overnight    Post-Op Info:  Procedure(s) (LRB):  LAPAROSCOPY, DIAGNOSTIC (N/A)  LAPAROTOMY, EXPLORATORY   17 Days Post-Op      Medications:  Continuous Infusions:  Scheduled Meds:   albumin human 5%  12.5 g Intravenous Once    albuterol-ipratropium  3 mL Nebulization Q4H    aspirin  81 mg Oral Daily    atorvastatin  10 mg Oral Daily    bismuth subsalicylate  30 mL Oral QID    ciprofloxacin HCl  500 mg Oral Q12H    enoxaparin  40 mg Subcutaneous Daily    escitalopram oxalate  10 mg Oral Daily    fluticasone-vilanterol  1 puff Inhalation Daily    levetiracetam XR  500 mg Oral BID    lisinopril  5 mg Oral Daily    megestrol  40 mg Oral Daily    nystatin  500,000 Units Mouth/Throat Daily    octreotide  100 mcg Subcutaneous Q8H    sodium chloride 0.9%  10 mL Intravenous Q6H     PRN Meds:sodium chloride, albuterol, dextrose 50%, glucagon (human recombinant), insulin aspart U-100, ketorolac, naloxone, ondansetron, oxyCODONE-acetaminophen, Flushing PICC Protocol **AND** sodium chloride 0.9% **AND** sodium chloride 0.9%, sodium chloride 0.9%     Objective:     Vital Signs (Most Recent):  Temp: 98.3 °F (36.8 °C) (03/21/19 0831)  Pulse: 65 (03/21/19 1112)  Resp: 18 (03/21/19 1112)  BP: (!) 158/74 (03/21/19 0831)  SpO2: 95 % (03/21/19 1112) Vital Signs (24h Range):  Temp:  [98.2 °F (36.8 °C)-99 °F (37.2 °C)] 98.3 °F (36.8 °C)  Pulse:  [65-77] 65  Resp:  [16-24] 18  SpO2:  [92 %-100 %] 95 %  BP: (147-161)/(72-79) 158/74       Intake/Output Summary (Last 24 hours) at 3/21/2019 1123  Last data filed at 3/21/2019 0600  Gross per 24 hour   Intake 360 ml   Output 500 ml   Net -140 ml       Physical Exam  Alert, no distress  RRR  No increased work of breathing  Abd soft, ostomy pink and productive of stool, midline intact with staples    Significant Labs:  BMP:   Recent Labs   Lab 03/20/19  0388  03/21/19  0518   GLU 95 96    139   K 3.9 3.8    103   CO2 29 25   BUN 12 12   CREATININE 0.8 0.7   CALCIUM 8.9 9.0   MG 1.5*  --      CBC:   Recent Labs   Lab 03/21/19  0518   WBC 14.26*   RBC 3.29*   HGB 9.5*   HCT 28.5*   *   MCV 87   MCH 28.9   MCHC 33.3       Significant Diagnostics:  None    Assessment/Plan:   68M s/p recent lap LAR for rectal cancer on 2/22 presenting with increasing abdominal pain, CT with intraperitoneal free air in the postoperative setting. Evening of 3/1 patient febrile, tachycardic and subsequently transferred to the ICU. Hemodynamics normalized overnight on 3/1 and he has remained HDS without lactic acidosis or metabolic durrangements. Repeat CT scan with readministration of free air. NG placed and PICC line obtained. Taken to the OR on 3/4 for ex lap and diverting loop ostomy. Returned to the ICU in stable condition, stepped down to the floor. s/p IR drainage of pelvic and perihepatic collections. TPN weaned on 3/15 and calorie count started.  Repeat CT scan yesterday with no new fluid collections, and resolved fluid collection around drain. Drain removed. Antibiotics deescalated to cipro.   -octreotide and kaopectate  -PT/OT  -Discharge to SNF today      Active Diagnoses:    Diagnosis Date Noted POA    Hypertension [I10] 03/02/2019 Unknown    Mixed hyperlipidemia [E78.2] 03/02/2019 Yes    Abdominal pain [R10.9] 02/26/2019 Yes    Carcinoma of rectum [C20] 11/12/2018 Yes      Problems Resolved During this Admission:    Diagnosis Date Noted Date Resolved POA    PRINCIPAL PROBLEM:  Other chest pain [R07.89] 03/02/2019 03/08/2019 Yes         Coby Harp MD  General Surgery  [facility]

## 2019-03-21 NOTE — PT/OT/SLP DISCHARGE
Physical Therapy Discharge Summary    Name: Justin Adams  MRN: [id number]   Principal Problem: Peritonitis     Patient Discharged from acute Physical Therapy on 2019.  Please refer to prior PT noted date on 2019 for functional status.     Assessment:     Patient appropriate for care in another setting.    Objective:     GOALS:   Multidisciplinary Problems     Physical Therapy Goals     Not on file          Multidisciplinary Problems (Resolved)        Problem: Physical Therapy Goal    Goal Priority Disciplines Outcome Goal Variances Interventions   Physical Therapy Goal   (Resolved)     PT, PT/OT Outcome(s) achieved     Description:  Goals to be met by: 3/21/2019     Patient will increase functional independence with mobility by performin. Supine to sit with Stand-by Assistance  2. Sit to stand transfer with Stand-by Assistance  3. Gait  x 100 feet with Stand-by Assistance using rollator.   4. Lower extremity exercise program x10 reps per handout, with supervision                      Reasons for Discontinuation of Therapy Services  Transfer to alternate level of care.      Plan:     Patient Discharged to: Skilled Nursing Facility.    Rosio Haque, PT  3/21/2019

## 2019-03-21 NOTE — NURSING
Patient discharged to [facility] at this time via a-med transportation in wheelchair with o2 at 2l. Patient has all belongings, discharge paperworks and prescriptions.

## 2019-03-21 NOTE — NURSING
Plan of care discussed w/ pt.  Meds reviewed.  Tolerated well  Pt resting w/ no acute distress noted.  Call bell placed within reach.  Will continue to monitor.

## 2019-03-21 NOTE — PROGRESS NOTES
AMY received a call from [facility] ([facility]) informing SW pt has been accepted for SNF, and they have received authorization from [employer].     AMY unable to locate 142 and PASRR. AMY contacted office of aging @ [phone] to determine if they are able to e-mail 142 to new e-mail. AMY spoke with Hailey, and Hailey agreed to e-mail 142 to SW. SW waiting 142.     SW to pt's room to inform [facility] has received authorization and he will be transferred to SNF on today. Per pt, he will contact his sister and notify.     11:41AM  AMY faxed ppd and chest x ray to [facility] via right care. AMY spoke with Dr. Harp, and waiting for plan of care.     12:52PM  AMY faxed orders to [facility] via right care. AMY waiting call report information.     1:05PM  AMY faxed d/c summary to [facility] via right. AMY contacted Tabitha ([facility]) and notified all information is in. According to Tabitha, to will give to nursing. AMY waiting on call report.     3:10PM  AMY faxed script and update notes to [facility]. Tabitha notified.     3:19PM  ADT 30 order placed for  Transportation.  ETA: 5:00PM  If transportation does not arrive at ETA time nurse will be instructed to follow protocol for transportation below:  How can I get in touch directly with dispatch, if needed?                 Non-emergent dispatch: [phone]                                    Emergent dispatch: [phone]  Non-emergent (stretcher): [phone]  Escalation Needs (PFC Lead): [phone]

## 2019-03-21 NOTE — PLAN OF CARE
03/21/19 1303   Medicare Message   Important Message from Medicare regarding Discharge Appeal Rights Given to patient/caregiver;Explained to patient/caregiver;Signed/date by patient/caregiver   Date IMM was signed 03/21/19   Time IMM was signed 1307

## 2019-03-21 NOTE — PLAN OF CARE
[facility]     Department of Hospital Medicine     [address] [place] [address]     [phone] after hours  [phone] fax       NURSING HOME ORDERS    03/21/2019    Admit to Nursing Home:   Skilled Bed      Diagnoses:  Active Hospital Problems    Diagnosis  POA    Hypertension [I10]  Unknown    Mixed hyperlipidemia [E78.2]  Yes    Abdominal pain [R10.9]  Yes    Carcinoma of rectum [C20]  Yes      Resolved Hospital Problems    Diagnosis Date Resolved POA    *Peritonitis [K65.9] 03/21/2019 Yes    Other chest pain [R07.89] 03/08/2019 Yes       Patient is homebound due to:  Peritonitis    Allergies:Review of patient's allergies indicates:  No Known Allergies    Vitals:     Every shift (Skilled Nursing patients)    Diet: 2000 narinder diabetic diet      Acitivities:    - Up in a chair each morning as tolerated   - Ambulate with assistance to bathroom   - Scheduled walks once each shift (every 8 hours)  - May use walker, cane, or self-propelled wheelchair   - Weight bearing: as tolerated    LABS:  Per facility protocol    Nursing Precautions:            - Fall precautions per nursing home protocol  - Decubitus precautions:       - Turn patient every two hours. Use wedge pillows to anchor patient    CONSULTS:     Physical Therapy to evaluate and treat     Occupational Therapy to evaluate and treat            MISCELLANEOUS CARE:               Colostomy Care:    Empty bag every shift and prn                                              Change and clean site every 48 hours                              DIABETES CARE:        Check blood glucose      Fingerstick blood sugar AC and HS      Report CBG < 60 or > 400 to physician.                                          Insulin Sliding Scale          Glucose  Novolog Insulin Subcutaneous        0 - 60   Orange juice or glucose tablet, hold insulin      No insulin   201-250  2 units   251-300  4 units   301-350  6  units   351-400  8 units   >400   10 units then call physician      Medications: Discontinue all previous medication orders, if any. See new list below.   Justin Adams   Home Medication Instructions SHASTA:[id number]    Printed on:03/21/19 1235   Medication Information                      aspirin (ECOTRIN) 81 MG EC tablet  Take 81 mg by mouth once daily.             atorvastatin (LIPITOR) 10 MG tablet  TAKE 1 TABLET BY MOUTH EVERY BEDTIME             escitalopram oxalate (LEXAPRO) 10 MG tablet  Take 10 mg by mouth once daily.             levetiracetam (KEPPRA) 500 MG Tab  Take 500 mg by mouth 2 (two) times daily.             lisinopril (PRINIVIL,ZESTRIL) 5 MG tablet  Take 5 mg by mouth once daily.             oxyCODONE-acetaminophen (PERCOCET)  mg per tablet  TK 1 T PO Q 4 H PRN P FOR UP TO 10 DAYS. MDD 6 TS             SYMBICORT 160-4.5 mcg/actuation HFAA  2 puffs 2 (two) times daily.             Megace 400mg PO daily for appetite stimulation           Kaopectate 30mg PO every 6 hours to reduce ileostomy output           Octreotide 100mcg subcutaneous every 8 hours to reduce ileostomy output           Lovenox 40mg subcutaneous daily DVT prophylaxis           Cipro 500mg PO every 12 hours ---STOP on 3//25/19           Toradol 15mg IV every 6 hours PRN pain            VENTOLIN HFA 90 mcg/actuation inhaler  Inhale 2 puffs into the lungs every 4 (four) hours as needed.                 Return to Dr Glass clinic in one week for follow up and staple removal                 _________________________________  03/21/2019

## 2019-03-21 NOTE — PROGRESS NOTES
TN sent scanned face shet, pasrr and 142 to [facility] through [facility] and provided Sher Gabriel, Manager, CM with hard copies..Cee Miller RN, BSN, [facility]  3/21/2019

## 2019-03-21 NOTE — PLAN OF CARE
03/21/19 1511   Final Note   Assessment Type Final Discharge Note   Anticipated Discharge Disposition SNF   What phone number can be called within the next 1-3 days to see how you are doing after discharge? [phone]   Hospital Follow Up  Appt(s) scheduled? Yes   Discharge plans and expectations educations in teach back method with documentation complete? Yes   Right [facility] Referral Info   Post Acute Recommendation SNF / Sub-Acute Rehab   Referral Type SNF   Facility Name [address] [address]      provided pt's nurseLennie, with pt's travel packet, call report information, and transport ETA.

## 2019-03-21 NOTE — DISCHARGE SUMMARY
[facility]  Discharge Summary      Admit Date: 2/26/2019    Discharge Date and Time:  03/21/2019 12:58 PM    Attending Physician: Mat Glass MD     Reason for Admission: Abdominal pain after surgery, peritonitis    Procedures Performed: Procedure(s) (LRB):  LAPAROSCOPY, DIAGNOSTIC (N/A)  LAPAROTOMY, EXPLORATORY    Hospital Course (synopsis of major diagnoses, care, treatment, and services provided during the course of the hospital stay): Mr Adams is a 68 year old man with history of rectal cancer who underwent neoadjuvant therapy prior to presenting for laparoscopic low anterior resection on 2/22/19. His post operative course after this surgery was unremarkable and he was discharged home.   He was readmitted on 2/26/19, one day after discharge, with worsening abdominal pain. CT scan was obtained which demonstrated pneumoperitoneum, which was originally attributed to recent surgery. However, the patient's pain worsened and he became septic, which was more concerning for an anastomotic leak. He was taken back to the OR 3/4/19  for exploratory laparotomy and was found to have dehiscence of his anastomotic staple line. He underwent washout out and diverting loop ileostomy.  Repeat CT scan was performed post op which showed pelvic abscess and IR drain was placed. He underwent additional CT scans to monitor the fluid collections, and had placement of additional perihepatic drain. These drains were removed after resolution of the collection, and he remained on broad spectrum antibiotics until infectious markers improved.   CXR obtained 3/18/19 was suggestive of pneumonia, and he was placed on Cipro.      Consults: dietician, cardiology, IR, wound ostomy RN    Significant Diagnostic Studies: labs, CT scan, CX    Final Diagnoses:    Principal Problem: Peritonitis   Secondary Diagnoses:   Active Hospital Problems    Diagnosis  POA    Hypertension [I10]  Unknown    Mixed hyperlipidemia [E78.2]   Yes    Abdominal pain [R10.9]  Yes    Carcinoma of rectum [C20]  Yes      Resolved Hospital Problems    Diagnosis Date Resolved POA    *Peritonitis [K65.9] 03/21/2019 Yes    Other chest pain [R07.89] 03/08/2019 Yes       Discharged Condition: fair    Disposition: Skilled Nursing Facility    Follow Up/Patient Instructions:     Medications:  Reconciled Home Medications:      Medication List      START taking these medications    bismuth subsalicylate 262 mg/15 mL suspension  Commonly known as:  PEPTO BISMOL  Take 30 mLs by mouth 4 (four) times daily. for 10 days     ciprofloxacin HCl 500 MG tablet  Commonly known as:  CIPRO  Take 1 tablet (500 mg total) by mouth every 12 (twelve) hours.     enoxaparin 40 mg/0.4 mL Syrg  Commonly known as:  LOVENOX  Inject 0.4 mLs (40 mg total) into the skin once daily.     insulin aspart U-100 100 unit/mL Inpn pen  Commonly known as:  NovoLOG  Inject 0-10 Units into the skin every 6 (six) hours as needed (Hyperglycemia).     ketorolac 30 mg/mL (1 mL) injection  Commonly known as:  TORADOL  Inject 0.5 mLs (15 mg total) into the vein every 6 (six) hours as needed.     megestrol 40 MG Tab  Commonly known as:  MEGACE  Take 1 tablet (40 mg total) by mouth once daily.  Start taking on:  3/22/2019     octreotide 100 mcg/mL Soln  Commonly known as:  SANDOSTATIN  Inject 1 mL (100 mcg total) into the skin every 8 (eight) hours.     oxyCODONE-acetaminophen 5-325 mg per tablet  Commonly known as:  PERCOCET  Take 1 tablet by mouth every 4 (four) hours as needed.  Replaces:  oxyCODONE-acetaminophen  mg per tablet        CONTINUE taking these medications    aspirin 81 MG EC tablet  Commonly known as:  ECOTRIN  Take 81 mg by mouth once daily.     atorvastatin 10 MG tablet  Commonly known as:  LIPITOR  TAKE 1 TABLET BY MOUTH EVERY BEDTIME     escitalopram oxalate 10 MG tablet  Commonly known as:  LEXAPRO  Take 10 mg by mouth once daily.     levETIRAcetam 500 MG Tab  Commonly known as:   KEPPRA  Take 500 mg by mouth 2 (two) times daily.     lisinopril 5 MG tablet  Commonly known as:  PRINIVIL,ZESTRIL  Take 5 mg by mouth once daily.     SYMBICORT 160-4.5 mcg/actuation Hfaa  Generic drug:  budesonide-formoterol 160-4.5 mcg  2 puffs 2 (two) times daily.     VENTOLIN HFA 90 mcg/actuation inhaler  Generic drug:  albuterol  Inhale 2 puffs into the lungs every 4 (four) hours as needed.        STOP taking these medications    alvimopan 12 mg capsule  Commonly known as:  ENTEREG     CENTRUM SILVER ULTRA MEN'S ORAL     docusate sodium 100 MG capsule  Commonly known as:  COLACE     ferrous sulfate 325 mg (65 mg iron) Tab tablet  Commonly known as:  FEOSOL     GAVILYTE-G 236-22.74-6.74 -5.86 gram suspension  Generic drug:  polyethylene glycol     hydroCHLOROthiazide 25 MG tablet  Commonly known as:  HYDRODIURIL     metroNIDAZOLE 500 MG tablet  Commonly known as:  FLAGYL     neomycin 500 mg Tab  Commonly known as:  MYCIFRADIN     omeprazole 20 MG capsule  Commonly known as:  PRILOSEC     ondansetron 8 MG tablet  Commonly known as:  ZOFRAN     oxyCODONE 5 MG immediate release tablet  Commonly known as:  ROXICODONE     oxyCODONE-acetaminophen  mg per tablet  Commonly known as:  PERCOCET  Replaced by:  oxyCODONE-acetaminophen 5-325 mg per tablet     potassium chloride 10 MEQ Cpsr  Commonly known as:  MICRO-K     PROCTOSOL HC 2.5 % rectal cream  Generic drug:  hydrocortisone          Discharge Procedure Orders   Vital signs per facility protocol     Intake and output per facility protocol     Skin assessment every shift      Notify Physician     Order Specific Question Answer Comments   Temperature (F) greater than 101    Systolic Blood Pressure SBP greater than or equal to 160    Systolic Blood Pressure SBP less than or equal to 90    Diastolic Blood Pressure DBP greater than or equal to 110    Diastolic Blood Pressure DBP less than or equal to 60    Pulse greater than or equal to 120    Pulse less than or  equal to 50    Respirations Rate RR greater than or equal to 30    Respirations Rate RR less than or equal to 6    Urine output less than 60 over 2 hours   SPO2% less than 90      Pulse Oximetry     Follow-up Information     Mat Glass MD On 3/7/2019.    Specialties:  General Surgery, Oncology  Why:  Appointment scheduled for Thursday March 7th at 9:30am  Contact information:  [address]  [address]  [facility] [address]  [phone]             Vu Adames MD On 3/28/2019.    Specialties:  Hematology and Oncology, Hematology, Oncology  Why:  Appointment scheduled for Thursday March 28th at 3pm  Contact information:  [address] [facility] [address]  [phone]             Mat Glass MD In 1 week.    Specialties:  General Surgery, Oncology  Why:  staple removal, follow up  Contact information:  [address]  [address]  [facility] [address]  [phone]

## 2019-03-21 NOTE — NURSING
picc line removed, patient tolerated well. Tele monitor removed and called into tele. report called into [facility] to nurse katina.  Patient changed and belongings packed up. Patient requested to call his sister.

## 2019-03-21 NOTE — PROGRESS NOTES
Alice at [facility] is requesting ppd and cxr. TN will inform Sabrina..Cee Miller RN, BSN, [facility]  3/21/2019  '

## 2019-03-21 NOTE — PLAN OF CARE
Summary:  Mr Adams is a 68 year old man with history of rectal cancer who underwent neoadjuvant therapy prior to presenting for laparoscopic low anterior resection on 2/22/19. His post operative course after this surgery was unremarkable and he was discharged home.   He was readmitted on 2/26/19, one day after discharge, with worsening abdominal pain. CT scan was obtained which demonstrated pneumoperitoneum, which was originally attributed to recent surgery. However, the patient's pain worsened and he became septic, which was more concerning for an anastomotic leak. He was taken back to the OR 3/4/19  for exploratory laparotomy and was found to have dehiscence of his anastomotic staple line. He underwent washout out and diverting loop ileostomy.  Repeat CT scan was performed post op which showed pelvic abscess and IR drain was placed. He underwent additional CT scans to monitor the fluid collections, and had placement of additional perihepatic drain. These drains were removed after resolution of the collection, and he remained on broad spectrum antibiotics until infectious markers improved.   CXR obtained 3/18/19 was suggestive of pneumonia, and he was placed on Cipro.    Discharge to SNF    Neuro:   -continue home medication levetiracetem 500mg BID, escitalopram 10mg daily  -prn percocet 5/325mg q4hr for moderate pain, and prn toradol 15mg IV q6 hr for moderate pain    CV:   -continue home medication aspirin 81mg daily, atorvastatin 10mg daily, lisinopril 5mg daily  -vitals per routine    Pulm:  -continue home medication fluticasone-vilanterol 100-25mcg 1 puff daily, albuterol 2puff q4 hr prn wheezing  -pulmonary toilet, deep cough, incentive spirometer    FENGI:  -No IVF  -Diabetic 2000 calorie diet and oral supplements  -Megace 40mg daily for appetite stimulation  -octreotide 100mcg q 8 hr and kaopectate 30mg q6hr to reduce ileostomy output    Heme/ID:  -lovenox 40mg daily DVT prophylaxis  -Cipro 500mg PO  q12hr, end date 3/25/19    Endo:  -insulin sliding scale, accuchecks    Wound care:  -routine ostomy care  -return to clinic for staple removal    PT per routine, encourage OOB and ambulation    Return to Dr Glass clinic in one week for follow up and staple removal    Coby Harp MD

## 2019-03-22 ENCOUNTER — TELEPHONE (OUTPATIENT)
Dept: SURGERY | Facility: CLINIC | Age: 69
End: 2019-03-22

## 2019-03-22 NOTE — TELEPHONE ENCOUNTER
Spoke with Ernestina and notified her per  they can change ostomy bag and need to monitor his output. It is ordered for pt to have Sandostatin & Kaeopectate.   wants to add Metamucil 1 tbsp daily. Orders faxed to Ernestina [phone]        ----- Message from Shanna Kuhn sent at 3/22/2019 10:36 AM CDT -----  Contact: Vinicius Zamudio of [facility]  Ernestina called to clarify if the colostomy bag should be removed or not. Ernestina also stated that the bag is full of black liquid and was concerned it that was normal. Please call to advise at [phone].

## 2019-03-28 ENCOUNTER — TELEPHONE (OUTPATIENT)
Dept: HEMATOLOGY/ONCOLOGY | Facility: CLINIC | Age: 69
End: 2019-03-28

## 2019-03-28 NOTE — TELEPHONE ENCOUNTER
I called the patient to see if he would like to reschedule his missed appointment. The phone number is not active.

## 2019-04-01 ENCOUNTER — OFFICE VISIT (OUTPATIENT)
Dept: SURGERY | Facility: CLINIC | Age: 69
End: 2019-04-01
Payer: MEDICARE

## 2019-04-01 VITALS
HEIGHT: 64 IN | WEIGHT: 225 LBS | BODY MASS INDEX: 38.41 KG/M2 | HEART RATE: 105 BPM | DIASTOLIC BLOOD PRESSURE: 71 MMHG | SYSTOLIC BLOOD PRESSURE: 102 MMHG

## 2019-04-01 DIAGNOSIS — R10.84 GENERALIZED ABDOMINAL PAIN: ICD-10-CM

## 2019-04-01 DIAGNOSIS — C20 MALIGNANT NEOPLASM OF RECTUM: Primary | ICD-10-CM

## 2019-04-01 PROCEDURE — 99024 PR POST-OP FOLLOW-UP VISIT: ICD-10-PCS | Mod: S$GLB,,, | Performed by: SURGERY

## 2019-04-01 PROCEDURE — 99024 POSTOP FOLLOW-UP VISIT: CPT | Mod: S$GLB,,, | Performed by: SURGERY

## 2019-04-01 NOTE — PROGRESS NOTES
Subjective:       Patient ID: Justin Adams is a 68 y.o. male.    Chief Complaint: Post-op Evaluation    HPI 69 yo male with rectal cancer with post leak and ileostomy and now in rehab post op  Review of Systems   Constitutional: Negative.    HENT: Negative.    Eyes: Negative.    Respiratory: Negative.    Cardiovascular: Negative.    Gastrointestinal: Negative.    Endocrine: Negative.    Musculoskeletal: Negative.    Skin: Negative.    Allergic/Immunologic: Negative.    Neurological: Negative.    Hematological: Negative.    Psychiatric/Behavioral: Negative.    All other systems reviewed and are negative.      Objective:      Physical Exam   Constitutional: He is oriented to person, place, and time. He appears well-developed and well-nourished.   HENT:   Head: Normocephalic and atraumatic.   Right Ear: External ear normal.   Left Ear: External ear normal.   Nose: Nose normal.   Mouth/Throat: Oropharynx is clear and moist.   Eyes: Pupils are equal, round, and reactive to light. Conjunctivae and EOM are normal.   Neck: Normal range of motion. Neck supple.   Cardiovascular: Normal rate, regular rhythm, normal heart sounds and intact distal pulses.   Pulmonary/Chest: Effort normal and breath sounds normal.   Abdominal: Soft. Bowel sounds are normal.   Musculoskeletal: Normal range of motion.   Neurological: He is alert and oriented to person, place, and time. He has normal reflexes. He displays tremor.   Skin: Skin is warm and dry.   Psychiatric: He has a normal mood and affect. His behavior is normal. Thought content normal.   Vitals reviewed.      Assessment:       1. Malignant neoplasm of rectum      with ileostomy functioning well  Plan:       I will see him back in 3 weeks with a CT abd pelvis with rectal contrast.  He needs to increase his po fluid intake preferable with electrolyte based drinks

## 2019-04-02 ENCOUNTER — TELEPHONE (OUTPATIENT)
Dept: HEMATOLOGY/ONCOLOGY | Facility: CLINIC | Age: 69
End: 2019-04-02

## 2019-04-03 ENCOUNTER — TELEPHONE (OUTPATIENT)
Dept: SURGERY | Facility: CLINIC | Age: 69
End: 2019-04-03

## 2019-04-03 NOTE — TELEPHONE ENCOUNTER
Coco with [facility] notified of pts appt for ct scan 4/10/19 @ 9:30am-owb-prep instructions given and appt with  4/23/19 @ 1:00pm.  Coco also notified pt needs to have bloodwork done when he comes in on 4/10/19

## 2020-07-22 NOTE — PT/OT/SLP PROGRESS
Physical Therapy      Patient Name:  Justin Adams   MRN:  [id number]    Patient not seen today secondary to Pt being D/c'd to SNF.  PT to sign off.    Rosio Haque, PT     No

## 2021-10-18 NOTE — PROGRESS NOTES
Chief Complaint :   Carcinoma Rectum    Hx of Present illness :  Patient is a 68 y.o. year old male who presents to the clinic today for  Oncology evaluation. Had routine Colonoscopy about    Three  months ago. 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 In October had  Trans anal  Excision of rectal Mass.  .  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 PT1,pNx.      Allergies :    Review of patient's allergies indicates:  No Known Allergies    Occupation :  .    Transfusion :  none    Menstrual & obstetric Hx :  N/A      Present Meds :      Medication List           Accurate as of 11/12/18  9:01 AM. If you have any questions, ask your nurse or doctor.               CONTINUE taking these medications    aspirin 81 MG EC tablet  Commonly known as:  ECOTRIN     atorvastatin 10 MG tablet  Commonly known as:  LIPITOR     CENTRUM SILVER ULTRA MEN'S ORAL     docusate sodium 100 MG capsule  Commonly known as:  COLACE     escitalopram oxalate 10 MG tablet  Commonly known as:  LEXAPRO     ferrous sulfate 325 mg (65 mg iron) Tab tablet  Commonly known as:  FEOSOL     hydroCHLOROthiazide 25 MG tablet  Commonly known as:  HYDRODIURIL     levETIRAcetam 500 MG Tab  Commonly known as:  KEPPRA     lisinopril 5 MG tablet  Commonly known as:  PRINIVIL,ZESTRIL     omeprazole 20 MG capsule  Commonly known as:  PRILOSEC     potassium chloride 10 MEQ Cpsr  Commonly known as:  MICRO-K     SYMBICORT 160-4.5 mcg/actuation Hfaa  Generic drug:  budesonide-formoterol 160-4.5 mcg     VENTOLIN HFA 90 mcg/actuation inhaler  Generic drug:  albuterol            Past Medical Hx :  Hx of Prostate Cancer many years ago.  Hypetension; Hx of ruputured intracranial aneurysm.     Past Medical Hx :  Past Medical History:   Diagnosis Date    Aphagia     Cancer     prostate & colon    Cardiomegaly     Dysphagia     Hypertension     Seizures     Subdural hemorrhage        Travel Hx :  N/A    Immunization :    There is no  immunization history on file for this patient.    Family Hx :  Family History   Problem Relation Age of Onset    Aneurysm Sister        Social Hx :  Social History     Socioeconomic History    Marital status:      Spouse name: Not on file    Number of children: Not on file    Years of education: Not on file    Highest education level: Not on file   Social Needs    Financial resource strain: Not on file    Food insecurity - worry: Not on file    Food insecurity - inability: Not on file    Transportation needs - medical: Not on file    Transportation needs - non-medical: Not on file   Occupational History    Not on file   Tobacco Use    Smoking status: Current Some Day Smoker     Types: Cigars    Smokeless tobacco: Never Used    Tobacco comment: one daily   Substance and Sexual Activity    Alcohol use: Yes     Comment: occassional    Drug use: No    Sexual activity: Not Currently     Partners: Female   Other Topics Concern    Not on file   Social History Narrative    Not on file       Surgery :   Sep 2018, had laparoscopic transverse Colectomy.  Had excision of rectal mass in Oct 2018.   Colonoscopy; tonsillectomy. Pilonidal Cyst    Symptoms :    Review of Systems   Constitutional: Negative for chills, diaphoresis, fever, malaise/fatigue and weight loss.   HENT: Positive for hearing loss. Negative for congestion, ear discharge, nosebleeds, sore throat and tinnitus.    Eyes: Negative for blurred vision, double vision, photophobia, pain, discharge and redness.        Poor vision left eye. Cataract surgery both eyes   Respiratory: Positive for shortness of breath. Negative for cough, hemoptysis, sputum production and wheezing.    Cardiovascular: Positive for leg swelling. Negative for chest pain, palpitations, claudication and PND.   Gastrointestinal: Positive for blood in stool. Negative for abdominal pain, constipation, diarrhea, heartburn, melena, nausea and vomiting.        Has rectal pain    Genitourinary: Negative for dysuria, flank pain, frequency, hematuria and urgency.        Incontinent   Musculoskeletal: Negative for back pain, falls, joint pain, myalgias and neck pain.   Skin: Negative for itching and rash.   Neurological: Positive for tingling. Negative for dizziness, tremors, sensory change, speech change, focal weakness, seizures and headaches.   Endo/Heme/Allergies: Negative for environmental allergies and polydipsia. Does not bruise/bleed easily.   Psychiatric/Behavioral: Negative for depression and memory loss. The patient is nervous/anxious. The patient does not have insomnia.        Physical Exam :   Physical Exam   Constitutional: He is oriented to person, place, and time and well-developed, well-nourished, and in no distress. No distress.   HENT:   Head: Normocephalic and atraumatic.   Right Ear: External ear normal.   Left Ear: External ear normal.   Nose: Nose normal.   Mouth/Throat: Oropharynx is clear and moist. No oropharyngeal exudate.   Eyes: Conjunctivae and EOM are normal. Pupils are equal, round, and reactive to light. Right eye exhibits no discharge. Left eye exhibits no discharge. No scleral icterus.       Neck: Normal range of motion. Neck supple. No JVD present. No tracheal deviation present. No thyromegaly present.   Cardiovascular: Normal rate, regular rhythm, normal heart sounds, intact distal pulses and normal pulses. PMI is not displaced. Exam reveals no gallop and no friction rub.   No murmur heard.  Pulmonary/Chest: Effort normal and breath sounds normal. No stridor. No apnea. No respiratory distress. He has no wheezes. He has no rales. He exhibits no tenderness.   Abdominal: Soft. Bowel sounds are normal. He exhibits no distension and no mass. There is no tenderness. There is no rebound and no guarding.   Musculoskeletal: Normal range of motion. He exhibits edema (Trace Edema both legs). He exhibits no tenderness or deformity.   Lymphadenopathy:        Head  (right side): No submental, no submandibular, no tonsillar, no preauricular, no posterior auricular and no occipital adenopathy present.        Head (left side): No submental, no submandibular, no tonsillar, no preauricular, no posterior auricular and no occipital adenopathy present.     He has no cervical adenopathy.     He has no axillary adenopathy.        Right: No inguinal, no supraclavicular and no epitrochlear adenopathy present.        Left: No inguinal, no supraclavicular and no epitrochlear adenopathy present.   Neurological: He is alert and oriented to person, place, and time. He has normal reflexes. He displays normal reflexes. No cranial nerve deficit. He exhibits normal muscle tone. Gait normal. Coordination normal. GCS score is 15.   Skin: Skin is warm, dry and intact. No rash noted. He is not diaphoretic. No cyanosis or erythema. No pallor. Nails show no clubbing.   Psychiatric: Mood, memory, affect and judgment normal.         Labs & Imaging :   Adenocarcinoma transverse Colon. Adenocarcinoma rectum.        Dx : Synchronous Cancers Involving transverse Colon and Rectum.      Assessment & Plan:  Discussed with patient and his sister. Reviewed with dr. Glass,. He stated he had only done Bx of Rectal mass and it was a large tumor. He wanted patient to receive neosadjuvant Concurrent Chemoradiation followed by surgery. Natural hx of rectal cancer reviewed. Patient;s presenting features explained. Rationale for concurrent Chemoradiation explained. 5 Fluorouracil  Will be the sensitizing agent.  Side effects of 5 fluorouracil explained. He has appt to see  on 11/13/18. portacath to be placed by . Consent for ChemoRx obtained.  Schedule chemoschool and initiate Treatment.     Pt presents to ED C/O intermittent chest palpitations and chest pain x 3 weeks. Pt states, " It's been getting worse, last night I woke up sweating and my heart was pounding, sometimes it feels like my chest tightens and my throat closes off for a few seconds". Pt states, "I have family cardiac hx." EKG in progress.

## 2023-11-10 NOTE — PT/OT/SLP PROGRESS
Occupational Therapy   Treatment    Name: Justin Adams  MRN: [id number]  Admitting Diagnosis:  Other chest pain  15 Days Post-Op    Recommendations:     Discharge Recommendations: nursing facility, skilled  Discharge Equipment Recommendations:  other (see comments)(TBD)  Barriers to discharge:  Decreased caregiver support    Assessment:     Justin Adams is a 68 y.o. male with a medical diagnosis of Other chest pain.  He presents with the following performance deficits affecting function weakness, impaired endurance, impaired self care skills, impaired functional mobilty, gait instability, impaired balance, impaired cognition, decreased upper extremity function, decreased lower extremity function, decreased coordination, decreased safety awareness, pain, decreased ROM, impaired skin, impaired cardiopulmonary response to activity, edema. Patient tolerated OOB activity today with increased time to perform all activity.     Rehab Prognosis:  Fair; patient would benefit from acute skilled OT services to address these deficits and reach maximum level of function.       Plan:     Patient to be seen 3 x/week to address the above listed problems via self-care/home management, therapeutic activities, therapeutic exercises  · Plan of Care Expires: 03/28/19  · Plan of Care Reviewed with: patient(sister)    Subjective   Patient agreeable to therapy with encouragement.  Pain/Comfort:  · Pain Rating 1: (did not rate)  · Location - Orientation 1: generalized  · Location 1: abdomen  · Pain Addressed 1: Pre-medicate for activity, Reposition    Objective:     Communicated with: Nurse Ordaz prior to session.  Patient found HOB elevated with bed alarm, telemetry, PICC line, colostomy, oxygen, GREG drain upon OT entry to room.    General Precautions: Standard, fall, respiratory   Orthopedic Precautions:N/A   Braces: N/A     Occupational Performance:  Patient confused and disoriented, able to re-orient with increased  time,    Bed Mobility:    · Patient completed Rolling/Turning to Left with  stand by assistance  · Patient completed Scooting/Bridging with contact guard assistance and minimum assistance  · Patient completed Supine to Sit with moderate assistance, HOB elevated, and side rail   · Patient completed Sit to Supine with contact guard assistance     Functional Mobility/Transfers:  · Patient completed Sit <> Stand Transfer with contact guard assistance  with rollator x 2 from EOB x 1 from chair  · Functional Mobility: Patient able to ambulate with PT CGA-MIN A/rollator.     Activities of Daily Living:  · Grooming: set up assistance to wash face seated EOB    · Upper Body Dressing: maximal assistance    · Toileting: total assistance for pericare and clothing management. Patient able to maintain standing balance with CGA to don/doff brief and for pericare.    [facility] 6 Click ADL: 11    Treatment & Education:  Patient performed bed mobility, functional transfers, and ADL's as above.   Patient educated on importance of OOB mobility and need for continued therapy.   Patient able to perform BUE ROM therex x 10 reps elbow and hand.  Patient noted with increased BUE tremors today.    Patient left HOB elevated with all lines intact, call button in reach, nurse notified and sister and transport present to take patient to CT scan presentEducation:      GOALS:   Multidisciplinary Problems     Occupational Therapy Goals        Problem: Occupational Therapy Goal    Goal Priority Disciplines Outcome Interventions   Occupational Therapy Goal     OT, PT/OT Ongoing (interventions implemented as appropriate)    Description:  Goals to be met by: 3/28/2019     Patient will increase functional independence with ADLs by performing:    Feeding with Modified [place].  UE Dressing with [place].  LE Dressing with Contact Guard Assistance.  Grooming while standing at sink with Contact Guard Assistance.  Toileting from bedside commode with  Stand-by Assistance for hygiene and clothing management.   Sitting at edge of bed x15 minutes with Stand-by Assistance.  Rolling to Bilateral with Stand-by Assistance.   Supine to sit with Stand-by Assistance.  Step transfer with Contact Guard Assistance  Toilet transfer to toilet with Contact Guard Assistance.  Upper extremity exercise program with assistance as needed.                      Time Tracking:     OT Date of Treatment: 03/19/19  OT Start Time: 1341  OT Stop Time: 1419  OT Total Time (min): 38 min    Billable Minutes:Self Care/Home Management 13  Therapeutic Activity 11 (co-treat with PTA)    CINDY Hurst  3/19/2019     none

## (undated) DEVICE — SEE MEDLINE ITEM 157117

## (undated) DEVICE — APPLICATOR CHLORAPREP ORN 26ML

## (undated) DEVICE — SEE MEDLINE ITEM 152622

## (undated) DEVICE — KIT FIBRIN SEALANT EVICEL 5 ML

## (undated) DEVICE — PACK ENDOSCOPY GENERAL

## (undated) DEVICE — SEE MEDLINE ITEM 146417

## (undated) DEVICE — TRAY FOLEY 16FR INFECTION CONT

## (undated) DEVICE — KIT ANTIFOG

## (undated) DEVICE — STAPLER SKIN PROXIMATE WIDE

## (undated) DEVICE — JELLY LUBRICANT STERILE 4 OZ

## (undated) DEVICE — NDL HYPO REG 25G X 1 1/2

## (undated) DEVICE — BLADE SURG CARBON STEEL SZ11

## (undated) DEVICE — CATH URETHRAL RED RUBBER 18FR

## (undated) DEVICE — CANISTER SUCTION 2 LTR

## (undated) DEVICE — SUT 3-0 VICRYL / SH (J416)

## (undated) DEVICE — IRRIGATOR ENDOSCOPY DISP.

## (undated) DEVICE — CLOSURE SKIN STERI STRIP 1/2X4

## (undated) DEVICE — SET DECANTER MEDICHOICE

## (undated) DEVICE — KIT SEAL HEMSTAT EVICEL 35CM

## (undated) DEVICE — BLANKET UPPER BODY 78.7X29.9IN

## (undated) DEVICE — Device

## (undated) DEVICE — RETRACTOR MED ENDOPATH EXTRUS

## (undated) DEVICE — SEE L#120831

## (undated) DEVICE — SUT CTD VICRYL 0 UND BR CT

## (undated) DEVICE — COVER OVERHEAD SURG LT BLUE

## (undated) DEVICE — SYR 10CC LUER LOCK

## (undated) DEVICE — NDL 18GA X1 1/2 REG BEVEL

## (undated) DEVICE — GLOVE BIOGEL 7.5

## (undated) DEVICE — SUT PROLENE 3-0 SH DA 36 BL

## (undated) DEVICE — SUT CTD VICRYL 3-0 CR/SH

## (undated) DEVICE — SUT PROLENE 2-0 SH 36IN BLU

## (undated) DEVICE — SEE MEDLINE ITEM 107746

## (undated) DEVICE — ELECTRODE REM PLYHSV RETURN 9

## (undated) DEVICE — DRESSING ABSRBNT ISLAND 3.6X8

## (undated) DEVICE — STAPLER SKIN ROTATING HEAD

## (undated) DEVICE — TROCAR ENDOPATH XCEL 5X100MM

## (undated) DEVICE — DRESSING TELFA N ADH 3X8

## (undated) DEVICE — GLOVE SURG BIOGEL LATEX SZ 7.5

## (undated) DEVICE — SYR 3CC LUER LOC

## (undated) DEVICE — POSITIONER HEAD DONUT 9IN FOAM

## (undated) DEVICE — SUT CTD VICRYL 3-0 UND SUTU

## (undated) DEVICE — SUT SILK 2-0 PS 18IN BLACK

## (undated) DEVICE — RELOAD ENDO LINEAR RELOD GR 60

## (undated) DEVICE — SOL NACL 0.9% INJ 250ML BG

## (undated) DEVICE — SUT 1 36IN PDS II VIO MONO

## (undated) DEVICE — CLIPPER BLADE MOD 4406 (CAREF)

## (undated) DEVICE — SPONGE LAP 18X18 PREWASHED

## (undated) DEVICE — SOL NS 1000CC

## (undated) DEVICE — GAUZE SPONGE 4X4 12PLY

## (undated) DEVICE — TROCAR ENDOPATH XCEL 12X100MM

## (undated) DEVICE — STAPLER ECHELON FLEX GST 60MM

## (undated) DEVICE — SEE MEDLINE ITEM 146372

## (undated) DEVICE — TUBING INSUFFLATION 10

## (undated) DEVICE — SUT 4/0 18IN COATED VICRYL

## (undated) DEVICE — SOL 9P NACL IRR PIC IL

## (undated) DEVICE — SHEET THYROID W/ISO-BAC

## (undated) DEVICE — SEE MEDLINE ITEM 146292

## (undated) DEVICE — STAPLER CIRCULAR XL SEAL 29MM

## (undated) DEVICE — SOL CLEARIFY VISUALIZATION LAP

## (undated) DEVICE — SOL SOD CHLORIDE 0.9% 10ML

## (undated) DEVICE — CART STAPLE RELD 30X3.5 BLU

## (undated) DEVICE — SEAL CAP ASSEMBLY

## (undated) DEVICE — STAPLER RELOADABLE LINEAR 30M

## (undated) DEVICE — SHEARS HARMONIC 36CM HD 1000I

## (undated) DEVICE — DRAPE C-ARM FOR MOBILE XRAY

## (undated) DEVICE — RELOAD ECHELON FLEX GRN 60MM

## (undated) DEVICE — RELOAD ECHELON FLEX BLU 60MM

## (undated) DEVICE — SYS CLSR DERMABOND PRINEO 22CM

## (undated) DEVICE — RETRACTOR SM ENDOPATH EXTRUS

## (undated) DEVICE — DRESSING ADH ISLAND 2.5 X 3

## (undated) DEVICE — FOAM APP FILM BARRIER NO STING

## (undated) DEVICE — APPLICATOR LAPAROSCOPIC EXTEND

## (undated) DEVICE — NDL HYPO A BEVEL 25X1

## (undated) DEVICE — SUPPORT ULNA NERVE PROTECTOR

## (undated) DEVICE — TAPE CLOTH MEDIPORE SOFT 2X2YD

## (undated) DEVICE — ADHESIVE MASTISOL VIAL 48/BX

## (undated) DEVICE — SUT 2/0 30IN SILK BLACK

## (undated) DEVICE — SUTURE STRATAFIX PGAPCL 4 UNI

## (undated) DEVICE — GOWN SURGICAL XX LARGE X LONG

## (undated) DEVICE — DRESSING TRANS 4X4 TEGADERM

## (undated) DEVICE — SLEEVE SCD EXPRESS CALF MEDIUM